# Patient Record
Sex: FEMALE | Race: BLACK OR AFRICAN AMERICAN | Employment: UNEMPLOYED | URBAN - METROPOLITAN AREA
[De-identification: names, ages, dates, MRNs, and addresses within clinical notes are randomized per-mention and may not be internally consistent; named-entity substitution may affect disease eponyms.]

---

## 2017-01-13 DIAGNOSIS — J45.40 MODERATE PERSISTENT ASTHMA WITHOUT COMPLICATION: ICD-10-CM

## 2017-01-13 RX ORDER — BUDESONIDE AND FORMOTEROL FUMARATE DIHYDRATE 160; 4.5 UG/1; UG/1
2 AEROSOL RESPIRATORY (INHALATION) 2 TIMES DAILY
Qty: 1 INHALER | Refills: 3 | Status: SHIPPED | OUTPATIENT
Start: 2017-01-13 | End: 2022-10-10

## 2017-07-06 ENCOUNTER — APPOINTMENT (OUTPATIENT)
Dept: CT IMAGING | Age: 27
End: 2017-07-06
Payer: MEDICARE

## 2017-07-06 ENCOUNTER — HOSPITAL ENCOUNTER (EMERGENCY)
Age: 27
Discharge: HOME | End: 2017-07-07
Attending: EMERGENCY MEDICINE
Payer: MEDICARE

## 2017-07-06 DIAGNOSIS — N83.209 HEMORRHAGIC CYST OF OVARY: ICD-10-CM

## 2017-07-06 DIAGNOSIS — N39.0 URINARY TRACT INFECTION, SITE UNSPECIFIED: ICD-10-CM

## 2017-07-06 DIAGNOSIS — K80.20 CHOLELITHIASIS WITHOUT CHOLECYSTITIS: ICD-10-CM

## 2017-07-06 DIAGNOSIS — R10.10 UPPER ABDOMINAL PAIN: Primary | ICD-10-CM

## 2017-07-06 DIAGNOSIS — R73.9 HYPERGLYCEMIA: ICD-10-CM

## 2017-07-06 LAB
-: ABNORMAL
ABSOLUTE EOS #: 0.1 K/UL (ref 0–0.4)
ABSOLUTE LYMPH #: 2.9 K/UL (ref 1–4.8)
ABSOLUTE MONO #: 0.4 K/UL (ref 0.1–1.3)
AMORPHOUS: ABNORMAL
ANION GAP SERPL CALCULATED.3IONS-SCNC: 15 MMOL/L (ref 9–17)
BACTERIA: ABNORMAL
BASOPHILS # BLD: 0 %
BASOPHILS ABSOLUTE: 0 K/UL (ref 0–0.2)
BILIRUBIN URINE: NEGATIVE
BUN BLDV-MCNC: 12 MG/DL (ref 6–20)
BUN/CREAT BLD: ABNORMAL (ref 9–20)
CALCIUM SERPL-MCNC: 9.2 MG/DL (ref 8.6–10.4)
CASTS UA: ABNORMAL /LPF
CHLORIDE BLD-SCNC: 101 MMOL/L (ref 98–107)
CO2: 24 MMOL/L (ref 20–31)
COLOR: YELLOW
COMMENT UA: ABNORMAL
CREAT SERPL-MCNC: 0.71 MG/DL (ref 0.5–0.9)
CRYSTALS, UA: ABNORMAL /HPF
DIFFERENTIAL TYPE: NORMAL
EOSINOPHILS RELATIVE PERCENT: 1 %
EPITHELIAL CELLS UA: ABNORMAL /HPF
GFR AFRICAN AMERICAN: >60 ML/MIN
GFR NON-AFRICAN AMERICAN: >60 ML/MIN
GFR SERPL CREATININE-BSD FRML MDRD: ABNORMAL ML/MIN/{1.73_M2}
GFR SERPL CREATININE-BSD FRML MDRD: ABNORMAL ML/MIN/{1.73_M2}
GLUCOSE BLD-MCNC: 351 MG/DL (ref 70–99)
GLUCOSE URINE: ABNORMAL
HCG QUALITATIVE: NEGATIVE
HCT VFR BLD CALC: 43 % (ref 36–46)
HEMOGLOBIN: 14.7 G/DL (ref 12–16)
KETONES, URINE: ABNORMAL
LEUKOCYTE ESTERASE, URINE: NEGATIVE
LIPASE: 46 U/L (ref 13–60)
LYMPHOCYTES # BLD: 32 %
MCH RBC QN AUTO: 29.4 PG (ref 26–34)
MCHC RBC AUTO-ENTMCNC: 34.2 G/DL (ref 31–37)
MCV RBC AUTO: 85.9 FL (ref 80–100)
MONOCYTES # BLD: 5 %
MUCUS: ABNORMAL
NITRITE, URINE: NEGATIVE
OTHER OBSERVATIONS UA: ABNORMAL
PDW BLD-RTO: 12.6 % (ref 11.5–14.9)
PH UA: 6 (ref 5–8)
PLATELET # BLD: 305 K/UL (ref 150–450)
PLATELET ESTIMATE: NORMAL
PMV BLD AUTO: 8.5 FL (ref 6–12)
POTASSIUM SERPL-SCNC: 4.1 MMOL/L (ref 3.7–5.3)
PROTEIN UA: ABNORMAL
RBC # BLD: 5.01 M/UL (ref 4–5.2)
RBC # BLD: NORMAL 10*6/UL
RBC UA: ABNORMAL /HPF
RENAL EPITHELIAL, UA: ABNORMAL /HPF
SEG NEUTROPHILS: 62 %
SEGMENTED NEUTROPHILS ABSOLUTE COUNT: 5.6 K/UL (ref 1.3–9.1)
SODIUM BLD-SCNC: 140 MMOL/L (ref 135–144)
SPECIFIC GRAVITY UA: 1.03 (ref 1–1.03)
TRICHOMONAS: ABNORMAL
TURBIDITY: CLEAR
URINE HGB: ABNORMAL
UROBILINOGEN, URINE: NORMAL
WBC # BLD: 9.1 K/UL (ref 3.5–11)
WBC # BLD: NORMAL 10*3/UL
WBC UA: ABNORMAL /HPF
YEAST: ABNORMAL

## 2017-07-06 PROCEDURE — 81001 URINALYSIS AUTO W/SCOPE: CPT

## 2017-07-06 PROCEDURE — S0028 INJECTION, FAMOTIDINE, 20 MG: HCPCS | Performed by: EMERGENCY MEDICINE

## 2017-07-06 PROCEDURE — 74177 CT ABD & PELVIS W/CONTRAST: CPT

## 2017-07-06 PROCEDURE — 83690 ASSAY OF LIPASE: CPT

## 2017-07-06 PROCEDURE — 2580000003 HC RX 258: Performed by: EMERGENCY MEDICINE

## 2017-07-06 PROCEDURE — 6360000002 HC RX W HCPCS: Performed by: EMERGENCY MEDICINE

## 2017-07-06 PROCEDURE — 80076 HEPATIC FUNCTION PANEL: CPT

## 2017-07-06 PROCEDURE — 80048 BASIC METABOLIC PNL TOTAL CA: CPT

## 2017-07-06 PROCEDURE — 96375 TX/PRO/DX INJ NEW DRUG ADDON: CPT

## 2017-07-06 PROCEDURE — 87086 URINE CULTURE/COLONY COUNT: CPT

## 2017-07-06 PROCEDURE — 86403 PARTICLE AGGLUT ANTBDY SCRN: CPT

## 2017-07-06 PROCEDURE — 85025 COMPLETE CBC W/AUTO DIFF WBC: CPT

## 2017-07-06 PROCEDURE — 99284 EMERGENCY DEPT VISIT MOD MDM: CPT

## 2017-07-06 PROCEDURE — 6360000004 HC RX CONTRAST MEDICATION: Performed by: EMERGENCY MEDICINE

## 2017-07-06 PROCEDURE — 96374 THER/PROPH/DIAG INJ IV PUSH: CPT

## 2017-07-06 PROCEDURE — 2500000003 HC RX 250 WO HCPCS: Performed by: EMERGENCY MEDICINE

## 2017-07-06 PROCEDURE — 36415 COLL VENOUS BLD VENIPUNCTURE: CPT

## 2017-07-06 PROCEDURE — 82010 KETONE BODYS QUAN: CPT

## 2017-07-06 PROCEDURE — 84703 CHORIONIC GONADOTROPIN ASSAY: CPT

## 2017-07-06 RX ORDER — FENTANYL CITRATE 50 UG/ML
75 INJECTION, SOLUTION INTRAMUSCULAR; INTRAVENOUS ONCE
Status: COMPLETED | OUTPATIENT
Start: 2017-07-06 | End: 2017-07-06

## 2017-07-06 RX ORDER — ONDANSETRON 2 MG/ML
4 INJECTION INTRAMUSCULAR; INTRAVENOUS ONCE
Status: COMPLETED | OUTPATIENT
Start: 2017-07-06 | End: 2017-07-06

## 2017-07-06 RX ORDER — 0.9 % SODIUM CHLORIDE 0.9 %
1000 INTRAVENOUS SOLUTION INTRAVENOUS ONCE
Status: COMPLETED | OUTPATIENT
Start: 2017-07-06 | End: 2017-07-06

## 2017-07-06 RX ORDER — SODIUM CHLORIDE 0.9 % (FLUSH) 0.9 %
10 SYRINGE (ML) INJECTION PRN
Status: DISCONTINUED | OUTPATIENT
Start: 2017-07-06 | End: 2017-07-07 | Stop reason: HOSPADM

## 2017-07-06 RX ORDER — 0.9 % SODIUM CHLORIDE 0.9 %
100 INTRAVENOUS SOLUTION INTRAVENOUS ONCE
Status: COMPLETED | OUTPATIENT
Start: 2017-07-06 | End: 2017-07-06

## 2017-07-06 RX ADMIN — IOVERSOL 130 ML: 741 INJECTION INTRA-ARTERIAL; INTRAVENOUS at 23:28

## 2017-07-06 RX ADMIN — FENTANYL CITRATE 75 MCG: 50 INJECTION, SOLUTION INTRAMUSCULAR; INTRAVENOUS at 22:45

## 2017-07-06 RX ADMIN — FAMOTIDINE 20 MG: 10 INJECTION, SOLUTION INTRAVENOUS at 22:26

## 2017-07-06 RX ADMIN — Medication 10 ML: at 23:28

## 2017-07-06 RX ADMIN — SODIUM CHLORIDE 100 ML: 9 INJECTION, SOLUTION INTRAVENOUS at 23:28

## 2017-07-06 RX ADMIN — ONDANSETRON 4 MG: 2 INJECTION INTRAMUSCULAR; INTRAVENOUS at 22:45

## 2017-07-06 RX ADMIN — SODIUM CHLORIDE 1000 ML: 9 INJECTION, SOLUTION INTRAVENOUS at 22:26

## 2017-07-06 ASSESSMENT — ENCOUNTER SYMPTOMS
SORE THROAT: 0
DIARRHEA: 0
EYE PAIN: 0
NAUSEA: 1
BACK PAIN: 0
ABDOMINAL PAIN: 1
COUGH: 0
VOMITING: 0
SHORTNESS OF BREATH: 0

## 2017-07-06 ASSESSMENT — PAIN SCALES - GENERAL
PAINLEVEL_OUTOF10: 7
PAINLEVEL_OUTOF10: 10
PAINLEVEL_OUTOF10: 10

## 2017-07-07 VITALS
RESPIRATION RATE: 16 BRPM | OXYGEN SATURATION: 99 % | HEIGHT: 64 IN | BODY MASS INDEX: 32.44 KG/M2 | TEMPERATURE: 98.5 F | WEIGHT: 190 LBS | SYSTOLIC BLOOD PRESSURE: 162 MMHG | HEART RATE: 92 BPM | DIASTOLIC BLOOD PRESSURE: 96 MMHG

## 2017-07-07 LAB
ALBUMIN SERPL-MCNC: 4 G/DL (ref 3.5–5.2)
ALBUMIN/GLOBULIN RATIO: ABNORMAL (ref 1–2.5)
ALP BLD-CCNC: 53 U/L (ref 35–104)
ALT SERPL-CCNC: 13 U/L (ref 5–33)
AST SERPL-CCNC: 11 U/L
BETA-HYDROXYBUTYRATE: 0.42 MMOL/L (ref 0.02–0.27)
BILIRUB SERPL-MCNC: 0.24 MG/DL (ref 0.3–1.2)
BILIRUBIN DIRECT: <0.08 MG/DL
BILIRUBIN, INDIRECT: ABNORMAL MG/DL (ref 0–1)
GLOBULIN: ABNORMAL G/DL (ref 1.5–3.8)
TOTAL PROTEIN: 7.7 G/DL (ref 6.4–8.3)

## 2017-07-07 PROCEDURE — 6360000002 HC RX W HCPCS: Performed by: EMERGENCY MEDICINE

## 2017-07-07 PROCEDURE — 96375 TX/PRO/DX INJ NEW DRUG ADDON: CPT

## 2017-07-07 PROCEDURE — 6370000000 HC RX 637 (ALT 250 FOR IP): Performed by: EMERGENCY MEDICINE

## 2017-07-07 RX ORDER — NITROFURANTOIN 25; 75 MG/1; MG/1
100 CAPSULE ORAL 2 TIMES DAILY
Qty: 10 CAPSULE | Refills: 0 | Status: SHIPPED | OUTPATIENT
Start: 2017-07-07 | End: 2017-07-12

## 2017-07-07 RX ORDER — HYDROCODONE BITARTRATE AND ACETAMINOPHEN 5; 325 MG/1; MG/1
1 TABLET ORAL ONCE
Status: COMPLETED | OUTPATIENT
Start: 2017-07-07 | End: 2017-07-07

## 2017-07-07 RX ORDER — HYDROCODONE BITARTRATE AND ACETAMINOPHEN 5; 325 MG/1; MG/1
1 TABLET ORAL EVERY 6 HOURS PRN
Qty: 8 TABLET | Refills: 0 | Status: SHIPPED | OUTPATIENT
Start: 2017-07-07 | End: 2017-07-14

## 2017-07-07 RX ORDER — IBUPROFEN 800 MG/1
800 TABLET ORAL EVERY 8 HOURS PRN
Qty: 30 TABLET | Refills: 0 | Status: SHIPPED | OUTPATIENT
Start: 2017-07-07 | End: 2017-12-30 | Stop reason: SDUPTHER

## 2017-07-07 RX ORDER — KETOROLAC TROMETHAMINE 30 MG/ML
30 INJECTION, SOLUTION INTRAMUSCULAR; INTRAVENOUS ONCE
Status: COMPLETED | OUTPATIENT
Start: 2017-07-07 | End: 2017-07-07

## 2017-07-07 RX ADMIN — KETOROLAC TROMETHAMINE 30 MG: 30 INJECTION, SOLUTION INTRAMUSCULAR at 00:53

## 2017-07-07 RX ADMIN — HYDROCODONE BITARTRATE AND ACETAMINOPHEN 1 TABLET: 5; 325 TABLET ORAL at 00:52

## 2017-07-07 ASSESSMENT — PAIN SCALES - GENERAL
PAINLEVEL_OUTOF10: 5
PAINLEVEL_OUTOF10: 7

## 2017-07-08 LAB
CULTURE: ABNORMAL
CULTURE: ABNORMAL
Lab: ABNORMAL
SPECIMEN DESCRIPTION: ABNORMAL
SPECIMEN DESCRIPTION: ABNORMAL
STATUS: ABNORMAL

## 2017-12-29 ENCOUNTER — HOSPITAL ENCOUNTER (EMERGENCY)
Age: 27
Discharge: HOME | End: 2017-12-30
Attending: EMERGENCY MEDICINE
Payer: MEDICARE

## 2017-12-29 DIAGNOSIS — R10.2 SUPRAPUBIC ABDOMINAL PAIN: ICD-10-CM

## 2017-12-29 DIAGNOSIS — N73.0 PID (ACUTE PELVIC INFLAMMATORY DISEASE): Primary | ICD-10-CM

## 2017-12-29 DIAGNOSIS — R73.9 HYPERGLYCEMIA: ICD-10-CM

## 2017-12-29 LAB — HCG(URINE) PREGNANCY TEST: NEGATIVE

## 2017-12-29 PROCEDURE — 81001 URINALYSIS AUTO W/SCOPE: CPT

## 2017-12-29 PROCEDURE — 99284 EMERGENCY DEPT VISIT MOD MDM: CPT

## 2017-12-29 PROCEDURE — 84703 CHORIONIC GONADOTROPIN ASSAY: CPT

## 2017-12-29 PROCEDURE — 86403 PARTICLE AGGLUT ANTBDY SCRN: CPT

## 2017-12-29 PROCEDURE — 87086 URINE CULTURE/COLONY COUNT: CPT

## 2017-12-29 ASSESSMENT — PAIN DESCRIPTION - LOCATION: LOCATION: BACK;ABDOMEN

## 2017-12-29 ASSESSMENT — PAIN DESCRIPTION - PAIN TYPE: TYPE: ACUTE PAIN

## 2017-12-29 ASSESSMENT — PAIN SCALES - GENERAL: PAINLEVEL_OUTOF10: 8

## 2017-12-30 VITALS
DIASTOLIC BLOOD PRESSURE: 85 MMHG | OXYGEN SATURATION: 98 % | TEMPERATURE: 97.6 F | WEIGHT: 172 LBS | RESPIRATION RATE: 17 BRPM | BODY MASS INDEX: 29.37 KG/M2 | HEART RATE: 85 BPM | SYSTOLIC BLOOD PRESSURE: 170 MMHG | HEIGHT: 64 IN

## 2017-12-30 LAB
-: ABNORMAL
AMORPHOUS: ABNORMAL
BACTERIA: ABNORMAL
BILIRUBIN URINE: NEGATIVE
CASTS UA: ABNORMAL /LPF
CHP ED QC CHECK: NORMAL
COLOR: YELLOW
COMMENT UA: ABNORMAL
CRYSTALS, UA: ABNORMAL /HPF
EPITHELIAL CELLS UA: ABNORMAL /HPF
GLUCOSE BLD-MCNC: 389 MG/DL
GLUCOSE BLD-MCNC: 389 MG/DL (ref 65–105)
GLUCOSE BLD-MCNC: 470 MG/DL
GLUCOSE BLD-MCNC: 470 MG/DL (ref 65–105)
GLUCOSE URINE: ABNORMAL
KETONES, URINE: NEGATIVE
LEUKOCYTE ESTERASE, URINE: NEGATIVE
MUCUS: ABNORMAL
NITRITE, URINE: NEGATIVE
OTHER OBSERVATIONS UA: ABNORMAL
PH UA: 7 (ref 5–8)
PROTEIN UA: ABNORMAL
RBC UA: ABNORMAL /HPF
RENAL EPITHELIAL, UA: ABNORMAL /HPF
SPECIFIC GRAVITY UA: 1.03 (ref 1–1.03)
TRICHOMONAS: ABNORMAL
TURBIDITY: CLEAR
URINE HGB: ABNORMAL
UROBILINOGEN, URINE: NORMAL
WBC UA: ABNORMAL /HPF
YEAST: ABNORMAL

## 2017-12-30 PROCEDURE — 6370000000 HC RX 637 (ALT 250 FOR IP)

## 2017-12-30 PROCEDURE — 96372 THER/PROPH/DIAG INJ SC/IM: CPT

## 2017-12-30 PROCEDURE — 82947 ASSAY GLUCOSE BLOOD QUANT: CPT

## 2017-12-30 PROCEDURE — 6370000000 HC RX 637 (ALT 250 FOR IP): Performed by: EMERGENCY MEDICINE

## 2017-12-30 RX ORDER — DOXYCYCLINE HYCLATE 100 MG
100 TABLET ORAL 2 TIMES DAILY
Qty: 28 TABLET | Refills: 0 | Status: SHIPPED | OUTPATIENT
Start: 2017-12-30 | End: 2018-01-13

## 2017-12-30 RX ORDER — IBUPROFEN 600 MG/1
600 TABLET ORAL EVERY 6 HOURS PRN
Qty: 30 TABLET | Refills: 0 | Status: SHIPPED | OUTPATIENT
Start: 2017-12-30 | End: 2022-10-10

## 2017-12-30 RX ORDER — ACETAMINOPHEN 325 MG/1
650 TABLET ORAL ONCE
Status: COMPLETED | OUTPATIENT
Start: 2017-12-30 | End: 2017-12-30

## 2017-12-30 RX ORDER — DOXYCYCLINE 100 MG/1
100 CAPSULE ORAL ONCE
Status: COMPLETED | OUTPATIENT
Start: 2017-12-30 | End: 2017-12-30

## 2017-12-30 RX ADMIN — DOXYCYCLINE 100 MG: 100 CAPSULE ORAL at 00:59

## 2017-12-30 RX ADMIN — ACETAMINOPHEN 650 MG: 325 TABLET ORAL at 01:52

## 2017-12-30 RX ADMIN — INSULIN LISPRO 16 UNITS: 100 INJECTION, SOLUTION INTRAVENOUS; SUBCUTANEOUS at 01:06

## 2017-12-30 ASSESSMENT — PAIN SCALES - GENERAL: PAINLEVEL_OUTOF10: 8

## 2017-12-30 NOTE — ED PROVIDER NOTES
[address]  eMERGENCY dEPARTMENT eNCOUnter      Pt Name: Francisca Núñez  MRN: [id number]  [place] 1990  Date of evaluation: 12/29/17      CHIEF COMPLAINT:  Chief Complaint   Patient presents with    Abdominal Pain    Back Pain       HISTORY OF PRESENT ILLNESS    Kennedy Núñez is a 32 y.o. female who presents with Persistent abdominal pain that is lower in nature, rating to her back which is more chronic in nature. Patient Reports just seen and evaluated by her ObGyn diagnosed with STDs and treated with azithromycin and Rocephin. Patient denies any irregular vaginal discharge or bleeding, she is currently on her period. Denies fevers chills nausea vomiting diarrhea chest pain shortness breath. Persistent low abdominal pain ongoing for approximately 1-2 weeks context of recently diagnosed such  sexually transmitted diseases, Constant duration and no modifying factors moderate severity. REVIEW OF SYSTEMS       Review of Systems   Constitutional: Negative for chills and fever. HENT: Negative for ear pain and sore throat. Eyes: Negative for pain and visual disturbance. Respiratory: Negative for cough and shortness of breath. Cardiovascular: Negative for chest pain. Gastrointestinal: Negative for abdominal pain, diarrhea, nausea and vomiting. Endocrine: Negative for polydipsia and polyuria. Genitourinary: Positive for pelvic pain. Negative for dysuria, hematuria and vaginal discharge. Musculoskeletal: Negative for arthralgias and back pain. Skin: Negative for rash. Allergic/Immunologic: Negative for food allergies. Neurological: Negative for weakness, numbness and headaches. Hematological: Does not bruise/bleed easily. Psychiatric/Behavioral: Negative for self-injury and suicidal ideas. The patient is not nervous/anxious. PAST MEDICAL HISTORY   PMH:  has a past medical history of Asthma; Diabetes mellitus ([address] Headache, migraine;  Hypertension; and Type 2 diabetes mellitus without complication ([facility] [address] Surgical History:  has a past surgical history that includes Ovary removal and Ovary removal.  Social History:  reports that she has never smoked. She does not have any smokeless tobacco history on file. She reports that she does not drink alcohol or use drugs. Family History: Noncontributory at this time  Psychiatric History: Noncontributory at this time    Allergies:is allergic to morphine and januvia [sitagliptin]. PHYSICAL EXAM     INITIAL VITALS: BP (!) 170/85   Pulse 85   Temp 97.6 °F (36.4 °C) (Oral)   Resp 17   Ht 5' 4\" (1.626 m)   Wt 172 lb (78 kg)   LMP 12/27/2017   SpO2 98%   BMI 29.52 kg/m²     Physical Exam   Constitutional: She is oriented to person, place, and time. She appears well-developed and well-nourished. HENT:   Head: Normocephalic and atraumatic. Right Ear: External ear normal.   Left Ear: External ear normal.   Nose: Nose normal.   Mouth/Throat: Oropharynx is clear and moist.   Eyes: Conjunctivae and EOM are normal. Pupils are equal, round, and reactive to light. Right eye exhibits no discharge. Left eye exhibits no discharge. Neck: Normal range of motion. Neck supple. No tracheal deviation present. Cardiovascular: Normal rate, regular rhythm, normal heart sounds and intact distal pulses. Exam reveals no gallop and no friction rub. No murmur heard. Pulmonary/Chest: Effort normal and breath sounds normal. No respiratory distress. She has no wheezes. She has no rales. She exhibits no tenderness. Abdominal: Soft. Bowel sounds are normal. She exhibits no distension and no mass. There is no tenderness. There is no rebound and no guarding. Musculoskeletal: Normal range of motion. She exhibits no edema or tenderness. Neurological: She is alert and oriented to person, place, and time. She has normal reflexes. No cranial nerve deficit. Skin: No rash noted. She is not diaphoretic.    Psychiatric: She has a normal mood and context of Recently treated STI, therefore we will treat for PID with 14 day course of doxycycline. Follow-up ObGyn for reevaluation. Patient's glucose level is trending down, she states she has outpatient follow-up for diabetes and encouraged reeducation. CRITICAL CARE:   This note was late noted and review of results show positive Trichomonas which was not identified during patient's stay. We'll notify the charge nurse to have Flagyl prescription called in for patient, As it is unclear whether patient was treated for trichomoniasis in addition to gonorrhea and chlamydia. CONSULTS:  None      FINAL IMPRESSION      1. PID (acute pelvic inflammatory disease)    2. Hyperglycemia    3.  Suprapubic abdominal pain          DISPOSITION/PLAN:  DISPOSITION Decision To Discharge 12/30/2017 01:47:57 AM        PATIENT REFERRED TO:  [address] [place] [id number]  [phone]  Go to   As needed, If symptoms worsen    Your primary care physician at the health department    Call in 1 week  Re-Evaluation      DISCHARGE MEDICATIONS:  Discharge Medication List as of 12/30/2017  1:50 AM      START taking these medications    Details   doxycycline hyclate (VIBRA-TABS) 100 MG tablet Take 1 tablet by mouth 2 times daily for 14 days, Disp-28 tablet, R-0Print             (Please note that portions of this note were completed with a voice recognition program.  Efforts were made to edit the dictations but occasionally words are mis-transcribed.)    Isaias Angel MD  Attending Emergency Physician            Isaias Angel MD  01/03/18 3065 [facility] Aly Medley MD  01/03/18 [id number]

## 2018-01-03 ASSESSMENT — ENCOUNTER SYMPTOMS
ABDOMINAL PAIN: 0
BACK PAIN: 0
EYE PAIN: 0
SHORTNESS OF BREATH: 0
COUGH: 0
DIARRHEA: 0
NAUSEA: 0
SORE THROAT: 0
VOMITING: 0

## 2019-05-07 LAB
BUN BLDV-MCNC: 22 MG/DL (ref 7–25)
CALCIUM SERPL-MCNC: 9.4 MG/DL (ref 8.6–10.3)
CHLORIDE BLD-SCNC: 100 MEQ/L (ref 98–107)
CO2: 24 MEQ/L (ref 21–31)
CREAT SERPL-MCNC: 0.83 MG/DL (ref 0.6–1.2)
EGFR AFRICAN AMERICAN: >60 ML/MIN/1.73SQ M
EGFR IF NONAFRICAN AMERICAN: >60 ML/MIN/1.73SQ M
GLUCOSE: 492 MG/DL (ref 70–100)
HCT VFR BLD CALC: 39.3 % (ref 36–45)
HEMOGLOBIN: 13.2 G/DL (ref 12–15)
INR BLD: 1.03 (ref 0.91–1.16)
MCH RBC QN AUTO: 27.9 PG (ref 27–33)
MCHC RBC AUTO-ENTMCNC: 33.6 G/DL (ref 32–35)
MCV RBC AUTO: 83.1 FL (ref 82–98)
NUCLEATED RED BLOOD CELLS: 0 % (ref 0–0)
PDW BLD-RTO: 12.1 % (ref 11.5–15)
PLATELET # BLD: 365 10*3/UL (ref 150–400)
POTASSIUM SERPL-SCNC: 4.3 MEQ/L (ref 3.5–5.1)
PT: 13.5 SEC (ref 12.3–14.8)
RBC: 4.73 10*6/UL (ref 3.8–5)
SODIUM BLD-SCNC: 132 MEQ/L (ref 136–145)
WBC: 8.03 10*3/UL (ref 4–10.6)

## 2019-05-31 LAB
GLUCOSE BLD-MCNC: 149 MG/DL (ref 70–100)
GLUCOSE BLD-MCNC: 217 MG/DL (ref 70–100)
GLUCOSE BLD-MCNC: 217 MG/DL (ref 70–100)
GLUCOSE BLD-MCNC: 232 MG/DL (ref 70–100)
PREGNANCY TEST URINE, POC: NEGATIVE

## 2019-06-01 LAB
GLUCOSE BLD-MCNC: 255 MG/DL (ref 70–100)
GLUCOSE BLD-MCNC: 324 MG/DL (ref 70–100)

## 2022-10-10 ENCOUNTER — APPOINTMENT (OUTPATIENT)
Dept: CT IMAGING | Age: 32
End: 2022-10-10
Payer: MEDICARE

## 2022-10-10 ENCOUNTER — HOSPITAL ENCOUNTER (EMERGENCY)
Age: 32
Discharge: ANOTHER HOSPITAL | End: 2022-10-11
Attending: EMERGENCY MEDICINE
Payer: MEDICARE

## 2022-10-10 DIAGNOSIS — K92.2 LOWER GI BLEED: Primary | ICD-10-CM

## 2022-10-10 LAB
ABSOLUTE BANDS #: 1.71 K/UL (ref 0–1)
ABSOLUTE EOS #: 0 K/UL (ref 0–0.4)
ABSOLUTE LYMPH #: 1.46 K/UL (ref 1–4.8)
ABSOLUTE MONO #: 0.98 K/UL (ref 0.1–1.3)
ALBUMIN SERPL-MCNC: 3.6 G/DL (ref 3.5–5.2)
ALP BLD-CCNC: 136 U/L (ref 35–104)
ALT SERPL-CCNC: 105 U/L (ref 5–33)
ANION GAP SERPL CALCULATED.3IONS-SCNC: 13 MMOL/L (ref 9–17)
AST SERPL-CCNC: 72 U/L
BANDS: 7 % (ref 0–10)
BASOPHILS # BLD: 0 % (ref 0–2)
BASOPHILS ABSOLUTE: 0 K/UL (ref 0–0.2)
BETA-HYDROXYBUTYRATE: 0.6 MMOL/L (ref 0.02–0.27)
BILIRUB SERPL-MCNC: 0.2 MG/DL (ref 0.3–1.2)
BILIRUBIN DIRECT: <0.1 MG/DL
BILIRUBIN, INDIRECT: ABNORMAL MG/DL (ref 0–1)
BUN BLDV-MCNC: 33 MG/DL (ref 6–20)
CALCIUM SERPL-MCNC: 9.6 MG/DL (ref 8.6–10.4)
CHLORIDE BLD-SCNC: 92 MMOL/L (ref 98–107)
CO2: 25 MMOL/L (ref 20–31)
CREAT SERPL-MCNC: 4.2 MG/DL (ref 0.5–0.9)
EOSINOPHILS RELATIVE PERCENT: 0 % (ref 0–4)
GFR SERPL CREATININE-BSD FRML MDRD: 14 ML/MIN/1.73M2
GLUCOSE BLD-MCNC: 161 MG/DL (ref 70–99)
HCT VFR BLD CALC: 21.9 % (ref 36–46)
HEMOGLOBIN: 6.9 G/DL (ref 12–16)
INR BLD: 2.1
LACTIC ACID, SEPSIS: 0.9 MMOL/L (ref 0.5–1.9)
LYMPHOCYTES # BLD: 6 % (ref 24–44)
MCH RBC QN AUTO: 28.2 PG (ref 26–34)
MCHC RBC AUTO-ENTMCNC: 31.6 G/DL (ref 31–37)
MCV RBC AUTO: 89.1 FL (ref 80–100)
MONOCYTES # BLD: 4 % (ref 1–7)
MORPHOLOGY: ABNORMAL
PARTIAL THROMBOPLASTIN TIME: 40.8 SEC (ref 24–36)
PDW BLD-RTO: 15.7 % (ref 11.5–14.9)
PLATELET # BLD: 433 K/UL (ref 150–450)
PMV BLD AUTO: 6.9 FL (ref 6–12)
POTASSIUM SERPL-SCNC: 4.1 MMOL/L (ref 3.7–5.3)
PROTHROMBIN TIME: 23.3 SEC (ref 11.8–14.6)
RBC # BLD: 2.46 M/UL (ref 4–5.2)
SEG NEUTROPHILS: 83 % (ref 36–66)
SEGMENTED NEUTROPHILS ABSOLUTE COUNT: 20.25 K/UL (ref 1.3–9.1)
SODIUM BLD-SCNC: 130 MMOL/L (ref 135–144)
TOTAL PROTEIN: 7.1 G/DL (ref 6.4–8.3)
TROPONIN, HIGH SENSITIVITY: 113 NG/L (ref 0–14)
TROPONIN, HIGH SENSITIVITY: 96 NG/L (ref 0–14)
WBC # BLD: 24.4 K/UL (ref 3.5–11)

## 2022-10-10 PROCEDURE — 6360000002 HC RX W HCPCS: Performed by: STUDENT IN AN ORGANIZED HEALTH CARE EDUCATION/TRAINING PROGRAM

## 2022-10-10 PROCEDURE — 82010 KETONE BODYS QUAN: CPT

## 2022-10-10 PROCEDURE — 86920 COMPATIBILITY TEST SPIN: CPT

## 2022-10-10 PROCEDURE — 85610 PROTHROMBIN TIME: CPT

## 2022-10-10 PROCEDURE — 93005 ELECTROCARDIOGRAM TRACING: CPT | Performed by: STUDENT IN AN ORGANIZED HEALTH CARE EDUCATION/TRAINING PROGRAM

## 2022-10-10 PROCEDURE — 82805 BLOOD GASES W/O2 SATURATION: CPT

## 2022-10-10 PROCEDURE — 80048 BASIC METABOLIC PNL TOTAL CA: CPT

## 2022-10-10 PROCEDURE — P9016 RBC LEUKOCYTES REDUCED: HCPCS

## 2022-10-10 PROCEDURE — 36415 COLL VENOUS BLD VENIPUNCTURE: CPT

## 2022-10-10 PROCEDURE — 70450 CT HEAD/BRAIN W/O DYE: CPT

## 2022-10-10 PROCEDURE — 83605 ASSAY OF LACTIC ACID: CPT

## 2022-10-10 PROCEDURE — 86850 RBC ANTIBODY SCREEN: CPT

## 2022-10-10 PROCEDURE — 96365 THER/PROPH/DIAG IV INF INIT: CPT

## 2022-10-10 PROCEDURE — 74174 CTA ABD&PLVS W/CONTRAST: CPT

## 2022-10-10 PROCEDURE — 85730 THROMBOPLASTIN TIME PARTIAL: CPT

## 2022-10-10 PROCEDURE — 80076 HEPATIC FUNCTION PANEL: CPT

## 2022-10-10 PROCEDURE — 86900 BLOOD TYPING SEROLOGIC ABO: CPT

## 2022-10-10 PROCEDURE — 99285 EMERGENCY DEPT VISIT HI MDM: CPT

## 2022-10-10 PROCEDURE — 36430 TRANSFUSION BLD/BLD COMPNT: CPT

## 2022-10-10 PROCEDURE — 86901 BLOOD TYPING SEROLOGIC RH(D): CPT

## 2022-10-10 PROCEDURE — 2580000003 HC RX 258: Performed by: STUDENT IN AN ORGANIZED HEALTH CARE EDUCATION/TRAINING PROGRAM

## 2022-10-10 PROCEDURE — 84484 ASSAY OF TROPONIN QUANT: CPT

## 2022-10-10 PROCEDURE — 85025 COMPLETE CBC W/AUTO DIFF WBC: CPT

## 2022-10-10 PROCEDURE — 6360000004 HC RX CONTRAST MEDICATION: Performed by: STUDENT IN AN ORGANIZED HEALTH CARE EDUCATION/TRAINING PROGRAM

## 2022-10-10 RX ORDER — ACETAMINOPHEN 325 MG/1
650 TABLET ORAL EVERY 6 HOURS PRN
COMMUNITY

## 2022-10-10 RX ORDER — SODIUM CHLORIDE 9 MG/ML
50 INJECTION, SOLUTION INTRAVENOUS ONCE
Status: COMPLETED | OUTPATIENT
Start: 2022-10-10 | End: 2022-10-10

## 2022-10-10 RX ORDER — POLYETHYLENE GLYCOL 3350 17 G/17G
17 POWDER, FOR SOLUTION ORAL DAILY PRN
COMMUNITY

## 2022-10-10 RX ORDER — INSULIN LISPRO 100 [IU]/ML
15 INJECTION, SOLUTION INTRAVENOUS; SUBCUTANEOUS
COMMUNITY
End: 2022-10-19

## 2022-10-10 RX ORDER — HYDRALAZINE HYDROCHLORIDE 100 MG/1
100 TABLET, FILM COATED ORAL 3 TIMES DAILY
COMMUNITY

## 2022-10-10 RX ORDER — RIVAROXABAN 2.5 MG/1
2.5 TABLET, FILM COATED ORAL 2 TIMES DAILY WITH MEALS
COMMUNITY
End: 2022-10-19

## 2022-10-10 RX ORDER — PATIROMER 8.4 G/1
8.4 POWDER, FOR SUSPENSION ORAL
COMMUNITY

## 2022-10-10 RX ORDER — SODIUM CHLORIDE 0.9 % (FLUSH) 0.9 %
10 SYRINGE (ML) INJECTION PRN
Status: DISCONTINUED | OUTPATIENT
Start: 2022-10-10 | End: 2022-10-11 | Stop reason: HOSPADM

## 2022-10-10 RX ORDER — LOSARTAN POTASSIUM 50 MG/1
50 TABLET ORAL DAILY
COMMUNITY
End: 2022-10-19

## 2022-10-10 RX ORDER — CLOPIDOGREL BISULFATE 75 MG/1
75 TABLET ORAL DAILY
COMMUNITY

## 2022-10-10 RX ORDER — B COMPLEX, C NO.20/FOLIC ACID 1 MG
1 CAPSULE ORAL DAILY
COMMUNITY

## 2022-10-10 RX ORDER — ATORVASTATIN CALCIUM 10 MG/1
10 TABLET, FILM COATED ORAL NIGHTLY
COMMUNITY

## 2022-10-10 RX ORDER — 0.9 % SODIUM CHLORIDE 0.9 %
1000 INTRAVENOUS SOLUTION INTRAVENOUS ONCE
Status: COMPLETED | OUTPATIENT
Start: 2022-10-10 | End: 2022-10-10

## 2022-10-10 RX ORDER — INSULIN DETEMIR 100 [IU]/ML
24 INJECTION, SOLUTION SUBCUTANEOUS NIGHTLY
Status: ON HOLD | COMMUNITY
End: 2022-10-31 | Stop reason: HOSPADM

## 2022-10-10 RX ORDER — SEVELAMER CARBONATE 800 MG/1
2 TABLET, FILM COATED ORAL
COMMUNITY
End: 2022-10-19

## 2022-10-10 RX ORDER — SODIUM CHLORIDE 9 MG/ML
INJECTION, SOLUTION INTRAVENOUS PRN
Status: DISCONTINUED | OUTPATIENT
Start: 2022-10-10 | End: 2022-10-11 | Stop reason: HOSPADM

## 2022-10-10 RX ORDER — ASPIRIN 81 MG/1
81 TABLET ORAL DAILY
COMMUNITY

## 2022-10-10 RX ORDER — CALCIUM ACETATE 667 MG/1
3 CAPSULE ORAL
COMMUNITY

## 2022-10-10 RX ORDER — 0.9 % SODIUM CHLORIDE 0.9 %
80 INTRAVENOUS SOLUTION INTRAVENOUS ONCE
Status: COMPLETED | OUTPATIENT
Start: 2022-10-10 | End: 2022-10-10

## 2022-10-10 RX ORDER — CARVEDILOL 25 MG/1
25 TABLET ORAL 2 TIMES DAILY
COMMUNITY

## 2022-10-10 RX ORDER — ALBUTEROL SULFATE 90 UG/1
2 AEROSOL, METERED RESPIRATORY (INHALATION) EVERY 6 HOURS PRN
COMMUNITY

## 2022-10-10 RX ORDER — NIFEDIPINE 90 MG/1
90 TABLET, FILM COATED, EXTENDED RELEASE ORAL DAILY
COMMUNITY

## 2022-10-10 RX ORDER — MIDODRINE HYDROCHLORIDE 10 MG/1
30 TABLET ORAL DAILY
COMMUNITY

## 2022-10-10 RX ORDER — ESCITALOPRAM OXALATE 10 MG/1
10 TABLET ORAL DAILY
Status: ON HOLD | COMMUNITY
End: 2022-10-31 | Stop reason: HOSPADM

## 2022-10-10 RX ORDER — SENNA AND DOCUSATE SODIUM 50; 8.6 MG/1; MG/1
2 TABLET, FILM COATED ORAL NIGHTLY
COMMUNITY

## 2022-10-10 RX ADMIN — PROTHROMBIN, COAGULATION FACTOR VII HUMAN, COAGULATION FACTOR IX HUMAN, COAGULATION FACTOR X HUMAN, PROTEIN C, PROTEIN S HUMAN, AND WATER 2000 UNITS: KIT at 22:20

## 2022-10-10 RX ADMIN — SODIUM CHLORIDE 1000 ML: 9 INJECTION, SOLUTION INTRAVENOUS at 19:45

## 2022-10-10 RX ADMIN — SODIUM CHLORIDE 50 ML: 9 INJECTION, SOLUTION INTRAVENOUS at 22:40

## 2022-10-10 RX ADMIN — IOPAMIDOL 100 ML: 755 INJECTION, SOLUTION INTRAVENOUS at 20:42

## 2022-10-10 RX ADMIN — SODIUM CHLORIDE, PRESERVATIVE FREE 10 ML: 5 INJECTION INTRAVENOUS at 20:42

## 2022-10-10 RX ADMIN — SODIUM CHLORIDE 80 ML: 9 INJECTION, SOLUTION INTRAVENOUS at 20:42

## 2022-10-10 ASSESSMENT — PAIN - FUNCTIONAL ASSESSMENT: PAIN_FUNCTIONAL_ASSESSMENT: NONE - DENIES PAIN

## 2022-10-10 NOTE — ED PROVIDER NOTES
[address]  Emergency Department Encounter  Emergency Medicine Resident     Pt Name:Kennedy Brito  MRN: [id number]  [place] 1990  Date of evaluation: 10/10/22  PCP:  No primary care provider on file. CHIEF COMPLAINT       Chief Complaint   Patient presents with    Altered Mental Status    Rectal Bleeding       HISTORY OF PRESENT ILLNESS  (Location/Symptom, Timing/Onset, Context/Setting, Quality, Duration, Modifying Factors, Severity.)      Kennedy Brito is a 28 y.o. female who presents with altered mental status from nursing home. Past medical history sniffing for diabetes, hypertension as well as acute CVA. Patient is a dialysis patient received her dialysis today. According to EMS patient has been altered for at least 24 hours. Nursing staff at [facility] states that since coming back from dialysis has been altered. States that he think it happened yesterday was her last known well. According to them patient is typically able to hold conversations but does have hemiparesis from acute CVA. They also state that patient had possible vaginal bleeding and but they were unsure if this was her menses or not. PAST MEDICAL / SURGICAL / SOCIAL / FAMILY HISTORY      has a past medical history of Asthma, Atherosclerosis of native arteries of extremities with rest pain, unspecified extremity ([address] Diabetes mellitus ([address] Headache, migraine, Hypertension, Lichen simplex chronicus, Noninflammatory disorder of vagina, unspecified, Patient's noncompliance with other medical treatment and regimen for other reason, and Type 2 diabetes mellitus without complication ([address]        has a past surgical history that includes Ovary removal and Ovary removal.      Social History     Socioeconomic History    Marital status: Single     Spouse name: Not on file    Number of children: Not on file    Years of education: Not on file    Highest education level: Not on file   Occupational History    Not on file Tobacco Use    Smoking status: Never    Smokeless tobacco: Never   Substance and Sexual Activity    Alcohol use: No    Drug use: No    Sexual activity: Not on file   Other Topics Concern    Not on file   Social History Narrative    Not on file     Social Determinants of Health     Financial Resource Strain: Not on file   Food Insecurity: Not on file   Transportation Needs: Not on file   Physical Activity: Not on file   Stress: Not on file   Social Connections: Not on file   Intimate Partner Violence: Not on file   Housing Stability: Not on file       Family History   Problem Relation Age of Onset    High Blood Pressure Mother     Diabetes Mother     Other Mother 27        Lung cancer    High Blood Pressure Father     Diabetes Father     Other Father 27        Prostate cancer       Allergies:  Morphine and Januvia [sitagliptin]    Home Medications:  Prior to Admission medications    Medication Sig Start Date End Date Taking?  Authorizing Provider   acetaminophen (TYLENOL) 325 MG tablet Take 650 mg by mouth every 6 hours as needed for Pain or Fever (do not exceed 3 gm in 24 hours)   Yes Historical Provider, MD   albuterol sulfate HFA (VENTOLIN HFA) 108 (90 Base) MCG/ACT inhaler Inhale 2 puffs into the lungs every 6 hours as needed for Wheezing or Shortness of Breath   Yes Historical Provider, MD   aspirin 81 MG EC tablet Take 81 mg by mouth daily   Yes Historical Provider, MD   atorvastatin (LIPITOR) 10 MG tablet Take 10 mg by mouth at bedtime   Yes Historical Provider, MD   calcium acetate (PHOSLO) 667 MG CAPS capsule Take 3 capsules by mouth 3 times daily (with meals)   Yes Historical Provider, MD   carvedilol (COREG) 25 MG tablet Take 25 mg by mouth 2 times daily   Yes Historical Provider, MD   vitamin D (CHOLECALCIFEROL) 125 MCG (5000 UT) CAPS capsule Take 5,000 Units by mouth daily   Yes Historical Provider, MD   clopidogrel (PLAVIX) 75 MG tablet Take 75 mg by mouth daily   Yes Historical Provider, MD escitalopram (LEXAPRO) 10 MG tablet Take 10 mg by mouth daily   Yes Historical Provider, MD   polyethylene glycol (GLYCOLAX) 17 GM/SCOOP powder Take 17 g by mouth daily as needed (constipation)   Yes Historical Provider, MD   hydrALAZINE (APRESOLINE) 100 MG tablet Take 100 mg by mouth 2 times daily   Yes Historical Provider, MD   insulin lispro, 1 Unit Dial, (HUMALOG/ADMELOG) 100 UNIT/ML SOPN Inject 15 Units into the skin 3 times daily (before meals)   Yes Historical Provider, MD   insulin detemir (LEVEMIR FLEXTOUCH) 100 UNIT/ML injection pen Inject 24 Units into the skin nightly   Yes Historical Provider, MD   losartan (COZAAR) 50 MG tablet Take 50 mg by mouth daily   Yes Historical Provider, MD   midodrine (PROAMATINE) 10 MG tablet Take 20 mg by mouth three times a week Indications: Mon, Wed, Fri at dialysis   Yes Historical Provider, MD   NIFEdipine (ADALAT CC) 90 MG extended release tablet Take 90 mg by mouth daily   Yes Historical Provider, MD   sennosides-docusate sodium (SENOKOT-S) 8.6-50 MG tablet Take 2 tablets by mouth at bedtime   Yes Historical Provider, MD   sevelamer (RENVELA) 800 MG tablet Take 2 tablets by mouth 3 times daily (with meals)   Yes Historical Provider, MD   B Complex-C-Folic Acid (TRIPHROCAPS) 1 MG CAPS Take 1 capsule by mouth daily   Yes Historical Provider, MD   patiromer sorbitex calcium (VELTASSA) 8.4 g PACK packet Take 8.4 g by mouth Twice a Week Indications: twice per day on Thursdays and Sundays   Yes Historical Provider, MD   rivaroxaban (XARELTO) 2.5 MG TABS tablet Take 2.5 mg by mouth 2 times daily (with meals)   Yes Historical Provider, MD       REVIEW OF SYSTEMS    (2-9 systems for level 4, 10 or more for level 5)      Review of Systems   Unable to perform ROS: Mental status change     PHYSICAL EXAM   (up to 7 for level 4, 8 or more for level 5)      INITIAL VITALS:   BP (!) 100/55   Pulse 75   Temp 98.1 °F (36.7 °C) (Oral)   Resp 15   Ht 5' 4\" (1.626 m)   Wt 185 lb (83.9 kg)   SpO2 98%   BMI 31.76 kg/m²     Physical Exam  Constitutional:       General: She is in acute distress. Appearance: She is ill-appearing. She is not toxic-appearing or diaphoretic. HENT:      Head: Normocephalic and atraumatic. Eyes:      Extraocular Movements: Extraocular movements intact. Pupils: Pupils are equal, round, and reactive to light. Cardiovascular:      Rate and Rhythm: Normal rate and regular rhythm. Heart sounds: No murmur heard. No friction rub. No gallop. Pulmonary:      Effort: No respiratory distress. Breath sounds: No stridor. No wheezing, rhonchi or rales. Chest:      Chest wall: No tenderness. Abdominal:      General: There is no distension. Palpations: There is no mass. Tenderness: There is no abdominal tenderness. There is no guarding. Musculoskeletal:         General: No swelling or tenderness. Skin:     General: Skin is warm. Capillary Refill: Capillary refill takes less than 2 seconds. Coloration: Skin is not cyanotic or pale. Findings: No erythema or rash. Neurological:      Mental Status: She is alert. She is disoriented. GCS: GCS eye subscore is 4. GCS verbal subscore is 3. GCS motor subscore is 3. Comments: Right-sided hemiparesis noted, weakness in all extremities, no facial droop or pupil abnormalities, patient looking around room. Does respond to some verbal stimuli. Is following commands.        DIFFERENTIAL  DIAGNOSIS     PLAN (LABS / IMAGING / EKG):  Orders Placed This Encounter   Procedures    CT HEAD WO CONTRAST    CTA ABDOMEN PELVIS W CONTRAST    CBC with Auto Differential    Basic Metabolic Panel w/ Reflex to MG    Hepatic Function Panel    Troponin    Lactate, Sepsis    Hemoglobin and Hematocrit    Blood Gas, Venous    Beta-Hydroxybutyrate    Protime-INR    APTT    Verify hospital blood product consent form has been signed and witnessed    Vital Signs For Blood Product Transfusion Transfusion Reaction Management    Verify hospital blood product consent form has been signed and witnessed    Vital Signs For Blood Product Transfusion    Transfusion Reaction Management    Inpatient consult to Vascular Surgery    Inpatient consult to General Surgery    Inpatient consult to GI    Inpatient consult to GI    EKG 12 Lead    TYPE AND SCREEN    PREPARE RBC (CROSSMATCH), 1 Units    PREPARE RBC (CROSSMATCH), 1 Units       MEDICATIONS ORDERED:  Orders Placed This Encounter   Medications    0.9 % sodium chloride bolus    0.9 % sodium chloride bolus    sodium chloride flush 0.9 % injection 10 mL    iopamidol (ISOVUE-370) 76 % injection 100 mL    FOLLOWED BY Linked Order Group     prothrombin complex concentrate (human) (KCENTRA) infusion 2,000 Units     0.9 % sodium chloride infusion    0.9 % sodium chloride infusion       DDX: Lower GI bleed, acute CVA, hemorrhagic shock, electrolyte abnormality, clotting disorder, vascular enteric fistula    DIAGNOSTIC RESULTS / EMERGENCY DEPARTMENT COURSE / MDM   LAB RESULTS:  Results for orders placed or performed during the hospital encounter of 10/10/22   CBC with Auto Differential   Result Value Ref Range    WBC 24.4 (H) 3.5 - 11.0 k/uL    RBC 2.46 (L) 4.0 - 5.2 m/uL    Hemoglobin 6.9 (LL) 12.0 - 16.0 g/dL    Hematocrit 21.9 (L) 36 - 46 %    MCV 89.1 80 - 100 fL    MCH 28.2 26 - 34 pg    MCHC 31.6 31 - 37 g/dL    RDW 15.7 (H) 11.5 - 14.9 %    Platelets 319 038 - 960 k/uL    MPV 6.9 6.0 - 12.0 fL    Seg Neutrophils 83 (H) 36 - 66 %    Lymphocytes 6 (L) 24 - 44 %    Monocytes 4 1 - 7 %    Eosinophils % 0 0 - 4 %    Basophils 0 0 - 2 %    Bands 7 0 - 10 %    Segs Absolute 20.25 (H) 1.3 - 9.1 k/uL    Absolute Lymph # 1.46 1.0 - 4.8 k/uL    Absolute Mono # 0.98 0.1 - 1.3 k/uL    Absolute Eos # 0.00 0.0 - 0.4 k/uL    Basophils Absolute 0.00 0.0 - 0.2 k/uL    Absolute Bands # 1.71 (H) 0.0 - 1.0 k/uL    Morphology ANISOCYTOSIS PRESENT     Morphology HYPOCHROMIA PRESENT Morphology 1+ POLYCHROMASIA    Basic Metabolic Panel w/ Reflex to MG   Result Value Ref Range    Glucose 161 (H) 70 - 99 mg/dL    BUN 33 (H) 6 - 20 mg/dL    Creatinine 4.20 (H) 0.50 - 0.90 mg/dL    Est, Glom Filt Rate 14 (L) >60 mL/min/1.73m2    Calcium 9.6 8.6 - 10.4 mg/dL    Sodium 130 (L) 135 - 144 mmol/L    Potassium 4.1 3.7 - 5.3 mmol/L    Chloride 92 (L) 98 - 107 mmol/L    CO2 25 20 - 31 mmol/L    Anion Gap 13 9 - 17 mmol/L   Hepatic Function Panel   Result Value Ref Range    Albumin 3.6 3.5 - 5.2 g/dL    Alkaline Phosphatase 136 (H) 35 - 104 U/L     (H) 5 - 33 U/L    AST 72 (H) <32 U/L    Total Bilirubin 0.2 (L) 0.3 - 1.2 mg/dL    Bilirubin, Direct <0.1 <0.31 mg/dL    Bilirubin, Indirect Can not be calculated 0.00 - 1.00 mg/dL    Total Protein 7.1 6.4 - 8.3 g/dL   Troponin   Result Value Ref Range    Troponin, High Sensitivity 113 (HH) 0 - 14 ng/L   Troponin   Result Value Ref Range    Troponin, High Sensitivity 96 (HH) 0 - 14 ng/L   Lactate, Sepsis   Result Value Ref Range    Lactic Acid, Sepsis 0.9 0.5 - 1.9 mmol/L   Beta-Hydroxybutyrate   Result Value Ref Range    Beta-Hydroxybutyrate 0.60 (H) 0.02 - 0.27 mmol/L   Protime-INR   Result Value Ref Range    Protime 23.3 (H) 11.8 - 14.6 sec    INR 2.1    APTT   Result Value Ref Range    PTT 40.8 (H) 24.0 - 36.0 sec   EKG 12 Lead   Result Value Ref Range    Ventricular Rate 78 BPM    Atrial Rate 78 BPM    P-R Interval 152 ms    QRS Duration 90 ms    Q-T Interval 410 ms    QTc Calculation (Bazett) 467 ms    P Axis 27 degrees    R Axis 51 degrees    T Axis 17 degrees   TYPE AND SCREEN   Result Value Ref Range    Expiration Date 10/13/2022,2359     Arm Band Number [id number]     ABO/Rh B POSITIVE     Antibody Screen NEGATIVE     Unit Number [id number]     Product Code Leukocyte Reduced Red Cell     Unit Divison 00     Dispense Status ISSUED     Unit Issue Date/Time [id number]     Product Code Blood Bank [id number]     Blood Bank Unit Type and Rh B POS Blood Bank ISBT Product Blood Type 7300     Blood Bank Blood Product Expiration Date 110148318961     Transfusion Status OK TO TRANSFUSE     Crossmatch Result COMPATIBLE        IMPRESSION: Labs concerning for hemorrhagic-like picture. Creatinine of 4 is not bad for a dialysis patient. RADIOLOGY:  CTA ABDOMEN PELVIS W CONTRAST   Final Result   Addendum (preliminary) 1 of 1   ADDENDUM:   Findings were discussed with Dr. Charlie Thomas. Diffuse presacral edema is present and there is also a moderate stenosis    of   the right renal artery, findings which were not mentioned in the original   report. Final   Moderate amount of colonic and rectal stool. Diffuse rectal mucosal   enhancement with a small focus of active bleeding within the distal rectum   and pooling of blood in the dependent rectal lumen on delayed phase imaging. No significant mesenteric artery occlusive disease. Mild stenosis of the right common iliac artery. CT HEAD WO CONTRAST   Final Result   No evidence of acute intracranial process. EKG  Normal sinus rhythm, rate 78, normal axis, intervals within normal limits, no acute ST elevations noted, there is some T wave inversions in V2 and V3 with flattening in V4 and V5, abnormal EKG, poor R wave progression as well, abnormal EKG    All EKG's are interpreted by the Emergency Department Physician who either signs or Co-signs this chart in the absence of a cardiologist.    EMERGENCY DEPARTMENT COURSE:  80-year-old female presenting with altered mental status was found to have with a thought was vaginal bleeding. On examination this was more concerning for GI bleeding. Patient been altered for over 24 hours. Concern for acute CVA given history of CVA. However patient was also slightly hypotensive but not tachycardic. CT head unremarkable. No new focal neurologic deficits. Patient was initially not responding to verbal stimuli.   Could not head to yes or no questions. Patient is on Xarelto. Has been compliant with her dialysis and comes from SNF. CT of the abdomen pelvis concerning for active bleeding the distal rectum. There is nothing on my exam that would be palpation of a hemorrhoid. Patient given 2 units of PRBCs due to low blood pressure. With maps in the low 60s. Patient's blood pressure improved after IV fluids as well as 1 unit PRBCs and reversal medications. Given that patient is on Xarelto given Kcentra here in emergency department. Discussed with multiple services for transfer. Difficulty with transfer due to multiple places being on bypass. Patient accepted at [facility][1] surgical ICU. Patient sent for transfer via mobile ICU with improved vital signs and stability. ED Course as of 10/11/22 0032   Mon Oct 10, 2022   1274 We will give IV fluids emergently. We will also transfuse emergently. Patient's blood pressure still sitting in maps the high 50s low 60s. This is in Trendelenburg. Patient is a dialysis patient but given low blood pressure and active hemorrhage concern for hypovolemia. [MS]   2000 Therefore risks versus benefits outweigh for fluid and blood ministration [MS]   2019 Troponin, High Sensitivity(!!): 113 [MS]   2138 Patient still having slightly low blood pressures. Getting blood now. Patient still has hypoperfusion we will continue blood as well as start on low-dose Levophed [MS]   2143 Called to bedside patient had large bloody bowel movement. Is getting 1 unit of blood now will increase speed. Difficult to ascertain the source. There is no obvious hemorrhoids. [MS]   2144 Will repair second PRBCs [MS]   5954 Discussed with vascular surgery over [facility][1] and they state that there is nothing from their standpoint they recommend GI and general surgery consultation. They state that transfer at their hospital is a long waiting list up to 24 hours.   Discussed with our general surgeon here who states that given her comorbidities he would recommend transfer. Discussed with GI over at [facility] they are on full transfer bypass and he states he would not be able to help. Patient needs emergent GI evaluation. We will discussed with [facility] if not available discussed with Betty Gooden. [MS]   65 Discussed with vascular surgeon again at [facility] he states there is nothing he can do from his standpoint but was told that the wait list was long for transfer. However we will discussed with crit care there and see if they have a bed available. If not we will attempt to do ER to ER at [facility]. [MS]   0224 BP(!): 100/55 [MS]   2301 Discussed with ED attending at [facility] he states he has no beds at this time. Discussed with access and they state there are no crit care beds at this time. Will reconsult critical care at [facility]. If cannot get bed there will discussed for transfer to [facility] [MS]   2345 BP(!): 100/55 [MS]   2356 Patient's most recent blood pressure shows a MAP of 90. We will still get the second unit of PRBCs. [MS]   Tue Oct 11, 2022   0031 Patient with persistent bleeding and low MAP readings. The decision was made to reverse her Xarelto. Patient was transferred after an accepting physician excepted her for direct admission at [facility] and after the vascular surgeon was spoken with. Patient was transferred ALS. EMS is being waited on for transfer. Patient's blood pressure did improve after transfusion was started. [SH]      ED Course User Index  [MS] Samy Maravilla DO  [SH] Sourav Farrell DO       No notes of EC Admission Criteria type on file. PROCEDURES:  N/A    CONSULTS:  IP CONSULT TO VASCULAR SURGERY  IP CONSULT TO GENERAL SURGERY  IP CONSULT TO GI  IP CONSULT TO GI    CRITICAL CARE:  60 min    FINAL IMPRESSION      1. Lower GI bleed          DISPOSITION / PLAN     DISPOSITION Decision To Transfer 10/11/2022 12:06:22 AM      PATIENT REFERRED TO:  No follow-up provider specified.     DISCHARGE MEDICATIONS:  New Prescriptions    No medications on file       Radha Iverson DO  Emergency Medicine Resident    (Please note that portions of thisnote were completed with a voice recognition program.  Efforts were made to edit the dictations but occasionally words are mis-transcribed.)        Anna Cervantes DO  Resident  10/11/22 520 [address]DO  Resident  10/11/22 [id number]

## 2022-10-10 NOTE — ED PROVIDER NOTES
[address]  eMERGENCY dEPARTMENT eNCOUnter   Attending Attestation     Pt Name: Jada Pradhan  MRN: [id number]  [place] 1990  Date of evaluation: 10/10/22       Kennedy Pradhan is a 28 y.o. female who presents with Altered Mental Status and Rectal Bleeding      History:   Patient was sent in by squad for altered mental status. In reviewing the chart it looks like the patient is about 2 weeks out from a femoropopliteal bypass for critical limb ischemia. Patient is dialysis patient evidently went to dialysis today and since then has not been acting normal.  Normally she is able to converse but evidently has not really been talking but her eyes are open. Blood pressure has been low and they have noticed blood coming out. Patient is not able to answer any my questions. Exam: Vitals:   Vitals:    10/10/22 1922   BP: (!) 90/49   Pulse: 79   Resp: 18   Temp: 98.1 °F (36.7 °C)   TempSrc: Axillary   SpO2: 100%   Weight: 185 lb (83.9 kg)   Height: 5' 4\" (1.626 m)     Patient's heart is regular rate rhythm no murmurs. Lungs clear to auscultation bilaterally. Patient is staring with her eyes open but is not responding otherwise. Patient was rolled and it was noted that there is clear blood coming from the rectum. I performed a history and physical examination of the patient and discussed management with the resident. I reviewed the residents note and agree with the documented findings and plan of care. Any areas of disagreement are noted on the chart. I was personally present for the key portions of any procedures. I have documented in the chart those procedures where I was not present during the key portions. I have personally reviewed all images and agree with the resident's interpretation. I have reviewed the emergency nurses triage note.  I agree with the chief complaint, past medical history, past surgical history, allergies, medications, social and family history as documented unless otherwise noted below. Documentation of the HPI, Physical Exam and Medical Decision Making performed by medical students or scribes is based on my personal performance of the HPI, PE and MDM. I personally evaluated and examined the patient in conjunction with the APC and agree with the assessment, treatment plan, and disposition of the patient as recorded by the APC. Additional findings are as noted.     Robert Cortes MD  Attending Emergency  Physician             Niya Curtis MD  10/10/22 2003

## 2022-10-11 VITALS
SYSTOLIC BLOOD PRESSURE: 70 MMHG | TEMPERATURE: 98.1 F | HEIGHT: 64 IN | HEART RATE: 81 BPM | WEIGHT: 185 LBS | BODY MASS INDEX: 31.58 KG/M2 | DIASTOLIC BLOOD PRESSURE: 36 MMHG | RESPIRATION RATE: 19 BRPM | OXYGEN SATURATION: 99 %

## 2022-10-11 LAB
CARBOXYHEMOGLOBIN: 4 % (ref 0–5)
EKG ATRIAL RATE: 78 BPM
EKG P AXIS: 27 DEGREES
EKG P-R INTERVAL: 152 MS
EKG Q-T INTERVAL: 410 MS
EKG QRS DURATION: 90 MS
EKG QTC CALCULATION (BAZETT): 467 MS
EKG R AXIS: 51 DEGREES
EKG T AXIS: 17 DEGREES
EKG VENTRICULAR RATE: 78 BPM
HCO3 VENOUS: 27.8 MMOL/L (ref 24–30)
METHEMOGLOBIN: <0 % (ref 0–1.9)
O2 SAT, VEN: 70.7 % (ref 60–85)
PATIENT TEMP: 37
PCO2, VEN: 37.4 MM HG (ref 39–55)
PH VENOUS: 7.48 (ref 7.32–7.42)
PO2, VEN: 37.5 MM HG (ref 30–50)
POSITIVE BASE EXCESS, VEN: 4.3 MMOL/L (ref 0–2)

## 2022-10-11 PROCEDURE — 93010 ELECTROCARDIOGRAM REPORT: CPT | Performed by: INTERNAL MEDICINE

## 2022-10-11 PROCEDURE — 82800 BLOOD PH: CPT

## 2022-10-11 PROCEDURE — 86900 BLOOD TYPING SEROLOGIC ABO: CPT

## 2022-10-11 PROCEDURE — P9016 RBC LEUKOCYTES REDUCED: HCPCS

## 2022-10-11 ASSESSMENT — PAIN - FUNCTIONAL ASSESSMENT: PAIN_FUNCTIONAL_ASSESSMENT: NONE - DENIES PAIN

## 2022-10-11 NOTE — ED NOTES
Transfusion infusion rate increased to @ 200mL/hr per Dr Berna Kowalski verbal order.      Richard Gomez RN  10/10/22 7739

## 2022-10-11 NOTE — ED NOTES
Pt incontinent of urine and bloody stools. XL clots observed. MD notified and enters room. Pericare performed. Brief changed.       Emmanuel Schuster RN  10/10/22 0904

## 2022-10-11 NOTE — PROGRESS NOTES
Medication History completed:    New medications: cholecalciferol, sevelamer, senna-docusate, Xarelto, polyethylene glycol, Veltassa, nifedipine ER, midodrine, losartan, Levemir, hydralazine, escitalopram, clopidogrel, carvedilol, calcium acetate, B complex vitamin, atorvastatin, aspirin, albuterol, acetaminophen    Medications discontinued: ergocalciferol, rosuvastatin, Novolog, metformin, meclizine, lisinopril, Lantus, ibuprofen, cyclobenzaprine, Symbicort    Changes to dosing:   Humalog changed to 15 units three times daily before meals    Stated allergies: As listed    Other pertinent information: Medications confirmed with facility list ([facility]).      Thank you,  Capo Morrell, PharmD, BCPS  [phone]

## 2022-10-11 NOTE — ED NOTES
Blood started. [address] transport willing to do 15 minute monitoring and observe pt for reactions and monitor vitals.  Pt on monitor in transport     Poornima Zuluaga, [facility]  10/11/22 7174

## 2022-10-11 NOTE — ED NOTES
[facility] ([facility]) Transportation arrival. Report given to personnel.      Miguel Angel Dowd RN  10/11/22 0043

## 2022-10-11 NOTE — ED NOTES
Pt incontinent of bloody stools. XL clots noted. Pericare completed. Brief changed. Family at the bedside.      Lizzeth Keith RN  10/11/22 4479

## 2022-10-11 NOTE — ED NOTES
Attempts at contacting pt's Emergency Contact/Mother unsuccessful. Per the individual whom answered the phone, the pt's EC/Mother is now . Mother recently passed away, 10/5/22.       Lilly Cain RN  10/10/22 2023

## 2022-10-17 LAB
ABO/RH: NORMAL
ANTIBODY SCREEN: NEGATIVE
ARM BAND NUMBER: NORMAL
BLD PROD TYP BPU: NORMAL
BLD PROD TYP BPU: NORMAL
BLOOD BANK BLOOD PRODUCT EXPIRATION DATE: NORMAL
BLOOD BANK BLOOD PRODUCT EXPIRATION DATE: NORMAL
BLOOD BANK ISBT PRODUCT BLOOD TYPE: 5100
BLOOD BANK ISBT PRODUCT BLOOD TYPE: 7300
BLOOD BANK PRODUCT CODE: NORMAL
BLOOD BANK PRODUCT CODE: NORMAL
BLOOD BANK UNIT TYPE AND RH: NORMAL
BLOOD BANK UNIT TYPE AND RH: NORMAL
BPU ID: NORMAL
BPU ID: NORMAL
CROSSMATCH RESULT: NORMAL
CROSSMATCH RESULT: NORMAL
DISPENSE STATUS BLOOD BANK: NORMAL
DISPENSE STATUS BLOOD BANK: NORMAL
EXPIRATION DATE: NORMAL
TRANSFUSION STATUS: NORMAL
TRANSFUSION STATUS: NORMAL
UNIT DIVISION: 0
UNIT DIVISION: 0
UNIT ISSUE DATE/TIME: NORMAL
UNIT ISSUE DATE/TIME: NORMAL

## 2022-10-19 ENCOUNTER — APPOINTMENT (OUTPATIENT)
Dept: CT IMAGING | Age: 32
DRG: 368 | End: 2022-10-19
Payer: MEDICARE

## 2022-10-19 ENCOUNTER — HOSPITAL ENCOUNTER (INPATIENT)
Age: 32
LOS: 12 days | Discharge: NURSING, REHAB OR LONG-TERM CARE | DRG: 368 | End: 2022-10-31
Attending: EMERGENCY MEDICINE | Admitting: INTERNAL MEDICINE
Payer: MEDICARE

## 2022-10-19 DIAGNOSIS — R10.9 ABDOMINAL PAIN, UNSPECIFIED ABDOMINAL LOCATION: ICD-10-CM

## 2022-10-19 DIAGNOSIS — R11.2 NAUSEA AND VOMITING, UNSPECIFIED VOMITING TYPE: ICD-10-CM

## 2022-10-19 DIAGNOSIS — R33.8 ACUTE URINARY RETENTION: Primary | ICD-10-CM

## 2022-10-19 DIAGNOSIS — R10.84 DIFFUSE ABDOMINAL PAIN: ICD-10-CM

## 2022-10-19 DIAGNOSIS — R10.30 LOWER ABDOMINAL PAIN: ICD-10-CM

## 2022-10-19 DIAGNOSIS — R74.8 ELEVATED LIPASE: ICD-10-CM

## 2022-10-19 PROBLEM — K85.90 PANCREATITIS, UNSPECIFIED PANCREATITIS TYPE: Status: ACTIVE | Noted: 2022-10-19

## 2022-10-19 LAB
ABSOLUTE EOS #: 0.1 K/UL (ref 0–0.4)
ABSOLUTE LYMPH #: 1.5 K/UL (ref 1–4.8)
ABSOLUTE MONO #: 0.4 K/UL (ref 0.1–1.3)
ALBUMIN SERPL-MCNC: 4.4 G/DL (ref 3.5–5.2)
ALP BLD-CCNC: 110 U/L (ref 35–104)
ALT SERPL-CCNC: 82 U/L (ref 5–33)
ANION GAP SERPL CALCULATED.3IONS-SCNC: 19 MMOL/L (ref 9–17)
AST SERPL-CCNC: 59 U/L
BACTERIA: NORMAL
BASOPHILS # BLD: 0 % (ref 0–2)
BASOPHILS ABSOLUTE: 0 K/UL (ref 0–0.2)
BILIRUB SERPL-MCNC: 0.3 MG/DL (ref 0.3–1.2)
BILIRUBIN URINE: ABNORMAL
BUN BLDV-MCNC: 19 MG/DL (ref 6–20)
CALCIUM SERPL-MCNC: 9.7 MG/DL (ref 8.6–10.4)
CASTS UA: NORMAL /LPF
CHLORIDE BLD-SCNC: 94 MMOL/L (ref 98–107)
CO2: 21 MMOL/L (ref 20–31)
COLOR: ABNORMAL
CREAT SERPL-MCNC: 2.87 MG/DL (ref 0.5–0.9)
DATE, STOOL #1: NORMAL
EOSINOPHILS RELATIVE PERCENT: 1 % (ref 0–4)
EPITHELIAL CELLS UA: NORMAL /HPF
GFR SERPL CREATININE-BSD FRML MDRD: 22 ML/MIN/1.73M2
GLUCOSE BLD-MCNC: 151 MG/DL (ref 65–105)
GLUCOSE BLD-MCNC: 202 MG/DL (ref 70–99)
GLUCOSE URINE: NEGATIVE
HCG QUALITATIVE: NEGATIVE
HCT VFR BLD CALC: 26.4 % (ref 36–46)
HEMOCCULT SP1 STL QL: POSITIVE
HEMOGLOBIN: 8.9 G/DL (ref 12–16)
KETONES, URINE: ABNORMAL
LACTIC ACID: 0.9 MMOL/L (ref 0.5–2.2)
LEUKOCYTE ESTERASE, URINE: ABNORMAL
LIPASE: 117 U/L (ref 13–60)
LYMPHOCYTES # BLD: 14 % (ref 24–44)
MAGNESIUM: 2 MG/DL (ref 1.6–2.6)
MCH RBC QN AUTO: 30.1 PG (ref 26–34)
MCHC RBC AUTO-ENTMCNC: 33.6 G/DL (ref 31–37)
MCV RBC AUTO: 89.6 FL (ref 80–100)
MONOCYTES # BLD: 4 % (ref 1–7)
NITRITE, URINE: NEGATIVE
PDW BLD-RTO: 17.5 % (ref 11.5–14.9)
PH UA: 5 (ref 5–8)
PLATELET # BLD: 344 K/UL (ref 150–450)
PMV BLD AUTO: 6.9 FL (ref 6–12)
POTASSIUM SERPL-SCNC: 3.6 MMOL/L (ref 3.7–5.3)
PROTEIN UA: ABNORMAL
RBC # BLD: 2.95 M/UL (ref 4–5.2)
RBC UA: NORMAL /HPF
SEG NEUTROPHILS: 81 % (ref 36–66)
SEGMENTED NEUTROPHILS ABSOLUTE COUNT: 8.8 K/UL (ref 1.3–9.1)
SODIUM BLD-SCNC: 134 MMOL/L (ref 135–144)
SPECIFIC GRAVITY UA: 1.02 (ref 1–1.03)
SPECIMEN DESCRIPTION: NORMAL
TIME, STOOL #1: 1706
TOTAL PROTEIN: 7.8 G/DL (ref 6.4–8.3)
TROPONIN, HIGH SENSITIVITY: 90 NG/L (ref 0–14)
TROPONIN, HIGH SENSITIVITY: 95 NG/L (ref 0–14)
TROPONIN, HIGH SENSITIVITY: 98 NG/L (ref 0–14)
TURBIDITY: CLEAR
URINE HGB: NEGATIVE
UROBILINOGEN, URINE: NORMAL
WBC # BLD: 10.9 K/UL (ref 3.5–11)
WBC UA: NORMAL /HPF

## 2022-10-19 PROCEDURE — 81001 URINALYSIS AUTO W/SCOPE: CPT

## 2022-10-19 PROCEDURE — 83605 ASSAY OF LACTIC ACID: CPT

## 2022-10-19 PROCEDURE — 80053 COMPREHEN METABOLIC PANEL: CPT

## 2022-10-19 PROCEDURE — 96375 TX/PRO/DX INJ NEW DRUG ADDON: CPT

## 2022-10-19 PROCEDURE — 84484 ASSAY OF TROPONIN QUANT: CPT

## 2022-10-19 PROCEDURE — 2060000000 HC ICU INTERMEDIATE R&B

## 2022-10-19 PROCEDURE — 70450 CT HEAD/BRAIN W/O DYE: CPT

## 2022-10-19 PROCEDURE — 6360000002 HC RX W HCPCS: Performed by: STUDENT IN AN ORGANIZED HEALTH CARE EDUCATION/TRAINING PROGRAM

## 2022-10-19 PROCEDURE — 82272 OCCULT BLD FECES 1-3 TESTS: CPT

## 2022-10-19 PROCEDURE — 82947 ASSAY GLUCOSE BLOOD QUANT: CPT

## 2022-10-19 PROCEDURE — 6360000002 HC RX W HCPCS: Performed by: NURSE PRACTITIONER

## 2022-10-19 PROCEDURE — 2580000003 HC RX 258: Performed by: NURSE PRACTITIONER

## 2022-10-19 PROCEDURE — 83735 ASSAY OF MAGNESIUM: CPT

## 2022-10-19 PROCEDURE — 84703 CHORIONIC GONADOTROPIN ASSAY: CPT

## 2022-10-19 PROCEDURE — 99285 EMERGENCY DEPT VISIT HI MDM: CPT

## 2022-10-19 PROCEDURE — 51702 INSERT TEMP BLADDER CATH: CPT

## 2022-10-19 PROCEDURE — 93005 ELECTROCARDIOGRAM TRACING: CPT | Performed by: STUDENT IN AN ORGANIZED HEALTH CARE EDUCATION/TRAINING PROGRAM

## 2022-10-19 PROCEDURE — 74176 CT ABD & PELVIS W/O CONTRAST: CPT

## 2022-10-19 PROCEDURE — 85025 COMPLETE CBC W/AUTO DIFF WBC: CPT

## 2022-10-19 PROCEDURE — 83690 ASSAY OF LIPASE: CPT

## 2022-10-19 PROCEDURE — 36415 COLL VENOUS BLD VENIPUNCTURE: CPT

## 2022-10-19 PROCEDURE — 96374 THER/PROPH/DIAG INJ IV PUSH: CPT

## 2022-10-19 RX ORDER — FENTANYL CITRATE 50 UG/ML
50 INJECTION, SOLUTION INTRAMUSCULAR; INTRAVENOUS ONCE
Status: COMPLETED | OUTPATIENT
Start: 2022-10-19 | End: 2022-10-19

## 2022-10-19 RX ORDER — SODIUM CHLORIDE 0.9 % (FLUSH) 0.9 %
5-40 SYRINGE (ML) INJECTION PRN
Status: DISCONTINUED | OUTPATIENT
Start: 2022-10-19 | End: 2022-10-31 | Stop reason: HOSPADM

## 2022-10-19 RX ORDER — CARVEDILOL 25 MG/1
25 TABLET ORAL 2 TIMES DAILY
Status: DISCONTINUED | OUTPATIENT
Start: 2022-10-19 | End: 2022-10-31 | Stop reason: HOSPADM

## 2022-10-19 RX ORDER — SODIUM CHLORIDE, SODIUM LACTATE, POTASSIUM CHLORIDE, CALCIUM CHLORIDE 600; 310; 30; 20 MG/100ML; MG/100ML; MG/100ML; MG/100ML
INJECTION, SOLUTION INTRAVENOUS CONTINUOUS
Status: DISCONTINUED | OUTPATIENT
Start: 2022-10-20 | End: 2022-10-20

## 2022-10-19 RX ORDER — SODIUM CHLORIDE 9 MG/ML
INJECTION, SOLUTION INTRAVENOUS PRN
Status: DISCONTINUED | OUTPATIENT
Start: 2022-10-19 | End: 2022-10-31 | Stop reason: HOSPADM

## 2022-10-19 RX ORDER — MIDODRINE HYDROCHLORIDE 10 MG/1
30 TABLET ORAL
Status: DISCONTINUED | OUTPATIENT
Start: 2022-10-21 | End: 2022-10-20

## 2022-10-19 RX ORDER — ESCITALOPRAM OXALATE 10 MG/1
10 TABLET ORAL DAILY
Status: DISCONTINUED | OUTPATIENT
Start: 2022-10-20 | End: 2022-10-27

## 2022-10-19 RX ORDER — CALCIUM ACETATE 667 MG/1
3 CAPSULE ORAL
Status: DISCONTINUED | OUTPATIENT
Start: 2022-10-20 | End: 2022-10-31 | Stop reason: HOSPADM

## 2022-10-19 RX ORDER — ONDANSETRON 2 MG/ML
4 INJECTION INTRAMUSCULAR; INTRAVENOUS ONCE
Status: COMPLETED | OUTPATIENT
Start: 2022-10-19 | End: 2022-10-19

## 2022-10-19 RX ORDER — CLOPIDOGREL BISULFATE 75 MG/1
75 TABLET ORAL DAILY
Status: DISCONTINUED | OUTPATIENT
Start: 2022-10-20 | End: 2022-10-31 | Stop reason: HOSPADM

## 2022-10-19 RX ORDER — NIFEDIPINE 90 MG/1
90 TABLET, FILM COATED, EXTENDED RELEASE ORAL DAILY
Status: DISCONTINUED | OUTPATIENT
Start: 2022-10-20 | End: 2022-10-31 | Stop reason: HOSPADM

## 2022-10-19 RX ORDER — ONDANSETRON 2 MG/ML
4 INJECTION INTRAMUSCULAR; INTRAVENOUS EVERY 6 HOURS PRN
Status: DISCONTINUED | OUTPATIENT
Start: 2022-10-19 | End: 2022-10-26

## 2022-10-19 RX ORDER — ATORVASTATIN CALCIUM 10 MG/1
10 TABLET, FILM COATED ORAL NIGHTLY
Status: DISCONTINUED | OUTPATIENT
Start: 2022-10-19 | End: 2022-10-31 | Stop reason: HOSPADM

## 2022-10-19 RX ORDER — DEXTROSE MONOHYDRATE 100 MG/ML
INJECTION, SOLUTION INTRAVENOUS CONTINUOUS PRN
Status: DISCONTINUED | OUTPATIENT
Start: 2022-10-19 | End: 2022-10-31 | Stop reason: HOSPADM

## 2022-10-19 RX ORDER — INSULIN GLARGINE 100 [IU]/ML
24 INJECTION, SOLUTION SUBCUTANEOUS NIGHTLY
Status: DISCONTINUED | OUTPATIENT
Start: 2022-10-19 | End: 2022-10-31 | Stop reason: HOSPADM

## 2022-10-19 RX ORDER — ALBUTEROL SULFATE 90 UG/1
2 AEROSOL, METERED RESPIRATORY (INHALATION) EVERY 6 HOURS PRN
Status: DISCONTINUED | OUTPATIENT
Start: 2022-10-19 | End: 2022-10-31 | Stop reason: HOSPADM

## 2022-10-19 RX ORDER — ONDANSETRON 4 MG/1
4 TABLET, ORALLY DISINTEGRATING ORAL EVERY 8 HOURS PRN
Status: DISCONTINUED | OUTPATIENT
Start: 2022-10-19 | End: 2022-10-26

## 2022-10-19 RX ORDER — SODIUM CHLORIDE, SODIUM LACTATE, POTASSIUM CHLORIDE, CALCIUM CHLORIDE 600; 310; 30; 20 MG/100ML; MG/100ML; MG/100ML; MG/100ML
INJECTION, SOLUTION INTRAVENOUS CONTINUOUS
Status: DISCONTINUED | OUTPATIENT
Start: 2022-10-19 | End: 2022-10-20

## 2022-10-19 RX ORDER — ASPIRIN 81 MG/1
81 TABLET ORAL DAILY
Status: DISCONTINUED | OUTPATIENT
Start: 2022-10-20 | End: 2022-10-31 | Stop reason: HOSPADM

## 2022-10-19 RX ORDER — SODIUM CHLORIDE 0.9 % (FLUSH) 0.9 %
5-40 SYRINGE (ML) INJECTION EVERY 12 HOURS SCHEDULED
Status: DISCONTINUED | OUTPATIENT
Start: 2022-10-19 | End: 2022-10-30

## 2022-10-19 RX ORDER — HYDRALAZINE HYDROCHLORIDE 50 MG/1
100 TABLET, FILM COATED ORAL 3 TIMES DAILY
Status: DISCONTINUED | OUTPATIENT
Start: 2022-10-19 | End: 2022-10-31 | Stop reason: HOSPADM

## 2022-10-19 RX ADMIN — SODIUM CHLORIDE, PRESERVATIVE FREE 10 ML: 5 INJECTION INTRAVENOUS at 22:46

## 2022-10-19 RX ADMIN — FENTANYL CITRATE 50 MCG: 50 INJECTION, SOLUTION INTRAMUSCULAR; INTRAVENOUS at 16:59

## 2022-10-19 RX ADMIN — ONDANSETRON 4 MG: 2 INJECTION INTRAMUSCULAR; INTRAVENOUS at 16:56

## 2022-10-19 RX ADMIN — ONDANSETRON 4 MG: 2 INJECTION INTRAMUSCULAR; INTRAVENOUS at 22:51

## 2022-10-19 RX ADMIN — SODIUM CHLORIDE, POTASSIUM CHLORIDE, SODIUM LACTATE AND CALCIUM CHLORIDE: 600; 310; 30; 20 INJECTION, SOLUTION INTRAVENOUS at 22:49

## 2022-10-19 ASSESSMENT — PAIN DESCRIPTION - PAIN TYPE: TYPE: ACUTE PAIN

## 2022-10-19 ASSESSMENT — PAIN DESCRIPTION - LOCATION: LOCATION: ABDOMEN

## 2022-10-19 ASSESSMENT — PAIN - FUNCTIONAL ASSESSMENT
PAIN_FUNCTIONAL_ASSESSMENT: 0-10
PAIN_FUNCTIONAL_ASSESSMENT: PREVENTS OR INTERFERES WITH MANY ACTIVE NOT PASSIVE ACTIVITIES

## 2022-10-19 ASSESSMENT — PAIN DESCRIPTION - FREQUENCY: FREQUENCY: CONTINUOUS

## 2022-10-19 ASSESSMENT — PAIN SCALES - GENERAL: PAINLEVEL_OUTOF10: 10

## 2022-10-19 ASSESSMENT — PAIN DESCRIPTION - DESCRIPTORS: DESCRIPTORS: SHARP

## 2022-10-19 NOTE — ED PROVIDER NOTES
[facility] ED  Emergency Department Encounter  Emergency Medicine Resident     Pt Name: Yusuf Bryant  MRN: [id number]  [place] 1990  Date of evaluation: 10/19/22  PCP:  No primary care provider on file. CHIEF COMPLAINT       Chief Complaint   Patient presents with    Abdominal Pain     Abdominal pain, emesis post 2-4 hours dialysis treatment. Abdomen is softly distended. HISTORY OFPRESENT ILLNESS  (Location/Symptom, Timing/Onset, Context/Setting, Quality, Duration, Modifying Factors,Severity.)      Yusuf Bryant is a 28 y.o. female who presents with lower abdominal pain which started today after dialysis. She did have vomiting with dialysis earlier today. Patient is uncomfortable appearing and intermittently answering questions, but mostly stating she is in pain. Of note she was admitted for GI bleed 10/11-10/14/2022 in which she required transfusion due to blood loss. Did undergo flex sigmoidoscopy and heater probing cauterization of multiple rectal ulcers and was also taken off xarelto, however was later cleared by GI for ASA and plavix. Patient unable to state if she is having rectal bleeding at this time. PMH: threathened , c section, T2DM, HTN, DKA    PAST MEDICAL / SURGICAL / SOCIAL / FAMILY HISTORY      has a past medical history of Asthma, Atherosclerosis of native arteries of extremities with rest pain, unspecified extremity ([address] Diabetes mellitus ([address] Headache, migraine, Hypertension, Lichen simplex chronicus, Noninflammatory disorder of vagina, unspecified, Patient's noncompliance with other medical treatment and regimen for other reason, and Type 2 diabetes mellitus without complication ([address]      has a past surgical history that includes Ovary removal and Ovary removal.    Social History     Socioeconomic History    Marital status: Single     Spouse name: Not on file    Number of children: Not on file    Years of education: Not on file    Highest education level: Not on file   Occupational History    Not on file   Tobacco Use    Smoking status: Never    Smokeless tobacco: Never   Substance and Sexual Activity    Alcohol use: No    Drug use: No    Sexual activity: Not on file   Other Topics Concern    Not on file   Social History Narrative    Not on file     Social Determinants of Health     Financial Resource Strain: Not on file   Food Insecurity: Not on file   Transportation Needs: Not on file   Physical Activity: Not on file   Stress: Not on file   Social Connections: Not on file   Intimate Partner Violence: Not on file   Housing Stability: Not on file       Family History   Problem Relation Age of Onset    High Blood Pressure Mother     Diabetes Mother     Other Mother 27        Lung cancer    High Blood Pressure Father     Diabetes Father     Other Father 27        Prostate cancer       Allergies:  Morphine and Januvia [sitagliptin]    Home Medications:  Prior to Admission medications    Medication Sig Start Date End Date Taking?  Authorizing Provider   aspirin 81 MG EC tablet Take 81 mg by mouth daily   Yes Historical Provider, MD   atorvastatin (LIPITOR) 10 MG tablet Take 10 mg by mouth at bedtime   Yes Historical Provider, MD   calcium acetate (PHOSLO) 667 MG CAPS capsule Take 3 capsules by mouth 3 times daily (with meals)   Yes Historical Provider, MD   carvedilol (COREG) 25 MG tablet Take 25 mg by mouth 2 times daily   Yes Historical Provider, MD   vitamin D (CHOLECALCIFEROL) 125 MCG (5000 UT) CAPS capsule Take 5,000 Units by mouth daily   Yes Historical Provider, MD   clopidogrel (PLAVIX) 75 MG tablet Take 75 mg by mouth daily   Yes Historical Provider, MD   escitalopram (LEXAPRO) 10 MG tablet Take 10 mg by mouth daily   Yes Historical Provider, MD   hydrALAZINE (APRESOLINE) 100 MG tablet Take 100 mg by mouth 3 times daily   Yes Historical Provider, MD   insulin detemir (LEVEMIR FLEXTOUCH) 100 UNIT/ML injection pen Inject 24 Units into the skin nightly   Yes Historical Provider, MD   midodrine (PROAMATINE) 10 MG tablet Take 30 mg by mouth daily Indications: Mon, Wed, Fri at dialysis   Yes Historical Provider, MD   NIFEdipine (ADALAT CC) 90 MG extended release tablet Take 90 mg by mouth daily   Yes Historical Provider, MD   sennosides-docusate sodium (SENOKOT-S) 8.6-50 MG tablet Take 2 tablets by mouth at bedtime   Yes Historical Provider, MD ADAME Complex-C-Folic Acid (TRIPHROCAPS) 1 MG CAPS Take 1 capsule by mouth daily   Yes Historical Provider, MD   patiromer sorbitex calcium (VELTASSA) 8.4 g PACK packet Take 8.4 g by mouth Twice a Week Indications: twice per day on Thursdays and Sundays   Yes Historical Provider, MD   acetaminophen (TYLENOL) 325 MG tablet Take 650 mg by mouth every 6 hours as needed for Pain or Fever (do not exceed 3 gm in 24 hours)    Historical Provider, MD   albuterol sulfate HFA (PROVENTIL;VENTOLIN;PROAIR) 108 (90 Base) MCG/ACT inhaler Inhale 2 puffs into the lungs every 6 hours as needed for Wheezing or Shortness of Breath    Historical Provider, MD   polyethylene glycol (GLYCOLAX) 17 GM/SCOOP powder Take 17 g by mouth daily as needed (constipation)    Historical Provider, MD       REVIEW OF SYSTEMS    (2-9 systems for level 4, 10 or more for level 5)      Review of Systems   Reason unable to perform ROS: patient intermittently cooperative with examination/ROS questioning. Constitutional:  Negative for fever. Respiratory:  Negative for shortness of breath. Cardiovascular:  Negative for chest pain. Gastrointestinal:  Positive for abdominal distention, abdominal pain and vomiting. Neurological:  Negative for headaches. PHYSICAL EXAM   (up to 7 for level 4, 8 or more for level 5)     INITIAL VITALS:    height is 5' 4.02\" (1.626 m) and weight is 185 lb (83.9 kg). Her oral temperature is 97.3 °F (36.3 °C). Her blood pressure is 137/64 and her pulse is 84.  Her respiration is 18 and oxygen saturation is 99%. Physical Exam  Constitutional:       Appearance: She is ill-appearing. She is not toxic-appearing. HENT:      Head: Normocephalic and atraumatic. Mouth/Throat:      Mouth: Mucous membranes are moist.   Eyes:      Extraocular Movements: Extraocular movements intact. Pupils: Pupils are equal, round, and reactive to light. Cardiovascular:      Rate and Rhythm: Normal rate and regular rhythm. Heart sounds: Normal heart sounds. No murmur heard. Pulmonary:      Effort: Pulmonary effort is normal. No respiratory distress. Breath sounds: Normal breath sounds. No wheezing. Abdominal:      General: Bowel sounds are normal. There is distension. Tenderness: There is abdominal tenderness in the right lower quadrant, suprapubic area and left lower quadrant. Musculoskeletal:         General: Normal range of motion. Cervical back: Normal range of motion. Right lower leg: No edema. Left lower leg: No edema. Skin:     Capillary Refill: Capillary refill takes less than 2 seconds. Comments: Surgical incision at left groin, clean dry and intact with staples. No induration, external signs of hematoma or bleeding. Neurological:      Mental Status: She is oriented to person, place, and time.        DIFFERENTIAL  DIAGNOSIS     PLAN (LABS / IMAGING / EKG):  Orders Placed This Encounter   Procedures    CT ABDOMEN PELVIS WO CONTRAST Additional Contrast? None    CT HEAD WO CONTRAST    CBC with Auto Differential    CMP    Lactic Acid    Lipase    Magnesium    Urinalysis with Reflex to Culture    HCG Qualitative, Serum    BLOOD OCCULT STOOL #1    Troponin    Troponin    Basic Metabolic Panel w/ Reflex to MG    Lipase    CBC with Auto Differential    Microscopic Urinalysis    Diet NPO Exceptions are: Ice Chips, Sips of Water with Meds    Insert indwelling urinary catheter    Discontinue indwelling urinary catheter when documented indications no longer apply per hospital policy    Telemetry monitoring - 72 hour duration    Vital signs per unit routine    Admission/Observation order previously placed    Up with assistance    Daily weights    Intake and output    Neurovascular checks    HYPOGLYCEMIA TREATMENT: blood glucose LESS THAN 70 mg/dL and patient ALERT and TOLERATING PO    HYPOGLYCEMIA TREATMENT: blood glucose LESS THAN 70 mg/dL and patient NOT ALERT or NPO    Full Code    Inpatient consult to Internal Medicine    Consult to Gastroenterology    Inpatient consult to Nephrology    Initiate Oxygen Therapy Protocol    POCT glucose    POCT Glucose    POC Glucose Fingerstick    EKG 12 Lead    ADMIT TO INPATIENT       MEDICATIONS ORDERED:  Orders Placed This Encounter   Medications    ondansetron (ZOFRAN) injection 4 mg    fentaNYL (SUBLIMAZE) injection 50 mcg    albuterol sulfate HFA (PROVENTIL;VENTOLIN;PROAIR) 108 (90 Base) MCG/ACT inhaler 2 puff     Order Specific Question:   Initiate RT Bronchodilator Protocol     Answer:   Yes - Inpatient Protocol    aspirin EC tablet 81 mg    atorvastatin (LIPITOR) tablet 10 mg    calcium acetate (PHOSLO) capsule 2,001 mg    carvedilol (COREG) tablet 25 mg    clopidogrel (PLAVIX) tablet 75 mg    escitalopram (LEXAPRO) tablet 10 mg    hydrALAZINE (APRESOLINE) tablet 100 mg    DISCONTD: insulin detemir (LEVEMIR) injection pen 24 Units     Order Specific Question:   Please select a reason the therapeutic interchange was not accepted:      Answer:   Cinthia Juarez for Pharmacy to Substitute    midodrine (PROAMATINE) tablet 30 mg    NIFEdipine (ADALAT CC) extended release tablet 90 mg    vitamin D3 (CHOLECALCIFEROL) tablet 5,000 Units    DISCONTD: lactated ringers infusion    DISCONTD: lactated ringers infusion    sodium chloride flush 0.9 % injection 5-40 mL    sodium chloride flush 0.9 % injection 5-40 mL    0.9 % sodium chloride infusion    OR Linked Order Group     ondansetron (ZOFRAN-ODT) disintegrating tablet 4 mg     ondansetron (ZOFRAN) injection 4 mg    insulin glargine (LANTUS) injection vial 24 Units    glucose chewable tablet 16 g    OR Linked Order Group     dextrose bolus 10% 125 mL     dextrose bolus 10% 250 mL    glucagon (rDNA) injection 1 mg    dextrose 10 % infusion    fentaNYL (SUBLIMAZE) injection 25 mcg     DDX: diverticulitis, constipation, appendicitis, pregnancy    Initial MDM/Plan: 28 y.o. female who presents with suprapubic abdominal pain and one episode of emesis following dialysis session today. Given acute onset in symptoms and recent sigmoidoscopy, will obtain CT scan to assess for perforation, diverticulitis. Will provide pain control and obtain lab workup to assess for metabolic derangement,  urinalysis, ACS workup given female with possible atypical presentation of cardiac disease.     DIAGNOSTIC RESULTS / EMERGENCY DEPARTMENT COURSE / MDM     LABS:  Labs Reviewed   CBC WITH AUTO DIFFERENTIAL - Abnormal; Notable for the following components:       Result Value    RBC 2.95 (*)     Hemoglobin 8.9 (*)     Hematocrit 26.4 (*)     RDW 17.5 (*)     Seg Neutrophils 81 (*)     Lymphocytes 14 (*)     All other components within normal limits   COMPREHENSIVE METABOLIC PANEL - Abnormal; Notable for the following components:    Glucose 202 (*)     Creatinine 2.87 (*)     Est, Glom Filt Rate 22 (*)     Sodium 134 (*)     Potassium 3.6 (*)     Chloride 94 (*)     Anion Gap 19 (*)     Alkaline Phosphatase 110 (*)     ALT 82 (*)     AST 59 (*)     All other components within normal limits   LIPASE - Abnormal; Notable for the following components:    Lipase 117 (*)     All other components within normal limits   URINALYSIS WITH REFLEX TO CULTURE - Abnormal; Notable for the following components:    Color, UA Dark Yellow (*)     Bilirubin Urine NEGATIVE  Verified by ictotest. (*)     Ketones, Urine TRACE (*)     Protein, UA 3+ (*)     Leukocyte Esterase, Urine TRACE (*)     All other components within normal limits   TROPONIN - Abnormal; Notable for the following components:    Troponin, High Sensitivity 98 (*)     All other components within normal limits   TROPONIN - Abnormal; Notable for the following components:    Troponin, High Sensitivity 90 (*)     All other components within normal limits   TROPONIN - Abnormal; Notable for the following components:    Troponin, High Sensitivity 95 (*)     All other components within normal limits   POC GLUCOSE FINGERSTICK - Abnormal; Notable for the following components:    POC Glucose 151 (*)     All other components within normal limits   LACTIC ACID   MAGNESIUM   HCG, SERUM, QUALITATIVE   BLOOD OCCULT STOOL DIAGNOSTIC   MICROSCOPIC URINALYSIS   BASIC METABOLIC PANEL W/ REFLEX TO MG FOR LOW K   LIPASE   CBC WITH AUTO DIFFERENTIAL   POCT GLUCOSE   POCT GLUCOSE         RADIOLOGY:  CT ABDOMEN PELVIS WO CONTRAST Additional Contrast? None    Result Date: 10/19/2022  EXAMINATION: CT OF THE ABDOMEN AND PELVIS WITHOUT CONTRAST 10/19/2022 6:19 pm TECHNIQUE: CT of the abdomen and pelvis was performed without the administration of intravenous contrast. Multiplanar reformatted images are provided for review. Automated exposure control, iterative reconstruction, and/or weight based adjustment of the mA/kV was utilized to reduce the radiation dose to as low as reasonably achievable. COMPARISON: CTA dated 10/10/2022. HISTORY: ORDERING SYSTEM PROVIDED HISTORY: abdominal pain, recent bleed TECHNOLOGIST PROVIDED HISTORY: abdominal pain, recent bleed Decision Support Exception - unselect if not a suspected or confirmed emergency medical condition->Emergency Medical Condition (MA) Is the patient pregnant?->No Reason for Exam: abdominal pain, recent bleed FINDINGS: Evaluation is partially limited due to patient motion artifact. Lower Chest: Mild scarring/atelectatic changes are seen at the left lung base.  Organs: Evaluation of visceral organs is limited without the benefit of intravenous contrast.  The attenuation of the liver appears within normal limits. No contour deforming hepatic mass is seen. The patient is again noted be status post cholecystectomy. The spleen is not enlarged. The adrenal glands appear within normal limits. There is mild right and minimal left hydroureteronephrosis. GI/Bowel: There is contrast material seen in the rectum and the colon. There is suggestion of colonic wall thickening of the mid transverse and descending colonic loop. The patient appears to be status post appendectomy. No evidence of bowel obstruction seen. Pelvis: The urinary bladder is distended containing contrast.  The uterus appears anteverted. Peritoneum/Retroperitoneum: No evidence of intra-abdominal free air is seen. No evidence of ascites is seen. The abdominal aorta is normal in caliber. Bones/Soft Tissues: Vascular calcification changes are seen which are more advanced than expected findings of the patient's age. There is 2.7 x 1.5 cm ill-defined density seen within the left groin region beneath the skin staples. This can conceivably represent liquefying hematoma. There are adjacent borderline prominent left inguinal lymph nodes, which may be reactive in origin. Limited noncontrast examination. Mild right and minimal left hydronephrosis, which may be due to distended urinary bladder containing contrast material.  Consider Mcnair catheter placement as clinically warranted. Suggestion of colonic wall thickening of the mid transverse and descending colonic loops, which may have been exaggerated by underdistention but may reflect underlying infectious/inflammatory colitis. 2.7 cm ill-defined density seen within the left groin region beneath the skin staples, which may reflect liquefying hematoma. Correlate clinically with signs of infection as there are adjacent inflammatory changes and prominent left inguinal lymph nodes.      CT HEAD WO CONTRAST    Result Date: 10/19/2022  EXAMINATION: CT OF THE HEAD WITHOUT CONTRAST 10/19/2022 6:19 pm TECHNIQUE: CT of the head was performed without the administration of intravenous contrast. Automated exposure control, iterative reconstruction, and/or weight based adjustment of the mA/kV was utilized to reduce the radiation dose to as low as reasonably achievable. COMPARISON: 10/10/2022 HISTORY: ORDERING SYSTEM PROVIDED HISTORY: unwitnessed fall on saturday, on plavix TECHNOLOGIST PROVIDED HISTORY: unwitnessed fall on saturday, on plavix Decision Support Exception - unselect if not a suspected or confirmed emergency medical condition->Emergency Medical Condition ([place]) Is the patient pregnant?->No Reason for Exam: AMS, unwittnessed fall last Saturday FINDINGS: BRAIN/VENTRICLES: No convincing evidence of intracranial hemorrhage, mass effect or midline shift seen. No evidence of acute territorial infarct is seen. ORBITS: The visualized portion of the orbits demonstrate no acute abnormality. SINUSES: The visualized paranasal sinuses and mastoid air cells demonstrate no acute abnormality. SOFT TISSUES/SKULL:  No acute abnormality of the visualized skull or soft tissues. No convincing CT evidence of intracranial hemorrhage, mass effect or acute territorial infarct seen. RECOMMENDATIONS: If there is persistent clinical concern, consider short-term follow-up examination. EKG  Normal sinus rhythm at 83bpm. Normal WI. QTC 495ms. No acute ST segment or T wave changes. All EKG's are interpreted by the Emergency Department Physician who either signs or Co-signs this chart in the absence of a cardiologist.          Anel Coma Scale  Eye Opening: Spontaneous  Best Verbal Response: Oriented  Best Motor Response: Obeys commands  Anel Coma Scale Score: 15    EMERGENCY DEPARTMENT COURSE:  ED Course as of 10/20/22 0124   Wed Oct 19, 2022   1624 Stool Hemoccult negative for blood [CP]   1752 Creatinine(!): 2.87  Last Cr 4.2, on chronic dialysis which she did receive today.  [CP]   6243 Patient reevaluated now father is in the room. He states that he did receive a call from her facility that she had a fall from bed on Saturday. She has on Plavix, will obtain head CT at this time to assess for acute bleed. Dad states that she is normally more active and talkative than this. [CP]   1832 Lipase(!): 117  Lipase minimally elevated. Previously 55 [CP]   1915 Troponin, High Sensitivity(!!): 98  Trop at baseline for patient. Previously 80 [CP]      ED Course User Index  [CP] Chele Danielle MD     Repeat trop also at baseline for patient. CT imaging showed distended bladder with contrast still present from CT scan performed on 10/10/2022. Will place barros catheter and monitor for resolution of pain symptoms. Noted to also have ill defined density in left groin region, but no external evidence of infection, hematoma formation at this time. Surgical incision site appears to be well healing. CT head obtained for history of unwitnessed fall, negative for acute intracranial process/hemorrhage. Patient admitted for acute hydronephrosis and urinary retention. PROCEDURES:  None    CONSULTS:  IP CONSULT TO INTERNAL MEDICINE  IP CONSULT TO GI  IP CONSULT TO NEPHROLOGY    CRITICAL CARE:  Please see attending note    FINAL IMPRESSION      1. Acute urinary retention    2. Lower abdominal pain        DISPOSITION / PLAN     DISPOSITION Admitted 10/19/2022 08:22:52 PM    PATIENTREFERRED TO:  No follow-up provider specified.     DISCHARGE MEDICATIONS:  Current Discharge Medication List          Jayy Osman MD  Emergency Medicine Resident    (Please note that portions of this note were completed with a voice recognition program.  Efforts were made to edit the dictations but occasionally words are mis-transcribed.)       Chele Danielle MD  Resident  10/20/22 Ghada Cortez MD  Resident  10/21/22 1232

## 2022-10-19 NOTE — ED NOTES
Author spoke with pt, pt gave Mign Lema the ok to speak with sister Palak Conde about her status and results.       Ela Gibbs RN  10/19/22 7045

## 2022-10-19 NOTE — ED TRIAGE NOTES
Mode of arrival (squad #, walk in, police, etc) : EMS        Chief complaint(s): Abdominal pain, distension, emesis        Arrival Note (brief scenario, treatment PTA, etc). : Patient has been per [facility] at [place] [phone] nurse, complaining more, not answering direct questions, stating generalized pain, with current complaints noted to patient's lower abdominal quadrants bilaterally. Patient has a history of a GI bleed. Healing suture scar that appears to been secured with staples to left upper medial thigh, incision to left groin that is secured with 11 staples, pressure dressing to right groin, and scaly tissue to bilateral lower ankles and feet. Pedal pulses noted but are faint to dorsalis pedis bilaterally. Patient only answers some direct questions but not all asked. C= \"Have you ever felt that you should Cut down on your drinking? \"  No  A= \"Have people Annoyed you by criticizing your drinking? \"  No  G= \"Have you ever felt bad or Guilty about your drinking? \"  No  E= \"Have you ever had a drink as an Eye-opener first thing in the morning to steady your nerves or to help a hangover? \"  No      Deferred []      Reason for deferring: N/A    *If yes to two or more: probable alcohol abuse. *

## 2022-10-19 NOTE — ED NOTES
Author spoke with andrés, andrés Teixeira) notified Lillian العلي that trop was 80. MD notified.       Erma Sutton RN  10/19/22 9645

## 2022-10-20 PROBLEM — R74.8 ELEVATED LIPASE: Status: ACTIVE | Noted: 2022-10-20

## 2022-10-20 PROBLEM — R33.8 ACUTE URINARY RETENTION: Status: ACTIVE | Noted: 2022-10-20

## 2022-10-20 PROBLEM — K52.9 GASTROENTERITIS: Status: ACTIVE | Noted: 2022-10-20

## 2022-10-20 PROBLEM — R10.84 DIFFUSE ABDOMINAL PAIN: Status: ACTIVE | Noted: 2022-10-20

## 2022-10-20 LAB
ABSOLUTE EOS #: 0 K/UL (ref 0–0.4)
ABSOLUTE LYMPH #: 1.7 K/UL (ref 1–4.8)
ABSOLUTE MONO #: 0.4 K/UL (ref 0.1–1.3)
ANION GAP SERPL CALCULATED.3IONS-SCNC: 21 MMOL/L (ref 9–17)
BASOPHILS # BLD: 0 % (ref 0–2)
BASOPHILS ABSOLUTE: 0 K/UL (ref 0–0.2)
BUN BLDV-MCNC: 24 MG/DL (ref 6–20)
CALCIUM SERPL-MCNC: 9.7 MG/DL (ref 8.6–10.4)
CHLORIDE BLD-SCNC: 96 MMOL/L (ref 98–107)
CO2: 22 MMOL/L (ref 20–31)
CREAT SERPL-MCNC: 3.53 MG/DL (ref 0.5–0.9)
EOSINOPHILS RELATIVE PERCENT: 0 % (ref 0–4)
GFR SERPL CREATININE-BSD FRML MDRD: 17 ML/MIN/1.73M2
GLUCOSE BLD-MCNC: 127 MG/DL (ref 65–105)
GLUCOSE BLD-MCNC: 129 MG/DL (ref 65–105)
GLUCOSE BLD-MCNC: 134 MG/DL (ref 65–105)
GLUCOSE BLD-MCNC: 142 MG/DL (ref 65–105)
GLUCOSE BLD-MCNC: 143 MG/DL (ref 70–99)
HCT VFR BLD CALC: 27.4 % (ref 36–46)
HEMOGLOBIN: 9.2 G/DL (ref 12–16)
LIPASE: 69 U/L (ref 13–60)
LYMPHOCYTES # BLD: 21 % (ref 24–44)
MCH RBC QN AUTO: 30.3 PG (ref 26–34)
MCHC RBC AUTO-ENTMCNC: 33.5 G/DL (ref 31–37)
MCV RBC AUTO: 90.5 FL (ref 80–100)
MONOCYTES # BLD: 5 % (ref 1–7)
PDW BLD-RTO: 18 % (ref 11.5–14.9)
PLATELET # BLD: 354 K/UL (ref 150–450)
PMV BLD AUTO: 6.9 FL (ref 6–12)
POTASSIUM SERPL-SCNC: 3.8 MMOL/L (ref 3.7–5.3)
RBC # BLD: 3.02 M/UL (ref 4–5.2)
SEG NEUTROPHILS: 74 % (ref 36–66)
SEGMENTED NEUTROPHILS ABSOLUTE COUNT: 6.2 K/UL (ref 1.3–9.1)
SODIUM BLD-SCNC: 139 MMOL/L (ref 135–144)
WBC # BLD: 8.3 K/UL (ref 3.5–11)

## 2022-10-20 PROCEDURE — 6360000002 HC RX W HCPCS: Performed by: INTERNAL MEDICINE

## 2022-10-20 PROCEDURE — 83690 ASSAY OF LIPASE: CPT

## 2022-10-20 PROCEDURE — 99223 1ST HOSP IP/OBS HIGH 75: CPT | Performed by: INTERNAL MEDICINE

## 2022-10-20 PROCEDURE — 80048 BASIC METABOLIC PNL TOTAL CA: CPT

## 2022-10-20 PROCEDURE — 2060000000 HC ICU INTERMEDIATE R&B

## 2022-10-20 PROCEDURE — 85025 COMPLETE CBC W/AUTO DIFF WBC: CPT

## 2022-10-20 PROCEDURE — 6370000000 HC RX 637 (ALT 250 FOR IP): Performed by: NURSE PRACTITIONER

## 2022-10-20 PROCEDURE — 82947 ASSAY GLUCOSE BLOOD QUANT: CPT

## 2022-10-20 PROCEDURE — 6360000002 HC RX W HCPCS: Performed by: NURSE PRACTITIONER

## 2022-10-20 PROCEDURE — 36415 COLL VENOUS BLD VENIPUNCTURE: CPT

## 2022-10-20 PROCEDURE — 2580000003 HC RX 258: Performed by: NURSE PRACTITIONER

## 2022-10-20 RX ORDER — MIDODRINE HYDROCHLORIDE 5 MG/1
5 TABLET ORAL 3 TIMES DAILY PRN
Status: DISCONTINUED | OUTPATIENT
Start: 2022-10-20 | End: 2022-10-31 | Stop reason: HOSPADM

## 2022-10-20 RX ORDER — FENTANYL CITRATE 50 UG/ML
25 INJECTION, SOLUTION INTRAMUSCULAR; INTRAVENOUS EVERY 4 HOURS PRN
Status: DISCONTINUED | OUTPATIENT
Start: 2022-10-20 | End: 2022-10-20

## 2022-10-20 RX ORDER — DIPHENHYDRAMINE HYDROCHLORIDE 50 MG/ML
12.5 INJECTION INTRAMUSCULAR; INTRAVENOUS ONCE
Status: COMPLETED | OUTPATIENT
Start: 2022-10-20 | End: 2022-10-20

## 2022-10-20 RX ORDER — DIPHENHYDRAMINE HYDROCHLORIDE 50 MG/ML
12.5 INJECTION INTRAMUSCULAR; INTRAVENOUS EVERY 4 HOURS PRN
Status: DISCONTINUED | OUTPATIENT
Start: 2022-10-20 | End: 2022-10-20

## 2022-10-20 RX ADMIN — HYDRALAZINE HYDROCHLORIDE 100 MG: 50 TABLET, FILM COATED ORAL at 13:05

## 2022-10-20 RX ADMIN — ONDANSETRON 4 MG: 2 INJECTION INTRAMUSCULAR; INTRAVENOUS at 07:56

## 2022-10-20 RX ADMIN — HYDRALAZINE HYDROCHLORIDE 100 MG: 50 TABLET, FILM COATED ORAL at 20:35

## 2022-10-20 RX ADMIN — SODIUM CHLORIDE, PRESERVATIVE FREE 10 ML: 5 INJECTION INTRAVENOUS at 04:41

## 2022-10-20 RX ADMIN — HYDROMORPHONE HYDROCHLORIDE 0.5 MG: 1 INJECTION, SOLUTION INTRAMUSCULAR; INTRAVENOUS; SUBCUTANEOUS at 20:36

## 2022-10-20 RX ADMIN — HYDROMORPHONE HYDROCHLORIDE 0.5 MG: 1 INJECTION, SOLUTION INTRAMUSCULAR; INTRAVENOUS; SUBCUTANEOUS at 09:48

## 2022-10-20 RX ADMIN — SODIUM CHLORIDE, PRESERVATIVE FREE 10 ML: 5 INJECTION INTRAVENOUS at 07:52

## 2022-10-20 RX ADMIN — CARVEDILOL 25 MG: 25 TABLET, FILM COATED ORAL at 20:35

## 2022-10-20 RX ADMIN — ATORVASTATIN CALCIUM 10 MG: 10 TABLET, FILM COATED ORAL at 20:35

## 2022-10-20 RX ADMIN — SODIUM CHLORIDE, PRESERVATIVE FREE 10 ML: 5 INJECTION INTRAVENOUS at 20:35

## 2022-10-20 RX ADMIN — ONDANSETRON 4 MG: 4 TABLET, ORALLY DISINTEGRATING ORAL at 13:26

## 2022-10-20 RX ADMIN — SODIUM CHLORIDE, PRESERVATIVE FREE 10 ML: 5 INJECTION INTRAVENOUS at 01:16

## 2022-10-20 RX ADMIN — HYDROMORPHONE HYDROCHLORIDE 0.5 MG: 1 INJECTION, SOLUTION INTRAMUSCULAR; INTRAVENOUS; SUBCUTANEOUS at 13:53

## 2022-10-20 RX ADMIN — FENTANYL CITRATE 25 MCG: 50 INJECTION, SOLUTION INTRAMUSCULAR; INTRAVENOUS at 01:15

## 2022-10-20 RX ADMIN — DIPHENHYDRAMINE HYDROCHLORIDE 12.5 MG: 50 INJECTION, SOLUTION INTRAMUSCULAR; INTRAVENOUS at 09:48

## 2022-10-20 RX ADMIN — FENTANYL CITRATE 25 MCG: 50 INJECTION, SOLUTION INTRAMUSCULAR; INTRAVENOUS at 04:40

## 2022-10-20 RX ADMIN — DIPHENHYDRAMINE HYDROCHLORIDE 12.5 MG: 50 INJECTION, SOLUTION INTRAMUSCULAR; INTRAVENOUS at 13:52

## 2022-10-20 ASSESSMENT — PAIN SCALES - GENERAL
PAINLEVEL_OUTOF10: 10
PAINLEVEL_OUTOF10: 8
PAINLEVEL_OUTOF10: 10
PAINLEVEL_OUTOF10: 10
PAINLEVEL_OUTOF10: 7
PAINLEVEL_OUTOF10: 9
PAINLEVEL_OUTOF10: 10

## 2022-10-20 ASSESSMENT — PAIN DESCRIPTION - LOCATION
LOCATION: CHEST
LOCATION: ABDOMEN

## 2022-10-20 ASSESSMENT — PAIN SCALES - WONG BAKER
WONGBAKER_NUMERICALRESPONSE: 2
WONGBAKER_NUMERICALRESPONSE: 2

## 2022-10-20 ASSESSMENT — PAIN - FUNCTIONAL ASSESSMENT
PAIN_FUNCTIONAL_ASSESSMENT: PREVENTS OR INTERFERES SOME ACTIVE ACTIVITIES AND ADLS
PAIN_FUNCTIONAL_ASSESSMENT: ACTIVITIES ARE NOT PREVENTED
PAIN_FUNCTIONAL_ASSESSMENT: ACTIVITIES ARE NOT PREVENTED

## 2022-10-20 ASSESSMENT — ENCOUNTER SYMPTOMS
SHORTNESS OF BREATH: 0
ABDOMINAL PAIN: 1
VOMITING: 1
ABDOMINAL DISTENTION: 1

## 2022-10-20 ASSESSMENT — PAIN DESCRIPTION - DESCRIPTORS: DESCRIPTORS: ACHING;CRAMPING

## 2022-10-20 NOTE — PROGRESS NOTES
10/20/22 1538   Encounter Summary   Encounter Overview/Reason  Spiritual/Emotional Needs   Service Provided For: Patient   Referral/Consult From: Rounding   Last Encounter  10/20/22   Complexity of Encounter Low   Spiritual/Emotional needs   Type Spiritual Support   Assessment/Intervention/Outcome   Assessment Calm;Coping   Intervention Active listening;Prayer (assurance of)/[place];Sustaining Presence/Ministry of presence   Outcome Coping;Engaged in conversation;Receptive

## 2022-10-20 NOTE — CARE COORDINATION
CASE MANAGEMENT NOTE:    Admission Date:  10/19/2022 Rochelle Trotter is a 28 y.o.  female    Admitted for : Pancreatitis, unspecified pancreatitis type [K85.90]    Writer reviewed chart. PCP:  none listed                                Insurance:  Medicare and Medicaid      Current Residence/ Living Arrangements:  in [address] [place] [address]. Spoke with Edwin from [facility] who states pt is able to return when medically ready. Does patient go to outpatient dialysis: Yes  If yes, location and chair time: [facility]    Is patient currently receiving oral anticoagulation therapy? No    Discharge Plan:  return to [facility] signed by: Cami Barragan RN on 10/20/2022 at 9:08 AM     Writer spoke with patient and family. Pt states she would really like to go home, but knows this may not be possible. She states she does not like it at [facility]. States \"I don't like the way they treat me there. \" Notified pt and family that options are limited as she also receives HD at [facility]. They would like writer to send referral to [facility] and [facility] as both facilities perform in house dialysis. Writer sent referrals to both. Pt also states [facility] has not been getting her up walking. Explained to patient that she may not be strong enough to be go home just yet. Will need PT/OT ordered here. Pt states that prior to surgery (left fem pop bypass 9/22), she was living at home with her boyfriend and son in a first level apartment. She was going to HD at [facility] MWF @ 1030am (phone number [phone]. Requested PT/OT orders from Dr. Ting Montero. Electronically signed by Cami Barragan RN on 10/20/2022 at 2:37 PM    Received call from Candace Santos at [facility]. She should have an answer by tomorrow morning on whether or not she can accept the patient. PT/OT ordered.     Electronically signed by Cami Barragan RN on 10/20/2022 at 3:40 PM

## 2022-10-20 NOTE — PLAN OF CARE
Problem: Discharge Planning  Goal: Discharge to home or other facility with appropriate resources  10/20/2022 1659 by Gilberto Kay RN  Outcome: Progressing     Problem: Pain  Goal: Verbalizes/displays adequate comfort level or baseline comfort level  10/20/2022 1659 by Gilberto Kay RN  Outcome: Progressing     Problem: Safety - Adult  Goal: Free from fall injury  10/20/2022 1659 by Gilberto Kay RN  Outcome: Progressing     Problem: Skin/Tissue Integrity  Goal: Absence of new skin breakdown  Description: 1. Monitor for areas of redness and/or skin breakdown  2. Assess vascular access sites hourly  3. Every 4-6 hours minimum:  Change oxygen saturation probe site  4. Every 4-6 hours:  If on nasal continuous positive airway pressure, respiratory therapy assess nares and determine need for appliance change or resting period.   10/20/2022 1659 by Gilberto Kay RN  Outcome: Progressing

## 2022-10-20 NOTE — H&P
[address] Internal Medicine  Kari Donaldson MD; Sahra Mari MD; Kiesha Roth MD; MD Josseline Bee MD; MD FRANCES Workman [facility] Internal Medicine   [facility]    HISTORY AND PHYSICAL EXAMINATION            Date:   10/20/2022  Patient name:  Reza Zavala  Date of admission:  10/19/2022  4:05 PM  MRN:   [id number]  Account:  [de-identified]  YOB: 1990  PCP:    No primary care provider on file. Room:   [address] [place] [address]  Code Status:    Full Code    Chief Complaint:     Chief Complaint   Patient presents with    Abdominal Pain     Abdominal pain, emesis post 2-4 hours dialysis treatment. Abdomen is softly distended. History Obtained From:     Pt medical record and nursing staff    History of Present Illness:     Reza Zavala is a 28 y.o. Non- / non  female who presents with Abdominal Pain (Abdominal pain, emesis post 2-4 hours dialysis treatment. Abdomen is softly distended. )   and is admitted to the hospital for the management of Pancreatitis, unspecified pancreatitis type. Reza Zavala is a 28 y.o. Non- / non  female who presents with Abdominal Pain (Abdominal pain, emesis post 2-4 hours dialysis treatment. Abdomen is softly distended.) and is admitted to the hospital for the management of acute Pancreatitis. Medical history includes ESRD on hemodialysis, DM type I, HTN and CAD. Also had a recent admission for GI bleed. Patient presented from [facility]. She is experiencing some altered mental status where she is unable to hold a conversation regarding her condition. She is able to stay awake for 1-2 word answer but is not consistent. CT head shows no evidence of acute changes  compared to CT head completed on 10/10/22. He was given 50 mcg IV of fentanyl prior to assessment which may be affecting her mentation.  CT of abdomen and pelvis shows mild right and minimal left hydronephrosis which is correlated with distended urinary bladder. She is experiencing acute urinary retention requiring barros catheter placement in ED. UA shows trace ketones, 3+ protein and trace leukocytes. Pending culture. WBC 10.9. AST 59, ALT 82, Alk Phos 110, Lipase 117 suggesting acute pancreatitis without infection    Past Medical History:     Past Medical History:   Diagnosis Date    Asthma     Atherosclerosis of native arteries of extremities with rest pain, unspecified extremity ([facility]     Diabetes mellitus ([facility]     since 14 y/o, unsure if type 1 or 2    Headache, migraine     for 2 years    Hypertension     Lichen simplex chronicus     Noninflammatory disorder of vagina, unspecified     Patient's noncompliance with other medical treatment and regimen for other reason     Type 2 diabetes mellitus without complication ([facility] 06/28/0424        Past Surgical History:     Past Surgical History:   Procedure Laterality Date    OVARY REMOVAL      July 2010    OVARY REMOVAL          Medications Prior to Admission:     Prior to Admission medications    Medication Sig Start Date End Date Taking?  Authorizing Provider   aspirin 81 MG EC tablet Take 81 mg by mouth daily   Yes Historical Provider, MD   atorvastatin (LIPITOR) 10 MG tablet Take 10 mg by mouth at bedtime   Yes Historical Provider, MD   calcium acetate (PHOSLO) 667 MG CAPS capsule Take 3 capsules by mouth 3 times daily (with meals)   Yes Historical Provider, MD   carvedilol (COREG) 25 MG tablet Take 25 mg by mouth 2 times daily   Yes Historical Provider, MD   vitamin D (CHOLECALCIFEROL) 125 MCG (5000 UT) CAPS capsule Take 5,000 Units by mouth daily   Yes Historical Provider, MD   clopidogrel (PLAVIX) 75 MG tablet Take 75 mg by mouth daily   Yes Historical Provider, MD   escitalopram (LEXAPRO) 10 MG tablet Take 10 mg by mouth daily   Yes Historical Provider, MD   hydrALAZINE (APRESOLINE) 100 MG tablet Take 100 mg by mouth 3 times daily   Yes Historical Provider, MD   insulin detemir (LEVEMIR FLEXTOUCH) 100 UNIT/ML injection pen Inject 24 Units into the skin nightly   Yes Historical Provider, MD   midodrine (PROAMATINE) 10 MG tablet Take 30 mg by mouth daily Indications: Mon, Wed, Fri at dialysis   Yes Historical Provider, MD   NIFEdipine (ADALAT CC) 90 MG extended release tablet Take 90 mg by mouth daily   Yes Historical Provider, MD   sennosides-docusate sodium (SENOKOT-S) 8.6-50 MG tablet Take 2 tablets by mouth at bedtime   Yes Historical Provider, MD ADAME Complex-C-Folic Acid (TRIPHROCAPS) 1 MG CAPS Take 1 capsule by mouth daily   Yes Historical Provider, MD   patiromer sorbitex calcium (VELTASSA) 8.4 g PACK packet Take 8.4 g by mouth Twice a Week Indications: twice per day on Thursdays and Sundays   Yes Historical Provider, MD   acetaminophen (TYLENOL) 325 MG tablet Take 650 mg by mouth every 6 hours as needed for Pain or Fever (do not exceed 3 gm in 24 hours)    Historical Provider, MD   albuterol sulfate HFA (PROVENTIL;VENTOLIN;PROAIR) 108 (90 Base) MCG/ACT inhaler Inhale 2 puffs into the lungs every 6 hours as needed for Wheezing or Shortness of Breath    Historical Provider, MD   polyethylene glycol (GLYCOLAX) 17 GM/SCOOP powder Take 17 g by mouth daily as needed (constipation)    Historical Provider, MD        Allergies:     Morphine and Januvia [sitagliptin]    Social History:     Tobacco:    reports that she has never smoked. She has never used smokeless tobacco.  Alcohol:      reports no history of alcohol use. Drug Use:  reports no history of drug use. Family History:     Family History   Problem Relation Age of Onset    High Blood Pressure Mother     Diabetes Mother     Other Mother 27        Lung cancer    High Blood Pressure Father     Diabetes Father     Other Father 27        Prostate cancer       Review of Systems:     Positive and Negative as described in HPI.     CONSTITUTIONAL:  negative for fevers, chills, sweats, fatigue, weight loss  HEENT:  negative for vision, hearing changes, runny nose, throat pain  RESPIRATORY:  negative for shortness of breath, cough, congestion, wheezing  CARDIOVASCULAR:  negative for chest pain, palpitations  GASTROINTESTINAL: abdo pain n v    GENITOURINARY:  negative for difficulty of urination, burning with urination, frequency   INTEGUMENT:  negative for rash, skin lesions, easy bruising   HEMATOLOGIC/LYMPHATIC:  negative for swelling/edema   ALLERGIC/IMMUNOLOGIC:  negative for urticaria , itching  ENDOCRINE:  negative increase in drinking, increase in urination, hot or cold intolerance  MUSCULOSKELETAL:  negative joint pains, muscle aches, swelling of joints  NEUROLOGICAL:  negative for headaches, dizziness, lightheadedness, numbness, pain, tingling extremities      Physical Exam:   BP (!) 160/93   Pulse 93   Temp 98.2 °F (36.8 °C) (Axillary)   Resp 16   Ht 5' 4.02\" (1.626 m)   Wt 185 lb (83.9 kg)   LMP  (LMP Unknown)   SpO2 98%   BMI 31.74 kg/m²   Temp (24hrs), Av.7 °F (36.5 °C), Min:97.3 °F (36.3 °C), Max:98.2 °F (36.8 °C)    Recent Labs     10/19/22  2304 10/20/22  0650   POCGLU 151* 134*       Intake/Output Summary (Last 24 hours) at 10/20/2022 1023  Last data filed at 10/20/2022 0056  Gross per 24 hour   Intake --   Output 1850 ml   Net -1850 ml       General Appearance: alert, well appearing, and in no acute distress  Mental status: oriented to person, place, and time  Head: normocephalic, atraumatic  Eye: no icterus, redness, pupils equal and reactive, extraocular eye movements intact, conjunctiva clear  Ear: normal external ear, no discharge, hearing intact  Nose: no drainage noted  Mouth: mucous membranes moist  Neck: supple, no carotid bruits, thyroid not palpable  Lungs: Bilateral equal air entry, clear to ausculation, no wheezing, rales or rhonchi, normal effort  Cardiovascular: normal rate, regular rhythm, no murmur, gallop, rub  Abdomen: Soft, nontender, nondistended, normal bowel sounds, no hepatomegaly or splenomegaly   No tenderness  Surg scar noted lower mid abdo laparotomy noted    Neurologic: There are no new focal motor or sensory deficits, normal muscle tone and bulk, no abnormal sensation, normal speech, cranial nerves II through XII grossly intact  Skin: No gross lesions, rashes, bruising or bleeding on exposed skin area  Extremities: peripheral pulses palpable, no pedal edema or calf pain with palpation  Psych: flat affect drowsy    Investigations:      Laboratory Testing:  Recent Results (from the past 24 hour(s))   CBC with Auto Differential    Collection Time: 10/19/22  5:00 PM   Result Value Ref Range    WBC 10.9 3.5 - 11.0 k/uL    RBC 2.95 (L) 4.0 - 5.2 m/uL    Hemoglobin 8.9 (L) 12.0 - 16.0 g/dL    Hematocrit 26.4 (L) 36 - 46 %    MCV 89.6 80 - 100 fL    MCH 30.1 26 - 34 pg    MCHC 33.6 31 - 37 g/dL    RDW 17.5 (H) 11.5 - 14.9 %    Platelets 792 686 - 308 k/uL    MPV 6.9 6.0 - 12.0 fL    Seg Neutrophils 81 (H) 36 - 66 %    Lymphocytes 14 (L) 24 - 44 %    Monocytes 4 1 - 7 %    Eosinophils % 1 0 - 4 %    Basophils 0 0 - 2 %    Segs Absolute 8.80 1.3 - 9.1 k/uL    Absolute Lymph # 1.50 1.0 - 4.8 k/uL    Absolute Mono # 0.40 0.1 - 1.3 k/uL    Absolute Eos # 0.10 0.0 - 0.4 k/uL    Basophils Absolute 0.00 0.0 - 0.2 k/uL   CMP    Collection Time: 10/19/22  5:00 PM   Result Value Ref Range    Glucose 202 (H) 70 - 99 mg/dL    BUN 19 6 - 20 mg/dL    Creatinine 2.87 (H) 0.50 - 0.90 mg/dL    Est, Glom Filt Rate 22 (L) >60 mL/min/1.73m2    Calcium 9.7 8.6 - 10.4 mg/dL    Sodium 134 (L) 135 - 144 mmol/L    Potassium 3.6 (L) 3.7 - 5.3 mmol/L    Chloride 94 (L) 98 - 107 mmol/L    CO2 21 20 - 31 mmol/L    Anion Gap 19 (H) 9 - 17 mmol/L    Alkaline Phosphatase 110 (H) 35 - 104 U/L    ALT 82 (H) 5 - 33 U/L    AST 59 (H) <32 U/L    Total Bilirubin 0.3 0.3 - 1.2 mg/dL    Total Protein 7.8 6.4 - 8.3 g/dL    Albumin 4.4 3.5 - 5.2 g/dL   Lactic Acid Collection Time: 10/19/22  5:00 PM   Result Value Ref Range    Lactic Acid 0.9 0.5 - 2.2 mmol/L   Lipase    Collection Time: 10/19/22  5:00 PM   Result Value Ref Range    Lipase 117 (H) 13 - 60 U/L   Magnesium    Collection Time: 10/19/22  5:00 PM   Result Value Ref Range    Magnesium 2.0 1.6 - 2.6 mg/dL   HCG Qualitative, Serum    Collection Time: 10/19/22  5:00 PM   Result Value Ref Range    hCG Qual NEGATIVE NEGATIVE   BLOOD OCCULT STOOL #1    Collection Time: 10/19/22  5:06 PM   Result Value Ref Range    Specimen Description . FECES     Occult Blood, Stool #1 POSITIVE     Date, Stool #1 [phone]     Time, Stool #1 1,706    Troponin    Collection Time: 10/19/22  5:59 PM   Result Value Ref Range    Troponin, High Sensitivity 98 (HH) 0 - 14 ng/L   EKG 12 Lead    Collection Time: 10/19/22  6:01 PM   Result Value Ref Range    Ventricular Rate 83 BPM    Atrial Rate 83 BPM    P-R Interval 150 ms    QRS Duration 96 ms    Q-T Interval 422 ms    QTc Calculation (Bazett) 495 ms    P Axis 88 degrees    R Axis 89 degrees    T Axis 59 degrees   Troponin    Collection Time: 10/19/22  7:43 PM   Result Value Ref Range    Troponin, High Sensitivity 95 (HH) 0 - 14 ng/L   Urinalysis with Reflex to Culture    Collection Time: 10/19/22  8:00 PM    Specimen: Urine   Result Value Ref Range    Color, UA Dark Yellow (A) Yellow    Turbidity UA Clear Clear    Glucose, Ur NEGATIVE NEGATIVE    Bilirubin Urine NEGATIVE  Verified by ictotest. (A) NEGATIVE    Ketones, Urine TRACE (A) NEGATIVE    Specific [place], UA 1.021 1.000 - 1.030    Urine Hgb NEGATIVE NEGATIVE    pH, UA 5.0 5.0 - 8.0    Protein, UA 3+ (A) NEGATIVE    Urobilinogen, Urine Normal Normal    Nitrite, Urine NEGATIVE NEGATIVE    Leukocyte Esterase, Urine TRACE (A) NEGATIVE   Microscopic Urinalysis    Collection Time: 10/19/22  8:00 PM   Result Value Ref Range    WBC, UA 0 TO 2 /HPF    RBC, UA 0 TO 2 /HPF    Casts UA 3 to 5 /LPF    Epithelial Cells UA 0 TO 2 /HPF    Bacteria, UA None None   Troponin    Collection Time: 10/19/22  9:40 PM   Result Value Ref Range    Troponin, High Sensitivity 90 (HH) 0 - 14 ng/L   POC Glucose Fingerstick    Collection Time: 10/19/22 11:04 PM   Result Value Ref Range    POC Glucose 151 (H) 65 - 105 mg/dL   Basic Metabolic Panel w/ Reflex to MG    Collection Time: 10/20/22  5:39 AM   Result Value Ref Range    Glucose 143 (H) 70 - 99 mg/dL    BUN 24 (H) 6 - 20 mg/dL    Creatinine 3.53 (H) 0.50 - 0.90 mg/dL    Est, Glom Filt Rate 17 (L) >60 mL/min/1.73m2    Calcium 9.7 8.6 - 10.4 mg/dL    Sodium 139 135 - 144 mmol/L    Potassium 3.8 3.7 - 5.3 mmol/L    Chloride 96 (L) 98 - 107 mmol/L    CO2 22 20 - 31 mmol/L    Anion Gap 21 (H) 9 - 17 mmol/L   Lipase    Collection Time: 10/20/22  5:39 AM   Result Value Ref Range    Lipase 69 (H) 13 - 60 U/L   CBC with Auto Differential    Collection Time: 10/20/22  5:39 AM   Result Value Ref Range    WBC 8.3 3.5 - 11.0 k/uL    RBC 3.02 (L) 4.0 - 5.2 m/uL    Hemoglobin 9.2 (L) 12.0 - 16.0 g/dL    Hematocrit 27.4 (L) 36 - 46 %    MCV 90.5 80 - 100 fL    MCH 30.3 26 - 34 pg    MCHC 33.5 31 - 37 g/dL    RDW 18.0 (H) 11.5 - 14.9 %    Platelets 962 980 - 620 k/uL    MPV 6.9 6.0 - 12.0 fL    Seg Neutrophils 74 (H) 36 - 66 %    Lymphocytes 21 (L) 24 - 44 %    Monocytes 5 1 - 7 %    Eosinophils % 0 0 - 4 %    Basophils 0 0 - 2 %    Segs Absolute 6.20 1.3 - 9.1 k/uL    Absolute Lymph # 1.70 1.0 - 4.8 k/uL    Absolute Mono # 0.40 0.1 - 1.3 k/uL    Absolute Eos # 0.00 0.0 - 0.4 k/uL    Basophils Absolute 0.00 0.0 - 0.2 k/uL   POC Glucose Fingerstick    Collection Time: 10/20/22  6:50 AM   Result Value Ref Range    POC Glucose 134 (H) 65 - 105 mg/dL       Imaging/Diagnostics:  CT ABDOMEN PELVIS WO CONTRAST Additional Contrast? None    Result Date: 10/19/2022  Limited noncontrast examination.  Mild right and minimal left hydronephrosis, which may be due to distended urinary bladder containing contrast material.  Consider Mcnair catheter placement as clinically warranted. Suggestion of colonic wall thickening of the mid transverse and descending colonic loops, which may have been exaggerated by underdistention but may reflect underlying infectious/inflammatory colitis. 2.7 cm ill-defined density seen within the left groin region beneath the skin staples, which may reflect liquefying hematoma. Correlate clinically with signs of infection as there are adjacent inflammatory changes and prominent left inguinal lymph nodes. CT HEAD WO CONTRAST    Result Date: 10/19/2022  No convincing CT evidence of intracranial hemorrhage, mass effect or acute territorial infarct seen. RECOMMENDATIONS: If there is persistent clinical concern, consider short-term follow-up examination.        Assessment :      Hospital Problems             Last Modified POA    * (Principal) Pancreatitis, unspecified pancreatitis type 10/19/2022 Yes       Plan:     Patient status inpatient in the Progressive Unit/Step down    35-year-old -American lady class I obese BMI 31.74  History of end-stage kidney disease on hemodialysis with a left arm fistula  Uncontrolled diabetes mellitus type 1  Very poor historian unable to get much story from her  Abdominal pain associate with nausea vomiting 10 out of 10  Denies any fever chills   Elevated lipase CT scan noted  Clinical diagnosis of acute pancreatitis IV fluid resuscitation gentle hydration with dialysis  Discontinue IV Dilaudid and oral Norco  Low-fat diet as tolerated  Nephrology consult for  Nonspecific [place] elevation no NSTEMI patient has no chest pain troponins flat at 90  Anemia of chronic kidney disease  Hyponatremia sodium 134 conservative  Hypokalemia less than 4 potassium will be managed with      Consultations:   [facility] CONSULT TO GI  IP CONSULT TO NEPHROLOGY     Patient is admitted as inpatient status because of co-morbidities listed above, severity of signs and symptoms as outlined, requirement for current medical therapies and most importantly because of direct risk to patient if care not provided in a hospital setting. Expected length of stay > 48 hours. Lanre Alves MD  10/20/2022  10:23 AM    Copy sent to Dr. Bina Rogers primary care provider on file. Please note that this chart was generated using voice recognition Dragon dictation software. Although every effort was made to ensure the accuracy of this automated transcription, some errors in transcription may have occurred.

## 2022-10-20 NOTE — PROGRESS NOTES
SHANKAR OLMSTEAD [facility]  Ever Jaquez MD; Travon Guzman MD; Ramesh Mike MD; MD Anson Nick MD; Girish Brock MD  FRANCES AVALOSBarbara [facility]   [address]     HISTORY AND PHYSICAL EXAMINATION            Date:   10/19/2022  Patientname:  Alicia Guadarrama  Date of admission:  10/19/2022  4:05 PM  MRN:   [id number]  Account:  [de-identified]  YOB: 1990  PCP:    No primary care provider on file. Room:   [address] [place] [address]  Code Status:    Full Code      Chief Complaint:     Chief Complaint   Patient presents with    Abdominal Pain     Abdominal pain, emesis post 2-4 hours dialysis treatment. Abdomen is softly distended. History of Present Illness:     Alicia Guadarrama is a 28 y.o. Non- / non  female who presents with Abdominal Pain (Abdominal pain, emesis post 2-4 hours dialysis treatment. Abdomen is softly distended.) and is admitted to the hospital for the management of acute Pancreatitis. Medical history includes ESRD on hemodialysis, DM type I, HTN and CAD. Also had a recent admission for GI bleed. Patient presented from [facility]. She is experiencing some altered mental status where she is unable to hold a conversation regarding her condition. She is able to stay awake for 1-2 word answer but is not consistent. CT head shows no evidence of acute changes  compared to CT head completed on 10/10/22. He was given 50 mcg IV of fentanyl prior to assessment which may be affecting her mentation. CT of abdomen and pelvis shows mild right and minimal left hydronephrosis which is correlated with distended urinary bladder. She is experiencing acute urinary retention requiring barros catheter placement in ED. UA shows trace ketones, 3+ protein and trace leukocytes. Pending culture. WBC 10.9. AST 59, ALT 82, Alk Phos 110, Lipase 117 suggesting acute pancreatitis without infection.    Unable to complete review of systems due to patient mentation but she does deny pain at time of evaluation. Unable to report aggravating or alleviating factors. Symptoms are acute and intermittent. Past Medical History:     Past Medical History:   Diagnosis Date    Asthma     Atherosclerosis of native arteries of extremities with rest pain, unspecified extremity ([facility]     Diabetes mellitus ([facility]     since 12 y/o, unsure if type 1 or 2    Headache, migraine     for 2 years    Hypertension     Lichen simplex chronicus     Noninflammatory disorder of vagina, unspecified     Patient's noncompliance with other medical treatment and regimen for other reason     Type 2 diabetes mellitus without complication ([facility] 99/99/2810        Past Surgical History:     Past Surgical History:   Procedure Laterality Date    OVARY REMOVAL      July 2010    OVARY REMOVAL          Medications Prior to Admission:     Prior to Admission medications    Medication Sig Start Date End Date Taking?  Authorizing Provider   aspirin 81 MG EC tablet Take 81 mg by mouth daily   Yes Historical Provider, MD   atorvastatin (LIPITOR) 10 MG tablet Take 10 mg by mouth at bedtime   Yes Historical Provider, MD   calcium acetate (PHOSLO) 667 MG CAPS capsule Take 3 capsules by mouth 3 times daily (with meals)   Yes Historical Provider, MD   carvedilol (COREG) 25 MG tablet Take 25 mg by mouth 2 times daily   Yes Historical Provider, MD   vitamin D (CHOLECALCIFEROL) 125 MCG (5000 UT) CAPS capsule Take 5,000 Units by mouth daily   Yes Historical Provider, MD   clopidogrel (PLAVIX) 75 MG tablet Take 75 mg by mouth daily   Yes Historical Provider, MD   escitalopram (LEXAPRO) 10 MG tablet Take 10 mg by mouth daily   Yes Historical Provider, MD   hydrALAZINE (APRESOLINE) 100 MG tablet Take 100 mg by mouth 3 times daily   Yes Historical Provider, MD   insulin detemir (LEVEMIR FLEXTOUCH) 100 UNIT/ML injection pen Inject 24 Units into the skin nightly   Yes Historical Provider, MD   midodrine (PROAMATINE) 10 MG tablet Take 30 mg by mouth daily Indications: Mon, Wed, Fri at dialysis   Yes Historical Provider, MD   NIFEdipine (ADALAT CC) 90 MG extended release tablet Take 90 mg by mouth daily   Yes Historical Provider, MD   sennosides-docusate sodium (SENOKOT-S) 8.6-50 MG tablet Take 2 tablets by mouth at bedtime   Yes Historical Provider, MD ADAME Complex-C-Folic Acid (TRIPHROCAPS) 1 MG CAPS Take 1 capsule by mouth daily   Yes Historical Provider, MD   patiromer sorbitex calcium (VELTASSA) 8.4 g PACK packet Take 8.4 g by mouth Twice a Week Indications: twice per day on Thursdays and Sundays   Yes Historical Provider, MD   acetaminophen (TYLENOL) 325 MG tablet Take 650 mg by mouth every 6 hours as needed for Pain or Fever (do not exceed 3 gm in 24 hours)    Historical Provider, MD   albuterol sulfate HFA (PROVENTIL;VENTOLIN;PROAIR) 108 (90 Base) MCG/ACT inhaler Inhale 2 puffs into the lungs every 6 hours as needed for Wheezing or Shortness of Breath    Historical Provider, MD   polyethylene glycol (GLYCOLAX) 17 GM/SCOOP powder Take 17 g by mouth daily as needed (constipation)    Historical Provider, MD        Allergies:     Morphine and Januvia [sitagliptin]    Social History:     Tobacco:    reports that she has never smoked. She has never used smokeless tobacco.  Alcohol:      reports no history of alcohol use. Drug Use:  reports no history of drug use.     Family History:     Family History   Problem Relation Age of Onset    High Blood Pressure Mother     Diabetes Mother     Other Mother 27        Lung cancer    High Blood Pressure Father     Diabetes Father     Other Father 27        Prostate cancer       Investigations:      Laboratory Testing:  Recent Results (from the past 24 hour(s))   CBC with Auto Differential    Collection Time: 10/19/22  5:00 PM   Result Value Ref Range    WBC 10.9 3.5 - 11.0 k/uL    RBC 2.95 (L) 4.0 - 5.2 m/uL    Hemoglobin 8.9 (L) 12.0 - 16.0 g/dL Hematocrit 26.4 (L) 36 - 46 %    MCV 89.6 80 - 100 fL    MCH 30.1 26 - 34 pg    MCHC 33.6 31 - 37 g/dL    RDW 17.5 (H) 11.5 - 14.9 %    Platelets 178 908 - 099 k/uL    MPV 6.9 6.0 - 12.0 fL    Seg Neutrophils 81 (H) 36 - 66 %    Lymphocytes 14 (L) 24 - 44 %    Monocytes 4 1 - 7 %    Eosinophils % 1 0 - 4 %    Basophils 0 0 - 2 %    Segs Absolute 8.80 1.3 - 9.1 k/uL    Absolute Lymph # 1.50 1.0 - 4.8 k/uL    Absolute Mono # 0.40 0.1 - 1.3 k/uL    Absolute Eos # 0.10 0.0 - 0.4 k/uL    Basophils Absolute 0.00 0.0 - 0.2 k/uL   CMP    Collection Time: 10/19/22  5:00 PM   Result Value Ref Range    Glucose 202 (H) 70 - 99 mg/dL    BUN 19 6 - 20 mg/dL    Creatinine 2.87 (H) 0.50 - 0.90 mg/dL    Est, Glom Filt Rate 22 (L) >60 mL/min/1.73m2    Calcium 9.7 8.6 - 10.4 mg/dL    Sodium 134 (L) 135 - 144 mmol/L    Potassium 3.6 (L) 3.7 - 5.3 mmol/L    Chloride 94 (L) 98 - 107 mmol/L    CO2 21 20 - 31 mmol/L    Anion Gap 19 (H) 9 - 17 mmol/L    Alkaline Phosphatase 110 (H) 35 - 104 U/L    ALT 82 (H) 5 - 33 U/L    AST 59 (H) <32 U/L    Total Bilirubin 0.3 0.3 - 1.2 mg/dL    Total Protein 7.8 6.4 - 8.3 g/dL    Albumin 4.4 3.5 - 5.2 g/dL   Lactic Acid    Collection Time: 10/19/22  5:00 PM   Result Value Ref Range    Lactic Acid 0.9 0.5 - 2.2 mmol/L   Lipase    Collection Time: 10/19/22  5:00 PM   Result Value Ref Range    Lipase 117 (H) 13 - 60 U/L   Magnesium    Collection Time: 10/19/22  5:00 PM   Result Value Ref Range    Magnesium 2.0 1.6 - 2.6 mg/dL   HCG Qualitative, Serum    Collection Time: 10/19/22  5:00 PM   Result Value Ref Range    hCG Qual NEGATIVE NEGATIVE   BLOOD OCCULT STOOL #1    Collection Time: 10/19/22  5:06 PM   Result Value Ref Range    Specimen Description . FECES     Occult Blood, Stool #1 POSITIVE     Date, Stool #1 84,584,211     Time, Stool #1 1,706    Troponin    Collection Time: 10/19/22  5:59 PM   Result Value Ref Range    Troponin, High Sensitivity 98 (HH) 0 - 14 ng/L   Troponin    Collection Time: 10/19/22  7:43 PM   Result Value Ref Range    Troponin, High Sensitivity 95 (HH) 0 - 14 ng/L   Urinalysis with Reflex to Culture    Collection Time: 10/19/22  8:00 PM    Specimen: Urine   Result Value Ref Range    Color, UA Dark Yellow (A) Yellow    Turbidity UA Clear Clear    Glucose, Ur NEGATIVE NEGATIVE    Bilirubin Urine NEGATIVE  Verified by ictotest. (A) NEGATIVE    Ketones, Urine TRACE (A) NEGATIVE    Specific Lilly, UA 1.021 1.000 - 1.030    Urine Hgb NEGATIVE NEGATIVE    pH, UA 5.0 5.0 - 8.0    Protein, UA 3+ (A) NEGATIVE    Urobilinogen, Urine Normal Normal    Nitrite, Urine NEGATIVE NEGATIVE    Leukocyte Esterase, Urine TRACE (A) NEGATIVE   Microscopic Urinalysis    Collection Time: 10/19/22  8:00 PM   Result Value Ref Range    WBC, UA 0 TO 2 /HPF    RBC, UA 0 TO 2 /HPF    Casts UA 3 to 5 /LPF    Epithelial Cells UA 0 TO 2 /HPF    Bacteria, UA None None   Troponin    Collection Time: 10/19/22  9:40 PM   Result Value Ref Range    Troponin, High Sensitivity 90 (HH) 0 - 14 ng/L   POC Glucose Fingerstick    Collection Time: 10/19/22 11:04 PM   Result Value Ref Range    POC Glucose 151 (H) 65 - 105 mg/dL       Imaging/Diagnostics:  CT ABDOMEN PELVIS WO CONTRAST Additional Contrast? None    Result Date: 10/19/2022  Limited noncontrast examination. Mild right and minimal left hydronephrosis, which may be due to distended urinary bladder containing contrast material.  Consider Mcnair catheter placement as clinically warranted. Suggestion of colonic wall thickening of the mid transverse and descending colonic loops, which may have been exaggerated by underdistention but may reflect underlying infectious/inflammatory colitis. 2.7 cm ill-defined density seen within the left groin region beneath the skin staples, which may reflect liquefying hematoma. Correlate clinically with signs of infection as there are adjacent inflammatory changes and prominent left inguinal lymph nodes.      CT HEAD WO CONTRAST    Result Date: 10/19/2022  No convincing CT evidence of intracranial hemorrhage, mass effect or acute territorial infarct seen. RECOMMENDATIONS: If there is persistent clinical concern, consider short-term follow-up examination. Plan:     Patient status inpatient in the Progressive Unit/Step down    Pancreatitis  -Lipase- 117  -Check Triglycerides-   -NPO  -IV fluids discontinued due to ESRD  -Pain and nausea control  -Monitor lipase  -GI consult    ESRD  -on dialysis 3x/week  -had partial dialysis treatment 10/19  -hydronephrosis seen on CT abdomen/Pelvis  -nephrology consulted     Acute urine retention  -Mcnair catheter placed in ED  -UA completed  -pending C&S. Diabetes Mellitus  -Continue home dose insulin  -hold oral hypoglycemics/metformin for now  -POCT ac and hs with sliding scale coverage    SW consult for return to facility    Full code    Lovenox held due to recent GI bleed    CUAUHTEMOC Garcia - NP  10/19/2022  11:30 PM      Please note that this chart was generated using voice recognition Dragon dictation software. Although every effort was made to ensure the accuracy of this automated transcription, some errors in transcription may have occurred. [address]  Bang Wilkinson [address] [place] [address].    Phone [phone]

## 2022-10-20 NOTE — PROGRESS NOTES
Dr. Bebeto Padilla notified patient had reported 10/10 chest pain after nurse had given hydralazine and family showed up. Order received and placed for 0.5 mg Dilaudid IV and 12.5 mg Benadryl IV. Continue to monitor.

## 2022-10-20 NOTE — ED NOTES
Report to [employer]. All questions answered. Pt ready for transport to inpatient unit.       Annette Ding RN  10/19/22 2038

## 2022-10-20 NOTE — CONSULTS
GI Consult Note:    Name: Denia Ley  MRN: [id number]     Acct: [de-identified]  Room: 2103/2103-01    Admit Date: 10/19/2022  PCP: No primary care provider on file. Physician Requesting Consult: Marleny Delgadillo MD     Reason for Consult:    Abdominal pain  Nausea vomiting   Mild elevation of the pancreatic enzymes  Bloating   Anxiety issues  Irritable bowel syndrome    Chief Complaint:     Chief Complaint   Patient presents with    Abdominal Pain     Abdominal pain, emesis post 2-4 hours dialysis treatment. Abdomen is softly distended. History Obtained From:     Patient and EMR    History of Present Illness:      Denia Ley is a  28 y.o.  female who presents with Abdominal Pain (Abdominal pain, emesis post 2-4 hours dialysis treatment. Abdomen is softly distended. )    This patient has history significant for renal failure  She is a dialysis patient  Has been admitted some gastroenteritis-like symptoms abdominal pain nausea vomiting without any hematemesis coffee-ground emesis  Mild elevation of the pancreatic enzymes also noted  She is history for ovarian surgery done in the past  Complains of bloating gas  She is not able to communicate very well at this time very drowsy  No rectal bleeding melena is reported  She has some anemia probably related to renal failure?   Her charts records were all reviewed      Symptoms:  Onset:  Location:  abdomen  Duration:  day(s)  Severity:  moderate  Quality:  intermittent      Past Medical History:     Past Medical History:   Diagnosis Date    Asthma     Atherosclerosis of native arteries of extremities with rest pain, unspecified extremity ([address]     Diabetes mellitus ([address]     since 14 y/o, unsure if type 1 or 2    Headache, migraine     for 2 years    Hypertension     Lichen simplex chronicus     Noninflammatory disorder of vagina, unspecified     Patient's noncompliance with other medical treatment and regimen for other reason     Type 2 diabetes mellitus without complication ([facility] 63/36/7775        Past Surgical History:     Past Surgical History:   Procedure Laterality Date    OVARY REMOVAL      July 2010    OVARY REMOVAL          Medications Prior to Admission:       Prior to Admission medications    Medication Sig Start Date End Date Taking?  Authorizing Provider   aspirin 81 MG EC tablet Take 81 mg by mouth daily   Yes Historical Provider, MD   atorvastatin (LIPITOR) 10 MG tablet Take 10 mg by mouth at bedtime   Yes Historical Provider, MD   calcium acetate (PHOSLO) 667 MG CAPS capsule Take 3 capsules by mouth 3 times daily (with meals)   Yes Historical Provider, MD   carvedilol (COREG) 25 MG tablet Take 25 mg by mouth 2 times daily   Yes Historical Provider, MD   vitamin D (CHOLECALCIFEROL) 125 MCG (5000 UT) CAPS capsule Take 5,000 Units by mouth daily   Yes Historical Provider, MD   clopidogrel (PLAVIX) 75 MG tablet Take 75 mg by mouth daily   Yes Historical Provider, MD   escitalopram (LEXAPRO) 10 MG tablet Take 10 mg by mouth daily   Yes Historical Provider, MD   hydrALAZINE (APRESOLINE) 100 MG tablet Take 100 mg by mouth 3 times daily   Yes Historical Provider, MD   insulin detemir (LEVEMIR FLEXTOUCH) 100 UNIT/ML injection pen Inject 24 Units into the skin nightly   Yes Historical Provider, MD   midodrine (PROAMATINE) 10 MG tablet Take 30 mg by mouth daily Indications: Mon, Wed, Fri at dialysis   Yes Historical Provider, MD   NIFEdipine (ADALAT CC) 90 MG extended release tablet Take 90 mg by mouth daily   Yes Historical Provider, MD   sennosides-docusate sodium (SENOKOT-S) 8.6-50 MG tablet Take 2 tablets by mouth at bedtime   Yes Historical Provider, MD   B Complex-C-Folic Acid (TRIPHROCAPS) 1 MG CAPS Take 1 capsule by mouth daily   Yes Historical Provider, MD   patiromer sorbitex calcium (VELTASSA) 8.4 g PACK packet Take 8.4 g by mouth Twice a Week Indications: twice per day on Thursdays and Sundays   Yes Historical Provider, MD   acetaminophen (TYLENOL) 325 MG tablet Take 650 mg by mouth every 6 hours as needed for Pain or Fever (do not exceed 3 gm in 24 hours)    Historical Provider, MD   albuterol sulfate HFA (PROVENTIL;VENTOLIN;PROAIR) 108 (90 Base) MCG/ACT inhaler Inhale 2 puffs into the lungs every 6 hours as needed for Wheezing or Shortness of Breath    Historical Provider, MD   polyethylene glycol (GLYCOLAX) 17 GM/SCOOP powder Take 17 g by mouth daily as needed (constipation)    Historical Provider, MD        Allergies:       Morphine and Januvia [sitagliptin]    Social History:     Tobacco:    reports that she has never smoked. She has never used smokeless tobacco.  Alcohol:      reports no history of alcohol use. Drug Use:  reports no history of drug use.     Family History:     Family History   Problem Relation Age of Onset    High Blood Pressure Mother     Diabetes Mother     Other Mother 27        Lung cancer    High Blood Pressure Father     Diabetes Father     Other Father 27        Prostate cancer       Review of Systems:     Positive and Negative as described in HPI    Constitutional:  negative for  fevers, chills, sweats, positive fatigue, and weight loss  HEENT:  negative for vision or hearing changes,   Respiratory:  negative for shortness of breath, cough, or congestion  Cardiovascular:  negative for  chest pain, palpitations  Gastrointestinal as it is nausea, vomiting, diarrhea, constipation, positive abdominal pain  Genitourinary: On dialysis  Integument:  negative for rash, skin lesions  Musculoskeletal: Positive muscle aches or joint pain  Neurological:  negative for headaches, dizziness, lightheadedness, numbness, pain and tingling extrimities  Behavior/Psych:  negative for depression and positive anxiety    Code Status:  Full Code    Physical Exam:     Vitals:  BP (!) 154/83   Pulse 87   Temp 98 °F (36.7 °C) (Axillary)   Resp 15   Ht 5' 4.02\" (1.626 m)   Wt 185 lb (83.9 kg)   LMP  (LMP Unknown) SpO2 99%   BMI 31.74 kg/m²   Temp (24hrs), Av.8 °F (36.6 °C), Min:97.3 °F (36.3 °C), Max:98.2 °F (36.8 °C)      General appearance -very drowsy and sick appearing, and in no acute distress  Mental status -slow but oriented to person, place, and time with anxious affect  Head - normocephalic and atraumatic  Eyes - pupils equal and reactive, extraocular eye movements intact, conjunctiva clear  Ears - hearing appears to be intact  Nose - no drainage noted  Mouth - mucous membranes moist  Neck - supple, no carotid bruits, thyroid not palpable  Chest - clear to auscultation, normal effort  Heart - normal rate, regular rhythm, no murmurs  Abdomen - soft, scars in the lower abdominal areas mild diffuse tenderness, nondistended, bowel sounds present all four quadrants, no masses, hepatomegaly or splenomegaly.  No hernias  Neurological - normal speech, no focal findings or movement disorder noted, cranial nerves not done at this time  Extremities mild pedal edema or calf pain with palpation  Skin - no gross lesions, rashes, or induration noted  Cranial Nerves not done at this time  Lymph nodes: not done at this time    Data:   CBC:   Lab Results   Component Value Date/Time    WBC 8.3 10/20/2022 05:39 AM    RBC 3.02 10/20/2022 05:39 AM    RBC 4.73 2019 11:18 AM    HGB 9.2 10/20/2022 05:39 AM    HCT 27.4 10/20/2022 05:39 AM    MCV 90.5 10/20/2022 05:39 AM    MCH 30.3 10/20/2022 05:39 AM    MCHC 33.5 10/20/2022 05:39 AM    RDW 18.0 10/20/2022 05:39 AM     10/20/2022 05:39 AM     2012 03:56 AM    MPV 6.9 10/20/2022 05:39 AM     CBC with Differential:    Lab Results   Component Value Date/Time    WBC 8.3 10/20/2022 05:39 AM    RBC 3.02 10/20/2022 05:39 AM    RBC 4.73 2019 11:18 AM    HGB 9.2 10/20/2022 05:39 AM    HCT 27.4 10/20/2022 05:39 AM     10/20/2022 05:39 AM     2012 03:56 AM    MCV 90.5 10/20/2022 05:39 AM    MCH 30.3 10/20/2022 05:39 AM    MCHC 33.5 10/20/2022 05:39 AM    RDW 18.0 10/20/2022 05:39 AM    NRBC 0 05/07/2019 11:18 AM    LYMPHOPCT 21 10/20/2022 05:39 AM    MONOPCT 5 10/20/2022 05:39 AM    BASOPCT 0 10/20/2022 05:39 AM    MONOSABS 0.40 10/20/2022 05:39 AM    LYMPHSABS 1.70 10/20/2022 05:39 AM    EOSABS 0.00 10/20/2022 05:39 AM    BASOSABS 0.00 10/20/2022 05:39 AM    DIFFTYPE NOT REPORTED 07/06/2017 10:17 PM     Hemoglobin/Hematocrit:    Lab Results   Component Value Date/Time    HGB 9.2 10/20/2022 05:39 AM    HCT 27.4 10/20/2022 05:39 AM     CMP:    Lab Results   Component Value Date/Time     10/20/2022 05:39 AM    K 3.8 10/20/2022 05:39 AM    CL 96 10/20/2022 05:39 AM    CO2 22 10/20/2022 05:39 AM    BUN 24 10/20/2022 05:39 AM    CREATININE 3.53 10/20/2022 05:39 AM    GFRAA >60 07/06/2017 10:17 PM    LABGLOM 17 10/20/2022 05:39 AM    GLUCOSE 143 10/20/2022 05:39 AM    GLUCOSE 492 05/07/2019 11:18 AM    PROT 7.8 10/19/2022 05:00 PM    PROT 7.0 02/20/2019 10:46 AM    LABALBU 4.4 10/19/2022 05:00 PM    LABALBU 3.6 02/20/2019 10:46 AM    CALCIUM 9.7 10/20/2022 05:39 AM    BILITOT 0.3 10/19/2022 05:00 PM    ALKPHOS 110 10/19/2022 05:00 PM    AST 59 10/19/2022 05:00 PM    ALT 82 10/19/2022 05:00 PM     BMP:    Lab Results   Component Value Date/Time     10/20/2022 05:39 AM    K 3.8 10/20/2022 05:39 AM    CL 96 10/20/2022 05:39 AM    CO2 22 10/20/2022 05:39 AM    BUN 24 10/20/2022 05:39 AM    LABALBU 4.4 10/19/2022 05:00 PM    LABALBU 3.6 02/20/2019 10:46 AM    CREATININE 3.53 10/20/2022 05:39 AM    CALCIUM 9.7 10/20/2022 05:39 AM    GFRAA >60 07/06/2017 10:17 PM    LABGLOM 17 10/20/2022 05:39 AM    GLUCOSE 143 10/20/2022 05:39 AM    GLUCOSE 492 05/07/2019 11:18 AM     PT/INR:    Lab Results   Component Value Date/Time    PROTIME 23.3 10/10/2022 07:37 PM    PROTIME 13.5 05/07/2019 11:18 AM    INR 2.1 10/10/2022 07:37 PM     PTT:    Lab Results   Component Value Date/Time    APTT 40.8 10/10/2022 07:37 PM   [APTT}    Assesment:     Primary Problem  Pancreatitis, unspecified pancreatitis type    Active Hospital Problems    Diagnosis Date Noted    Pancreatitis, unspecified pancreatitis type [K85.90] 10/19/2022     Priority: Medium     Abdominal pain  Mild elevation of the pancreatic enzymes  Nausea vomiting   Bloating   Anxiety issues  Irritable bowel syndrome  Plan:     Mild elevation of the pancreatic enzymes related to renal failure does not appear to be related to pancreatitis  Currently improving  Continue hydration  Soft diet as tolerated  As needed antiemetics  Watch for any bleeding  Relatively stable from GI point of view for outpatient follow-up  Discussed with nursing staff on the floor        Thank you for allowing me to participate in the care of your patient. Please feel free to contact me with any questions or concerns. Electronically signed by Rona Scales MD on 10/20/2022 at 5:02 PM     Copy sent to Dr. Cho primary care provider on file.

## 2022-10-20 NOTE — CONSULTS
Department of Internal Medicine  Nephrology Quincy Glaser MD   Consult Note    Reason for consultation: Management of hemodialysis dependent end-stage renal disease. Consulting physician: Pam Chan MD.    History of present: This is a 28 y.o. 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 Mena Servin at of nephrology as consultants of [facility], Presented to the emergency department yesterday with complaints of abdominal pain that started after hemodialysis. This was associated with 1 episode of vomiting at dialysis. She has a recent history of GI bleed requiring transfusion and was admitted from October 11 to October 14, 2022. Flexible sigmoidoscopy performed on that admission showed multiple rectal ulcers which were cauterized and patient was taken off Xarelto.     Morphine and Januvia [sitagliptin]    Past Medical History:   Diagnosis Date    Asthma     Atherosclerosis of native arteries of extremities with rest pain, unspecified extremity ([address]     Diabetes mellitus ([address]     since 12 y/o, unsure if type 1 or 2    Headache, migraine     for 2 years    Hypertension     Lichen simplex chronicus     Noninflammatory disorder of vagina, unspecified     Patient's noncompliance with other medical treatment and regimen for other reason     Type 2 diabetes mellitus without complication ([address] 77/40/1708     Scheduled Meds:   aspirin  81 mg Oral Daily    atorvastatin  10 mg Oral Nightly    calcium acetate  3 capsule Oral TID WC    carvedilol  25 mg Oral BID    clopidogrel  75 mg Oral Daily    escitalopram  10 mg Oral Daily    hydrALAZINE  100 mg Oral TID    NIFEdipine  90 mg Oral Daily    vitamin D3  5,000 Units Net -1850 ml     Constitutional: alert, appears stated age, and cooperative    Skin: Skin color, texture, turgor normal. No rashes or lesions    Head: Normocephalic, without obvious abnormality, atraumatic     Cardiovascular/Edema: regular rate and rhythm, S1, S2 normal, no murmur, click, rub or gallop    Respiratory: Lungs: clear to auscultation bilaterally    Abdomen:  Full with periumbilical tenderness; normal bowel sounds. Back: symmetric, no curvature. ROM normal. No CVA tenderness. Extremities: Trace bilateral pitting pedal edema with hyperpigmented dermatosis. Neuro:  Grossly normal      CBC:   Recent Labs     10/19/22  1700 10/20/22  0539   WBC 10.9 8.3   HGB 8.9* 9.2*    354     BMP:    Recent Labs     10/19/22  1700 10/20/22  0539   * 139   K 3.6* 3.8   CL 94* 96*   CO2 21 22   BUN 19 24*   CREATININE 2.87* 3.53*   GLUCOSE 202* 143*       Lab Results   Component Value Date/Time    NITRU NEGATIVE 10/19/2022 08:00 PM    COLORU Dark Yellow 10/19/2022 08:00 PM    PHUR 5.0 10/19/2022 08:00 PM    WBCUA 0 TO 2 10/19/2022 08:00 PM    RBCUA 0 TO 2 10/19/2022 08:00 PM    MUCUS NOT REPORTED 12/29/2017 11:33 PM    TRICHOMONAS MODERATE 12/29/2017 11:33 PM    YEAST NOT REPORTED 12/29/2017 11:33 PM    BACTERIA None 10/19/2022 08:00 PM    SPECGRAV 1.021 10/19/2022 08:00 PM    LEUKOCYTESUR TRACE 10/19/2022 08:00 PM    UROBILINOGEN Normal 10/19/2022 08:00 PM    BILIRUBINUR NEGATIVE  Verified by ictotest. 10/19/2022 08:00 PM    [address] [place] [address] Street NEGATIVE 01/07/2012 03:53 AM    GLUCOSEU NEGATIVE 10/19/2022 08:00 PM    GLUCOSEU 3+ 01/07/2012 03:53 AM    KETUA TRACE 10/19/2022 08:00 PM    AMORPHOUS NOT REPORTED 12/29/2017 11:33 PM     Urine Creatinine:     Lab Results   Component Value Date/Time    LABCREA 254.2 12/06/2015 02:31 PM     IMPRESSION/RECOMMENDATIONS:    1.  End-stage renal disease - we will place patient on a MWF hemodialysis schedule. Next more dialysis will be tomorrow.     2.  Systemic hypertension -continue current medications. 3.  Mineral bone disease profile - check serum phosphorus level. 4.  Abdominal pain - CT scan of the abdomen and pelvis showed mild right and minimal left hydronephrosis; colonic wall thickening of the mid transverse and descending colonic loops as well as a 2.7 cm ill-defined density within the left groin region beneath the skin staples consistent with liquefying hematoma. GI evaluation is ongoing. Prognosis is guarded. Thank you very much for the courtesy and confidence of this consultation.     Pablo Cole MD FACP  Attending Nephrologist  10/20/2022 2:46 PM

## 2022-10-20 NOTE — DISCHARGE INSTR - COC
Continuity of Care Form    Patient Name: Marsha Woo   :  1990  MRN:  [id number]    Admit date:  10/19/2022  Discharge date:  10/31/22    Code Status Order: Full Code   Advance Directives:     Admitting Physician:  Ángel Flaherty MD  PCP: No primary care provider on file. Discharging Nurse: Negro Black RN  [address] Unit/Room#: 2103/2103-01  Discharging Unit Phone Number: [phone]    Emergency Contact:   Extended Emergency Contact Information  Primary Emergency Contact: Kyle Bolton   Home Phone: [phone]  Relation: Parent  Secondary Emergency Contact: \[facility] [place] [facility]\"" Phone: [phone]  Relation: Brother/Sister    Past Surgical History:  Past Surgical History:   Procedure Laterality Date    OVARY REMOVAL      2010    OVARY REMOVAL         Immunization History: There is no immunization history on file for this patient.     Active Problems:  Patient Active Problem List   Diagnosis Code    Threatened  O20.0    Ovarian cyst N83.209    Hx of  section Z98.891    Asthma J45.909    Diabetes ([address] E11.9    Uncontrolled diabetes mellitus [id number]    Vitamin D deficiency E55.9    Chronic fatigue R53.82    Medically noncompliant Z91.199    Type 2 diabetes mellitus without complication ([address] O85.8    Dizziness R42    Type 1 diabetes mellitus without complication (HCC) D45.0    HTN, goal below 140/90 I10    Nausea and vomiting R11.2    Microalbuminuria R80.9    Pain of right lower extremity M79.604    Pain in both lower extremities M79.604, M79.605    Fatigue R53.83    Diabetic ketoacidosis associated with type 1 diabetes mellitus (HCC) E10.10    Chest pain R07.9    Diabetic ketoacidosis without coma associated with type 1 diabetes mellitus (HCC) E10.10    Pancreatitis, unspecified pancreatitis type K85.90       Isolation/Infection:   Isolation            No Isolation          Patient Infection Status       None to display            Nurse Assessment:  Last Vital Signs: BP (!) 160/93   Pulse 93   Temp 98.2 °F (36.8 °C) (Axillary)   Resp 16   Ht 5' 4.02\" (1.626 m)   Wt 185 lb (83.9 kg)   LMP  (LMP Unknown)   SpO2 98%   BMI 31.74 kg/m²     Last documented pain score (0-10 scale): Pain Level: 10  Last Weight:   Wt Readings from Last 1 Encounters:   10/19/22 185 lb (83.9 kg)     Mental Status:  oriented    IV Access:  - None    Nursing Mobility/ADLs:  Walking   Assisted  Transfer  Assisted  Bathing  Assisted  Dressing  Assisted  Toileting  Assisted  Feeding  Independent  Med Admin  Assisted  Med Delivery   whole    Wound Care Documentation and Therapy:   Wound Care Documentation:  Wound 10/31/22 Right great toe (Active)   Wound Image   10/31/22 1231   Wound Etiology Traumatic 10/31/22 1231   Dressing Status Old drainage noted;New dressing applied 10/31/22 1231   Wound Cleansed Cleansed with saline 10/31/22 1231   Dressing/Treatment Betadine swabs/povidone iodine; Foam 10/31/22 1231   Wound Assessment Dry 10/31/22 1231   Drainage Amount Small 10/31/22 1231   Drainage Description Sanguinous 10/31/22 1231   Odor None 10/31/22 1231   Indy-wound Assessment Dry/flaky; Hyperkeratosis (callous) 10/31/22 1231   Margins Attached edges 10/31/22 1231   Number of days: 0     Right and left great toenails: Keep dry, paint with betadine swab daily. Cover with foam dressing or bandaid to protect and keep nail from catching on clothing or linens. Pt needs to follow up with podiatry as outpatient for nail care    Elimination:  Continence:    Bowel: No  Bladder: No  Urinary Catheter: Removal Date 10/31/22    Colostomy/Ileostomy/Ileal Conduit: No       Date of Last BM: 10/28/22    Intake/Output Summary (Last 24 hours) at 10/20/2022 0912  Last data filed at 10/20/2022 0056  Gross per 24 hour   Intake --   Output 1850 ml   Net -1850 ml     I/O last 3 completed shifts:  In: -   Out: [address] [Urine:1850]    Safety Concerns:     None    Impairments/Disabilities: None    Nutrition Therapy:  Current Nutrition Therapy:   - Oral Diet:  General    Routes of Feeding: Oral  Liquids: Thin Liquids  Daily Fluid Restriction: no  Last Modified Barium Swallow with Video (Video Swallowing Test): not done    Treatments at the Time of Hospital Discharge:   Respiratory Treatments: PRN  Oxygen Therapy:  is not on home oxygen therapy. Ventilator:    - No ventilator support    Rehab Therapies: Physical Therapy and Occupational Therapy  Weight Bearing Status/Restrictions: No weight bearing restrictions  Other Medical Equipment (for information only, NOT a DME order): Other Treatments: skilled nursing assessment, medication education and monitoring, resume previous orders    Patient's personal belongings (please select all that are sent with patient):  None    RN SIGNATURE:  Electronically signed by Heather Caraballo RN on 10/31/22 at 11:25 AM EDT    CASE MANAGEMENT/SOCIAL WORK SECTION    Inpatient Status Date: 10/19/22    Readmission Risk Assessment Score:  Readmission Risk              Risk of Unplanned Readmission:  16           Discharging to Facility/ Agency   [facility] [address]  Phone [phone]  Fax [phone]  Evening fax [phone]      Receives Hemodialysis  MWF    / signature: Electronically signed by Gemini Rawls RN on 10/20/22 at 9:12 AM EDT    PHYSICIAN SECTION    Prognosis: Fair    Condition at Discharge: Stable    Rehab Potential (if transferring to Rehab): Fair    Recommended Labs or Other Treatments After Discharge:     Physician Certification: I certify the above information and transfer of Tom Nab  is necessary for the continuing treatment of the diagnosis listed and that she requires [facility] for less 30 days.      Update Admission H&P: No change in H&P    LFTs in 1 week after discharge    **Corrective Low Dose Algorithm** Insulin sliding scale  Glucose: Dose:    No Insulin  200-249 1 Unit  250-299 2 Units  300-349 3 Units  Over 349 4 Units and notify physician      PHYSICIAN SIGNATURE:  Electronically signed by Kyle Catherine MD on 10/21/22 at 10:36 AM EDT

## 2022-10-21 LAB
ABSOLUTE EOS #: 0.1 K/UL (ref 0–0.4)
ABSOLUTE LYMPH #: 1.7 K/UL (ref 1–4.8)
ABSOLUTE MONO #: 0.5 K/UL (ref 0.1–1.3)
ANION GAP SERPL CALCULATED.3IONS-SCNC: 20 MMOL/L (ref 9–17)
BASOPHILS # BLD: 0 % (ref 0–2)
BASOPHILS ABSOLUTE: 0 K/UL (ref 0–0.2)
BUN BLDV-MCNC: 33 MG/DL (ref 6–20)
CALCIUM SERPL-MCNC: 10 MG/DL (ref 8.6–10.4)
CHLORIDE BLD-SCNC: 98 MMOL/L (ref 98–107)
CO2: 23 MMOL/L (ref 20–31)
CREAT SERPL-MCNC: 4.32 MG/DL (ref 0.5–0.9)
EKG ATRIAL RATE: 83 BPM
EKG ATRIAL RATE: 89 BPM
EKG P AXIS: 33 DEGREES
EKG P AXIS: 88 DEGREES
EKG P-R INTERVAL: 146 MS
EKG P-R INTERVAL: 150 MS
EKG Q-T INTERVAL: 400 MS
EKG Q-T INTERVAL: 422 MS
EKG QRS DURATION: 90 MS
EKG QRS DURATION: 96 MS
EKG QTC CALCULATION (BAZETT): 486 MS
EKG QTC CALCULATION (BAZETT): 495 MS
EKG R AXIS: 79 DEGREES
EKG R AXIS: 89 DEGREES
EKG T AXIS: 46 DEGREES
EKG T AXIS: 59 DEGREES
EKG VENTRICULAR RATE: 83 BPM
EKG VENTRICULAR RATE: 89 BPM
EOSINOPHILS RELATIVE PERCENT: 1 % (ref 0–4)
GFR SERPL CREATININE-BSD FRML MDRD: 13 ML/MIN/1.73M2
GLUCOSE BLD-MCNC: 119 MG/DL (ref 65–105)
GLUCOSE BLD-MCNC: 131 MG/DL (ref 65–105)
GLUCOSE BLD-MCNC: 141 MG/DL (ref 65–105)
GLUCOSE BLD-MCNC: 144 MG/DL (ref 65–105)
GLUCOSE BLD-MCNC: 159 MG/DL (ref 70–99)
HCT VFR BLD CALC: 28.7 % (ref 36–46)
HEMOGLOBIN: 9.6 G/DL (ref 12–16)
LYMPHOCYTES # BLD: 21 % (ref 24–44)
MCH RBC QN AUTO: 30.8 PG (ref 26–34)
MCHC RBC AUTO-ENTMCNC: 33.5 G/DL (ref 31–37)
MCV RBC AUTO: 91.8 FL (ref 80–100)
MONOCYTES # BLD: 6 % (ref 1–7)
PDW BLD-RTO: 19.2 % (ref 11.5–14.9)
PHOSPHORUS: 4.5 MG/DL (ref 2.6–4.5)
PLATELET # BLD: 433 K/UL (ref 150–450)
PMV BLD AUTO: 6.8 FL (ref 6–12)
POTASSIUM SERPL-SCNC: 3.9 MMOL/L (ref 3.7–5.3)
RBC # BLD: 3.13 M/UL (ref 4–5.2)
SEG NEUTROPHILS: 72 % (ref 36–66)
SEGMENTED NEUTROPHILS ABSOLUTE COUNT: 5.6 K/UL (ref 1.3–9.1)
SODIUM BLD-SCNC: 141 MMOL/L (ref 135–144)
WBC # BLD: 7.9 K/UL (ref 3.5–11)

## 2022-10-21 PROCEDURE — 6360000002 HC RX W HCPCS: Performed by: INTERNAL MEDICINE

## 2022-10-21 PROCEDURE — 36415 COLL VENOUS BLD VENIPUNCTURE: CPT

## 2022-10-21 PROCEDURE — 2580000003 HC RX 258: Performed by: NURSE PRACTITIONER

## 2022-10-21 PROCEDURE — 84100 ASSAY OF PHOSPHORUS: CPT

## 2022-10-21 PROCEDURE — 2060000000 HC ICU INTERMEDIATE R&B

## 2022-10-21 PROCEDURE — 93010 ELECTROCARDIOGRAM REPORT: CPT | Performed by: INTERNAL MEDICINE

## 2022-10-21 PROCEDURE — 5A1D70Z PERFORMANCE OF URINARY FILTRATION, INTERMITTENT, LESS THAN 6 HOURS PER DAY: ICD-10-PCS | Performed by: INTERNAL MEDICINE

## 2022-10-21 PROCEDURE — 99239 HOSP IP/OBS DSCHRG MGMT >30: CPT | Performed by: INTERNAL MEDICINE

## 2022-10-21 PROCEDURE — 93005 ELECTROCARDIOGRAM TRACING: CPT | Performed by: INTERNAL MEDICINE

## 2022-10-21 PROCEDURE — 82947 ASSAY GLUCOSE BLOOD QUANT: CPT

## 2022-10-21 PROCEDURE — 2500000003 HC RX 250 WO HCPCS: Performed by: NURSE PRACTITIONER

## 2022-10-21 PROCEDURE — 85025 COMPLETE CBC W/AUTO DIFF WBC: CPT

## 2022-10-21 PROCEDURE — 6370000000 HC RX 637 (ALT 250 FOR IP): Performed by: INTERNAL MEDICINE

## 2022-10-21 PROCEDURE — 6370000000 HC RX 637 (ALT 250 FOR IP): Performed by: NURSE PRACTITIONER

## 2022-10-21 PROCEDURE — 90935 HEMODIALYSIS ONE EVALUATION: CPT

## 2022-10-21 PROCEDURE — 80048 BASIC METABOLIC PNL TOTAL CA: CPT

## 2022-10-21 PROCEDURE — 6360000002 HC RX W HCPCS: Performed by: NURSE PRACTITIONER

## 2022-10-21 RX ORDER — ONDANSETRON 4 MG/1
4 TABLET, ORALLY DISINTEGRATING ORAL EVERY 8 HOURS PRN
Qty: 30 TABLET | Refills: 0 | Status: SHIPPED | OUTPATIENT
Start: 2022-10-21

## 2022-10-21 RX ORDER — LANOLIN ALCOHOL/MO/W.PET/CERES
3 CREAM (GRAM) TOPICAL NIGHTLY PRN
Status: DISCONTINUED | OUTPATIENT
Start: 2022-10-21 | End: 2022-10-31 | Stop reason: HOSPADM

## 2022-10-21 RX ORDER — HYDROCODONE BITARTRATE AND ACETAMINOPHEN 5; 325 MG/1; MG/1
1 TABLET ORAL EVERY 4 HOURS PRN
Qty: 18 TABLET | Refills: 0 | Status: SHIPPED | OUTPATIENT
Start: 2022-10-21 | End: 2022-10-31 | Stop reason: SDUPTHER

## 2022-10-21 RX ORDER — METOPROLOL TARTRATE 5 MG/5ML
2.5 INJECTION INTRAVENOUS EVERY 6 HOURS PRN
Status: DISCONTINUED | OUTPATIENT
Start: 2022-10-21 | End: 2022-10-31 | Stop reason: HOSPADM

## 2022-10-21 RX ADMIN — ESCITALOPRAM OXALATE 10 MG: 10 TABLET ORAL at 08:42

## 2022-10-21 RX ADMIN — EPOETIN ALFA-EPBX 2000 UNITS: 2000 INJECTION, SOLUTION INTRAVENOUS; SUBCUTANEOUS at 17:55

## 2022-10-21 RX ADMIN — HYDRALAZINE HYDROCHLORIDE 100 MG: 50 TABLET, FILM COATED ORAL at 08:43

## 2022-10-21 RX ADMIN — CARVEDILOL 25 MG: 25 TABLET, FILM COATED ORAL at 20:41

## 2022-10-21 RX ADMIN — CLOPIDOGREL BISULFATE 75 MG: 75 TABLET ORAL at 08:42

## 2022-10-21 RX ADMIN — SODIUM CHLORIDE, PRESERVATIVE FREE 10 ML: 5 INJECTION INTRAVENOUS at 06:12

## 2022-10-21 RX ADMIN — CALCIUM ACETATE 2001 MG: 667 CAPSULE ORAL at 08:42

## 2022-10-21 RX ADMIN — ONDANSETRON 4 MG: 4 TABLET, ORALLY DISINTEGRATING ORAL at 20:50

## 2022-10-21 RX ADMIN — SODIUM CHLORIDE, PRESERVATIVE FREE 10 ML: 5 INJECTION INTRAVENOUS at 00:16

## 2022-10-21 RX ADMIN — SODIUM CHLORIDE, PRESERVATIVE FREE 10 ML: 5 INJECTION INTRAVENOUS at 12:51

## 2022-10-21 RX ADMIN — ONDANSETRON 4 MG: 2 INJECTION INTRAMUSCULAR; INTRAVENOUS at 12:51

## 2022-10-21 RX ADMIN — MIDODRINE HYDROCHLORIDE 5 MG: 5 TABLET ORAL at 15:35

## 2022-10-21 RX ADMIN — ONDANSETRON 4 MG: 2 INJECTION INTRAMUSCULAR; INTRAVENOUS at 00:16

## 2022-10-21 RX ADMIN — HYDRALAZINE HYDROCHLORIDE 100 MG: 50 TABLET, FILM COATED ORAL at 20:41

## 2022-10-21 RX ADMIN — NIFEDIPINE 90 MG: 90 TABLET, EXTENDED RELEASE ORAL at 08:42

## 2022-10-21 RX ADMIN — SODIUM CHLORIDE, PRESERVATIVE FREE 10 ML: 5 INJECTION INTRAVENOUS at 20:46

## 2022-10-21 RX ADMIN — CARVEDILOL 25 MG: 25 TABLET, FILM COATED ORAL at 08:43

## 2022-10-21 RX ADMIN — INSULIN GLARGINE 24 UNITS: 100 INJECTION, SOLUTION SUBCUTANEOUS at 20:45

## 2022-10-21 RX ADMIN — ASPIRIN 81 MG: 81 TABLET, COATED ORAL at 08:41

## 2022-10-21 RX ADMIN — ATORVASTATIN CALCIUM 10 MG: 10 TABLET, FILM COATED ORAL at 20:41

## 2022-10-21 RX ADMIN — ONDANSETRON 4 MG: 2 INJECTION INTRAMUSCULAR; INTRAVENOUS at 06:12

## 2022-10-21 RX ADMIN — Medication 5000 UNITS: at 08:41

## 2022-10-21 RX ADMIN — SODIUM CHLORIDE, PRESERVATIVE FREE 10 ML: 5 INJECTION INTRAVENOUS at 08:46

## 2022-10-21 NOTE — PROGRESS NOTES
HEMODIALYSIS PRE-TREATMENT NOTE    Patient Identifiers prior to treatment: Name, , MRN    Isolation Required: No                      Isolation Type: N/A       (please document if patient is being managed as a PUI/COVID-19 patient)        Hepatitis status:                           Date Drawn                             Result  Hepatitis B Surface Antigen 10/10/22     Negative                     Hepatitis B Surface Antibody 10/10/22 20        Hepatitis B Core Antibody 10/10/22 Negative          How was Hepatitis Status verified: Immune per [facility] outpatient faxed records     Was a copy of the labs you documented provided to facility for the patient's chart: Yes    Hemodialysis orders verified: Yes, Dr. Nicole Davison Within normal limits ( I.e. s/s of infection,...): AVF RONAL     Pre-Assessment completed: Yes    Pre-dialysis report received from: Carlie Langley RN                      Time: [phone]

## 2022-10-21 NOTE — PROGRESS NOTES
Department of Internal Medicine  Nephrology Benita Cornell MD Progress Note    Reason for consultation: Management of hemodialysis dependent end-stage renal disease. Consulting physician: Vickie Leon MD.    Interval history: Patient was seen and examined today and she continues to have nausea. She also reports mild abdominal pain. She is afebrile. History of present: This is a 28 y.o. 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 Colin Snellen at of nephrology as consultants of [facility], Presented to the emergency department yesterday with complaints of abdominal pain that started after hemodialysis. This was associated with 1 episode of vomiting at dialysis. She has a recent history of GI bleed requiring transfusion and was admitted from October 11 to October 14, 2022. Flexible sigmoidoscopy performed on that admission showed multiple rectal ulcers which were cauterized and patient was taken off Xarelto.     Scheduled Meds:   aspirin  81 mg Oral Daily    atorvastatin  10 mg Oral Nightly    calcium acetate  3 capsule Oral TID WC    carvedilol  25 mg Oral BID    clopidogrel  75 mg Oral Daily    escitalopram  10 mg Oral Daily    hydrALAZINE  100 mg Oral TID    NIFEdipine  90 mg Oral Daily    vitamin D3  5,000 Units Oral Daily    sodium chloride flush  5-40 mL IntraVENous 2 times per day    insulin glargine  24 Units SubCUTAneous Nightly     Continuous Infusions:   sodium chloride      dextrose       PRN Meds:.midodrine, albuterol sulfate HFA, sodium chloride flush, sodium chloride, ondansetron **OR** ondansetron, glucose, dextrose bolus **OR** dextrose bolus, glucagon (rDNA), dextrose    Physical Exam: VITALS:  BP (!) 182/87   Pulse 88   Temp 98.1 °F (36.7 °C) (Oral)   Resp 16   Ht 5' 4.02\" (1.626 m)   Wt 195 lb 12.3 oz (88.8 kg)   LMP  (LMP Unknown)   SpO2 98%   BMI 33.59 kg/m²   TEMPERATURE:  Current - Temp: 98.1 °F (36.7 °C); Max - Temp  Av.2 °F (36.8 °C)  Min: 98 °F (36.7 °C)  Max: 98.6 °F (37 °C)  RESPIRATIONS RANGE: Resp  Av.8  Min: 14  Max: 22  PULSE RANGE: Pulse  Av  Min: 84  Max: 88  BLOOD PRESSURE RANGE:  Systolic (47WLC), DII:477 , Min:149 , YCB:930 ; Diastolic (11MEO), XYK:67, Min:69, Max:87  PULSE OXIMETRY RANGE: SpO2  Av.5 %  Min: 98 %  Max: 100 %  24HR INTAKE/OUTPUT:    Intake/Output Summary (Last 24 hours) at 10/21/2022 0931  Last data filed at 10/21/2022 0429  Gross per 24 hour   Intake --   Output 250 ml   Net -250 ml       Constitutional: alert, appears stated age, and cooperative    Skin: Skin color, texture, turgor normal. No rashes or lesions    Head: Normocephalic, without obvious abnormality, atraumatic     Cardiovascular/Edema: regular rate and rhythm, S1, S2 normal, no murmur, click, rub or gallop    Respiratory: Lungs: clear to auscultation bilaterally    Abdomen:  Full with periumbilical tenderness; normal bowel sounds. Back: symmetric, no curvature. ROM normal. No CVA tenderness. Extremities: Trace bilateral pitting pedal edema with hyperpigmented dermatosis.     Neuro:  Grossly normal      CBC:   Recent Labs     10/19/22  1700 10/20/22  0539 10/21/22  0540   WBC 10.9 8.3 7.9   HGB 8.9* 9.2* 9.6*    354 433       BMP:    Recent Labs     10/19/22  1700 10/20/22  0539 10/21/22  0540   * 139 141   K 3.6* 3.8 3.9   CL 94* 96* 98   CO2 21 22 23   BUN 19 24* 33*   CREATININE 2.87* 3.53* 4.32*   GLUCOSE 202* 143* 159*         Lab Results   Component Value Date/Time    NITRU NEGATIVE 10/19/2022 08:00 PM    COLORU Dark Yellow 10/19/2022 08:00 PM    PHUR 5.0 10/19/2022 08:00 PM    WBCUA 0 TO 2 10/19/2022 08:00 PM    RBCUA 0 TO 2 10/19/2022 08:00 PM MUCUS NOT REPORTED 12/29/2017 11:33 PM    TRICHOMONAS MODERATE 12/29/2017 11:33 PM    YEAST NOT REPORTED 12/29/2017 11:33 PM    BACTERIA None 10/19/2022 08:00 PM    SPECGRAV 1.021 10/19/2022 08:00 PM    LEUKOCYTESUR TRACE 10/19/2022 08:00 PM    UROBILINOGEN Normal 10/19/2022 08:00 PM    BILIRUBINUR NEGATIVE  Verified by ictotest. 10/19/2022 08:00 PM    [address] NEGATIVE 01/07/2012 03:53 AM    GLUCOSEU NEGATIVE 10/19/2022 08:00 PM    GLUCOSEU 3+ 01/07/2012 03:53 AM    KETUA TRACE 10/19/2022 08:00 PM    AMORPHOUS NOT REPORTED 12/29/2017 11:33 PM     Urine Creatinine:     Lab Results   Component Value Date/Time    LABCREA 254.2 12/06/2015 02:31 PM     IMPRESSION/RECOMMENDATIONS:    1.  End-stage renal disease - We will maintain Monday, Wednesday and Friday hemodialysis schedule and patient will receive acute hemodialysis today using left arm AV fistula. 2.  Systemic hypertension - Blood pressure control is suboptimal partly secondary to inability to keep down medications. She has diabetic autonomic neuropathy and often has intradialytic episodes of hypotension for which she gets midodrine. We will add IV metoprolol 5 mg every 6 hours as needed. 3.  Mineral bone disease profile - serum phosphorus level 4.5 mg/dL is within target range. Sylvia Ruffing 4.  Abdominal pain - CT scan of the abdomen and pelvis showed mild right and minimal left hydronephrosis; colonic wall thickening of the mid transverse and descending colonic loops as well as a 2.7 cm ill-defined density within the left groin region beneath the skin staples consistent with liquefying hematoma. GI evaluation is ongoing. 5.  Anemia of chronic kidney disease - hemoglobin 9.6 g/dL today. We will place on Retacrit 2000 units subcutaneously 3 times a week. Prognosis is guarded.     Ethan Martinez MD FACP  Attending Nephrologist  10/21/2022 9:31 AM

## 2022-10-21 NOTE — PLAN OF CARE
Problem: Discharge Planning  Goal: Discharge to home or other facility with appropriate resources  10/21/2022 0010 by Oanh Nunez RN  Outcome: Progressing     Problem: Pain  Goal: Verbalizes/displays adequate comfort level or baseline comfort level  10/21/2022 0010 by Oanh Nunez RN  Outcome: Progressing     Problem: Safety - Adult  Goal: Free from fall injury  10/21/2022 0010 by Oanh Nunez RN  Outcome: Progressing     Problem: Skin/Tissue Integrity  Goal: Absence of new skin breakdown  Description: 1. Monitor for areas of redness and/or skin breakdown  2. Assess vascular access sites hourly  3. Every 4-6 hours minimum:  Change oxygen saturation probe site  4. Every 4-6 hours:  If on nasal continuous positive airway pressure, respiratory therapy assess nares and determine need for appliance change or resting period.   10/21/2022 0010 by Oanh Nunez RN  Outcome: Progressing     Problem: Chronic Conditions and Co-morbidities  Goal: Patient's chronic conditions and co-morbidity symptoms are monitored and maintained or improved  10/21/2022 0010 by Oanh Nunez RN  Outcome: Progressing

## 2022-10-21 NOTE — PROGRESS NOTES
HEMODIALYSIS POST TREATMENT NOTE    Treatment time ordered: 3hrs    Actual treatment time: 3hrs    UltraFiltration Goal: 1.0/2.0kgs    UltraFiltration Removed: 1.17kgs       Pre Treatment weight: 88.8  Post Treatment weight: 87.5  Estimated Dry Weight: 75kg    Access used:     Central Venous Catheter:          Tunneled or Non-tunneled: N/A           Site: N/A          Access Flow: N/A      Internal Access:       AV Fistula or AV Graft: AVF         Site: Upper Left       Access Flow: Fair       Sign and symptoms of infection: No       If YES: N/A    Medications or blood products given: See MAR     Chronic outpatient schedule: [facility]    Chronic outpatient unit: [facility]    Summary of response to treatment: Patient tolerated treatment fair patient was hypotensive 200 ml of normal saline given temperature reduced to 35.5 goal also reduced then UF turned off returned blood and clamped sites for 5 min bruit and thrill are still present    Explain if orders NOT met, was physician notified:N/A      ACES flowsheet faxed to patient unit/ placed in patient chart: Yes    Post assessment completed: Jose Hernández    Report given to: Chioma Sanz documented Safety Checks include the followin) Access and face visible at all times. 2) All connections and blood lines are secure with no kinks. 3) NVL alarm engaged. 4) Hemosafe device applied (if applicable). 5) No collapse of Arterial or Venous blood chambers. 6) All blood lines / pump segments in the air detectors.

## 2022-10-21 NOTE — H&P
[address] [facility] Internal Medicine  Jhonatan Moreland MD; Paola Aguilar MD; Carmela Rosario MD; Erling Mcardle, MD Larance Lah, MD; MD FRANCES GillespieSainte Genev[facility] Internal [facility]   [facility]    HISTORY AND PHYSICAL EXAMINATION            Date:   10/21/2022  Patient name:  Yusuf Bryant  Date of admission:  10/19/2022  4:05 PM  MRN:   [id number]  Account:  [de-identified]  YOB: 1990  PCP:    No primary care provider on file. Room:   [address] [place] [address]  Code Status:    Full Code    Chief Complaint:     Chief Complaint   Patient presents with    Abdominal Pain     Abdominal pain, emesis post 2-4 hours dialysis treatment. Abdomen is softly distended. History Obtained From:     Pt medical record and nursing staff    History of Present Illness:     Yusuf Bryant is a 28 y.o. Non- / non  female who presents with Abdominal Pain (Abdominal pain, emesis post 2-4 hours dialysis treatment. Abdomen is softly distended. )   and is admitted to the hospital for the management of Pancreatitis, unspecified pancreatitis type. Yusuf Bryant is a 28 y.o. Non- / non  female who presents with Abdominal Pain (Abdominal pain, emesis post 2-4 hours dialysis treatment. Abdomen is softly distended.) and is admitted to the hospital for the management of acute Pancreatitis. Medical history includes ESRD on hemodialysis, DM type I, HTN and CAD. Also had a recent admission for GI bleed. Patient presented from [facility]. She is experiencing some altered mental status where she is unable to hold a conversation regarding her condition. She is able to stay awake for 1-2 word answer but is not consistent. CT head shows no evidence of acute changes  compared to CT head completed on 10/10/22. He was given 50 mcg IV of fentanyl prior to assessment which may be affecting her mentation.  CT of abdomen and pelvis shows mild right and minimal left hydronephrosis which is correlated with distended urinary bladder. She is experiencing acute urinary retention requiring barros catheter placement in ED. UA shows trace ketones, 3+ protein and trace leukocytes. Pending culture. WBC 10.9. AST 59, ALT 82, Alk Phos 110, Lipase 117 suggesting acute pancreatitis without infection    Past Medical History:     Past Medical History:   Diagnosis Date    Asthma     Atherosclerosis of native arteries of extremities with rest pain, unspecified extremity ([address]     Diabetes mellitus ([facility]     since 12 y/o, unsure if type 1 or 2    Headache, migraine     for 2 years    Hypertension     Lichen simplex chronicus     Noninflammatory disorder of vagina, unspecified     Patient's noncompliance with other medical treatment and regimen for other reason     Type 2 diabetes mellitus without complication ([facility] 19/66/6505        Past Surgical History:     Past Surgical History:   Procedure Laterality Date    OVARY REMOVAL      July 2010    OVARY REMOVAL          Medications Prior to Admission:     Prior to Admission medications    Medication Sig Start Date End Date Taking? Authorizing Provider   ondansetron (ZOFRAN-ODT) 4 MG disintegrating tablet Take 1 tablet by mouth every 8 hours as needed for Nausea or Vomiting 10/21/22  Yes Shanda Alanis MD   HYDROcodone-acetaminophen (NORCO) 5-325 MG per tablet Take 1 tablet by mouth every 4 hours as needed for Pain for up to 3 days. Intended supply: 3 days.  Take lowest dose possible to manage pain 10/21/22 10/24/22 Yes Shanda Alanis MD   aspirin 81 MG EC tablet Take 81 mg by mouth daily   Yes Historical Provider, MD   atorvastatin (LIPITOR) 10 MG tablet Take 10 mg by mouth at bedtime   Yes Historical Provider, MD   calcium acetate (PHOSLO) 667 MG CAPS capsule Take 3 capsules by mouth 3 times daily (with meals)   Yes Historical Provider, MD   carvedilol (COREG) 25 MG tablet Take 25 mg by mouth 2 times daily   Yes Historical Provider, MD   vitamin D (CHOLECALCIFEROL) 125 MCG (5000 UT) CAPS capsule Take 5,000 Units by mouth daily   Yes Historical Provider, MD   clopidogrel (PLAVIX) 75 MG tablet Take 75 mg by mouth daily   Yes Historical Provider, MD   escitalopram (LEXAPRO) 10 MG tablet Take 10 mg by mouth daily   Yes Historical Provider, MD   hydrALAZINE (APRESOLINE) 100 MG tablet Take 100 mg by mouth 3 times daily   Yes Historical Provider, MD   insulin detemir (LEVEMIR FLEXTOUCH) 100 UNIT/ML injection pen Inject 24 Units into the skin nightly   Yes Historical Provider, MD   midodrine (PROAMATINE) 10 MG tablet Take 30 mg by mouth daily Indications: Mon, Wed, Fri at dialysis   Yes Historical Provider, MD   NIFEdipine (ADALAT CC) 90 MG extended release tablet Take 90 mg by mouth daily   Yes Historical Provider, MD   sennosides-docusate sodium (SENOKOT-S) 8.6-50 MG tablet Take 2 tablets by mouth at bedtime   Yes Historical Provider, MD   B Complex-C-Folic Acid (TRIPHROCAPS) 1 MG CAPS Take 1 capsule by mouth daily   Yes Historical Provider, MD   patiromer sorbitex calcium (VELTASSA) 8.4 g PACK packet Take 8.4 g by mouth Twice a Week Indications: twice per day on Thursdays and Sundays   Yes Historical Provider, MD   acetaminophen (TYLENOL) 325 MG tablet Take 650 mg by mouth every 6 hours as needed for Pain or Fever (do not exceed 3 gm in 24 hours)    Historical Provider, MD   albuterol sulfate HFA (PROVENTIL;VENTOLIN;PROAIR) 108 (90 Base) MCG/ACT inhaler Inhale 2 puffs into the lungs every 6 hours as needed for Wheezing or Shortness of Breath    Historical Provider, MD   polyethylene glycol (GLYCOLAX) 17 GM/SCOOP powder Take 17 g by mouth daily as needed (constipation)    Historical Provider, MD        Allergies:     Morphine and Januvia [sitagliptin]    Social History:     Tobacco:    reports that she has never smoked.  She has never used smokeless tobacco.  Alcohol:      reports no history of alcohol use.  Drug Use:  reports no history of drug use. Family History:     Family History   Problem Relation Age of Onset    High Blood Pressure Mother     Diabetes Mother     Other Mother 27        Lung cancer    High Blood Pressure Father     Diabetes Father     Other Father 27        Prostate cancer       Review of Systems:     Positive and Negative as described in HPI.     CONSTITUTIONAL:  negative for fevers, chills, sweats, fatigue, weight loss  HEENT:  negative for vision, hearing changes, runny nose, throat pain  RESPIRATORY:  negative for shortness of breath, cough, congestion, wheezing  CARDIOVASCULAR:  negative for chest pain, palpitations  GASTROINTESTINAL: abdo pain n v    GENITOURINARY:  negative for difficulty of urination, burning with urination, frequency   INTEGUMENT:  negative for rash, skin lesions, easy bruising   HEMATOLOGIC/LYMPHATIC:  negative for swelling/edema   ALLERGIC/IMMUNOLOGIC:  negative for urticaria , itching  ENDOCRINE:  negative increase in drinking, increase in urination, hot or cold intolerance  MUSCULOSKELETAL:  negative joint pains, muscle aches, swelling of joints  NEUROLOGICAL:  negative for headaches, dizziness, lightheadedness, numbness, pain, tingling extremities      Physical Exam:   BP (!) 182/87   Pulse 88   Temp 98.1 °F (36.7 °C) (Oral)   Resp 16   Ht 5' 4.02\" (1.626 m)   Wt 195 lb 12.3 oz (88.8 kg)   LMP  (LMP Unknown)   SpO2 98%   BMI 33.59 kg/m²   Temp (24hrs), Av.2 °F (36.8 °C), Min:98 °F (36.7 °C), Max:98.6 °F (37 °C)    Recent Labs     10/20/22  1612 10/20/22  2122 10/21/22  0539 10/21/22  0853   POCGLU 129* 127* 144* 141*         Intake/Output Summary (Last 24 hours) at 10/21/2022 1039  Last data filed at 10/21/2022 0900  Gross per 24 hour   Intake --   Output 450 ml   Net -450 ml         General Appearance: alert, well appearing, and in no acute distress  Mental status: oriented to person, place, and time  Head: normocephalic, atraumatic  Eye: no icterus, redness, pupils equal and reactive, extraocular eye movements intact, conjunctiva clear  Ear: normal external ear, no discharge, hearing intact  Nose: no drainage noted  Mouth: mucous membranes moist  Neck: supple, no carotid bruits, thyroid not palpable  Lungs: Bilateral equal air entry, clear to ausculation, no wheezing, rales or rhonchi, normal effort  Cardiovascular: normal rate, regular rhythm, no murmur, gallop, rub  Abdomen: Soft, nontender, nondistended, normal bowel sounds, no hepatomegaly or splenomegaly   No tenderness  Surg scar noted lower mid abdo laparotomy noted    Neurologic: There are no new focal motor or sensory deficits, normal muscle tone and bulk, no abnormal sensation, normal speech, cranial nerves II through XII grossly intact  Skin: No gross lesions, rashes, bruising or bleeding on exposed skin area  Extremities: peripheral pulses palpable, no pedal edema or calf pain with palpation  Psych: flat affect drowsy    Investigations:      Laboratory Testing:  Recent Results (from the past 24 hour(s))   POC Glucose Fingerstick    Collection Time: 10/20/22 11:30 AM   Result Value Ref Range    POC Glucose 142 (H) 65 - 105 mg/dL   POC Glucose Fingerstick    Collection Time: 10/20/22  4:12 PM   Result Value Ref Range    POC Glucose 129 (H) 65 - 105 mg/dL   POC Glucose Fingerstick    Collection Time: 10/20/22  9:22 PM   Result Value Ref Range    POC Glucose 127 (H) 65 - 105 mg/dL   POC Glucose Fingerstick    Collection Time: 10/21/22  5:39 AM   Result Value Ref Range    POC Glucose 144 (H) 65 - 105 mg/dL   Basic Metabolic Panel w/ Reflex to MG    Collection Time: 10/21/22  5:40 AM   Result Value Ref Range    Glucose 159 (H) 70 - 99 mg/dL    BUN 33 (H) 6 - 20 mg/dL    Creatinine 4.32 (H) 0.50 - 0.90 mg/dL    Est, Glom Filt Rate 13 (L) >60 mL/min/1.73m2    Calcium 10.0 8.6 - 10.4 mg/dL    Sodium 141 135 - 144 mmol/L    Potassium 3.9 3.7 - 5.3 mmol/L    Chloride 98 98 - 107 mmol/L    CO2 23 20 - 31 mmol/L    Anion Gap 20 (H) 9 - 17 mmol/L   CBC with Auto Differential    Collection Time: 10/21/22  5:40 AM   Result Value Ref Range    WBC 7.9 3.5 - 11.0 k/uL    RBC 3.13 (L) 4.0 - 5.2 m/uL    Hemoglobin 9.6 (L) 12.0 - 16.0 g/dL    Hematocrit 28.7 (L) 36 - 46 %    MCV 91.8 80 - 100 fL    MCH 30.8 26 - 34 pg    MCHC 33.5 31 - 37 g/dL    RDW 19.2 (H) 11.5 - 14.9 %    Platelets 796 935 - 239 k/uL    MPV 6.8 6.0 - 12.0 fL    Seg Neutrophils 72 (H) 36 - 66 %    Lymphocytes 21 (L) 24 - 44 %    Monocytes 6 1 - 7 %    Eosinophils % 1 0 - 4 %    Basophils 0 0 - 2 %    Segs Absolute 5.60 1.3 - 9.1 k/uL    Absolute Lymph # 1.70 1.0 - 4.8 k/uL    Absolute Mono # 0.50 0.1 - 1.3 k/uL    Absolute Eos # 0.10 0.0 - 0.4 k/uL    Basophils Absolute 0.00 0.0 - 0.2 k/uL   Phosphorus    Collection Time: 10/21/22  5:40 AM   Result Value Ref Range    Phosphorus 4.5 2.6 - 4.5 mg/dL   POC Glucose Fingerstick    Collection Time: 10/21/22  8:53 AM   Result Value Ref Range    POC Glucose 141 (H) 65 - 105 mg/dL   EKG 12 Lead    Collection Time: 10/21/22  9:20 AM   Result Value Ref Range    Ventricular Rate 89 BPM    Atrial Rate 89 BPM    P-R Interval 146 ms    QRS Duration 90 ms    Q-T Interval 400 ms    QTc Calculation (Bazett) 486 ms    P Axis 33 degrees    R Axis 79 degrees    T Axis 46 degrees       Imaging/Diagnostics:  CT ABDOMEN PELVIS WO CONTRAST Additional Contrast? None    Result Date: 10/19/2022  Limited noncontrast examination. Mild right and minimal left hydronephrosis, which may be due to distended urinary bladder containing contrast material.  Consider Mcnair catheter placement as clinically warranted. Suggestion of colonic wall thickening of the mid transverse and descending colonic loops, which may have been exaggerated by underdistention but may reflect underlying infectious/inflammatory colitis. 2.7 cm ill-defined density seen within the left groin region beneath the skin staples, which may reflect liquefying hematoma. Correlate clinically with signs of infection as there are adjacent inflammatory changes and prominent left inguinal lymph nodes. CT HEAD WO CONTRAST    Result Date: 10/19/2022  No convincing CT evidence of intracranial hemorrhage, mass effect or acute territorial infarct seen. RECOMMENDATIONS: If there is persistent clinical concern, consider short-term follow-up examination.        Assessment :      Hospital Problems             Last Modified POA    * (Principal) Pancreatitis, unspecified pancreatitis type 10/19/2022 Yes    Acute urinary retention 10/20/2022 Yes    Gastroenteritis 10/20/2022 Yes    Elevated lipase 10/20/2022 Yes    Diffuse abdominal pain 10/20/2022 Yes     Plan:     Patient status inpatient in the Progressive Unit/Step down    63-year-old -American lady class I obese BMI 31.74  History of end-stage kidney disease on hemodialysis with a left arm fistula  Uncontrolled diabetes mellitus type 1  Very poor historian unable to get much story from her  Abdominal pain associate with nausea vomiting 10 out of 10  Denies any fever chills   Elevated lipase CT scan noted  Clinical diagnosis of acute pancreatitis IV fluid resuscitation gentle hydration with dialysis  Discontinue IV Dilaudid and oral Norco  Low-fat diet as tolerated  Nephrology consult for  Nonspecific Trope elevation no NSTEMI patient has no chest pain troponins flat at 90  Anemia of chronic kidney disease  Hyponatremia sodium 134 conservative  Hypokalemia less than 4 potassium will be managed with  Oct 21  Troponin elevation is flat EKG noted chest pain and noncardiac in origin  Reproducible  Patient complains abdominal pain asking for IV Dilaudid  GI input noted  Patient has dependence on opioid and abuse  Advance diet discharge home with family patient is refusing ECF  High risk of readmission with the dependence and opioid seeking behavior      Consultations:   IP CONSULT TO INTERNAL MEDICINE  IP CONSULT TO GI  IP CONSULT TO NEPHROLOGY     Patient is admitted as inpatient status because of co-morbidities listed above, severity of signs and symptoms as outlined, requirement for current medical therapies and most importantly because of direct risk to patient if care not provided in a hospital setting. Expected length of stay > 48 hours. Jake Mcnally MD  10/21/2022  10:39 AM    Copy sent to Dr. Makenzie Parnell primary care provider on file. Please note that this chart was generated using voice recognition Dragon dictation software. Although every effort was made to ensure the accuracy of this automated transcription, some errors in transcription may have occurred.

## 2022-10-21 NOTE — PROGRESS NOTES
Patient complaining of bad abdominal pain and has no pain medication on. Patient also now complaining of chest pain. Dr. Marie Lorenz called. Awaiting call back.

## 2022-10-21 NOTE — PROGRESS NOTES
Physical Therapy        Physical Therapy Cancel Note      DATE: 10/21/2022    NAME: Renita Gonsalez  MRN: [id number]   : 1990      Patient not seen this date for Physical Therapy due to:    10- at 36- HOLD PT evaluation this a.m. per nurse Petty Cervantes due to patient's C/O chest pain.   Will check status this p.m. per nurse request.       Electronically signed by Phoebe Hobbs PT on 10/21/2022 at 9:25 AM

## 2022-10-21 NOTE — CARE COORDINATION
DISCHARGE PLANNING NOTE:     [facility] called ans stated that the patient is accepted. They do not have an inpt dialysis slot but they would pay for the transportation to the patient's out pt HD slot  M/W/F at [address] renal [address] @10:30 am .     CM will speak to the patient .   Electronically signed by Laura Wilhelm RN on 10/21/2022 at 11:47 AM

## 2022-10-21 NOTE — PROGRESS NOTES
Physical Therapy        Physical Therapy Cancel Note      DATE: 10/21/2022    NAME: Piero Louis  MRN: [id number]   : 1990      Patient not seen this date for Physical Therapy due to:    10- at 1341- Deferred per nurse Kvng Jensen as pt being taken to dialysis momentarily. Will hold PT evaluation until tomorrow.        Electronically signed by Jaida Bustamante PT on 10/21/2022 at 2:48 PM

## 2022-10-22 LAB
ABSOLUTE EOS #: 0.2 K/UL (ref 0–0.4)
ABSOLUTE LYMPH #: 1.8 K/UL (ref 1–4.8)
ABSOLUTE MONO #: 0.8 K/UL (ref 0.1–1.3)
ALBUMIN SERPL-MCNC: 4.7 G/DL (ref 3.5–5.2)
ALP BLD-CCNC: 80 U/L (ref 35–104)
ALT SERPL-CCNC: 26 U/L (ref 5–33)
ANION GAP SERPL CALCULATED.3IONS-SCNC: 16 MMOL/L (ref 9–17)
ANION GAP SERPL CALCULATED.3IONS-SCNC: 18 MMOL/L (ref 9–17)
AST SERPL-CCNC: 15 U/L
BASOPHILS # BLD: 1 % (ref 0–2)
BASOPHILS ABSOLUTE: 0 K/UL (ref 0–0.2)
BILIRUB SERPL-MCNC: 0.3 MG/DL (ref 0.3–1.2)
BUN BLDV-MCNC: 20 MG/DL (ref 6–20)
BUN BLDV-MCNC: 26 MG/DL (ref 6–20)
CALCIUM SERPL-MCNC: 10.1 MG/DL (ref 8.6–10.4)
CALCIUM SERPL-MCNC: 10.1 MG/DL (ref 8.6–10.4)
CHLORIDE BLD-SCNC: 95 MMOL/L (ref 98–107)
CHLORIDE BLD-SCNC: 96 MMOL/L (ref 98–107)
CO2: 26 MMOL/L (ref 20–31)
CO2: 27 MMOL/L (ref 20–31)
CREAT SERPL-MCNC: 3.44 MG/DL (ref 0.5–0.9)
CREAT SERPL-MCNC: 4.21 MG/DL (ref 0.5–0.9)
EOSINOPHILS RELATIVE PERCENT: 2 % (ref 0–4)
GFR SERPL CREATININE-BSD FRML MDRD: 14 ML/MIN/1.73M2
GFR SERPL CREATININE-BSD FRML MDRD: 17 ML/MIN/1.73M2
GLUCOSE BLD-MCNC: 100 MG/DL (ref 65–105)
GLUCOSE BLD-MCNC: 106 MG/DL (ref 65–105)
GLUCOSE BLD-MCNC: 108 MG/DL (ref 65–105)
GLUCOSE BLD-MCNC: 108 MG/DL (ref 70–99)
GLUCOSE BLD-MCNC: 116 MG/DL (ref 65–105)
GLUCOSE BLD-MCNC: 98 MG/DL (ref 70–99)
HCT VFR BLD CALC: 29.9 % (ref 36–46)
HCT VFR BLD CALC: 32 % (ref 36–46)
HEMOGLOBIN: 10.2 G/DL (ref 12–16)
HEMOGLOBIN: 10.5 G/DL (ref 12–16)
LACTIC ACID: 0.8 MMOL/L (ref 0.5–2.2)
LIPASE: 75 U/L (ref 13–60)
LYMPHOCYTES # BLD: 23 % (ref 24–44)
MCH RBC QN AUTO: 30.1 PG (ref 26–34)
MCH RBC QN AUTO: 30.8 PG (ref 26–34)
MCHC RBC AUTO-ENTMCNC: 32.9 G/DL (ref 31–37)
MCHC RBC AUTO-ENTMCNC: 34.1 G/DL (ref 31–37)
MCV RBC AUTO: 90.4 FL (ref 80–100)
MCV RBC AUTO: 91.5 FL (ref 80–100)
MONOCYTES # BLD: 10 % (ref 1–7)
PDW BLD-RTO: 18.9 % (ref 11.5–14.9)
PDW BLD-RTO: 19.2 % (ref 11.5–14.9)
PLATELET # BLD: 426 K/UL (ref 150–450)
PLATELET # BLD: 442 K/UL (ref 150–450)
PMV BLD AUTO: 6.7 FL (ref 6–12)
PMV BLD AUTO: 6.8 FL (ref 6–12)
POTASSIUM SERPL-SCNC: 3.8 MMOL/L (ref 3.7–5.3)
POTASSIUM SERPL-SCNC: 3.9 MMOL/L (ref 3.7–5.3)
RBC # BLD: 3.31 M/UL (ref 4–5.2)
RBC # BLD: 3.5 M/UL (ref 4–5.2)
SEG NEUTROPHILS: 64 % (ref 36–66)
SEGMENTED NEUTROPHILS ABSOLUTE COUNT: 5 K/UL (ref 1.3–9.1)
SODIUM BLD-SCNC: 138 MMOL/L (ref 135–144)
SODIUM BLD-SCNC: 140 MMOL/L (ref 135–144)
TOTAL PROTEIN: 7.8 G/DL (ref 6.4–8.3)
WBC # BLD: 6.8 K/UL (ref 3.5–11)
WBC # BLD: 7.8 K/UL (ref 3.5–11)

## 2022-10-22 PROCEDURE — 80048 BASIC METABOLIC PNL TOTAL CA: CPT

## 2022-10-22 PROCEDURE — 97530 THERAPEUTIC ACTIVITIES: CPT

## 2022-10-22 PROCEDURE — 2060000000 HC ICU INTERMEDIATE R&B

## 2022-10-22 PROCEDURE — 6360000002 HC RX W HCPCS: Performed by: NURSE PRACTITIONER

## 2022-10-22 PROCEDURE — 85025 COMPLETE CBC W/AUTO DIFF WBC: CPT

## 2022-10-22 PROCEDURE — 83605 ASSAY OF LACTIC ACID: CPT

## 2022-10-22 PROCEDURE — 80053 COMPREHEN METABOLIC PANEL: CPT

## 2022-10-22 PROCEDURE — 82947 ASSAY GLUCOSE BLOOD QUANT: CPT

## 2022-10-22 PROCEDURE — 6370000000 HC RX 637 (ALT 250 FOR IP): Performed by: NURSE PRACTITIONER

## 2022-10-22 PROCEDURE — 99232 SBSQ HOSP IP/OBS MODERATE 35: CPT | Performed by: INTERNAL MEDICINE

## 2022-10-22 PROCEDURE — 2580000003 HC RX 258: Performed by: NURSE PRACTITIONER

## 2022-10-22 PROCEDURE — 83690 ASSAY OF LIPASE: CPT

## 2022-10-22 PROCEDURE — 97162 PT EVAL MOD COMPLEX 30 MIN: CPT

## 2022-10-22 PROCEDURE — 36415 COLL VENOUS BLD VENIPUNCTURE: CPT

## 2022-10-22 PROCEDURE — 85027 COMPLETE CBC AUTOMATED: CPT

## 2022-10-22 PROCEDURE — 51798 US URINE CAPACITY MEASURE: CPT

## 2022-10-22 RX ORDER — ACETAMINOPHEN 325 MG/1
650 TABLET ORAL EVERY 4 HOURS PRN
Status: DISCONTINUED | OUTPATIENT
Start: 2022-10-22 | End: 2022-10-31 | Stop reason: HOSPADM

## 2022-10-22 RX ADMIN — ONDANSETRON 4 MG: 2 INJECTION INTRAMUSCULAR; INTRAVENOUS at 16:56

## 2022-10-22 RX ADMIN — ESCITALOPRAM OXALATE 10 MG: 10 TABLET ORAL at 11:13

## 2022-10-22 RX ADMIN — CARVEDILOL 25 MG: 25 TABLET, FILM COATED ORAL at 11:13

## 2022-10-22 RX ADMIN — ONDANSETRON 4 MG: 2 INJECTION INTRAMUSCULAR; INTRAVENOUS at 23:48

## 2022-10-22 RX ADMIN — INSULIN GLARGINE 24 UNITS: 100 INJECTION, SOLUTION SUBCUTANEOUS at 23:48

## 2022-10-22 RX ADMIN — ASPIRIN 81 MG: 81 TABLET, COATED ORAL at 11:13

## 2022-10-22 RX ADMIN — ATORVASTATIN CALCIUM 10 MG: 10 TABLET, FILM COATED ORAL at 22:52

## 2022-10-22 RX ADMIN — ACETAMINOPHEN 650 MG: 325 TABLET, FILM COATED ORAL at 04:03

## 2022-10-22 RX ADMIN — Medication 3 MG: at 22:52

## 2022-10-22 RX ADMIN — Medication 5000 UNITS: at 11:13

## 2022-10-22 RX ADMIN — HYDRALAZINE HYDROCHLORIDE 100 MG: 50 TABLET, FILM COATED ORAL at 22:52

## 2022-10-22 RX ADMIN — ONDANSETRON 4 MG: 4 TABLET, ORALLY DISINTEGRATING ORAL at 04:05

## 2022-10-22 RX ADMIN — SODIUM CHLORIDE, PRESERVATIVE FREE 10 ML: 5 INJECTION INTRAVENOUS at 10:54

## 2022-10-22 RX ADMIN — CARVEDILOL 25 MG: 25 TABLET, FILM COATED ORAL at 22:51

## 2022-10-22 RX ADMIN — SODIUM CHLORIDE, PRESERVATIVE FREE 10 ML: 5 INJECTION INTRAVENOUS at 22:58

## 2022-10-22 RX ADMIN — CLOPIDOGREL BISULFATE 75 MG: 75 TABLET ORAL at 11:13

## 2022-10-22 RX ADMIN — SODIUM CHLORIDE, PRESERVATIVE FREE 10 ML: 5 INJECTION INTRAVENOUS at 10:55

## 2022-10-22 RX ADMIN — NIFEDIPINE 90 MG: 90 TABLET, EXTENDED RELEASE ORAL at 11:13

## 2022-10-22 RX ADMIN — ONDANSETRON 4 MG: 2 INJECTION INTRAMUSCULAR; INTRAVENOUS at 10:53

## 2022-10-22 RX ADMIN — HYDRALAZINE HYDROCHLORIDE 100 MG: 50 TABLET, FILM COATED ORAL at 11:13

## 2022-10-22 ASSESSMENT — PAIN SCALES - GENERAL
PAINLEVEL_OUTOF10: 8
PAINLEVEL_OUTOF10: 6

## 2022-10-22 ASSESSMENT — PAIN DESCRIPTION - LOCATION: LOCATION: THROAT

## 2022-10-22 NOTE — PROGRESS NOTES
Per Dr. Josias Jiang remove patient's barros and bladder scan once per shift. Call if retaining > 250 mL.

## 2022-10-22 NOTE — PROGRESS NOTES
Physical Therapy  Facility/Department: [address] [place] [address] CARE  Physical Therapy Initial Assessment    Name: Rica Kawasaki  : 1990  MRN: [id number]  Date of Service: 10/22/2022    Discharge Recommendations:  Patient would benefit from continued therapy after discharge, Therapy recommended at discharge   PT Equipment Recommendations  Equipment Needed:  (TBD)      Patient Diagnosis(es): The primary encounter diagnosis was Acute urinary retention. Diagnoses of Lower abdominal pain, Diffuse abdominal pain, and Elevated lipase were also pertinent to this visit. Past Medical History:  has a past medical history of Asthma, Atherosclerosis of native arteries of extremities with rest pain, unspecified extremity ([address] Diabetes mellitus ([facility] [address] Headache, migraine, Hypertension, Lichen simplex chronicus, Noninflammatory disorder of vagina, unspecified, Patient's noncompliance with other medical treatment and regimen for other reason, and Type 2 diabetes mellitus without complication ([facility] [address] Past Surgical History:  has a past surgical history that includes Ovary removal and Ovary removal.    Assessment   Body Structures, Functions, Activity Limitations Requiring Skilled Therapeutic Intervention: Decreased functional mobility ; Decreased endurance;Decreased ROM; Decreased balance; Increased pain;Decreased sensation;Decreased strength;Decreased safe awareness  Assessment: continue per POC to maxmize potential for safe D/C  Treatment Diagnosis: impaiured mobility due to weakness  Therapy Prognosis: Fair  Decision Making: Medium Complexity  History: pt admitted due to C/O abdominal pain and emesis  Exam: ROM, MMT, balance and mobility assessments  Clinical Presentation: mod x 1 for rolling either direction in bed, max x 1 supine > sit but max x 2 sit > supine, dangled at the EOB x 20 minutes w/ max x 1, HIGH FALL RISK- very weak  Barriers to Learning: fatigue  Requires PT Follow-Up: Yes  Activity Tolerance  Activity Tolerance: Patient limited by fatigue;Patient limited by endurance; Patient limited by pain     Plan   Physcial Therapy Plan  General Plan: 5-7 times per week  Current Treatment Recommendations: Strengthening, ROM, Equipment evaluation, education, & procurement, Balance training, Functional mobility training, Home exercise program, Transfer training, Safety education & training, Patient/Caregiver education & training  Safety Devices  Type of Devices: Patient at risk for falls, All fall risk precautions in place, All bull prominences offloaded, Left in bed, Bed alarm in place, Call light within reach, Nurse notified     Restrictions  Restrictions/Precautions  Restrictions/Precautions: Fall Risk (AV fistula  left arm, periperal IV right antecubital, urinary catheter, troponins 95 on 10-)  Required Braces or Orthoses?: No  Position Activity Restriction  Other position/activity restrictions: NO BP, IV sticks or venipunctures left UE, up w/ assist     Subjective   Pain: 6/10 legsdescribes as dull and intermittant in nature  General  Patient assessed for rehabilitation services?: Yes  Response To Previous Treatment: Not applicable  Family / Caregiver Present: No  Referring Practitioner: Dr. Loreda Moritz  Referral Date : 10/20/22  Diagnosis: pancreatitis  Follows Commands: Within Functional Limits  Other (Comment): OK per nurse Ursula to proceed w/ PT evaluation  General Comment  Comments: hx recent left LE fem-pop surgery 9-. CT  scan of the head shows no acute intracranial abnormalities.   Subjective  Subjective: Pt reports that she was admitted w/ C/O abdominal pain and emesis starting 2-4  hours after dialysis         Social/Functional History  Social/Functional History  Lives With: Significant other  Type of Home: Apartment  Home Layout: One level, Two level, Performs ADL's on one level, Able to Live on Main level with bedroom/bathroom  Home Access: Stairs to enter without rails  Entrance Stairs - Number of Steps: 2-3 steps w/o rail  Entrance Stairs - Rails: None  Bathroom Shower/Tub: Tub/Shower unit, Doors, Curtain  Bathroom Toilet: Standard (has coutertop next to it)  Home Equipment: Rollator  Has the patient had two or more falls in the past year or any fall with injury in the past year?: No  ADL Assistance: Independent  Homemaking Assistance: Independent (pt is primary prior to fem pop surgery)  Homemaking Responsibilities: Yes (pt is primary prior to fem pop surgery)  Ambulation Assistance: Independent (no device usually, states she used rollator when she needed it)  Transfer Assistance: Independent  Active : No  Patient's  Info: medical cab to dialysis  Mode of Transportation: Cab  IADL Comments: sleeps on the couch at home  Additional Comments: HD 3 X week (M, W and F); pt has been  staying at [facility] [address] for rehab since her hospitalization for left LE fem-pop surgery- states she has been transfering and tanding w/ 2 assist, pt reports that she has been unable to walk due to pain in bilateral LEs and weakness.   Vision/Hearing  Vision  Vision: Impaired  Vision Exceptions: Wears glasses at all times  Hearing  Hearing: Within functional limits    Cognition   Orientation  Overall Orientation Status: Within Functional Limits  Orientation Level: Oriented to place;Oriented to person;Oriented to time;Oriented to situation;Oriented X4     Objective   O2 Device: None (Room air)     Observation/Palpation  Observation: AV fistula left arm, peripheral IV right antecubital, urinary catheter  Gross Assessment  Sensation: Impaired (C/O numbness bilateral feet)     PROM RLE (degrees)  RLE PROM: WFL  AROM RLE (degrees)  R Hip Flexion 0-125: 0-20  R Hip ABduction 0-45: 0-25  R Hip ADduction 0-10: neutral  R Knee Flexion 0-145: 0-30  R Knee Extension 0: 0  R Ankle Dorsiflexion 0-20: 0-5  R Ankle Plantar Flexion 0-45: 0-30  PROM LLE (degrees)  LLE General PROM: gentle PROM only tolerated due to pain level: hx recent left fem-pop surgery 9-  AROM LLE (degrees)  LLE General AROM: hx recent left fem-pop surgery 9-  L Hip Flexion 0-125: unable to tolerate AROM due to pain, 0-10 AAROM  L Hip ABduction 0-45: unable to tolerate AROM due to pain, 0-20 AAROM  L Hip ADduction 0-10: unable to tolerate AROM due to pain, return to neutral from abducted position  L Knee Flexion 0-145: unable to tolerate AROM due to pain, 0-20 AAROM  L Knee Extension 0: 0  L Ankle Dorsiflexion 0-20: 0-5  L Ankle Plantar Flexion 0-45: 0-30  AROM RUE (degrees)  RUE General AROM: see OT for UE assessment  AROM LUE (degrees)  LUE General AROM: see OT for UE assessment  Strength RLE  Strength RLE: Exception  R Hip Flexion: 2+/5  R Hip ABduction: 3+/5  R Hip ADduction: 3+/5  R Knee Flexion: 3-/5  R Knee Extension: 2+/5  R Ankle Dorsiflexion: 3/5  R Ankle Plantar flexion: 3+/5  Strength LLE  Comment: hx recent left fem-pop surgery 9-  L Hip Flexion: 2-/5  L Hip ABduction: 2-/5  L Hip ADduction: 2-/5  L Knee Flexion: 2-/5  L Knee Extension: 2-/5  L Ankle Dorsiflexion: 3/5  L Ankle Plantar Flexion: 3+/5  Strength RUE  Comment: see OT for UE assessment  Strength LUE  Comment: see OT for UE assessment           Bed mobility  Rolling to Left: Moderate assistance  Rolling to Right: Moderate assistance  Supine to Sit: Maximum assistance  Sit to Supine: Maximum assistance;2 Person assistance  Scooting: Maximal assistance;2 Person assistance  Bed Mobility Comments: dangled at the EOB 15 minutes w/ max x 1 to hold position. Pt extremely weak and leans head forward on therapist's shoulder- pt having bouts of dizziness and spitting up saliva.   PCT changed out special sheet for the [place] bed while therapist guarded the patient at the EOB  Transfers  Comment: deferred standing due to debilitation  Ambulation  Comments: deferred standing due to debilitation  Stairs/Curb  Stairs?: No     Balance  Sitting - Static: Poor  Sitting - Dynamic: Poor  Standing - Static:  (deferred standing due to debilitation)  Standing - Dynamic:  (deferred standing due to debilitation)           OutComes Score                                                  AM-PAC Score  AM-PAC Inpatient Mobility Raw Score : 8 (10/22/22 0835)  AM-PAC Inpatient T-Scale Score : 28.52 (10/22/22 0835)  Mobility Inpatient CMS 0-100% Score: 86.62 (10/22/22 0835)  Mobility Inpatient CMS G-Code Modifier : CM (10/22/22 0835)          Tinneti Score       Goals  Short Term Goals  Time Frame for Short Term Goals: 5-7 treatments/ week  Short Term Goal 1: pt to tolerate 1/2 hour of therapuetic exercise and activity  Short Term Goal 2: pt to demonstrate good technique for LE strengthening and ROM exercises  Short Term Goal 3: pt to demonstrate increased LE  strength by 1/2 MMG to assist movement in bed and transfers  Short Term Goal 4: pt to demonstrate rolling in bed and supine > sit w/ min x 1 for repositioning and sit > supine w/ min x 2  Short Term Goal 5: pt to demonstrate fair sitting balance at the EOB x 20-30 minutes w/ min x 1  Short Term Goal 6: pt to advance to and  demonstrate sit <> stand transfers using Earlean Mitts w/ max x 2  Short Term Goal 7: pt to advance to and  demonstrate standing tolerance to 3 minutes on the Earlean Mitts w/ max x 2  Patient Goals   Patient Goals : feel better       Education  Patient Education  Education Given To: Patient  Education Provided: Role of Therapy;Plan of Care  Education Method: Demonstration;Verbal  Education Outcome: Continued education needed      Therapy Time   Individual Concurrent Group Co-treatment   Time In 0835         Time Out 0922         Minutes 47         Timed Code Treatment Minutes: [address], PT

## 2022-10-22 NOTE — PROGRESS NOTES
[facility] Internal Medicine  Katharine Sol MD; Willy Pope MD; Adela Casillas MD; MD Magda Fulton MD; MD FRANCES [facility] Internal Medicine   [facility]    Progress note               Date:   10/22/2022  Patient name:  Kade Iraheta  Date of admission:  10/19/2022  4:05 PM  MRN:   [id number]  Account:  [de-identified]  YOB: 1990  PCP:    No primary care provider on file. Room:   [address] [place] [address]  Code Status:    Full Code    Chief Complaint:     Chief Complaint   Patient presents with    Abdominal Pain     Abdominal pain, emesis post 2-4 hours dialysis treatment. Abdomen is softly distended. History Obtained From:     Pt medical record and nursing staff    History of Present Illness:     Kade Iraheta is a 28 y.o. Non- / non  female who presents with Abdominal Pain (Abdominal pain, emesis post 2-4 hours dialysis treatment. Abdomen is softly distended. )   and is admitted to the hospital for the management of Pancreatitis, unspecified pancreatitis type. Kade Iraheta is a 28 y.o. Non- / non  female who presents with Abdominal Pain (Abdominal pain, emesis post 2-4 hours dialysis treatment. Abdomen is softly distended.) and is admitted to the hospital for the management of acute Pancreatitis. Medical history includes ESRD on hemodialysis, DM type I, HTN and CAD. Also had a recent admission for GI bleed. Patient presented from [facility]. She is experiencing some altered mental status where she is unable to hold a conversation regarding her condition. She is able to stay awake for 1-2 word answer but is not consistent. CT head shows no evidence of acute changes  compared to CT head completed on 10/10/22. He was given 50 mcg IV of fentanyl prior to assessment which may be affecting her mentation.  CT of abdomen and pelvis shows mild right and minimal left hydronephrosis which is correlated with distended urinary bladder. She is experiencing acute urinary retention requiring barros catheter placement in ED. UA shows trace ketones, 3+ protein and trace leukocytes. Pending culture. WBC 10.9. AST 59, ALT 82, Alk Phos 110, Lipase 117 suggesting acute pancreatitis without infection    Past Medical History:     Past Medical History:   Diagnosis Date    Asthma     Atherosclerosis of native arteries of extremities with rest pain, unspecified extremity ([facility] [address]     Diabetes mellitus ([facility] [address]     since 12 y/o, unsure if type 1 or 2    Headache, migraine     for 2 years    Hypertension     Lichen simplex chronicus     Noninflammatory disorder of vagina, unspecified     Patient's noncompliance with other medical treatment and regimen for other reason     Type 2 diabetes mellitus without complication ([facility] [address] 73/68/8233        Past Surgical History:     Past Surgical History:   Procedure Laterality Date    OVARY REMOVAL      July 2010    OVARY REMOVAL          Medications Prior to Admission:     Prior to Admission medications    Medication Sig Start Date End Date Taking? Authorizing Provider   ondansetron (ZOFRAN-ODT) 4 MG disintegrating tablet Take 1 tablet by mouth every 8 hours as needed for Nausea or Vomiting 10/21/22  Yes Tello Rivas MD   HYDROcodone-acetaminophen (NORCO) 5-325 MG per tablet Take 1 tablet by mouth every 4 hours as needed for Pain for up to 3 days. Intended supply: 3 days.  Take lowest dose possible to manage pain 10/21/22 10/24/22 Yes Tello Rivas MD   aspirin 81 MG EC tablet Take 81 mg by mouth daily   Yes Historical Provider, MD   atorvastatin (LIPITOR) 10 MG tablet Take 10 mg by mouth at bedtime   Yes Historical Provider, MD   calcium acetate (PHOSLO) 667 MG CAPS capsule Take 3 capsules by mouth 3 times daily (with meals)   Yes Historical Provider, MD   carvedilol (COREG) 25 MG tablet Take 25 mg by mouth 2 times daily Yes Historical Provider, MD   vitamin D (CHOLECALCIFEROL) 125 MCG (5000 UT) CAPS capsule Take 5,000 Units by mouth daily   Yes Historical Provider, MD   clopidogrel (PLAVIX) 75 MG tablet Take 75 mg by mouth daily   Yes Historical Provider, MD   escitalopram (LEXAPRO) 10 MG tablet Take 10 mg by mouth daily   Yes Historical Provider, MD   hydrALAZINE (APRESOLINE) 100 MG tablet Take 100 mg by mouth 3 times daily   Yes Historical Provider, MD   insulin detemir (LEVEMIR FLEXTOUCH) 100 UNIT/ML injection pen Inject 24 Units into the skin nightly   Yes Historical Provider, MD   midodrine (PROAMATINE) 10 MG tablet Take 30 mg by mouth daily Indications: Mon, Wed, Fri at dialysis   Yes Historical Provider, MD   NIFEdipine (ADALAT CC) 90 MG extended release tablet Take 90 mg by mouth daily   Yes Historical Provider, MD   sennosides-docusate sodium (SENOKOT-S) 8.6-50 MG tablet Take 2 tablets by mouth at bedtime   Yes Historical Provider, MD   B Complex-C-Folic Acid (TRIPHROCAPS) 1 MG CAPS Take 1 capsule by mouth daily   Yes Historical Provider, MD   patiromer sorbitex calcium (VELTASSA) 8.4 g PACK packet Take 8.4 g by mouth Twice a Week Indications: twice per day on Thursdays and Sundays   Yes Historical Provider, MD   acetaminophen (TYLENOL) 325 MG tablet Take 650 mg by mouth every 6 hours as needed for Pain or Fever (do not exceed 3 gm in 24 hours)    Historical Provider, MD   albuterol sulfate HFA (PROVENTIL;VENTOLIN;PROAIR) 108 (90 Base) MCG/ACT inhaler Inhale 2 puffs into the lungs every 6 hours as needed for Wheezing or Shortness of Breath    Historical Provider, MD   polyethylene glycol (GLYCOLAX) 17 GM/SCOOP powder Take 17 g by mouth daily as needed (constipation)    Historical Provider, MD        Allergies:     Morphine and Januvia [sitagliptin]    Social History:     Tobacco:    reports that she has never smoked. She has never used smokeless tobacco.  Alcohol:      reports no history of alcohol use.   Drug Use:  reports no history of drug use. Family History:     Family History   Problem Relation Age of Onset    High Blood Pressure Mother     Diabetes Mother     Other Mother 27        Lung cancer    High Blood Pressure Father     Diabetes Father     Other Father 27        Prostate cancer       Review of Systems:     Positive and Negative as described in HPI.     CONSTITUTIONAL:  negative for fevers, chills, sweats, fatigue, weight loss  HEENT:  negative for vision, hearing changes, runny nose, throat pain  RESPIRATORY:  negative for shortness of breath, cough, congestion, wheezing  CARDIOVASCULAR:  negative for chest pain, palpitations  GASTROINTESTINAL: abdo pain n v    GENITOURINARY:  negative for difficulty of urination, burning with urination, frequency   INTEGUMENT:  negative for rash, skin lesions, easy bruising   HEMATOLOGIC/LYMPHATIC:  negative for swelling/edema   ALLERGIC/IMMUNOLOGIC:  negative for urticaria , itching  ENDOCRINE:  negative increase in drinking, increase in urination, hot or cold intolerance  MUSCULOSKELETAL:  negative joint pains, muscle aches, swelling of joints  NEUROLOGICAL:  negative for headaches, dizziness, lightheadedness, numbness, pain, tingling extremities      Physical Exam:   BP (!) 161/68   Pulse 87   Temp 98.2 °F (36.8 °C) (Oral)   Resp 18   Ht 5' 4.02\" (1.626 m)   Wt 187 lb 6.3 oz (85 kg)   LMP  (LMP Unknown)   SpO2 99%   BMI 32.15 kg/m²   Temp (24hrs), Av.2 °F (36.8 °C), Min:97.9 °F (36.6 °C), Max:98.4 °F (36.9 °C)    Recent Labs     10/21/22  2008 10/22/22  0620 10/22/22  1144 10/22/22  1620   POCGLU 119* 108* 106* 100         Intake/Output Summary (Last 24 hours) at 10/22/2022 1948  Last data filed at 10/22/2022 0292  Gross per 24 hour   Intake 160 ml   Output 150 ml   Net 10 ml         General Appearance: alert, well appearing, and in no acute distress  Mental status: oriented to person, place, and time  Head: normocephalic, atraumatic  Eye: no icterus, redness, pupils equal and reactive, extraocular eye movements intact, conjunctiva clear  Ear: normal external ear, no discharge, hearing intact  Nose: no drainage noted  Mouth: mucous membranes moist  Neck: supple, no carotid bruits, thyroid not palpable  Lungs: Bilateral equal air entry, clear to ausculation, no wheezing, rales or rhonchi, normal effort  Cardiovascular: normal rate, regular rhythm, no murmur, gallop, rub  Abdomen: Soft, nontender, nondistended, normal bowel sounds, no hepatomegaly or splenomegaly   No tenderness  Surg scar noted lower mid abdo laparotomy noted    Neurologic: There are no new focal motor or sensory deficits, normal muscle tone and bulk, no abnormal sensation, normal speech, cranial nerves II through XII grossly intact  Skin: No gross lesions, rashes, bruising or bleeding on exposed skin area  Extremities: peripheral pulses palpable, no pedal edema or calf pain with palpation  Psych: flat affect drowsy    Investigations:      Laboratory Testing:  Recent Results (from the past 24 hour(s))   POC Glucose Fingerstick    Collection Time: 10/21/22  8:08 PM   Result Value Ref Range    POC Glucose 119 (H) 65 - 105 mg/dL   Basic Metabolic Panel w/ Reflex to MG    Collection Time: 10/22/22  5:13 AM   Result Value Ref Range    Glucose 108 (H) 70 - 99 mg/dL    BUN 20 6 - 20 mg/dL    Creatinine 3.44 (H) 0.50 - 0.90 mg/dL    Est, Glom Filt Rate 17 (L) >60 mL/min/1.73m2    Calcium 10.1 8.6 - 10.4 mg/dL    Sodium 138 135 - 144 mmol/L    Potassium 3.8 3.7 - 5.3 mmol/L    Chloride 96 (L) 98 - 107 mmol/L    CO2 26 20 - 31 mmol/L    Anion Gap 16 9 - 17 mmol/L   CBC with Auto Differential    Collection Time: 10/22/22  5:13 AM   Result Value Ref Range    WBC 7.8 3.5 - 11.0 k/uL    RBC 3.31 (L) 4.0 - 5.2 m/uL    Hemoglobin 10.2 (L) 12.0 - 16.0 g/dL    Hematocrit 29.9 (L) 36 - 46 %    MCV 90.4 80 - 100 fL    MCH 30.8 26 - 34 pg    MCHC 34.1 31 - 37 g/dL    RDW 19.2 (H) 11.5 - 14.9 %    Platelets 996 180 - 157 k/uL    MPV 6.8 6.0 - 12.0 fL    Seg Neutrophils 64 36 - 66 %    Lymphocytes 23 (L) 24 - 44 %    Monocytes 10 (H) 1 - 7 %    Eosinophils % 2 0 - 4 %    Basophils 1 0 - 2 %    Segs Absolute 5.00 1.3 - 9.1 k/uL    Absolute Lymph # 1.80 1.0 - 4.8 k/uL    Absolute Mono # 0.80 0.1 - 1.3 k/uL    Absolute Eos # 0.20 0.0 - 0.4 k/uL    Basophils Absolute 0.00 0.0 - 0.2 k/uL   POC Glucose Fingerstick    Collection Time: 10/22/22  6:20 AM   Result Value Ref Range    POC Glucose 108 (H) 65 - 105 mg/dL   POC Glucose Fingerstick    Collection Time: 10/22/22 11:44 AM   Result Value Ref Range    POC Glucose 106 (H) 65 - 105 mg/dL   POC Glucose Fingerstick    Collection Time: 10/22/22  4:20 PM   Result Value Ref Range    POC Glucose 100 65 - 105 mg/dL   CBC    Collection Time: 10/22/22  7:15 PM   Result Value Ref Range    WBC 6.8 3.5 - 11.0 k/uL    RBC 3.50 (L) 4.0 - 5.2 m/uL    Hemoglobin 10.5 (L) 12.0 - 16.0 g/dL    Hematocrit 32.0 (L) 36 - 46 %    MCV 91.5 80 - 100 fL    MCH 30.1 26 - 34 pg    MCHC 32.9 31 - 37 g/dL    RDW 18.9 (H) 11.5 - 14.9 %    Platelets 515 737 - 292 k/uL    MPV 6.7 6.0 - 12.0 fL       Imaging/Diagnostics:  CT ABDOMEN PELVIS WO CONTRAST Additional Contrast? None    Result Date: 10/19/2022  Limited noncontrast examination. Mild right and minimal left hydronephrosis, which may be due to distended urinary bladder containing contrast material.  Consider Mcnair catheter placement as clinically warranted. Suggestion of colonic wall thickening of the mid transverse and descending colonic loops, which may have been exaggerated by underdistention but may reflect underlying infectious/inflammatory colitis. 2.7 cm ill-defined density seen within the left groin region beneath the skin staples, which may reflect liquefying hematoma. Correlate clinically with signs of infection as there are adjacent inflammatory changes and prominent left inguinal lymph nodes.      CT HEAD WO CONTRAST    Result Date: 10/19/2022  No convincing CT evidence of intracranial hemorrhage, mass effect or acute territorial infarct seen. RECOMMENDATIONS: If there is persistent clinical concern, consider short-term follow-up examination.        Assessment :      Hospital Problems             Last Modified POA    * (Principal) Pancreatitis, unspecified pancreatitis type 10/19/2022 Yes    Acute urinary retention 10/20/2022 Yes    Gastroenteritis 10/20/2022 Yes    Elevated lipase 10/20/2022 Yes    Diffuse abdominal pain 10/20/2022 Yes     Plan:     Patient status inpatient in the Progressive Unit/Step down    80-year-old -American lady class I obese BMI 31.74  History of end-stage kidney disease on hemodialysis with a left arm fistula  Uncontrolled diabetes mellitus type 1  Very poor historian unable to get much story from her  Abdominal pain associate with nausea vomiting 10 out of 10  Denies any fever chills   Elevated lipase CT scan noted  Clinical diagnosis of acute pancreatitis IV fluid resuscitation gentle hydration with dialysis  Discontinue IV Dilaudid and oral Norco  Low-fat diet as tolerated  Nephrology consult for  Nonspecific Trope elevation no NSTEMI patient has no chest pain troponins flat at 90  Anemia of chronic kidney disease  Hyponatremia sodium 134 conservative  Hypokalemia less than 4 potassium will be managed with  Oct 22  Troponin elevation is flat EKG noted chest pain and noncardiac in origin  Reproducible  Patient complains abdominal pain asking for IV Dilaudid  GI input noted  Patient has dependence on opioid and abuse  Advance diet discharge home with family patient is refusing ECF  High risk of readmission with the dependence and opioid seeking behavior  PT/OT   More nausea today   GI input needed ,   Labs ordered       Consultations:   IP CONSULT TO INTERNAL MEDICINE  IP CONSULT TO GI  IP CONSULT TO NEPHROLOGY     Patient is admitted as inpatient status because of co-morbidities listed above, severity of signs and symptoms as outlined, requirement for current medical therapies and most importantly because of direct risk to patient if care not provided in a hospital setting. Expected length of stay > 48 hours. Rajat Ferguson MD  10/22/2022  7:48 PM    Copy sent to Dr. Cho primary care provider on file. Please note that this chart was generated using voice recognition Dragon dictation software. Although every effort was made to ensure the accuracy of this automated transcription, some errors in transcription may have occurred.

## 2022-10-22 NOTE — PROGRESS NOTES
Patient had large amount of liquid, brown emesis. Patient states she is having nausea and stomach pain. IV zofran administered.

## 2022-10-22 NOTE — PROGRESS NOTES
Department of Internal Medicine  Nephrology Willow Israel MD Progress Note    Reason for consultation: Management of hemodialysis dependent end-stage renal disease. Consulting physician: Lakia Oswald MD.    Interval history: Merline Spoon was seen and examined today and she is more withdrawn but awake. She is not in acute respiratory distress. 1.2 kg of fluid was removed with dialysis yesterday for a postdialysis weight of 87.5 kg. Her outpatient target weight is 75 kg. History of present: This is a 28 y.o. 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 Janes Barillas at of nephrology as consultants of [facility], Presented to the emergency department yesterday with complaints of abdominal pain that started after hemodialysis. This was associated with 1 episode of vomiting at dialysis. She has a recent history of GI bleed requiring transfusion and was admitted from October 11 to October 14, 2022. Flexible sigmoidoscopy performed on that admission showed multiple rectal ulcers which were cauterized and patient was taken off Xarelto.     Scheduled Meds:   epoetin juventino-epbx  2,000 Units SubCUTAneous Once per day on Mon Wed Fri    aspirin  81 mg Oral Daily    atorvastatin  10 mg Oral Nightly    calcium acetate  3 capsule Oral TID WC    carvedilol  25 mg Oral BID    clopidogrel  75 mg Oral Daily    escitalopram  10 mg Oral Daily    hydrALAZINE  100 mg Oral TID    NIFEdipine  90 mg Oral Daily    vitamin D3  5,000 Units Oral Daily    sodium chloride flush  5-40 mL IntraVENous 2 times per day    insulin glargine  24 Units SubCUTAneous Nightly     Continuous Infusions:   sodium chloride      dextrose       PRN Meds:.acetaminophen, metoprolol, melatonin, midodrine, albuterol sulfate HFA, sodium chloride flush, sodium chloride, ondansetron **OR** ondansetron, glucose, dextrose bolus **OR** dextrose bolus, glucagon (rDNA), dextrose    Physical Exam:    VITALS:  BP (!) 151/60   Pulse 86   Temp 98.3 °F (36.8 °C) (Oral)   Resp 18   Ht 5' 4.02\" (1.626 m)   Wt 187 lb 6.3 oz (85 kg)   LMP  (LMP Unknown)   SpO2 98%   BMI 32.15 kg/m²   TEMPERATURE:  Current - Temp: 98.3 °F (36.8 °C); Max - Temp  Av.3 °F (36.8 °C)  Min: 97.9 °F (36.6 °C)  Max: 98.5 °F (36.9 °C)  RESPIRATIONS RANGE: Resp  Av  Min: 16  Max: 18  PULSE RANGE: Pulse  Av.4  Min: 80  Max: 88  BLOOD PRESSURE RANGE:  Systolic (46ZNS), PBF:298 , Min:87 , JJW:741 ; Diastolic (42VUB), QCR:42, Min:43, Max:75  PULSE OXIMETRY RANGE: SpO2  Av.6 %  Min: 97 %  Max: 100 %  24HR INTAKE/OUTPUT:    Intake/Output Summary (Last 24 hours) at 10/22/2022 1344  Last data filed at 10/22/2022 2399  Gross per 24 hour   Intake 660 ml   Output 1827 ml   Net -1167 ml       Constitutional: alert, appears stated age, and cooperative    Skin: Skin color, texture, turgor normal. No rashes or lesions    Head: Normocephalic, without obvious abnormality, atraumatic     Cardiovascular/Edema: regular rate and rhythm, S1, S2 normal, no murmur, click, rub or gallop    Respiratory: Lungs: clear to auscultation bilaterally    Abdomen:  Full with periumbilical tenderness; normal bowel sounds. Back: symmetric, no curvature. ROM normal. No CVA tenderness. Extremities: Trace bilateral pitting pedal edema with hyperpigmented dermatosis.     Neuro:  Grossly normal      CBC:   Recent Labs     10/20/22  0539 10/21/22  0540 10/22/22  0513   WBC 8.3 7.9 7.8   HGB 9.2* 9.6* 10.2*    433 426       BMP:    Recent Labs     10/20/22  0539 10/21/22  0540 10/22/22  0513    141 138   K 3.8 3.9 3.8   CL 96* 98 96*   CO2 22 23 26   BUN 24* 33* 20   CREATININE 3.53* 4.32* 3.44*   GLUCOSE 143* 159* 108*         Lab Results   Component Value Date/Time    NITRU NEGATIVE 10/19/2022 08:00 PM    COLORU Dark Yellow 10/19/2022 08:00 PM    PHUR 5.0 10/19/2022 08:00 PM    WBCUA 0 TO 2 10/19/2022 08:00 PM    RBCUA 0 TO 2 10/19/2022 08:00 PM    MUCUS NOT REPORTED 12/29/2017 11:33 PM    TRICHOMONAS MODERATE 12/29/2017 11:33 PM    YEAST NOT REPORTED 12/29/2017 11:33 PM    BACTERIA None 10/19/2022 08:00 PM    SPECGRAV 1.021 10/19/2022 08:00 PM    LEUKOCYTESUR TRACE 10/19/2022 08:00 PM    UROBILINOGEN Normal 10/19/2022 08:00 PM    BILIRUBINUR NEGATIVE  Verified by ictotest. 10/19/2022 08:00 PM    [address] NEGATIVE 01/07/2012 03:53 AM    GLUCOSEU NEGATIVE 10/19/2022 08:00 PM    GLUCOSEU 3+ 01/07/2012 03:53 AM    KETUA TRACE 10/19/2022 08:00 PM    AMORPHOUS NOT REPORTED 12/29/2017 11:33 PM     Urine Creatinine:     Lab Results   Component Value Date/Time    LABCREA 254.2 12/06/2015 02:31 PM     IMPRESSION/RECOMMENDATIONS:    1.  End-stage renal disease - We will maintain Monday, Wednesday and Friday hemodialysis schedule. We will change target weight to 85 kg. AV fistula functioning well    2. Systemic hypertension - Blood pressure control is acceptable at this time. 3.  Mineral bone disease profile - serum phosphorus level 4.5 mg/dL is within target range. [place] 4.  Abdominal pain - CT scan of the abdomen and pelvis showed mild right and minimal left hydronephrosis; colonic wall thickening of the mid transverse and descending colonic loops as well as a 2.7 cm ill-defined density within the left groin region beneath the skin staples consistent with liquefying hematoma. GI evaluation is ongoing. 5.  Anemia of chronic kidney disease - hemoglobin 10.2 g/dL today is within target range. We will place on Retacrit 2000 units subcutaneously 3 times a week. Prognosis is guarded.     Cherelle Maria MD FACP  Attending Nephrologist  10/22/2022 1:44 PM

## 2022-10-22 NOTE — CARE COORDINATION
ONGOING DISCHARGE PLAN:    Patient is alert and oriented x4. Spoke with patient regarding discharge plan and patient confirms that plan is still to go to SNF. Pt does not want [facility]. [facility] can accept and do HD at the facility Patient was agreeable to [facility]. Spoke to Maeve at [facility] and they do have a bed for the patient. Monday they will have to submit paperwork for the HD spot but Mamadou Williamson will contact us. Pt gets HD MWF currently . Will continue to follow for additional discharge needs.     Electronically signed by Alexandru Cisneros RN on 10/22/2022 at 1:47 PM

## 2022-10-22 NOTE — PROGRESS NOTES
Dr. Carmelo Alves notified patient is still having nausea/vomiting. Orders received for lab work, see orders.

## 2022-10-22 NOTE — PROGRESS NOTES
Dr. Jose Eduardo Betancourt notified of patient vomiting x 2 today, instructed to reach out to GI. NP Ramila Hayes notified, will see patient tomorrow.

## 2022-10-23 ENCOUNTER — APPOINTMENT (OUTPATIENT)
Dept: CT IMAGING | Age: 32
DRG: 368 | End: 2022-10-23
Payer: MEDICARE

## 2022-10-23 LAB
AMMONIA: 24 UMOL/L (ref 11–51)
CARBOXYHEMOGLOBIN: 4.1 % (ref 0–5)
D-DIMER QUANTITATIVE: 3.04 MG/L FEU (ref 0–0.59)
GLUCOSE BLD-MCNC: 102 MG/DL (ref 65–105)
GLUCOSE BLD-MCNC: 103 MG/DL (ref 65–105)
GLUCOSE BLD-MCNC: 118 MG/DL (ref 65–105)
GLUCOSE BLD-MCNC: 205 MG/DL (ref 65–105)
GLUCOSE BLD-MCNC: 70 MG/DL (ref 65–105)
GLUCOSE BLD-MCNC: 73 MG/DL (ref 65–105)
GLUCOSE BLD-MCNC: 75 MG/DL (ref 65–105)
GLUCOSE BLD-MCNC: 87 MG/DL (ref 65–105)
HCO3 VENOUS: 25.5 MMOL/L (ref 24–30)
METHEMOGLOBIN: 0.7 % (ref 0–1.9)
O2 SAT, VEN: 95.9 % (ref 60–85)
PCO2, VEN: 23.1 MM HG (ref 39–55)
PH VENOUS: 7.65 (ref 7.32–7.42)
PO2, VEN: 203 MM HG (ref 30–50)
POSITIVE BASE EXCESS, VEN: 4.8 MMOL/L (ref 0–2)
REASON FOR REJECTION: NORMAL
TEXT FOR RESPIRATORY: ABNORMAL
TROPONIN, HIGH SENSITIVITY: 94 NG/L (ref 0–14)
TROPONIN, HIGH SENSITIVITY: 96 NG/L (ref 0–14)
TSH SERPL DL<=0.05 MIU/L-ACNC: 1.04 UIU/ML (ref 0.3–5)
ZZ NTE CLEAN UP: ORDERED TEST: NORMAL
ZZ NTE WITH NAME CLEAN UP: SPECIMEN SOURCE: NORMAL

## 2022-10-23 PROCEDURE — 99233 SBSQ HOSP IP/OBS HIGH 50: CPT | Performed by: INTERNAL MEDICINE

## 2022-10-23 PROCEDURE — 6370000000 HC RX 637 (ALT 250 FOR IP): Performed by: INTERNAL MEDICINE

## 2022-10-23 PROCEDURE — 2580000003 HC RX 258: Performed by: INTERNAL MEDICINE

## 2022-10-23 PROCEDURE — 71260 CT THORAX DX C+: CPT | Performed by: INTERNAL MEDICINE

## 2022-10-23 PROCEDURE — 84443 ASSAY THYROID STIM HORMONE: CPT

## 2022-10-23 PROCEDURE — 6370000000 HC RX 637 (ALT 250 FOR IP): Performed by: NURSE PRACTITIONER

## 2022-10-23 PROCEDURE — 82947 ASSAY GLUCOSE BLOOD QUANT: CPT

## 2022-10-23 PROCEDURE — 82805 BLOOD GASES W/O2 SATURATION: CPT

## 2022-10-23 PROCEDURE — 82800 BLOOD PH: CPT

## 2022-10-23 PROCEDURE — 6360000002 HC RX W HCPCS: Performed by: NURSE PRACTITIONER

## 2022-10-23 PROCEDURE — 2580000003 HC RX 258: Performed by: NURSE PRACTITIONER

## 2022-10-23 PROCEDURE — 84484 ASSAY OF TROPONIN QUANT: CPT

## 2022-10-23 PROCEDURE — 82140 ASSAY OF AMMONIA: CPT

## 2022-10-23 PROCEDURE — 85379 FIBRIN DEGRADATION QUANT: CPT

## 2022-10-23 PROCEDURE — 93005 ELECTROCARDIOGRAM TRACING: CPT

## 2022-10-23 PROCEDURE — 6360000004 HC RX CONTRAST MEDICATION: Performed by: INTERNAL MEDICINE

## 2022-10-23 PROCEDURE — 36415 COLL VENOUS BLD VENIPUNCTURE: CPT

## 2022-10-23 PROCEDURE — 2060000000 HC ICU INTERMEDIATE R&B

## 2022-10-23 RX ORDER — SODIUM CHLORIDE 9 MG/ML
INJECTION, SOLUTION INTRAVENOUS CONTINUOUS
Status: DISCONTINUED | OUTPATIENT
Start: 2022-10-23 | End: 2022-10-25

## 2022-10-23 RX ORDER — SODIUM CHLORIDE 0.9 % (FLUSH) 0.9 %
10 SYRINGE (ML) INJECTION PRN
Status: DISCONTINUED | OUTPATIENT
Start: 2022-10-23 | End: 2022-10-31 | Stop reason: HOSPADM

## 2022-10-23 RX ORDER — 0.9 % SODIUM CHLORIDE 0.9 %
100 INTRAVENOUS SOLUTION INTRAVENOUS ONCE
Status: COMPLETED | OUTPATIENT
Start: 2022-10-23 | End: 2022-10-23

## 2022-10-23 RX ORDER — LIDOCAINE 4 G/G
1 PATCH TOPICAL DAILY
Status: DISCONTINUED | OUTPATIENT
Start: 2022-10-23 | End: 2022-10-31 | Stop reason: HOSPADM

## 2022-10-23 RX ADMIN — HYDRALAZINE HYDROCHLORIDE 100 MG: 50 TABLET, FILM COATED ORAL at 20:55

## 2022-10-23 RX ADMIN — ONDANSETRON 4 MG: 2 INJECTION INTRAMUSCULAR; INTRAVENOUS at 13:42

## 2022-10-23 RX ADMIN — SODIUM CHLORIDE: 9 INJECTION, SOLUTION INTRAVENOUS at 14:39

## 2022-10-23 RX ADMIN — ONDANSETRON 4 MG: 2 INJECTION INTRAMUSCULAR; INTRAVENOUS at 06:11

## 2022-10-23 RX ADMIN — SODIUM CHLORIDE 100 ML: 9 INJECTION, SOLUTION INTRAVENOUS at 13:32

## 2022-10-23 RX ADMIN — CARVEDILOL 25 MG: 25 TABLET, FILM COATED ORAL at 20:55

## 2022-10-23 RX ADMIN — ACETAMINOPHEN 650 MG: 325 TABLET, FILM COATED ORAL at 22:27

## 2022-10-23 RX ADMIN — Medication 3 MG: at 22:29

## 2022-10-23 RX ADMIN — SODIUM CHLORIDE, PRESERVATIVE FREE 10 ML: 5 INJECTION INTRAVENOUS at 09:48

## 2022-10-23 RX ADMIN — ASPIRIN 81 MG: 81 TABLET, COATED ORAL at 11:40

## 2022-10-23 RX ADMIN — CARVEDILOL 25 MG: 25 TABLET, FILM COATED ORAL at 09:51

## 2022-10-23 RX ADMIN — SODIUM CHLORIDE, PRESERVATIVE FREE 10 ML: 5 INJECTION INTRAVENOUS at 13:32

## 2022-10-23 RX ADMIN — DEXTROSE MONOHYDRATE: 100 INJECTION, SOLUTION INTRAVENOUS at 19:32

## 2022-10-23 RX ADMIN — IOPAMIDOL 75 ML: 755 INJECTION, SOLUTION INTRAVENOUS at 13:32

## 2022-10-23 RX ADMIN — ACETAMINOPHEN 650 MG: 325 TABLET, FILM COATED ORAL at 04:18

## 2022-10-23 RX ADMIN — CLOPIDOGREL BISULFATE 75 MG: 75 TABLET ORAL at 09:51

## 2022-10-23 RX ADMIN — ATORVASTATIN CALCIUM 10 MG: 10 TABLET, FILM COATED ORAL at 20:55

## 2022-10-23 ASSESSMENT — PAIN DESCRIPTION - FREQUENCY: FREQUENCY: CONTINUOUS

## 2022-10-23 ASSESSMENT — PAIN DESCRIPTION - ORIENTATION: ORIENTATION: RIGHT;LEFT

## 2022-10-23 ASSESSMENT — PAIN - FUNCTIONAL ASSESSMENT: PAIN_FUNCTIONAL_ASSESSMENT: PREVENTS OR INTERFERES SOME ACTIVE ACTIVITIES AND ADLS

## 2022-10-23 ASSESSMENT — PAIN SCALES - GENERAL
PAINLEVEL_OUTOF10: 10
PAINLEVEL_OUTOF10: 9

## 2022-10-23 ASSESSMENT — PAIN DESCRIPTION - ONSET: ONSET: ON-GOING

## 2022-10-23 ASSESSMENT — PAIN DESCRIPTION - DESCRIPTORS: DESCRIPTORS: ACHING;NAGGING

## 2022-10-23 ASSESSMENT — PAIN DESCRIPTION - PAIN TYPE: TYPE: CHRONIC PAIN

## 2022-10-23 ASSESSMENT — PAIN DESCRIPTION - LOCATION
LOCATION: ABDOMEN
LOCATION: FOOT;LEG

## 2022-10-23 NOTE — PROGRESS NOTES
[facility] Internal Medicine  Rebecca Davison MD; Lola Garcia MD; David Irving MD; MD Blayne Covarrubias MD; MD FRANCES [facility] Internal Medicine   [facility]    Progress note               Date:   10/23/2022  Patient name:  Yonis Singh  Date of admission:  10/19/2022  4:05 PM  MRN:   [id number]  Account:  [de-identified]  YOB: 1990  PCP:    No primary care provider on file. Room:   [address] [place] [address]  Code Status:    Full Code    Chief Complaint:     Chief Complaint   Patient presents with    Abdominal Pain     Abdominal pain, emesis post 2-4 hours dialysis treatment. Abdomen is softly distended. History Obtained From:     Pt medical record and nursing staff    History of Present Illness:     Yonis Singh is a 28 y.o. Non- / non  female who presents with Abdominal Pain (Abdominal pain, emesis post 2-4 hours dialysis treatment. Abdomen is softly distended. )   and is admitted to the hospital for the management of Pancreatitis, unspecified pancreatitis type. Yonis Singh is a 28 y.o. Non- / non  female who presents with Abdominal Pain (Abdominal pain, emesis post 2-4 hours dialysis treatment. Abdomen is softly distended.) and is admitted to the hospital for the management of acute Pancreatitis. Medical history includes ESRD on hemodialysis, DM type I, HTN and CAD. Also had a recent admission for GI bleed. Patient presented from [facility]. She is experiencing some altered mental status where she is unable to hold a conversation regarding her condition. She is able to stay awake for 1-2 word answer but is not consistent. CT head shows no evidence of acute changes  compared to CT head completed on 10/10/22. He was given 50 mcg IV of fentanyl prior to assessment which may be affecting her mentation.  CT of abdomen and pelvis shows mild right and minimal left hydronephrosis which is correlated with distended urinary bladder. She is experiencing acute urinary retention requiring barros catheter placement in ED. UA shows trace ketones, 3+ protein and trace leukocytes. Pending culture. WBC 10.9. AST 59, ALT 82, Alk Phos 110, Lipase 117 suggesting acute pancreatitis without infection  10/23  Patient, very sleepy as per nursing report  Has poor oral intake  Has nausea, 1 episode of vomiting  Complaining of chest pain  Very poor historian  Past Medical History:     Past Medical History:   Diagnosis Date    Asthma     Atherosclerosis of native arteries of extremities with rest pain, unspecified extremity ([address]     Diabetes mellitus ([facility] [address]     since 12 y/o, unsure if type 1 or 2    Headache, migraine     for 2 years    Hypertension     Lichen simplex chronicus     Noninflammatory disorder of vagina, unspecified     Patient's noncompliance with other medical treatment and regimen for other reason     Type 2 diabetes mellitus without complication ([facility] [address] 24/72/8261        Past Surgical History:     Past Surgical History:   Procedure Laterality Date    OVARY REMOVAL      July 2010    OVARY REMOVAL          Medications Prior to Admission:     Prior to Admission medications    Medication Sig Start Date End Date Taking? Authorizing Provider   ondansetron (ZOFRAN-ODT) 4 MG disintegrating tablet Take 1 tablet by mouth every 8 hours as needed for Nausea or Vomiting 10/21/22  Yes Marina Ramirez MD   HYDROcodone-acetaminophen (NORCO) 5-325 MG per tablet Take 1 tablet by mouth every 4 hours as needed for Pain for up to 3 days. Intended supply: 3 days.  Take lowest dose possible to manage pain 10/21/22 10/24/22 Yes Marina Ramirez MD   aspirin 81 MG EC tablet Take 81 mg by mouth daily   Yes Historical Provider, MD   atorvastatin (LIPITOR) 10 MG tablet Take 10 mg by mouth at bedtime   Yes Historical Provider, MD   calcium acetate (PHOSLO) 667 MG CAPS capsule Take 3 capsules by mouth 3 times daily (with meals)   Yes Historical Provider, MD   carvedilol (COREG) 25 MG tablet Take 25 mg by mouth 2 times daily   Yes Historical Provider, MD   vitamin D (CHOLECALCIFEROL) 125 MCG (5000 UT) CAPS capsule Take 5,000 Units by mouth daily   Yes Historical Provider, MD   clopidogrel (PLAVIX) 75 MG tablet Take 75 mg by mouth daily   Yes Historical Provider, MD   escitalopram (LEXAPRO) 10 MG tablet Take 10 mg by mouth daily   Yes Historical Provider, MD   hydrALAZINE (APRESOLINE) 100 MG tablet Take 100 mg by mouth 3 times daily   Yes Historical Provider, MD   insulin detemir (LEVEMIR FLEXTOUCH) 100 UNIT/ML injection pen Inject 24 Units into the skin nightly   Yes Historical Provider, MD   midodrine (PROAMATINE) 10 MG tablet Take 30 mg by mouth daily Indications: Mon, Wed, Fri at dialysis   Yes Historical Provider, MD   NIFEdipine (ADALAT CC) 90 MG extended release tablet Take 90 mg by mouth daily   Yes Historical Provider, MD   sennosides-docusate sodium (SENOKOT-S) 8.6-50 MG tablet Take 2 tablets by mouth at bedtime   Yes Historical Provider, MD   B Complex-C-Folic Acid (TRIPHROCAPS) 1 MG CAPS Take 1 capsule by mouth daily   Yes Historical Provider, MD   patiromer sorbitex calcium (VELTASSA) 8.4 g PACK packet Take 8.4 g by mouth Twice a Week Indications: twice per day on Thursdays and Sundays   Yes Historical Provider, MD   acetaminophen (TYLENOL) 325 MG tablet Take 650 mg by mouth every 6 hours as needed for Pain or Fever (do not exceed 3 gm in 24 hours)    Historical Provider, MD   albuterol sulfate HFA (PROVENTIL;VENTOLIN;PROAIR) 108 (90 Base) MCG/ACT inhaler Inhale 2 puffs into the lungs every 6 hours as needed for Wheezing or Shortness of Breath    Historical Provider, MD   polyethylene glycol (GLYCOLAX) 17 GM/SCOOP powder Take 17 g by mouth daily as needed (constipation)    Historical Provider, MD        Allergies:     Morphine and Januvia [sitagliptin]    Social History: Tobacco:    reports that she has never smoked. She has never used smokeless tobacco.  Alcohol:      reports no history of alcohol use. Drug Use:  reports no history of drug use. Family History:     Family History   Problem Relation Age of Onset    High Blood Pressure Mother     Diabetes Mother     Other Mother 27        Lung cancer    High Blood Pressure Father     Diabetes Father     Other Father 27        Prostate cancer       Review of Systems:     Positive and Negative as described in HPI.     CONSTITUTIONAL:  negative for fevers, chills, sweats, fatigue, weight loss  HEENT:  negative for vision, hearing changes, runny nose, throat pain  RESPIRATORY:  negative for shortness of breath, cough, congestion, wheezing  CARDIOVASCULAR:  negative for chest pain, palpitations  GASTROINTESTINAL: abdo pain n v    GENITOURINARY:  negative for difficulty of urination, burning with urination, frequency   INTEGUMENT:  negative for rash, skin lesions, easy bruising   HEMATOLOGIC/LYMPHATIC:  negative for swelling/edema   ALLERGIC/IMMUNOLOGIC:  negative for urticaria , itching  ENDOCRINE:  negative increase in drinking, increase in urination, hot or cold intolerance  MUSCULOSKELETAL:  negative joint pains, muscle aches, swelling of joints  NEUROLOGICAL:  negative for headaches, dizziness, lightheadedness, numbness, pain, tingling extremities      Physical Exam:   /66   Pulse 77   Temp 97.7 °F (36.5 °C) (Oral)   Resp 17   Ht 5' 4.02\" (1.626 m)   Wt 187 lb 6.3 oz (85 kg)   LMP  (LMP Unknown)   SpO2 99%   BMI 32.15 kg/m²   Temp (24hrs), Av.1 °F (36.7 °C), Min:97.5 °F (36.4 °C), Max:98.6 °F (37 °C)    Recent Labs     10/22/22  2253 10/23/22  0753 10/23/22  1020 10/23/22  1116   POCGLU 116* 87 103 118*     No intake or output data in the 24 hours ending 10/23/22 1143    General Appearance: alert, well appearing, and in no acute distress  Mental status: oriented to person, place, and time  Head: normocephalic, atraumatic  Eye: no icterus, redness, pupils equal and reactive, extraocular eye movements intact, conjunctiva clear  Ear: normal external ear, no discharge, hearing intact  Nose: no drainage noted  Mouth: mucous membranes moist  Neck: supple, no carotid bruits, thyroid not palpable  Lungs: Bilateral equal air entry, clear to ausculation, no wheezing, rales or rhonchi, normal effort  Cardiovascular: normal rate, regular rhythm, no murmur, gallop, rub  Abdomen: Soft, nontender, nondistended, normal bowel sounds, no hepatomegaly or splenomegaly   No tenderness  Surg scar noted lower mid abdo laparotomy noted    Neurologic: There are no new focal motor or sensory deficits, normal muscle tone and bulk, no abnormal sensation, normal speech, cranial nerves II through XII grossly intact  Skin: No gross lesions, rashes, bruising or bleeding on exposed skin area  Extremities: peripheral pulses palpable, no pedal edema or calf pain with palpation  Psych: flat affect drowsy    Investigations:      Laboratory Testing:  Recent Results (from the past 24 hour(s))   POC Glucose Fingerstick    Collection Time: 10/22/22 11:44 AM   Result Value Ref Range    POC Glucose 106 (H) 65 - 105 mg/dL   POC Glucose Fingerstick    Collection Time: 10/22/22  4:20 PM   Result Value Ref Range    POC Glucose 100 65 - 105 mg/dL   CBC    Collection Time: 10/22/22  7:15 PM   Result Value Ref Range    WBC 6.8 3.5 - 11.0 k/uL    RBC 3.50 (L) 4.0 - 5.2 m/uL    Hemoglobin 10.5 (L) 12.0 - 16.0 g/dL    Hematocrit 32.0 (L) 36 - 46 %    MCV 91.5 80 - 100 fL    MCH 30.1 26 - 34 pg    MCHC 32.9 31 - 37 g/dL    RDW 18.9 (H) 11.5 - 14.9 %    Platelets 283 376 - 847 k/uL    MPV 6.7 6.0 - 12.0 fL   Comprehensive Metabolic Panel    Collection Time: 10/22/22  7:15 PM   Result Value Ref Range    Glucose 98 70 - 99 mg/dL    BUN 26 (H) 6 - 20 mg/dL    Creatinine 4.21 (H) 0.50 - 0.90 mg/dL    Est, Glom Filt Rate 14 (L) >60 mL/min/1.73m2    Calcium 10.1 8.6 - 10.4 mg/dL Sodium 140 135 - 144 mmol/L    Potassium 3.9 3.7 - 5.3 mmol/L    Chloride 95 (L) 98 - 107 mmol/L    CO2 27 20 - 31 mmol/L    Anion Gap 18 (H) 9 - 17 mmol/L    Alkaline Phosphatase 80 35 - 104 U/L    ALT 26 5 - 33 U/L    AST 15 <32 U/L    Total Bilirubin 0.3 0.3 - 1.2 mg/dL    Total Protein 7.8 6.4 - 8.3 g/dL    Albumin 4.7 3.5 - 5.2 g/dL   Lipase    Collection Time: 10/22/22  7:15 PM   Result Value Ref Range    Lipase 75 (H) 13 - 60 U/L   Lactic Acid    Collection Time: 10/22/22  7:15 PM   Result Value Ref Range    Lactic Acid 0.8 0.5 - 2.2 mmol/L   POC Glucose Fingerstick    Collection Time: 10/22/22 10:53 PM   Result Value Ref Range    POC Glucose 116 (H) 65 - 105 mg/dL   POC Glucose Fingerstick    Collection Time: 10/23/22  7:53 AM   Result Value Ref Range    POC Glucose 87 65 - 105 mg/dL   POC Glucose Fingerstick    Collection Time: 10/23/22 10:20 AM   Result Value Ref Range    POC Glucose 103 65 - 105 mg/dL   POC Glucose Fingerstick    Collection Time: 10/23/22 11:16 AM   Result Value Ref Range    POC Glucose 118 (H) 65 - 105 mg/dL       Imaging/Diagnostics:  CT ABDOMEN PELVIS WO CONTRAST Additional Contrast? None    Result Date: 10/19/2022  Limited noncontrast examination. Mild right and minimal left hydronephrosis, which may be due to distended urinary bladder containing contrast material.  Consider Mcnair catheter placement as clinically warranted. Suggestion of colonic wall thickening of the mid transverse and descending colonic loops, which may have been exaggerated by underdistention but may reflect underlying infectious/inflammatory colitis. 2.7 cm ill-defined density seen within the left groin region beneath the skin staples, which may reflect liquefying hematoma. Correlate clinically with signs of infection as there are adjacent inflammatory changes and prominent left inguinal lymph nodes.      CT HEAD WO CONTRAST    Result Date: 10/19/2022  No convincing CT evidence of intracranial hemorrhage, mass effect or acute territorial infarct seen. RECOMMENDATIONS: If there is persistent clinical concern, consider short-term follow-up examination.        Assessment :      Hospital Problems             Last Modified POA    * (Principal) Pancreatitis, unspecified pancreatitis type 10/19/2022 Yes    Acute urinary retention 10/20/2022 Yes    Gastroenteritis 10/20/2022 Yes    Elevated lipase 10/20/2022 Yes    Diffuse abdominal pain 10/20/2022 Yes     Plan:     Patient status inpatient in the Progressive Unit/Step down    80-year-old -American lady class I obese BMI 31.74  History of end-stage kidney disease on hemodialysis with a left arm fistula  Uncontrolled diabetes mellitus type 1  Very poor historian unable to get much story from her  Abdominal pain associate with nausea vomiting 10 out of 10  Denies any fever chills   Elevated lipase CT scan noted  Clinical diagnosis of acute pancreatitis IV fluid resuscitation gentle hydration with dialysis  Discontinue IV Dilaudid and oral Norco  Low-fat diet as tolerated  Nephrology consult for  Nonspecific Trope elevation no NSTEMI patient has no chest pain troponins flat at 90  Anemia of chronic kidney disease  Hyponatremia sodium 134 conservative  Hypokalemia less than 4 potassium will be managed with  Oct 22  Troponin elevation is flat EKG noted chest pain and noncardiac in origin  Reproducible  Patient complains abdominal pain asking for IV Dilaudid  GI input noted  Patient has dependence on opioid and abuse  Advance diet discharge home with family patient is refusing ECF  High risk of readmission with the dependence and opioid seeking behavior  PT/OT   More nausea today   GI input needed ,   Labs ordered   10/23  Complaining of chest pain, ordering EKG, 2 sets of troponin  Patient sleepy, did not receive any pain medication, sleeping medication  Ordering serum ammonia level  TSH, VBG  Patient has CT head done on 10/20 which was negative  Holding Lexapro  Will start patient on gentle hydration  ESRD, recent femoropopliteal bypass surgery, on left side, hypertension, diabetes, coronary artery disease, ESRD on hemodialysis  Consultations:   IP CONSULT TO INTERNAL MEDICINE  IP CONSULT TO GI  IP CONSULT TO NEPHROLOGY     Patient is admitted as inpatient status because of co-morbidities listed above, severity of signs and symptoms as outlined, requirement for current medical therapies and most importantly because of direct risk to patient if care not provided in a hospital setting. Expected length of stay > 48 hours. Nano Albarran MD  10/23/2022  11:43 AM    Copy sent to Dr. Cho primary care provider on file. Please note that this chart was generated using voice recognition Dragon dictation software. Although every effort was made to ensure the accuracy of this automated transcription, some errors in transcription may have occurred.

## 2022-10-23 NOTE — PROGRESS NOTES
RN rounding with Dr. Nohemy Alston, patient initially not answering questions minimally responsive. Blood sugar checked per Dr. Sole Shah instructions, glucose 84. Vital signs WNL. After stimulating again patient is now following commands and answering questions appropriately but still very lethargic and stating she does not feel well and is very tired.

## 2022-10-23 NOTE — CARE COORDINATION
ONGOING DISCHARGE PLAN:     Patient is alert and oriented x4. Spoke with patient regarding discharge plan and patient confirms that plan is still to go to SNF. Pt does not want [facility]. [facility] and [facility] HD at the facility Patient was agreeable to [facility]. Spoke to [facility] and they do have a bed for the patient. Monday they will have to submit paperwork for the HD spot but Mag [facility] will contact us. Pt gets HD MWF currently . Will continue to follow for additional discharge needs.   Electronically signed by Biju Chavarria RN on 10/23/2022 at 10:14 AM

## 2022-10-24 LAB
GLUCOSE BLD-MCNC: 172 MG/DL (ref 65–105)
GLUCOSE BLD-MCNC: 82 MG/DL (ref 65–105)
GLUCOSE BLD-MCNC: 92 MG/DL (ref 65–105)
GLUCOSE BLD-MCNC: 96 MG/DL (ref 65–105)
HCT VFR BLD CALC: 32.5 % (ref 36–46)
HEMOGLOBIN: 10.7 G/DL (ref 12–16)

## 2022-10-24 PROCEDURE — 2500000003 HC RX 250 WO HCPCS: Performed by: NURSE PRACTITIONER

## 2022-10-24 PROCEDURE — 36415 COLL VENOUS BLD VENIPUNCTURE: CPT

## 2022-10-24 PROCEDURE — 99232 SBSQ HOSP IP/OBS MODERATE 35: CPT | Performed by: INTERNAL MEDICINE

## 2022-10-24 PROCEDURE — 82947 ASSAY GLUCOSE BLOOD QUANT: CPT

## 2022-10-24 PROCEDURE — 6370000000 HC RX 637 (ALT 250 FOR IP): Performed by: NURSE PRACTITIONER

## 2022-10-24 PROCEDURE — 2060000000 HC ICU INTERMEDIATE R&B

## 2022-10-24 PROCEDURE — 99221 1ST HOSP IP/OBS SF/LOW 40: CPT | Performed by: PSYCHIATRY & NEUROLOGY

## 2022-10-24 PROCEDURE — 2580000003 HC RX 258: Performed by: INTERNAL MEDICINE

## 2022-10-24 PROCEDURE — 6360000002 HC RX W HCPCS: Performed by: NURSE PRACTITIONER

## 2022-10-24 PROCEDURE — 90935 HEMODIALYSIS ONE EVALUATION: CPT

## 2022-10-24 PROCEDURE — 85014 HEMATOCRIT: CPT

## 2022-10-24 PROCEDURE — 2580000003 HC RX 258: Performed by: NURSE PRACTITIONER

## 2022-10-24 PROCEDURE — 6370000000 HC RX 637 (ALT 250 FOR IP): Performed by: INTERNAL MEDICINE

## 2022-10-24 PROCEDURE — 85018 HEMOGLOBIN: CPT

## 2022-10-24 RX ORDER — MIRTAZAPINE 15 MG/1
15 TABLET, ORALLY DISINTEGRATING ORAL NIGHTLY
Status: DISCONTINUED | OUTPATIENT
Start: 2022-10-24 | End: 2022-10-31 | Stop reason: HOSPADM

## 2022-10-24 RX ADMIN — CALCIUM ACETATE 2001 MG: 667 CAPSULE ORAL at 14:45

## 2022-10-24 RX ADMIN — CLOPIDOGREL BISULFATE 75 MG: 75 TABLET ORAL at 14:47

## 2022-10-24 RX ADMIN — ASPIRIN 81 MG: 81 TABLET, COATED ORAL at 14:47

## 2022-10-24 RX ADMIN — NIFEDIPINE 90 MG: 90 TABLET, EXTENDED RELEASE ORAL at 14:44

## 2022-10-24 RX ADMIN — SODIUM CHLORIDE, PRESERVATIVE FREE 10 ML: 5 INJECTION INTRAVENOUS at 21:14

## 2022-10-24 RX ADMIN — SODIUM CHLORIDE, PRESERVATIVE FREE 10 ML: 5 INJECTION INTRAVENOUS at 14:48

## 2022-10-24 RX ADMIN — MIRTAZAPINE 15 MG: 15 TABLET, ORALLY DISINTEGRATING ORAL at 21:14

## 2022-10-24 RX ADMIN — ONDANSETRON 4 MG: 2 INJECTION INTRAMUSCULAR; INTRAVENOUS at 21:09

## 2022-10-24 RX ADMIN — CALCIUM ACETATE 1334 MG: 667 CAPSULE ORAL at 18:23

## 2022-10-24 RX ADMIN — Medication 5000 UNITS: at 14:44

## 2022-10-24 RX ADMIN — SODIUM CHLORIDE: 9 INJECTION, SOLUTION INTRAVENOUS at 06:12

## 2022-10-24 RX ADMIN — SODIUM CHLORIDE: 9 INJECTION, SOLUTION INTRAVENOUS at 21:22

## 2022-10-24 RX ADMIN — ACETAMINOPHEN 650 MG: 325 TABLET, FILM COATED ORAL at 21:09

## 2022-10-24 RX ADMIN — HYDRALAZINE HYDROCHLORIDE 100 MG: 50 TABLET, FILM COATED ORAL at 14:44

## 2022-10-24 RX ADMIN — ATORVASTATIN CALCIUM 10 MG: 10 TABLET, FILM COATED ORAL at 21:09

## 2022-10-24 RX ADMIN — Medication 3 MG: at 21:09

## 2022-10-24 RX ADMIN — CARVEDILOL 25 MG: 25 TABLET, FILM COATED ORAL at 14:47

## 2022-10-24 RX ADMIN — ACETAMINOPHEN 650 MG: 325 TABLET, FILM COATED ORAL at 04:07

## 2022-10-24 ASSESSMENT — PAIN SCALES - GENERAL
PAINLEVEL_OUTOF10: 8
PAINLEVEL_OUTOF10: 8

## 2022-10-24 ASSESSMENT — PAIN DESCRIPTION - LOCATION
LOCATION: ABDOMEN;LEG
LOCATION: FOOT

## 2022-10-24 ASSESSMENT — PAIN - FUNCTIONAL ASSESSMENT
PAIN_FUNCTIONAL_ASSESSMENT: PREVENTS OR INTERFERES SOME ACTIVE ACTIVITIES AND ADLS
PAIN_FUNCTIONAL_ASSESSMENT: PREVENTS OR INTERFERES SOME ACTIVE ACTIVITIES AND ADLS

## 2022-10-24 ASSESSMENT — PAIN DESCRIPTION - ONSET: ONSET: ON-GOING

## 2022-10-24 ASSESSMENT — PAIN DESCRIPTION - FREQUENCY: FREQUENCY: CONTINUOUS

## 2022-10-24 ASSESSMENT — PAIN DESCRIPTION - ORIENTATION
ORIENTATION: LEFT;RIGHT
ORIENTATION: LEFT;RIGHT;LOWER

## 2022-10-24 ASSESSMENT — PAIN DESCRIPTION - DESCRIPTORS
DESCRIPTORS: ACHING;NAGGING
DESCRIPTORS: ACHING;CRAMPING

## 2022-10-24 ASSESSMENT — PAIN DESCRIPTION - PAIN TYPE: TYPE: CHRONIC PAIN

## 2022-10-24 NOTE — PROGRESS NOTES
[facility] Internal Medicine  Twanna Phalen, MD; Jd Matamoros MD; Charlette Raymond MD; MD Rachelle Mesa MD; MD FRANCES Holder [facility] Internal Medicine   [facility]    Progress note               Date:   10/24/2022  Patient name:  Shraddha Pederson  Date of admission:  10/19/2022  4:05 PM  MRN:   [id number]  Account:  [de-identified]  YOB: 1990  PCP:    No primary care provider on file. Room:   [address] [place] [address]  Code Status:    Full Code    Chief Complaint:     Chief Complaint   Patient presents with    Abdominal Pain     Abdominal pain, emesis post 2-4 hours dialysis treatment. Abdomen is softly distended. History Obtained From:     Pt medical record and nursing staff    History of Present Illness:     Shraddha Pederson is a 28 y.o. Non- / non  female who presents with Abdominal Pain (Abdominal pain, emesis post 2-4 hours dialysis treatment. Abdomen is softly distended. )   and is admitted to the hospital for the management of Pancreatitis, unspecified pancreatitis type. Shraddha Pederson is a 28 y.o. Non- / non  female who presents with Abdominal Pain (Abdominal pain, emesis post 2-4 hours dialysis treatment. Abdomen is softly distended.) and is admitted to the hospital for the management of acute Pancreatitis. Medical history includes ESRD on hemodialysis, DM type I, HTN and CAD. Also had a recent admission for GI bleed. Patient presented from [facility]. She is experiencing some altered mental status where she is unable to hold a conversation regarding her condition. She is able to stay awake for 1-2 word answer but is not consistent. CT head shows no evidence of acute changes  compared to CT head completed on 10/10/22. He was given 50 mcg IV of fentanyl prior to assessment which may be affecting her mentation.  CT of abdomen and pelvis shows mild right and minimal left hydronephrosis which is correlated with distended urinary bladder. She is experiencing acute urinary retention requiring barros catheter placement in ED. UA shows trace ketones, 3+ protein and trace leukocytes. Pending culture. WBC 10.9. AST 59, ALT 82, Alk Phos 110, Lipase 117 suggesting acute pancreatitis without infection  10/23  Patient, very sleepy as per nursing report  Has poor oral intake  Has nausea, 1 episode of vomiting  Complaining of chest pain  Very poor historian    10/24  Patient is much alert, awake, she is oriented to time place person to my assessment today  Still have poor oral intake  Lantus was held last night  Blood sugar running low  Past Medical History:     Past Medical History:   Diagnosis Date    Asthma     Atherosclerosis of native arteries of extremities with rest pain, unspecified extremity ([address]     Diabetes mellitus ([address]     since 12 y/o, unsure if type 1 or 2    Headache, migraine     for 2 years    Hypertension     Lichen simplex chronicus     Noninflammatory disorder of vagina, unspecified     Patient's noncompliance with other medical treatment and regimen for other reason     Type 2 diabetes mellitus without complication ([address] 96/90/2120        Past Surgical History:     Past Surgical History:   Procedure Laterality Date    OVARY REMOVAL      July 2010    OVARY REMOVAL          Medications Prior to Admission:     Prior to Admission medications    Medication Sig Start Date End Date Taking? Authorizing Provider   ondansetron (ZOFRAN-ODT) 4 MG disintegrating tablet Take 1 tablet by mouth every 8 hours as needed for Nausea or Vomiting 10/21/22  Yes Nilam Navarro MD   HYDROcodone-acetaminophen (NORCO) 5-325 MG per tablet Take 1 tablet by mouth every 4 hours as needed for Pain for up to 3 days. Intended supply: 3 days.  Take lowest dose possible to manage pain 10/21/22 10/24/22 Yes Nilam Navarro MD   aspirin 81 MG EC tablet Take 81 mg by mouth daily   Yes Historical Provider, MD   atorvastatin (LIPITOR) 10 MG tablet Take 10 mg by mouth at bedtime   Yes Historical Provider, MD   calcium acetate (PHOSLO) 667 MG CAPS capsule Take 3 capsules by mouth 3 times daily (with meals)   Yes Historical Provider, MD   carvedilol (COREG) 25 MG tablet Take 25 mg by mouth 2 times daily   Yes Historical Provider, MD   vitamin D (CHOLECALCIFEROL) 125 MCG (5000 UT) CAPS capsule Take 5,000 Units by mouth daily   Yes Historical Provider, MD   clopidogrel (PLAVIX) 75 MG tablet Take 75 mg by mouth daily   Yes Historical Provider, MD   escitalopram (LEXAPRO) 10 MG tablet Take 10 mg by mouth daily   Yes Historical Provider, MD   hydrALAZINE (APRESOLINE) 100 MG tablet Take 100 mg by mouth 3 times daily   Yes Historical Provider, MD   insulin detemir (LEVEMIR FLEXTOUCH) 100 UNIT/ML injection pen Inject 24 Units into the skin nightly   Yes Historical Provider, MD   midodrine (PROAMATINE) 10 MG tablet Take 30 mg by mouth daily Indications: Mon, Wed, Fri at dialysis   Yes Historical Provider, MD   NIFEdipine (ADALAT CC) 90 MG extended release tablet Take 90 mg by mouth daily   Yes Historical Provider, MD   sennosides-docusate sodium (SENOKOT-S) 8.6-50 MG tablet Take 2 tablets by mouth at bedtime   Yes Historical Provider, MD   B Complex-C-Folic Acid (TRIPHROCAPS) 1 MG CAPS Take 1 capsule by mouth daily   Yes Historical Provider, MD   patiromer sorbitex calcium (VELTASSA) 8.4 g PACK packet Take 8.4 g by mouth Twice a Week Indications: twice per day on Thursdays and Sundays   Yes Historical Provider, MD   acetaminophen (TYLENOL) 325 MG tablet Take 650 mg by mouth every 6 hours as needed for Pain or Fever (do not exceed 3 gm in 24 hours)    Historical Provider, MD   albuterol sulfate HFA (PROVENTIL;VENTOLIN;PROAIR) 108 (90 Base) MCG/ACT inhaler Inhale 2 puffs into the lungs every 6 hours as needed for Wheezing or Shortness of Breath    Historical Provider, MD   polyethylene glycol (GLYCOLAX) 17 GM/SCOOP powder Take 17 g by mouth daily as needed (constipation)    Historical Provider, MD        Allergies:     Morphine and Januvia [sitagliptin]    Social History:     Tobacco:    reports that she has never smoked. She has never used smokeless tobacco.  Alcohol:      reports no history of alcohol use. Drug Use:  reports no history of drug use. Family History:     Family History   Problem Relation Age of Onset    High Blood Pressure Mother     Diabetes Mother     Other Mother 27        Lung cancer    High Blood Pressure Father     Diabetes Father     Other Father 27        Prostate cancer       Review of Systems:     Positive and Negative as described in HPI.     CONSTITUTIONAL:  negative for fevers, chills, sweats, fatigue, weight loss  HEENT:  negative for vision, hearing changes, runny nose, throat pain  RESPIRATORY:  negative for shortness of breath, cough, congestion, wheezing  CARDIOVASCULAR:  negative for chest pain, palpitations  GASTROINTESTINAL: abdo pain n v    GENITOURINARY:  negative for difficulty of urination, burning with urination, frequency   INTEGUMENT:  negative for rash, skin lesions, easy bruising   HEMATOLOGIC/LYMPHATIC:  negative for swelling/edema   ALLERGIC/IMMUNOLOGIC:  negative for urticaria , itching  ENDOCRINE:  negative increase in drinking, increase in urination, hot or cold intolerance  MUSCULOSKELETAL:  negative joint pains, muscle aches, swelling of joints  NEUROLOGICAL:  negative for headaches, dizziness, lightheadedness, numbness, pain, tingling extremities      Physical Exam:   /80   Pulse 80   Temp 97.9 °F (36.6 °C)   Resp 18   Ht 5' 4.02\" (1.626 m)   Wt 184 lb 1.4 oz (83.5 kg)   LMP  (LMP Unknown)   SpO2 100%   BMI 31.58 kg/m²   Temp (24hrs), Av.2 °F (36.8 °C), Min:97.8 °F (36.6 °C), Max:98.6 °F (37 °C)    Recent Labs     10/23/22  2037 10/23/22  2345 10/24/22  0612 10/24/22  1135   POCGLU 102 205* 92 82       Intake/Output Summary (Last 24 hours) at 10/24/2022 1242  Last data filed at 10/23/2022 1834  Gross per 24 hour   Intake 190.79 ml   Output --   Net 190.79 ml       General Appearance: alert, well appearing, and in no acute distress  Mental status: oriented to person, place, and time  Head: normocephalic, atraumatic  Eye: no icterus, redness, pupils equal and reactive, extraocular eye movements intact, conjunctiva clear  Ear: normal external ear, no discharge, hearing intact  Nose: no drainage noted  Mouth: mucous membranes moist  Neck: supple, no carotid bruits, thyroid not palpable  Lungs: Bilateral equal air entry, clear to ausculation, no wheezing, rales or rhonchi, normal effort  Cardiovascular: normal rate, regular rhythm, no murmur, gallop, rub  Abdomen: Soft, nontender, nondistended, normal bowel sounds, no hepatomegaly or splenomegaly   No tenderness  Surg scar noted lower mid abdo laparotomy noted    Neurologic: There are no new focal motor or sensory deficits, normal muscle tone and bulk, no abnormal sensation, normal speech, cranial nerves II through XII grossly intact  Skin: No gross lesions, rashes, bruising or bleeding on exposed skin area  Extremities: peripheral pulses palpable, no pedal edema or calf pain with palpation  Psych: flat affect drowsy    Investigations:      Laboratory Testing:  Recent Results (from the past 24 hour(s))   Troponin    Collection Time: 10/23/22  2:15 PM   Result Value Ref Range    Troponin, High Sensitivity 96 (HH) 0 - 14 ng/L   D-Dimer, Quantitative    Collection Time: 10/23/22  2:15 PM   Result Value Ref Range    D-Dimer, Quant 3.04 (H) 0.00 - 0.59 mg/L FEU   Ammonia    Collection Time: 10/23/22  2:15 PM   Result Value Ref Range    Ammonia 24 11 - 51 umol/L   Troponin    Collection Time: 10/23/22  4:04 PM   Result Value Ref Range    Troponin, High Sensitivity 94 (HH) 0 - 14 ng/L   POC Glucose Fingerstick    Collection Time: 10/23/22  4:21 PM   Result Value Ref Range    POC Glucose 75 65 - 105 mg/dL   POC Glucose Fingerstick    Collection Time: 10/23/22  5:14 PM   Result Value Ref Range    POC Glucose 73 65 - 105 mg/dL   POC Glucose Fingerstick    Collection Time: 10/23/22  6:51 PM   Result Value Ref Range    POC Glucose 70 65 - 105 mg/dL   POC Glucose Fingerstick    Collection Time: 10/23/22  8:37 PM   Result Value Ref Range    POC Glucose 102 65 - 105 mg/dL   POC Glucose Fingerstick    Collection Time: 10/23/22 11:45 PM   Result Value Ref Range    POC Glucose 205 (H) 65 - 105 mg/dL   POC Glucose Fingerstick    Collection Time: 10/24/22  6:12 AM   Result Value Ref Range    POC Glucose 92 65 - 105 mg/dL   POC Glucose Fingerstick    Collection Time: 10/24/22 11:35 AM   Result Value Ref Range    POC Glucose 82 65 - 105 mg/dL       Imaging/Diagnostics:  CT ABDOMEN PELVIS WO CONTRAST Additional Contrast? None    Result Date: 10/19/2022  Limited noncontrast examination. Mild right and minimal left hydronephrosis, which may be due to distended urinary bladder containing contrast material.  Consider Mcnair catheter placement as clinically warranted. Suggestion of colonic wall thickening of the mid transverse and descending colonic loops, which may have been exaggerated by underdistention but may reflect underlying infectious/inflammatory colitis. 2.7 cm ill-defined density seen within the left groin region beneath the skin staples, which may reflect liquefying hematoma. Correlate clinically with signs of infection as there are adjacent inflammatory changes and prominent left inguinal lymph nodes. CT HEAD WO CONTRAST    Result Date: 10/19/2022  No convincing CT evidence of intracranial hemorrhage, mass effect or acute territorial infarct seen. RECOMMENDATIONS: If there is persistent clinical concern, consider short-term follow-up examination.        Assessment :      Hospital Problems             Last Modified POA    * (Principal) Pancreatitis, unspecified pancreatitis type 10/19/2022 Yes    Acute urinary retention 10/20/2022 Yes    Gastroenteritis 10/20/2022 Yes    Elevated lipase 10/20/2022 Yes    Diffuse abdominal pain 10/20/2022 Yes     Plan:     Patient status inpatient in the Progressive Unit/Step down    20-year-old -American lady class I obese BMI 31.74  History of end-stage kidney disease on hemodialysis with a left arm fistula  Uncontrolled diabetes mellitus type 1  Very poor historian unable to get much story from her  Abdominal pain associate with nausea vomiting 10 out of 10  Denies any fever chills   Elevated lipase CT scan noted  Clinical diagnosis of acute pancreatitis IV fluid resuscitation gentle hydration with dialysis  Discontinue IV Dilaudid and oral Norco  Low-fat diet as tolerated  Nephrology consult for  Nonspecific Trope elevation no NSTEMI patient has no chest pain troponins flat at 90  Anemia of chronic kidney disease  Hyponatremia sodium 134 conservative  Hypokalemia less than 4 potassium will be managed with  Oct 22  Troponin elevation is flat EKG noted chest pain and noncardiac in origin  Reproducible  Patient complains abdominal pain asking for IV Dilaudid  GI input noted  Patient has dependence on opioid and abuse  Advance diet discharge home with family patient is refusing ECF  High risk of readmission with the dependence and opioid seeking behavior  PT/OT   More nausea today   GI input needed ,   Labs ordered   10/23  Complaining of chest pain, ordering EKG, 2 sets of troponin  Patient sleepy, did not receive any pain medication, sleeping medication  Ordering serum ammonia level  TSH, VBG  Patient has CT head done on 10/20 which was negative  Holding Lexapro  Will start patient on gentle hydration  ESRD, recent femoropopliteal bypass surgery, on left side, hypertension, diabetes, coronary artery disease, ESRD on hemodialysis    10/24  Patient much alert, awake today  Underwent CTA chest which was negative for PE  I encourage patient to eat, she told me that she will try to eat  Abdominal pain, chest pain has resolved  Patient will need sleep hygiene, as per nursing report she was up all night talking on phone  Seen in dialysis being  Will work on discharge planning  Patient need to eat  Will start patient on Remeron  Consultations:   Sebastian     Patient is admitted as inpatient status because of co-morbidities listed above, severity of signs and symptoms as outlined, requirement for current medical therapies and most importantly because of direct risk to patient if care not provided in a hospital setting. Expected length of stay > 48 hours. Minnie Santana MD  10/24/2022  12:42 PM    Copy sent to Dr. Cho primary care provider on file. Please note that this chart was generated using voice recognition Dragon dictation software. Although every effort was made to ensure the accuracy of this automated transcription, some errors in transcription may have occurred.

## 2022-10-24 NOTE — PLAN OF CARE
Problem: Discharge Planning  Goal: Discharge to home or other facility with appropriate resources  Outcome: Progressing     Problem: Pain  Goal: Verbalizes/displays adequate comfort level or baseline comfort level  Outcome: Progressing     Problem: Safety - Adult  Goal: Free from fall injury  Outcome: Progressing     Problem: Skin/Tissue Integrity  Goal: Absence of new skin breakdown  Description: 1. Monitor for areas of redness and/or skin breakdown  2. Assess vascular access sites hourly  3. Every 4-6 hours minimum:  Change oxygen saturation probe site  4. Every 4-6 hours:  If on nasal continuous positive airway pressure, respiratory therapy assess nares and determine need for appliance change or resting period. Outcome: Not Progressing  Note: Pt bedfast with limited movement; PO intake is fair to poor. Problem: Chronic Conditions and Co-morbidities  Goal: Patient's chronic conditions and co-morbidity symptoms are monitored and maintained or improved  Outcome: Progressing     Problem: ABCDS Injury Assessment  Goal: Absence of physical injury  Outcome: Progressing     Problem: Skin/Tissue Integrity  Goal: Absence of new skin breakdown  Description: 1. Monitor for areas of redness and/or skin breakdown  2. Assess vascular access sites hourly  3. Every 4-6 hours minimum:  Change oxygen saturation probe site  4. Every 4-6 hours:  If on nasal continuous positive airway pressure, respiratory therapy assess nares and determine need for appliance change or resting period. Outcome: Not Progressing  Note: Pt bedfast with limited movement; PO intake is fair to poor.

## 2022-10-24 NOTE — PROGRESS NOTES
Spoke with Dr. Keila Lovell about removing staples from fem/pop bypass completed on 9/22. Dr gave authorization to remove same. 1838 removed staples, no drainage at site, skin intact and suture edges approximated.

## 2022-10-24 NOTE — CARE COORDINATION
[facility] has [facility] bed available for patient Wednesday/Thursday this week. Patient will be on a Tuesday, Thursday, and Saturday schedule for dialysis. Those were the remaining days and times for patient at facility.  Electronically signed by SHANDA Jasso on 10/24/2022 at 4:08 PM

## 2022-10-24 NOTE — PROGRESS NOTES
HEMODIALYSIS POST TREATMENT NOTE    Treatment time ordered: 3 Hours    Actual treatment time: 3 Hours    UltraFiltration Goal: 2000 ml  UltraFiltration Removed: 2000 ml      Pre Treatment weight: 83.56 kg  Post Treatment weight: 81.5 kg  Estimated Dry Weight: 75 kg    Access used:     Central Venous Catheter:          Tunneled or Non-tunneled:            Site:           Access Flow:       Internal Access:       AV Fistula or AV Graft: AVF         Site: RONAL       Access Flow: Good       Sign and symptoms of infection: No       If YES:     Medications or blood products given: None    Chronic outpatient schedule: [facility]    Chronic outpatient unit: [facility]     Summary of response to treatment: Treatment ran fair. Patient tolerated well. Target goal met without interventions for BP support. Patient awake post blood return and denies any discomfort. Did have some complications with high arterial pressures ultimately requiring a decrease in BFR. Explain if orders NOT met, was physician notified:      Jose Putnam faxed to patient unit/ placed in patient chart: Yes    Post assessment completed: Yes    Report given to: Vane Hi RN      * Intra-treatment documented Safety Checks include the followin) Access and face visible at all times. 2) All connections and blood lines are secure with no kinks. 3) NVL alarm engaged. 4) Hemosafe device applied (if applicable). 5) No collapse of Arterial or Venous blood chambers. 6) All blood lines / pump segments in the air detectors.

## 2022-10-24 NOTE — PROGRESS NOTES
Pt had fem/pop bypass in September. Chart reviewed, surgery appears to have been 9/22/22 at [facility] 117 called physician noted in report, LVM on nurse's line to see about staple removal as same remain within pt's groin. Will await return call and proceed accordingly.

## 2022-10-24 NOTE — PROGRESS NOTES
Department of Internal Medicine  Nephrology Ethan Martinez MD Progress Note    Reason for consultation: Management of hemodialysis dependent end-stage renal disease. Consulting physician: Amada Michel MD.    Interval history: Patient was seen and examined today and she continues to complain of nausea. ABG yesterday showed respiratory alkalosis but CT angiogram of the chest did not show any evidence of pulmonary embolism. She had slowed mentation but is awake and eventually responds to verbal stimuli. History of present: This is a 28 y.o. 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 Susi hammonds of nephrology as consultants of [facility], Presented to the emergency department yesterday with complaints of abdominal pain that started after hemodialysis. This was associated with 1 episode of vomiting at dialysis. She has a recent history of GI bleed requiring transfusion and was admitted from October 11 to October 14, 2022. Flexible sigmoidoscopy performed on that admission showed multiple rectal ulcers which were cauterized and patient was taken off Xarelto.     Scheduled Meds:   lidocaine  1 patch TransDERmal Daily    epoetin juventino-epbx  2,000 Units SubCUTAneous Once per day on Mon Wed Fri    aspirin  81 mg Oral Daily    atorvastatin  10 mg Oral Nightly    calcium acetate  3 capsule Oral TID WC    carvedilol  25 mg Oral BID    clopidogrel  75 mg Oral Daily    [Held by provider] escitalopram  10 mg Oral Daily    hydrALAZINE  100 mg Oral TID    NIFEdipine  90 mg Oral Daily    vitamin D3  5,000 Units Oral Daily    sodium chloride flush  5-40 mL IntraVENous 2 times per day    insulin glargine  24 Units SubCUTAneous Nightly     Continuous Infusions:   sodium chloride 50 mL/hr at 10/24/22 0612    sodium chloride      dextrose Stopped (10/23/22 2044)     PRN Meds:.sodium chloride flush, acetaminophen, metoprolol, melatonin, midodrine, albuterol sulfate HFA, sodium chloride flush, sodium chloride, ondansetron **OR** ondansetron, glucose, dextrose bolus **OR** dextrose bolus, glucagon (rDNA), dextrose    Physical Exam:    VITALS:  BP (!) 173/93   Pulse 88   Temp 97.8 °F (36.6 °C) (Axillary)   Resp 16   Ht 5' 4.02\" (1.626 m)   Wt 187 lb 6.3 oz (85 kg)   LMP  (LMP Unknown)   SpO2 100%   BMI 32.15 kg/m²   TEMPERATURE:  Current - Temp: 97.8 °F (36.6 °C); Max - Temp  Av.1 °F (36.7 °C)  Min: 97.7 °F (36.5 °C)  Max: 98.6 °F (37 °C)  RESPIRATIONS RANGE: Resp  Av.2  Min: 16  Max: 18  PULSE RANGE: Pulse  Av.3  Min: 77  Max: 90  BLOOD PRESSURE RANGE:  Systolic (15RWB), QTJ:362 , Min:104 , NLK:802 ; Diastolic (13XKO), MKZ:01, Min:56, Max:93  PULSE OXIMETRY RANGE: SpO2  Av.7 %  Min: 97 %  Max: 100 %  24HR INTAKE/OUTPUT:    Intake/Output Summary (Last 24 hours) at 10/24/2022 1038  Last data filed at 10/23/2022 1834  Gross per 24 hour   Intake 190.79 ml   Output --   Net 190.79 ml       Constitutional: alert, appears stated age, and cooperative    Skin: Skin color, texture, turgor normal. No rashes or lesions    Head: Normocephalic, without obvious abnormality, atraumatic     Cardiovascular/Edema: regular rate and rhythm, S1, S2 normal, no murmur, click, rub or gallop    Respiratory: Lungs: clear to auscultation bilaterally    Abdomen:  Full with periumbilical tenderness; normal bowel sounds. Back: symmetric, no curvature. ROM normal. No CVA tenderness. Extremities: Trace bilateral pitting pedal edema with hyperpigmented dermatosis.     Neuro:  Grossly normal      CBC:   Recent Labs     10/22/22  0513 10/22/22  1915   WBC 7.8 6.8   HGB 10.2* 10.5*    442       BMP:    Recent Labs 10/22/22  0513 10/22/22  1915    140   K 3.8 3.9   CL 96* 95*   CO2 26 27   BUN 20 26*   CREATININE 3.44* 4.21*   GLUCOSE 108* 98         Lab Results   Component Value Date/Time    NITRU NEGATIVE 10/19/2022 08:00 PM    COLORU Dark Yellow 10/19/2022 08:00 PM    PHUR 5.0 10/19/2022 08:00 PM    WBCUA 0 TO 2 10/19/2022 08:00 PM    RBCUA 0 TO 2 10/19/2022 08:00 PM    MUCUS NOT REPORTED 12/29/2017 11:33 PM    TRICHOMONAS MODERATE 12/29/2017 11:33 PM    YEAST NOT REPORTED 12/29/2017 11:33 PM    BACTERIA None 10/19/2022 08:00 PM    SPECGRAV 1.021 10/19/2022 08:00 PM    LEUKOCYTESUR TRACE 10/19/2022 08:00 PM    UROBILINOGEN Normal 10/19/2022 08:00 PM    BILIRUBINUR NEGATIVE  Verified by ictotest. 10/19/2022 08:00 PM    [address] NEGATIVE 01/07/2012 03:53 AM    GLUCOSEU NEGATIVE 10/19/2022 08:00 PM    GLUCOSEU 3+ 01/07/2012 03:53 AM    KETUA TRACE 10/19/2022 08:00 PM    AMORPHOUS NOT REPORTED 12/29/2017 11:33 PM     Urine Creatinine:     Lab Results   Component Value Date/Time    LABCREA 254.2 12/06/2015 02:31 PM     IMPRESSION/RECOMMENDATIONS:    1.  End-stage renal disease - We will maintain Monday, Wednesday and Friday hemodialysis schedule. We will keep target weight at 85 kg. AV fistula functioning well. Renal diet,i.e 2-gram sodium,2-gram potassium,1500 ml fluid restriction,1-gram phosphorus, 1800 KCal and 1.2 gram/kg protein per day. 2.  Systemic hypertension - Blood pressure control is acceptable at this time. 3.  Mineral bone disease profile - serum phosphorus level 4.5 mg/dL is within target range. .    4.  Anemia of chronic kidney disease - hemoglobin 10.5 g/dL is within target range. Continue Retacrit 2000 units subcutaneously 3 times a week. Prognosis is guarded.     Valencia Hobbs MD FACP  Attending Nephrologist  10/24/2022 10:38 AM

## 2022-10-24 NOTE — PLAN OF CARE
Problem: Discharge Planning  Goal: Discharge to home or other facility with appropriate resources  Outcome: Progressing  Flowsheets (Taken 10/23/2022 1945)  Discharge to home or other facility with appropriate resources: Identify barriers to discharge with patient and caregiver     Problem: Pain  Goal: Verbalizes/displays adequate comfort level or baseline comfort level  Outcome: Progressing     Problem: Safety - Adult  Goal: Free from fall injury  Outcome: Progressing     Problem: Skin/Tissue Integrity  Goal: Absence of new skin breakdown  Description: 1. Monitor for areas of redness and/or skin breakdown  2. Assess vascular access sites hourly  3. Every 4-6 hours minimum:  Change oxygen saturation probe site  4. Every 4-6 hours:  If on nasal continuous positive airway pressure, respiratory therapy assess nares and determine need for appliance change or resting period.   Outcome: Progressing     Problem: Chronic Conditions and Co-morbidities  Goal: Patient's chronic conditions and co-morbidity symptoms are monitored and maintained or improved  Outcome: Progressing  Flowsheets (Taken 10/23/2022 1945)  Care Plan - Patient's Chronic Conditions and Co-Morbidity Symptoms are Monitored and Maintained or Improved: Monitor and assess patient's chronic conditions and comorbid symptoms for stability, deterioration, or improvement     Problem: ABCDS Injury Assessment  Goal: Absence of physical injury  Outcome: Progressing

## 2022-10-24 NOTE — CONSULTS
[id number] [facility] Neurology   IN-PATIENT SERVICE      NEUROLOGY CONSULT  NOTE            Date:   10/24/2022  Patient name:  Eduardo Shearer  Date of admission:  10/19/2022  YOB: 1990      Chief Complaint:     Chief Complaint   Patient presents with    Abdominal Pain     Abdominal pain, emesis post 2-4 hours dialysis treatment. Abdomen is softly distended. Reason for Consult:      Lethargy    History of Present Illness: The patient is a 28 y.o. female who presents with Abdominal Pain (Abdominal pain, emesis post 2-4 hours dialysis treatment. Abdomen is softly distended. )  . The patient was seen and examined and the chart was reviewed. Patient presented to the hospital with abdominal pain on 10/20. Was initially suspected to have possible acute pancreatitis. She was seen by GI which does not believe mild elevation in lipase is secondary to acute pancreatitis. Patient continued to have nausea during her stay. She has history of chronic opioid dependence. Reportedly on 10/23 she was more lethargic than usual.  It was reported she had been up all night prior to that night and then slept all day yesterday. Today she has been awake alert and oriented without any significant lethargy or confusion. She states at home she has been off with her sleep schedule staying up for much of the night and sleeping during the day. She states she has been struggling with this since her mom passed away in the last few weeks.     Past Medical History:     Past Medical History:   Diagnosis Date    Asthma     Atherosclerosis of native arteries of extremities with rest pain, unspecified extremity ([address]     Diabetes mellitus ([address]     since 14 y/o, unsure if type 1 or 2    Headache, migraine     for 2 years    Hypertension     Lichen simplex chronicus     Noninflammatory disorder of vagina, unspecified     Patient's noncompliance with other medical treatment and regimen for other reason     Type 2 diabetes mellitus without complication ([facility] 22/97/4512        Past Surgical History:     Past Surgical History:   Procedure Laterality Date    OVARY REMOVAL      July 2010    OVARY REMOVAL          Medications Prior to Admission:     Prior to Admission medications    Medication Sig Start Date End Date Taking? Authorizing Provider   ondansetron (ZOFRAN-ODT) 4 MG disintegrating tablet Take 1 tablet by mouth every 8 hours as needed for Nausea or Vomiting 10/21/22  Yes Leslie Chang MD   HYDROcodone-acetaminophen (NORCO) 5-325 MG per tablet Take 1 tablet by mouth every 4 hours as needed for Pain for up to 3 days. Intended supply: 3 days.  Take lowest dose possible to manage pain 10/21/22 10/24/22 Yes Leslie Chang MD   aspirin 81 MG EC tablet Take 81 mg by mouth daily   Yes Historical Provider, MD   atorvastatin (LIPITOR) 10 MG tablet Take 10 mg by mouth at bedtime   Yes Historical Provider, MD   calcium acetate (PHOSLO) 667 MG CAPS capsule Take 3 capsules by mouth 3 times daily (with meals)   Yes Historical Provider, MD   carvedilol (COREG) 25 MG tablet Take 25 mg by mouth 2 times daily   Yes Historical Provider, MD   vitamin D (CHOLECALCIFEROL) 125 MCG (5000 UT) CAPS capsule Take 5,000 Units by mouth daily   Yes Historical Provider, MD   clopidogrel (PLAVIX) 75 MG tablet Take 75 mg by mouth daily   Yes Historical Provider, MD   escitalopram (LEXAPRO) 10 MG tablet Take 10 mg by mouth daily   Yes Historical Provider, MD   hydrALAZINE (APRESOLINE) 100 MG tablet Take 100 mg by mouth 3 times daily   Yes Historical Provider, MD   insulin detemir (LEVEMIR FLEXTOUCH) 100 UNIT/ML injection pen Inject 24 Units into the skin nightly   Yes Historical Provider, MD   midodrine (PROAMATINE) 10 MG tablet Take 30 mg by mouth daily Indications: Mon, Wed, Fri at dialysis   Yes Historical Provider, MD   NIFEdipine (ADALAT CC) 90 MG extended release tablet Take 90 mg by mouth daily   Yes Historical Provider, MD sennosides-docusate sodium (SENOKOT-S) 8.6-50 MG tablet Take 2 tablets by mouth at bedtime   Yes Historical Provider, MD ADAME Complex-C-Folic Acid (TRIPHROCAPS) 1 MG CAPS Take 1 capsule by mouth daily   Yes Historical Provider, MD   patiromer sorbitex calcium (VELTASSA) 8.4 g PACK packet Take 8.4 g by mouth Twice a Week Indications: twice per day on Thursdays and Sundays   Yes Historical Provider, MD   acetaminophen (TYLENOL) 325 MG tablet Take 650 mg by mouth every 6 hours as needed for Pain or Fever (do not exceed 3 gm in 24 hours)    Historical Provider, MD   albuterol sulfate HFA (PROVENTIL;VENTOLIN;PROAIR) 108 (90 Base) MCG/ACT inhaler Inhale 2 puffs into the lungs every 6 hours as needed for Wheezing or Shortness of Breath    Historical Provider, MD   polyethylene glycol (GLYCOLAX) 17 GM/SCOOP powder Take 17 g by mouth daily as needed (constipation)    Historical Provider, MD        Allergies:     Morphine and Januvia [sitagliptin]    Social History:     Tobacco:    reports that she has never smoked. She has never used smokeless tobacco.  Alcohol:      reports no history of alcohol use. Drug Use:  reports no history of drug use. Family History:     Family History   Problem Relation Age of Onset    High Blood Pressure Mother     Diabetes Mother     Other Mother 27        Lung cancer    High Blood Pressure Father     Diabetes Father     Other Father 27        Prostate cancer       Review of Systems:       Constitutional Negative for fever and chills   HEENT Negative for ear discharge, ear pain, nosebleed. Negative for photophobia, headache. Musculoskeletal Negative for joint pain, negative for myalgia   Respiratory Negative for cough, dyspnea. Negative for hemoptysis and sputum. Cardiovascular Negative for palpitations, chest pain. Negative for orthopnea, claudication. Gastrointestinal Positive for nausea, vomiting.   Positive for abdominal pain, negative for diarrhea, blood in stool   Genitourinary Negative for dysuria, hematuria. Negative for suprapubic pain. Negative for bladder incontinence. Skin Negative for rash or itching   Hematology Negative for ecchymosis, anemia   Psychiatric Positive for anxiety, depression. Negative for suicidal ideation, hallucinations         Physical Exam:   /76   Pulse 83   Temp 98.2 °F (36.8 °C) (Oral)   Resp 16   Ht 5' 4.02\" (1.626 m)   Wt 179 lb 10.8 oz (81.5 kg)   LMP  (LMP Unknown)   SpO2 100%   BMI 30.83 kg/m²   Temp (24hrs), Av.1 °F (36.7 °C), Min:97.8 °F (36.6 °C), Max:98.6 °F (37 °C)        General examination:      General Appearance:  alert, well appearing, and in no acute distress  HEENT: Normocephalic, atraumatic, moist mucus membranes  Neck: supple, no carotid bruits, (-) nuchal rigidity  Lungs:  Respirations unlabored, chest wall no deformity, BS normal  Cardiovascular: normal rate, regular rhythm  Abdomen: Soft, nontender, nondistended  Skin: No gross lesions, rashes, bruising or bleeding on exposed skin area  Extremities:  peripheral pulses palpable, no cyanosis, clubbing or edema  Psych: normal affect      Neurological examination:    Mental status   Alert and oriented x 3; following all commands; speech is fluent, no dysarthria, aphasia.       Cranial nerves   II - visual fields intact to confrontation; pupils reactive  III, IV, VI - extraocular muscles intact; no BALJIT; no nystagmus; no ptosis   V - normal facial sensation                                                               VII - normal facial symmetry                                                             VIII - intact hearing                                                                             IX, X - symmetrical palate elevation                                               XI - symmetrical shoulder shrug                                                       XII - midline tongue without atrophy or fasciculation     Motor function  Strength: 5/5 RUE, 5/5 RLE, 5/5 LUE, 3/5  LLE secondary to pain limitation  Normal bulk and tone. No tremors                      Sensory function Intact to touch, pin, vibration, proprioception throughout     Cerebellar Intact finger-nose-finger testing. Intact heel-shin testing. No dysdiadochokinesia present. Reflex function 2/4 symmetric throughout . Downgoing plantar response bilaterally. (-)Lopez's sign bilaterally    Gait                  Not assessed due to pain in left lower extremity             Diagnostics:      Laboratory Testing:  CBC:   Recent Labs     10/22/22  0513 10/22/22  1915   WBC 7.8 6.8   HGB 10.2* 10.5*    442     BMP:    Recent Labs     10/22/22  0513 10/22/22  1915    140   K 3.8 3.9   CL 96* 95*   CO2 26 27   BUN 20 26*   CREATININE 3.44* 4.21*   GLUCOSE 108* 98         Lab Results   Component Value Date    CHOL 216 (H) 12/06/2015    LDLCHOLESTEROL 157 (H) 12/06/2015    HDL 37 (L) 12/06/2015    TRIG 112 12/06/2015    ALT 26 10/22/2022    AST 15 10/22/2022    TSH 1.04 10/23/2022    INR 2.1 10/10/2022    LABA1C 11.1 (H) 12/26/2018    LABMICR 225.0 12/26/2018     Imaging/Diagnostics:      CT HEAD WO CONTRAST    Narrative  EXAMINATION:  CT OF THE HEAD WITHOUT CONTRAST  10/19/2022 6:19 pm    TECHNIQUE:  CT of the head was performed without the administration of intravenous  contrast. Automated exposure control, iterative reconstruction, and/or weight  based adjustment of the mA/kV was utilized to reduce the radiation dose to as  low as reasonably achievable.     COMPARISON:  10/10/2022    HISTORY:  ORDERING SYSTEM PROVIDED HISTORY: unwitnessed fall on Saturday, on plavix  TECHNOLOGIST PROVIDED HISTORY:  unwitnessed fall on saturday, on plavix    Decision Support Exception - unselect if not a suspected or confirmed  emergency medical condition->Emergency Medical Condition (MA)  Is the patient pregnant?->No  Reason for Exam: AMS, unwittnessed fall last Saturday    FINDINGS:  BRAIN/VENTRICLES: No convincing evidence of intracranial hemorrhage, mass  effect or midline shift seen. No evidence of acute territorial infarct is  seen. ORBITS: The visualized portion of the orbits demonstrate no acute abnormality. SINUSES: The visualized paranasal sinuses and mastoid air cells demonstrate  no acute abnormality. SOFT TISSUES/SKULL:  No acute abnormality of the visualized skull or soft  tissues. Impression  No convincing CT evidence of intracranial hemorrhage, mass effect or acute  territorial infarct seen. RECOMMENDATIONS:  If there is persistent clinical concern, consider short-term follow-up  examination. I personally reviewed all of the above medications, clinical laboratory, imaging and other diagnostic tests. Impression:      Increased lethargy, excessive drowsiness secondary to sleep deprivation, possible medication component    Plan:     Regulate sleep-wake cycle. Patient is used to sleeping during the day and being awake most of the night even while at home. Limit pain and sedating medications  She is alert and oriented at this time appears to be back at her baseline  No further neuro work-up is indicated at this time. We will sign off, please do not hesitate to contact with any further questions       Thank you for this very interesting consultation.       Electronically signed by David Ponce DO on 10/24/2022 at 2:59 PM      David Ponce [address] [place] [address]  Neurology

## 2022-10-24 NOTE — PROGRESS NOTES
Physical Therapy        Physical Therapy Cancel Note      DATE: 10/24/2022    NAME: Drew Brito  MRN: [id number]   : 1990      Patient not seen this date for Physical Therapy due to:    10- at 1500- Pt unavailable for PT treatment due to nursing care.       Electronically signed by Giorgio Villafana PT on 10/24/2022 at 3:30 PM

## 2022-10-25 PROBLEM — F32.A DEPRESSION: Status: ACTIVE | Noted: 2022-10-25

## 2022-10-25 LAB
GLUCOSE BLD-MCNC: 124 MG/DL (ref 65–105)
GLUCOSE BLD-MCNC: 151 MG/DL (ref 65–105)
GLUCOSE BLD-MCNC: 94 MG/DL (ref 65–105)

## 2022-10-25 PROCEDURE — 82947 ASSAY GLUCOSE BLOOD QUANT: CPT

## 2022-10-25 PROCEDURE — 2500000003 HC RX 250 WO HCPCS: Performed by: NURSE PRACTITIONER

## 2022-10-25 PROCEDURE — 97535 SELF CARE MNGMENT TRAINING: CPT

## 2022-10-25 PROCEDURE — 97166 OT EVAL MOD COMPLEX 45 MIN: CPT

## 2022-10-25 PROCEDURE — 2060000000 HC ICU INTERMEDIATE R&B

## 2022-10-25 PROCEDURE — 6370000000 HC RX 637 (ALT 250 FOR IP): Performed by: INTERNAL MEDICINE

## 2022-10-25 PROCEDURE — 6370000000 HC RX 637 (ALT 250 FOR IP): Performed by: NURSE PRACTITIONER

## 2022-10-25 PROCEDURE — 99233 SBSQ HOSP IP/OBS HIGH 50: CPT | Performed by: INTERNAL MEDICINE

## 2022-10-25 PROCEDURE — 2580000003 HC RX 258: Performed by: NURSE PRACTITIONER

## 2022-10-25 RX ADMIN — HYDRALAZINE HYDROCHLORIDE 100 MG: 50 TABLET, FILM COATED ORAL at 13:16

## 2022-10-25 RX ADMIN — SODIUM CHLORIDE, PRESERVATIVE FREE 10 ML: 5 INJECTION INTRAVENOUS at 23:29

## 2022-10-25 RX ADMIN — Medication 5000 UNITS: at 10:39

## 2022-10-25 RX ADMIN — CARVEDILOL 25 MG: 25 TABLET, FILM COATED ORAL at 10:39

## 2022-10-25 RX ADMIN — CALCIUM ACETATE 2001 MG: 667 CAPSULE ORAL at 10:39

## 2022-10-25 RX ADMIN — ACETAMINOPHEN 650 MG: 325 TABLET, FILM COATED ORAL at 10:39

## 2022-10-25 RX ADMIN — NIFEDIPINE 90 MG: 90 TABLET, EXTENDED RELEASE ORAL at 10:39

## 2022-10-25 RX ADMIN — MIRTAZAPINE 15 MG: 15 TABLET, ORALLY DISINTEGRATING ORAL at 23:24

## 2022-10-25 RX ADMIN — ASPIRIN 81 MG: 81 TABLET, COATED ORAL at 10:39

## 2022-10-25 RX ADMIN — CARVEDILOL 25 MG: 25 TABLET, FILM COATED ORAL at 23:22

## 2022-10-25 RX ADMIN — ACETAMINOPHEN 650 MG: 325 TABLET, FILM COATED ORAL at 17:25

## 2022-10-25 RX ADMIN — MIDODRINE HYDROCHLORIDE 5 MG: 5 TABLET ORAL at 23:21

## 2022-10-25 RX ADMIN — HYDRALAZINE HYDROCHLORIDE 100 MG: 50 TABLET, FILM COATED ORAL at 10:39

## 2022-10-25 RX ADMIN — ATORVASTATIN CALCIUM 10 MG: 10 TABLET, FILM COATED ORAL at 23:21

## 2022-10-25 RX ADMIN — CALCIUM ACETATE 2001 MG: 667 CAPSULE ORAL at 13:16

## 2022-10-25 RX ADMIN — CLOPIDOGREL BISULFATE 75 MG: 75 TABLET ORAL at 10:39

## 2022-10-25 NOTE — PROGRESS NOTES
[facility] Internal Medicine  Juliana Brito MD; Denise Sosa MD; Mitesh Bell MD; MD Sanjiv Herrera MD; MD FRANCES Kaur [facility] Internal Medicine   [facility]    Progress note               Date:   10/25/2022  Patient name:  Rica Kawasaki  Date of admission:  10/19/2022  4:05 PM  MRN:   [id number]  Account:  [de-identified]  YOB: 1990  PCP:    No primary care provider on file. Room:   [address] [place] [address]  Code Status:    Full Code    Chief Complaint:     Chief Complaint   Patient presents with    Abdominal Pain     Abdominal pain, emesis post 2-4 hours dialysis treatment. Abdomen is softly distended. History Obtained From:     Pt medical record and nursing staff    History of Present Illness:     Rica Kawasaki is a 28 y.o. Non- / non  female who presents with Abdominal Pain (Abdominal pain, emesis post 2-4 hours dialysis treatment. Abdomen is softly distended. )   and is admitted to the hospital for the management of Pancreatitis, unspecified pancreatitis type. Rica Kawasaki is a 28 y.o. Non- / non  female who presents with Abdominal Pain (Abdominal pain, emesis post 2-4 hours dialysis treatment. Abdomen is softly distended.) and is admitted to the hospital for the management of acute Pancreatitis. Medical history includes ESRD on hemodialysis, DM type I, HTN and CAD. Also had a recent admission for GI bleed. Patient presented from [facility]. She is experiencing some altered mental status where she is unable to hold a conversation regarding her condition. She is able to stay awake for 1-2 word answer but is not consistent. CT head shows no evidence of acute changes  compared to CT head completed on 10/10/22. He was given 50 mcg IV of fentanyl prior to assessment which may be affecting her mentation.  CT of abdomen and pelvis shows mild right and minimal left hydronephrosis which is correlated with distended urinary bladder. She is experiencing acute urinary retention requiring barros catheter placement in ED. UA shows trace ketones, 3+ protein and trace leukocytes. Pending culture. WBC 10.9. AST 59, ALT 82, Alk Phos 110, Lipase 117 suggesting acute pancreatitis without infection  10/23  Patient, very sleepy as per nursing report  Has poor oral intake  Has nausea, 1 episode of vomiting  Complaining of chest pain  Very poor historian    10/24  Patient is much alert, awake, she is oriented to time place person to my assessment today  Still have poor oral intake  Lantus was held last night  Blood sugar running low  10/25     Past Medical History:     Past Medical History:   Diagnosis Date    Asthma     Atherosclerosis of native arteries of extremities with rest pain, unspecified extremity ([address]     Diabetes mellitus ([address]     since 14 y/o, unsure if type 1 or 2    Headache, migraine     for 2 years    Hypertension     Lichen simplex chronicus     Noninflammatory disorder of vagina, unspecified     Patient's noncompliance with other medical treatment and regimen for other reason     Type 2 diabetes mellitus without complication ([address] 57/77/4874        Past Surgical History:     Past Surgical History:   Procedure Laterality Date    OVARY REMOVAL      July 2010    OVARY REMOVAL          Medications Prior to Admission:     Prior to Admission medications    Medication Sig Start Date End Date Taking?  Authorizing Provider   ondansetron (ZOFRAN-ODT) 4 MG disintegrating tablet Take 1 tablet by mouth every 8 hours as needed for Nausea or Vomiting 10/21/22  Yes Ángel Flaherty MD   aspirin 81 MG EC tablet Take 81 mg by mouth daily   Yes Historical Provider, MD   atorvastatin (LIPITOR) 10 MG tablet Take 10 mg by mouth at bedtime   Yes Historical Provider, MD   calcium acetate (PHOSLO) 667 MG CAPS capsule Take 3 capsules by mouth 3 times daily (with meals)   Yes Historical Provider, MD   carvedilol (COREG) 25 MG tablet Take 25 mg by mouth 2 times daily   Yes Historical Provider, MD   vitamin D (CHOLECALCIFEROL) 125 MCG (5000 UT) CAPS capsule Take 5,000 Units by mouth daily   Yes Historical Provider, MD   clopidogrel (PLAVIX) 75 MG tablet Take 75 mg by mouth daily   Yes Historical Provider, MD   escitalopram (LEXAPRO) 10 MG tablet Take 10 mg by mouth daily   Yes Historical Provider, MD   hydrALAZINE (APRESOLINE) 100 MG tablet Take 100 mg by mouth 3 times daily   Yes Historical Provider, MD   insulin detemir (LEVEMIR FLEXTOUCH) 100 UNIT/ML injection pen Inject 24 Units into the skin nightly   Yes Historical Provider, MD   midodrine (PROAMATINE) 10 MG tablet Take 30 mg by mouth daily Indications: Mon, Wed, Fri at dialysis   Yes Historical Provider, MD   NIFEdipine (ADALAT CC) 90 MG extended release tablet Take 90 mg by mouth daily   Yes Historical Provider, MD   sennosides-docusate sodium (SENOKOT-S) 8.6-50 MG tablet Take 2 tablets by mouth at bedtime   Yes Historical Provider, MD   B Complex-C-Folic Acid (TRIPHROCAPS) 1 MG CAPS Take 1 capsule by mouth daily   Yes Historical Provider, MD   patiromer sorbitex calcium (VELTASSA) 8.4 g PACK packet Take 8.4 g by mouth Twice a Week Indications: twice per day on Thursdays and Sundays   Yes Historical Provider, MD   acetaminophen (TYLENOL) 325 MG tablet Take 650 mg by mouth every 6 hours as needed for Pain or Fever (do not exceed 3 gm in 24 hours)    Historical Provider, MD   albuterol sulfate HFA (PROVENTIL;VENTOLIN;PROAIR) 108 (90 Base) MCG/ACT inhaler Inhale 2 puffs into the lungs every 6 hours as needed for Wheezing or Shortness of Breath    Historical Provider, MD   polyethylene glycol (GLYCOLAX) 17 GM/SCOOP powder Take 17 g by mouth daily as needed (constipation)    Historical Provider, MD        Allergies:     Morphine and Januvia [sitagliptin]    Social History:     Tobacco:    reports that she has never smoked. She has never used smokeless tobacco.  Alcohol:      reports no history of alcohol use. Drug Use:  reports no history of drug use. Family History:     Family History   Problem Relation Age of Onset    High Blood Pressure Mother     Diabetes Mother     Other Mother 27        Lung cancer    High Blood Pressure Father     Diabetes Father     Other Father 27        Prostate cancer       Review of Systems:     Positive and Negative as described in HPI.     CONSTITUTIONAL:  negative for fevers, chills, sweats, fatigue, weight loss  HEENT:  negative for vision, hearing changes, runny nose, throat pain  RESPIRATORY:  negative for shortness of breath, cough, congestion, wheezing  CARDIOVASCULAR:  negative for chest pain, palpitations  GASTROINTESTINAL: abdo pain n v    GENITOURINARY:  negative for difficulty of urination, burning with urination, frequency   INTEGUMENT:  negative for rash, skin lesions, easy bruising   HEMATOLOGIC/LYMPHATIC:  negative for swelling/edema   ALLERGIC/IMMUNOLOGIC:  negative for urticaria , itching  ENDOCRINE:  negative increase in drinking, increase in urination, hot or cold intolerance  MUSCULOSKELETAL:  negative joint pains, muscle aches, swelling of joints  NEUROLOGICAL:  negative for headaches, dizziness, lightheadedness, numbness, pain, tingling extremities      Physical Exam:   BP (!) 100/59   Pulse 88   Temp 98 °F (36.7 °C) (Oral)   Resp 16   Ht 5' 4.02\" (1.626 m)   Wt 183 lb 3.2 oz (83.1 kg)   LMP  (LMP Unknown)   SpO2 96%   BMI 31.43 kg/m²   Temp (24hrs), Av.4 °F (36.9 °C), Min:98 °F (36.7 °C), Max:99 °F (37.2 °C)    Recent Labs     10/24/22  1732 10/24/22  2013 10/25/22  0657 10/25/22  1059   POCGLU 96 172* 94 124*       Intake/Output Summary (Last 24 hours) at 10/25/2022 1422  Last data filed at 10/25/2022 1303  Gross per 24 hour   Intake 250 ml   Output --   Net 250 ml       General Appearance: alert, well appearing, and in no acute distress  Mental status: oriented to person, place, and time  Head: normocephalic, atraumatic  Eye: no icterus, redness, pupils equal and reactive, extraocular eye movements intact, conjunctiva clear  Ear: normal external ear, no discharge, hearing intact  Nose: no drainage noted  Mouth: mucous membranes moist  Neck: supple, no carotid bruits, thyroid not palpable  Lungs: Bilateral equal air entry, clear to ausculation, no wheezing, rales or rhonchi, normal effort  Cardiovascular: normal rate, regular rhythm, no murmur, gallop, rub  Abdomen: Soft, nontender, nondistended, normal bowel sounds, no hepatomegaly or splenomegaly   No tenderness  Surg scar noted lower mid abdo laparotomy noted    Neurologic: There are no new focal motor or sensory deficits, normal muscle tone and bulk, no abnormal sensation, normal speech, cranial nerves II through XII grossly intact  Skin: No gross lesions, rashes, bruising or bleeding on exposed skin area  Extremities: peripheral pulses palpable, no pedal edema or calf pain with palpation  Psych: flat affect drowsy    Investigations:      Laboratory Testing:  Recent Results (from the past 24 hour(s))   POC Glucose Fingerstick    Collection Time: 10/24/22  5:32 PM   Result Value Ref Range    POC Glucose 96 65 - 105 mg/dL   Hemoglobin and Hematocrit    Collection Time: 10/24/22  6:26 PM   Result Value Ref Range    Hemoglobin 10.7 (L) 12.0 - 16.0 g/dL    Hematocrit 32.5 (L) 36 - 46 %   POC Glucose Fingerstick    Collection Time: 10/24/22  8:13 PM   Result Value Ref Range    POC Glucose 172 (H) 65 - 105 mg/dL   POC Glucose Fingerstick    Collection Time: 10/25/22  6:57 AM   Result Value Ref Range    POC Glucose 94 65 - 105 mg/dL   POC Glucose Fingerstick    Collection Time: 10/25/22 10:59 AM   Result Value Ref Range    POC Glucose 124 (H) 65 - 105 mg/dL       Imaging/Diagnostics:  CT ABDOMEN PELVIS WO CONTRAST Additional Contrast? None    Result Date: 10/19/2022  Limited noncontrast examination.  Mild right and minimal left hydronephrosis, which may be due to distended urinary bladder containing contrast material.  Consider Mcnair catheter placement as clinically warranted. Suggestion of colonic wall thickening of the mid transverse and descending colonic loops, which may have been exaggerated by underdistention but may reflect underlying infectious/inflammatory colitis. 2.7 cm ill-defined density seen within the left groin region beneath the skin staples, which may reflect liquefying hematoma. Correlate clinically with signs of infection as there are adjacent inflammatory changes and prominent left inguinal lymph nodes. CT HEAD WO CONTRAST    Result Date: 10/19/2022  No convincing CT evidence of intracranial hemorrhage, mass effect or acute territorial infarct seen. RECOMMENDATIONS: If there is persistent clinical concern, consider short-term follow-up examination.        Assessment :      Hospital Problems             Last Modified POA    * (Principal) Pancreatitis, unspecified pancreatitis type 10/19/2022 Yes    Acute urinary retention 10/20/2022 Yes    Gastroenteritis 10/20/2022 Yes    Elevated lipase 10/20/2022 Yes    Diffuse abdominal pain 10/20/2022 Yes    Lethargy 10/24/2022 Yes     Plan:     Patient status inpatient in the Progressive Unit/Step down    40-year-old -American lady class I obese BMI 31.74  History of end-stage kidney disease on hemodialysis with a left arm fistula  Uncontrolled diabetes mellitus type 1  Very poor historian unable to get much story from her  Abdominal pain associate with nausea vomiting 10 out of 10  Denies any fever chills   Elevated lipase CT scan noted  Clinical diagnosis of acute pancreatitis IV fluid resuscitation gentle hydration with dialysis  Discontinue IV Dilaudid and oral Norco  Low-fat diet as tolerated  Nephrology consult for  Nonspecific Trope elevation no NSTEMI patient has no chest pain troponins flat at 90  Anemia of chronic kidney disease  Hyponatremia sodium 134 conservative  Hypokalemia less than 4 potassium will be managed with  Oct 22  Troponin elevation is flat EKG noted chest pain and noncardiac in origin  Reproducible  Patient complains abdominal pain asking for IV Dilaudid  GI input noted  Patient has dependence on opioid and abuse  Advance diet discharge home with family patient is refusing ECF  High risk of readmission with the dependence and opioid seeking behavior  PT/OT   More nausea today   GI input needed ,   Labs ordered   10/23  Complaining of chest pain, ordering EKG, 2 sets of troponin  Patient sleepy, did not receive any pain medication, sleeping medication  Ordering serum ammonia level  TSH, VBG  Patient has CT head done on 10/20 which was negative  Holding Lexapro  Will start patient on gentle hydration  ESRD, recent femoropopliteal bypass surgery, on left side, hypertension, diabetes, coronary artery disease, ESRD on hemodialysis    10/24  Patient much alert, awake today  Underwent CTA chest which was negative for PE  I encourage patient to eat, she told me that she will try to eat  Abdominal pain, chest pain has resolved  Patient will need sleep hygiene, as per nursing report she was up all night talking on phone  Seen in dialysis being  Will work on discharge planning  Patient need to eat  Will start patient on Remeron    10/25  Patient, essentially same  Still have poor oral intake  Started Remeron yesterday  Blood sugars better controlled  Patient lost her mother recently, looks like she has some element of depression, requesting psych consult  Consultations:   IP CONSULT TO INTERNAL MEDICINE  IP CONSULT TO GI  IP CONSULT TO NEPHROLOGY  IP CONSULT TO NEUROLOGY     Patient is admitted as inpatient status because of co-morbidities listed above, severity of signs and symptoms as outlined, requirement for current medical therapies and most importantly because of direct risk to patient if care not provided in a hospital setting.   Expected length of stay > 48 hours. Desiree Dorman MD  10/25/2022  2:22 PM    Copy sent to Dr. Cho primary care provider on file. Please note that this chart was generated using voice recognition Dragon dictation software. Although every effort was made to ensure the accuracy of this automated transcription, some errors in transcription may have occurred.

## 2022-10-25 NOTE — PLAN OF CARE
Problem: Discharge Planning  Goal: Discharge to home or other facility with appropriate resources  10/25/2022 0106 by Milagros Carreon RN  Outcome: Progressing     Problem: Pain  Goal: Verbalizes/displays adequate comfort level or baseline comfort level  10/25/2022 0106 by Milagros Carreon RN  Outcome: Progressing     Problem: Safety - Adult  Goal: Free from fall injury  10/25/2022 0106 by Milagros Carreon RN  Outcome: Progressing     Problem: Skin/Tissue Integrity  Goal: Absence of new skin breakdown  Description: 1. Monitor for areas of redness and/or skin breakdown  2. Assess vascular access sites hourly  3. Every 4-6 hours minimum:  Change oxygen saturation probe site  4. Every 4-6 hours:  If on nasal continuous positive airway pressure, respiratory therapy assess nares and determine need for appliance change or resting period. 10/25/2022 0106 by Milagros Carreon RN  Outcome: Progressing     Problem: Chronic Conditions and Co-morbidities  Goal: Patient's chronic conditions and co-morbidity symptoms are monitored and maintained or improved  10/25/2022 0106 by Milagros Carreon RN  Outcome: Progressing     Problem: ABCDS Injury Assessment  Goal: Absence of physical injury  10/25/2022 0106 by Milagros Carreon RN  Outcome: Progressing     Problem: Skin/Tissue Integrity  Goal: Absence of new skin breakdown  Description: 1. Monitor for areas of redness and/or skin breakdown  2. Assess vascular access sites hourly  3. Every 4-6 hours minimum:  Change oxygen saturation probe site  4. Every 4-6 hours:  If on nasal continuous positive airway pressure, respiratory therapy assess nares and determine need for appliance change or resting period. 10/25/2022 0106 by Milagros Carreon RN  Outcome: Progressing  10/24/2022 1532 by Cyril Malcolm RN  Outcome: Not Progressing  Note: Pt bedfast with limited movement; PO intake is fair to poor.

## 2022-10-25 NOTE — CARE COORDINATION
GISELLA following for bed availability at [facility].  Electronically signed by SHANDA Ocasio on 10/25/2022 at 5:06 PM

## 2022-10-25 NOTE — PROGRESS NOTES
Pt requested regular diet. Writer contacted Dr. Candi Lowry who states if there are no speech recommendations, can advance diet accordingly. Speech is not on her care team at this time. Diet advanced.

## 2022-10-25 NOTE — PROGRESS NOTES
Department of Internal Medicine  Nephrology Dahiana Cantu MD Progress Note    Reason for consultation: Management of hemodialysis dependent end-stage renal disease. Consulting physician: Pravin Pollard MD.    Interval history: Patient remains lethargic but arousable. She continues to complain of nausea. She does not have chest pain or shortness of breath. She tolerated hemodialysis well yesterday with removal of 2 kg of fluid. Post hemodialysis weight was 81.5 kg    History of present: This is a 28 y.o. 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 Lisbeth Nunez at of nephrology as consultants of [facility], Presented to the emergency department yesterday with complaints of abdominal pain that started after hemodialysis. This was associated with 1 episode of vomiting at dialysis. She has a recent history of GI bleed requiring transfusion and was admitted from October 11 to October 14, 2022. Flexible sigmoidoscopy performed on that admission showed multiple rectal ulcers which were cauterized and patient was taken off Xarelto.     Scheduled Meds:   mirtazapine  15 mg Oral Nightly    lidocaine  1 patch TransDERmal Daily    epoetin juventino-epbx  2,000 Units SubCUTAneous Once per day on Mon Wed Fri    aspirin  81 mg Oral Daily    atorvastatin  10 mg Oral Nightly    calcium acetate  3 capsule Oral TID WC    carvedilol  25 mg Oral BID    clopidogrel  75 mg Oral Daily    [Held by provider] escitalopram  10 mg Oral Daily    hydrALAZINE  100 mg Oral TID    NIFEdipine  90 mg Oral Daily    vitamin D3  5,000 Units Oral Daily    sodium chloride flush  5-40 mL IntraVENous 2 times per day    [Held by provider] insulin glargine 24 Units SubCUTAneous Nightly     Continuous Infusions:   sodium chloride 50 mL/hr at 10/24/22 2122    sodium chloride      dextrose Stopped (10/23/22 2044)     PRN Meds:.sodium chloride flush, acetaminophen, metoprolol, melatonin, midodrine, albuterol sulfate HFA, sodium chloride flush, sodium chloride, ondansetron **OR** ondansetron, glucose, dextrose bolus **OR** dextrose bolus, glucagon (rDNA), dextrose    Physical Exam:    VITALS:  BP (!) 100/59   Pulse 88   Temp 98 °F (36.7 °C) (Oral)   Resp 16   Ht 5' 4.02\" (1.626 m)   Wt 183 lb 3.2 oz (83.1 kg)   LMP  (LMP Unknown)   SpO2 96%   BMI 31.43 kg/m²   TEMPERATURE:  Current - Temp: 98 °F (36.7 °C); Max - Temp  Av.4 °F (36.9 °C)  Min: 98 °F (36.7 °C)  Max: 99 °F (37.2 °C)  RESPIRATIONS RANGE: Resp  Av  Min: 16  Max: 16  PULSE RANGE: Pulse  Av.6  Min: 81  Max: 90  BLOOD PRESSURE RANGE:  Systolic (00VNH), DWV:685 , Min:87 , RME:670 ; Diastolic (69SBH), ZJF:35, Min:52, Max:76  PULSE OXIMETRY RANGE: SpO2  Av %  Min: 96 %  Max: 100 %  24HR INTAKE/OUTPUT:    Intake/Output Summary (Last 24 hours) at 10/25/2022 1354  Last data filed at 10/25/2022 1303  Gross per 24 hour   Intake 250 ml   Output --   Net 250 ml       Constitutional: alert, appears stated age, and cooperative    Skin: Skin color, texture, turgor normal. No rashes or lesions    Head: Normocephalic, without obvious abnormality, atraumatic     Cardiovascular/Edema: regular rate and rhythm, S1, S2 normal, no murmur, click, rub or gallop    Respiratory: Lungs: clear to auscultation bilaterally    Abdomen:  Full with periumbilical tenderness; normal bowel sounds. Back: symmetric, no curvature. ROM normal. No CVA tenderness. Extremities: Trace bilateral pitting pedal edema with hyperpigmented dermatosis.     Neuro:  Grossly normal      CBC:   Recent Labs     10/22/22  1915 10/24/22  1826   WBC 6.8  --    HGB 10.5* 10.7*     --        BMP:    Recent Labs     10/22/22  1915   NA 140   K 3.9   CL 95*   CO2 27   BUN 26*   CREATININE 4.21*   GLUCOSE 98       Lab Results   Component Value Date/Time    NITRU NEGATIVE 10/19/2022 08:00 PM    COLORU Dark Yellow 10/19/2022 08:00 PM    PHUR 5.0 10/19/2022 08:00 PM    WBCUA 0 TO 2 10/19/2022 08:00 PM    RBCUA 0 TO 2 10/19/2022 08:00 PM    MUCUS NOT REPORTED 12/29/2017 11:33 PM    TRICHOMONAS MODERATE 12/29/2017 11:33 PM    YEAST NOT REPORTED 12/29/2017 11:33 PM    BACTERIA None 10/19/2022 08:00 PM    SPECGRAV 1.021 10/19/2022 08:00 PM    LEUKOCYTESUR TRACE 10/19/2022 08:00 PM    UROBILINOGEN Normal 10/19/2022 08:00 PM    BILIRUBINUR NEGATIVE  Verified by ictotest. 10/19/2022 08:00 PM    [address] NEGATIVE 01/07/2012 03:53 AM    GLUCOSEU NEGATIVE 10/19/2022 08:00 PM    GLUCOSEU 3+ 01/07/2012 03:53 AM    KETUA TRACE 10/19/2022 08:00 PM    AMORPHOUS NOT REPORTED 12/29/2017 11:33 PM     Urine Creatinine:     Lab Results   Component Value Date/Time    LABCREA 254.2 12/06/2015 02:31 PM     IMPRESSION/RECOMMENDATIONS:    1.  End-stage renal disease - We will maintain Monday, Wednesday and Friday hemodialysis schedule. We will change target weight to 80 kg. . AV fistula functioning well. Renal diet,i.e 2-gram sodium,2-gram potassium,1500 ml fluid restriction,1-gram phosphorus, 1800 KCal and 1.2 gram/kg protein per day. 2.  Systemic hypertension - Blood pressure control is acceptable at this time. We will continue as needed midodrine for intradialytic episodes of hypotension. 3.  Mineral bone disease profile - serum phosphorus level 4.5 mg/dL is within target range. .    4.  Anemia of chronic kidney disease - hemoglobin 10.7 g/dL is within target range. Continue Retacrit 2000 units subcutaneously 3 times a week. Prognosis is guarded.     Kelsi Ramey MD FACP  Attending Nephrologist  10/25/2022 1:54 PM

## 2022-10-25 NOTE — PROGRESS NOTES
[address]   Occupational Therapy Evaluation  Date: 10/25/22  Patient Name: Aayush Esquivel       Room: 8861/8709-61  MRN: [id number]  Account: [de-identified]   : 1990  (28 y.o.) Gender: female     Discharge Recommendations:  Further Occupational Therapy is recommended upon facility discharge. OT Equipment Recommendations  Other: TBD    Referring Practitioner: Dr. Dylan Jeter  Diagnosis: Pancreatitis      Treatment Diagnosis: Impaired self-care status    Past Medical History:  has a past medical history of Asthma, Atherosclerosis of native arteries of extremities with rest pain, unspecified extremity ([address] Diabetes mellitus ([address] Headache, migraine, Hypertension, Lichen simplex chronicus, Noninflammatory disorder of vagina, unspecified, Patient's noncompliance with other medical treatment and regimen for other reason, and Type 2 diabetes mellitus without complication ([facility] [address] Past Surgical History:   has a past surgical history that includes Ovary removal and Ovary removal.    Restrictions  Restrictions/Precautions  Restrictions/Precautions: Fall Risk;General Precautions (AV grecia JACKSON)  Required Braces or Orthoses?: No  Position Activity Restriction  Other position/activity restrictions: NO BP, IV sticks or venipunctures left UE, up w/ assist      Vitals  Vitals  Heart Rate: 81  Heart Rate Source: Monitor  BP: (!) 88/52  BP Location: Right upper arm  BP Method: Automatic  MAP (Calculated): 64  Resp: 16  SpO2: 96 %  O2 Device: None (Room air)     Subjective  Subjective: \"I can't, I don't want to, I don't feel good\"  Comments: Pt refused to attempt to wash under her arms, but did allow OT to complete this task.   Subjective  Pain: 8/10 low back and BLE    Social/Functional History  Social/Functional History  Lives With: Significant other  Type of Home: Apartment  Home Layout: One level, Two level, Performs ADL's on one level, Able to Live on Main level with bedroom/bathroom  Home Access: Stairs to enter without rails  Entrance Stairs - Number of Steps: 2-3 steps w/o rail  Entrance Stairs - Rails: None  Bathroom Shower/Tub: Tub/Shower unit, Doors, Curtain  Bathroom Toilet: Standard (Has countertop next to it)  Bathroom Equipment:  (NO DME)  Bathroom Accessibility: Walker accessible  Home Equipment: Rollator  Has the patient had two or more falls in the past year or any fall with injury in the past year?: No  ADL Assistance: [address]: Independent (Pt was primary prior to fem pop 9/2022)  Homemaking Responsibilities: Yes  Ambulation Assistance: Independent (Usually no device, rollator as needed prior to fem pop 9/2022)  Transfer Assistance: Independent  Active : No  Patient's  Info: medical cab to dialysis  Mode of Transportation: Cab  IADL Comments: sleeps on the couch at home  Additional Comments: HD 3 X week (M, W and F); pt has been  staying at [address] [place] [address] [address] for rehab since her hospitalization for left LE fem-pop surgery- states she has been transfering and standing w/ 2 assist, pt reports that she has been unable to walk due to pain in bilateral LEs and weakness. Objective  Cognition  Orientation  Overall Orientation Status: Within Functional Limits  Orientation Level: Oriented to place, Oriented to person, Oriented to time, Oriented to situation, Oriented X4  Cognition  Overall Cognitive Status: Exceptions  Memory: Decreased recall of recent events  Cognition Comment: Pt unable to recall how long she had been at SNF or whether or whether or not she had been getting dressed. Pt also did not recall whether or not she had gotten any therapy in the hospital after her procedure, but did report only 2 sessions at SNF.    Sensation  Overall Sensation Status: Impaired  Additional Comments: Numbness B feet    Activities of Daily Living  ADL  Feeding: Setup  Grooming: Setup  UE Bathing: Maximum assistance  LE Bathing: Dependent/Total  UE Dressing: Maximum assistance  LE Dressing: Maximum assistance  Toileting: Dependent/Total  Toileting Skilled Clinical Factors: Pt reports incontinent of B/B since fem pop bypass in September 2022, wearing briefs and getting changed  Additional Comments: Above levels based on pt report, observation, and clinical reasoning. Pt physically capable of doing more, but limited by pain and poor effort. Pt refused to attempt washing under her arms stating \"I don't feel like it\" despite previously demonstrating ROM/strength WFL to complete the task. Gross Assessment  AROM: Within functional limits  Strength: Generally decreased, functional (4/5 throughout)  Coordination: Within functional limits  Tone: Normal  Sensation: Intact (BUE)    UE Function  Tone LUE  LUE Tone: Normotonic    Tone RUE  RUE Tone: Normotonic    Mobility  Bed Mobility  Bed mobility  Rolling to Left: Moderate assistance  Rolling to Right: Moderate assistance  Supine to Sit: Moderate assistance, 2 Person assistance  Sit to Supine:  Moderate assistance, 2 Person assistance  Scooting: Maximal assistance, 2 Person assistance  Bed Mobility Comments: Dangled at the EOB for <5 minutes 2* reports of dizziness    Balance  Balance  Sitting Balance: Contact guard assistance (Varied SBA>Min A, Pt reports of dizziness at EOB, occasional posterior LOB)  Standing Balance: Unable to assess(comment) (Unsafe to attempt standing 2* poor tolerance for sitting EOB wiht c/o dizziness)    Transfers  Transfers  Transfer Comments: Unsafe to attempt standing/transfers at this time 2* poor tolerance for sitting EOB, c/o dizziness    Assessment  Assessment  Performance deficits / Impairments: Decreased functional mobility , Decreased ADL status, Decreased cognition, Decreased endurance, Decreased balance  Treatment Diagnosis: Impaired self-care status  Prognosis: Fair  Decision Making: Medium Complexity  Discharge Recommendations: Patient would benefit from continued therapy after discharge    Activity Tolerance  Activity Tolerance: Patient limited by fatigue, Patient limited by pain, Treatment limited secondary to medical complications (free text)    Safety Devices  Type of Devices: Patient at risk for falls, Left in bed, Call light within reach, Bed alarm in place    Patient Education  Patient Education  Education Given To: Patient  Education Provided: Role of Therapy, Plan of Care  Education Provided Comments: Activity promotion  Education Method: Verbal  Barriers to Learning: Cognition  Education Outcome: Continued education needed    Functional Outcome Measures  AM-PAC Daily Activity Inpatient   How much help for putting on and taking off regular lower body clothing?: A Lot  How much help for Bathing?: A Lot  How much help for Toileting?: Total  How much help for putting on and taking off regular upper body clothing?: A Lot  How much help for taking care of personal grooming?: A Little  How much help for eating meals?: A Little  AM-[facility] Inpatient Daily Activity Raw Score: 13  AM-PAC Inpatient ADL T-Scale Score : 32.03  ADL Inpatient CMS 0-100% Score: 63.03  ADL Inpatient CMS G-Code Modifier : CL    Goals  Patient Goals   Patient goals : Pt reports that she wants to discharge to a different SNF. Short Term Goals  Time Frame for Short Term Goals: By Discharge  Short Term Goal 1: Pt will complete bed mobility with Mod A x1 and tolerate sitting EOB for 10+ minutes with SBA while completing a functional task. Short Term Goal 2: Pt will actively participate in self-care tasks at EOB or up to chair to promote overall increased strength and activity tolerance. Short Term Goal 3: Pt will complete UB ADL's with Min A and LB ADL's with Mod A and Good safety using AE if appropriate. Short Term Goal 4: Pt will complete sit<>stand with Max A x2 in preparation for functional transfers.   Short Term Goal 5: Pt will actively participate in 15-20 minutes of therapeutic exercise/activity to promote increased independence and safety with self-care and mobility. Plan  Occupational Therapy Plan  Times Per Week: 5  Times Per Day:  Once a day  Current Treatment Recommendations: Strengthening, Balance training, Functional mobility training, Endurance training, Pain management, Safety education & training, Patient/Caregiver education & training, Equipment evaluation, education, & procurement, Self-Care / ADL    OT Individual Minutes  OT Individual Minutes  Time In: 1000  Time Out: 0300  Minutes: 32  Time Code Minutes   Timed Code Treatment Minutes: 15 Minutes    Electronically signed by DARION Alvarez on 10/25/22 at 10:48 AM EDT

## 2022-10-26 ENCOUNTER — APPOINTMENT (OUTPATIENT)
Dept: GENERAL RADIOLOGY | Age: 32
DRG: 368 | End: 2022-10-26
Payer: MEDICARE

## 2022-10-26 LAB
ABSOLUTE EOS #: 0.5 K/UL (ref 0–0.4)
ABSOLUTE LYMPH #: 2.3 K/UL (ref 1–4.8)
ABSOLUTE MONO #: 0.8 K/UL (ref 0.1–1.3)
ANION GAP SERPL CALCULATED.3IONS-SCNC: 14 MMOL/L (ref 9–17)
BASOPHILS # BLD: 0 % (ref 0–2)
BASOPHILS ABSOLUTE: 0 K/UL (ref 0–0.2)
BUN BLDV-MCNC: 32 MG/DL (ref 6–20)
CALCIUM SERPL-MCNC: 9.6 MG/DL (ref 8.6–10.4)
CHLORIDE BLD-SCNC: 102 MMOL/L (ref 98–107)
CO2: 26 MMOL/L (ref 20–31)
CREAT SERPL-MCNC: 6.45 MG/DL (ref 0.5–0.9)
EOSINOPHILS RELATIVE PERCENT: 7 % (ref 0–4)
GFR SERPL CREATININE-BSD FRML MDRD: 8 ML/MIN/1.73M2
GLUCOSE BLD-MCNC: 134 MG/DL (ref 65–105)
GLUCOSE BLD-MCNC: 154 MG/DL (ref 65–105)
GLUCOSE BLD-MCNC: 155 MG/DL (ref 65–105)
GLUCOSE BLD-MCNC: 164 MG/DL (ref 65–105)
GLUCOSE BLD-MCNC: 194 MG/DL (ref 70–99)
HCT VFR BLD CALC: 28.8 % (ref 36–46)
HEMOGLOBIN: 9.2 G/DL (ref 12–16)
LYMPHOCYTES # BLD: 33 % (ref 24–44)
MCH RBC QN AUTO: 29.4 PG (ref 26–34)
MCHC RBC AUTO-ENTMCNC: 32 G/DL (ref 31–37)
MCV RBC AUTO: 92.1 FL (ref 80–100)
MONOCYTES # BLD: 12 % (ref 1–7)
PDW BLD-RTO: 17.5 % (ref 11.5–14.9)
PLATELET # BLD: 390 K/UL (ref 150–450)
PMV BLD AUTO: 6.6 FL (ref 6–12)
POTASSIUM SERPL-SCNC: 4.1 MMOL/L (ref 3.7–5.3)
RBC # BLD: 3.13 M/UL (ref 4–5.2)
SEG NEUTROPHILS: 48 % (ref 36–66)
SEGMENTED NEUTROPHILS ABSOLUTE COUNT: 3.4 K/UL (ref 1.3–9.1)
SODIUM BLD-SCNC: 142 MMOL/L (ref 135–144)
WBC # BLD: 7.1 K/UL (ref 3.5–11)

## 2022-10-26 PROCEDURE — 85025 COMPLETE CBC W/AUTO DIFF WBC: CPT

## 2022-10-26 PROCEDURE — 90935 HEMODIALYSIS ONE EVALUATION: CPT

## 2022-10-26 PROCEDURE — 36415 COLL VENOUS BLD VENIPUNCTURE: CPT

## 2022-10-26 PROCEDURE — 2500000003 HC RX 250 WO HCPCS: Performed by: NURSE PRACTITIONER

## 2022-10-26 PROCEDURE — 74018 RADEX ABDOMEN 1 VIEW: CPT

## 2022-10-26 PROCEDURE — 99232 SBSQ HOSP IP/OBS MODERATE 35: CPT | Performed by: INTERNAL MEDICINE

## 2022-10-26 PROCEDURE — 6370000000 HC RX 637 (ALT 250 FOR IP): Performed by: NURSE PRACTITIONER

## 2022-10-26 PROCEDURE — 2580000003 HC RX 258: Performed by: NURSE PRACTITIONER

## 2022-10-26 PROCEDURE — 2060000000 HC ICU INTERMEDIATE R&B

## 2022-10-26 PROCEDURE — 82947 ASSAY GLUCOSE BLOOD QUANT: CPT

## 2022-10-26 PROCEDURE — 6360000002 HC RX W HCPCS: Performed by: NURSE PRACTITIONER

## 2022-10-26 PROCEDURE — 80048 BASIC METABOLIC PNL TOTAL CA: CPT

## 2022-10-26 PROCEDURE — 6360000002 HC RX W HCPCS: Performed by: INTERNAL MEDICINE

## 2022-10-26 PROCEDURE — 6370000000 HC RX 637 (ALT 250 FOR IP): Performed by: INTERNAL MEDICINE

## 2022-10-26 RX ORDER — CALCIUM CARBONATE 200(500)MG
500 TABLET,CHEWABLE ORAL 3 TIMES DAILY PRN
Status: DISCONTINUED | OUTPATIENT
Start: 2022-10-26 | End: 2022-10-31 | Stop reason: HOSPADM

## 2022-10-26 RX ORDER — ONDANSETRON 2 MG/ML
4 INJECTION INTRAMUSCULAR; INTRAVENOUS EVERY 6 HOURS PRN
Status: DISCONTINUED | OUTPATIENT
Start: 2022-10-26 | End: 2022-10-31 | Stop reason: HOSPADM

## 2022-10-26 RX ORDER — ONDANSETRON 4 MG/1
4 TABLET, ORALLY DISINTEGRATING ORAL EVERY 4 HOURS PRN
Status: DISCONTINUED | OUTPATIENT
Start: 2022-10-26 | End: 2022-10-31 | Stop reason: HOSPADM

## 2022-10-26 RX ADMIN — ONDANSETRON 4 MG: 2 INJECTION INTRAMUSCULAR; INTRAVENOUS at 18:32

## 2022-10-26 RX ADMIN — ONDANSETRON 4 MG: 4 TABLET, ORALLY DISINTEGRATING ORAL at 03:26

## 2022-10-26 RX ADMIN — ANTACID TABLETS 500 MG: 500 TABLET, CHEWABLE ORAL at 14:32

## 2022-10-26 RX ADMIN — SODIUM CHLORIDE, PRESERVATIVE FREE 10 ML: 5 INJECTION INTRAVENOUS at 18:17

## 2022-10-26 RX ADMIN — ACETAMINOPHEN 650 MG: 325 TABLET, FILM COATED ORAL at 11:35

## 2022-10-26 RX ADMIN — ONDANSETRON 4 MG: 2 INJECTION INTRAMUSCULAR; INTRAVENOUS at 14:32

## 2022-10-26 RX ADMIN — CALCIUM ACETATE 2001 MG: 667 CAPSULE ORAL at 11:34

## 2022-10-26 NOTE — PLAN OF CARE
Problem: Discharge Planning  Goal: Discharge to home or other facility with appropriate resources  Outcome: Progressing     Problem: Pain  Goal: Verbalizes/displays adequate comfort level or baseline comfort level  Outcome: Progressing     Problem: Safety - Adult  Goal: Free from fall injury  Outcome: Progressing     Problem: Skin/Tissue Integrity  Goal: Absence of new skin breakdown  Description: 1. Monitor for areas of redness and/or skin breakdown  2. Assess vascular access sites hourly  3. Every 4-6 hours minimum:  Change oxygen saturation probe site  4. Every 4-6 hours:  If on nasal continuous positive airway pressure, respiratory therapy assess nares and determine need for appliance change or resting period.   Outcome: Progressing     Problem: Chronic Conditions and Co-morbidities  Goal: Patient's chronic conditions and co-morbidity symptoms are monitored and maintained or improved  Outcome: Not Progressing  Note: Pt remains symptomatic during shift with constant emesis     Problem: ABCDS Injury Assessment  Goal: Absence of physical injury  Outcome: Progressing     Problem: Chronic Conditions and Co-morbidities  Goal: Patient's chronic conditions and co-morbidity symptoms are monitored and maintained or improved  Outcome: Not Progressing  Note: Pt remains symptomatic during shift with constant emesis

## 2022-10-26 NOTE — FLOWSHEET NOTE
10/26/22 1456   Vital Signs   /69   Temp 96.9 °F (36.1 °C)   Heart Rate 81   Resp 18   Post-Hemodialysis Assessment   Post-Treatment Procedures Blood returned; Access bleeding time < 10 minutes   Machine Disinfection Process Acid/Vinegar Clean;Heat Disinfect; Exterior Machine Disinfection   Rinseback Volume (ml) 300 ml   Blood Volume Processed (Liters) 53.1 l/min   Dialyzer Clearance Lightly streaked   Duration of Treatment (minutes) 206 minutes   Hemodialysis Output (ml) 2000 ml   Tolerated Treatment Poor   Interventions Taken Medication   Patient Response to Treatment toll poorly   Edema Generalized   Edema Generalized Trace   Physician Notified Yes   Time Off 1456   Patient Disposition Return to room   completed 3 hr 26 min. HD TX and removed 2 Liters of fluid. pt tolerated HD TX poorly. pt nauseous and vomiting during Dialysis TX. pt given Zofran during HD [address]. pt requested to come off Machine 4 min. early. Dr. Steve Carroll notified and gave order to take pt off machine. pt left access arm dressing is clean, dry and intact. report given to primary nurse, Belkis Braun RN.

## 2022-10-26 NOTE — CARE COORDINATION
GISELLA attempted to get transport arranged for johnathon to go to [facility]. Patient does not have a stretcher need that is charted, and [facility] is not agreeable to take patient via stretcher. GISELLA faxed documentation to [phone] [facility]. [id number] [facility] reported that it will be around 9:00pm before they can transport. Once again, patient does not have a medical necessity to go in a stretcher that is listed in the chart. Patient will have to go by a wheelchair. GISELLA called Mat Arambula RN bedside nurse and attempted to brainstorm. GISELLA suggested that nurse uses a radha to lift patient into a wheelchair. SW is waiting on a call back from [facility] to see if they can radha lift her out of the wheelchair. Electronically signed by SHANDA Blackman on 10/26/2022 at 12:50 PM     [facility] can not accommodate patient. [facility] is agreeable to bring a radha sling pad from facility to accommodate patient using radha lift. The radha lift that comes from [facility] must go with patient back to the facility so that they can take patient out of wheelchair. GISELLA has attempted to get patient to go via stretcher multiple times, but there is no medical necessity for patient. Electronically signed by SHANDA Blackman on 10/26/2022 at 1:56 PM     Follow up with [facility] tomorrow to see if they can bring out radha lift for patient to go to [facility].  Electronically signed by SHANDA Blackman on 10/26/2022 at 4:17 PM

## 2022-10-26 NOTE — PROGRESS NOTES
Department of Internal Medicine  Nephrology Maggy Ramey MD Progress Note    Reason for consultation: Management of hemodialysis dependent end-stage renal disease. Consulting physician: Rosemarie Recinos MD.    Interval history: Patient does not have any new complaints but she remains quite lethargic and apathetic. She is not in respiratory distress. History of present: This is a 28 y.o. 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 Rahel Coppola at of nephrology as consultants of [facility], Presented to the emergency department yesterday with complaints of abdominal pain that started after hemodialysis. This was associated with 1 episode of vomiting at dialysis. She has a recent history of GI bleed requiring transfusion and was admitted from October 11 to October 14, 2022. Flexible sigmoidoscopy performed on that admission showed multiple rectal ulcers which were cauterized and patient was taken off Xarelto.     Scheduled Meds:   mirtazapine  15 mg Oral Nightly    lidocaine  1 patch TransDERmal Daily    epoetin juventino-epbx  2,000 Units SubCUTAneous Once per day on Mon Wed Fri    aspirin  81 mg Oral Daily    atorvastatin  10 mg Oral Nightly    calcium acetate  3 capsule Oral TID WC    carvedilol  25 mg Oral BID    clopidogrel  75 mg Oral Daily    [Held by provider] escitalopram  10 mg Oral Daily    hydrALAZINE  100 mg Oral TID    NIFEdipine  90 mg Oral Daily    vitamin D3  5,000 Units Oral Daily    sodium chloride flush  5-40 mL IntraVENous 2 times per day    [Held by provider] insulin glargine  24 Units SubCUTAneous Nightly     Continuous Infusions:   sodium chloride      dextrose Stopped (10/23/22 2044) PRN Meds:.sodium chloride flush, acetaminophen, metoprolol, melatonin, midodrine, albuterol sulfate HFA, sodium chloride flush, sodium chloride, ondansetron **OR** ondansetron, glucose, dextrose bolus **OR** dextrose bolus, glucagon (rDNA), dextrose    Physical Exam:    VITALS:  /70   Pulse 82   Temp 98.6 °F (37 °C) (Oral)   Resp 18   Ht 5' 4.02\" (1.626 m)   Wt 179 lb 10.8 oz (81.5 kg)   LMP  (LMP Unknown)   SpO2 98%   BMI 30.83 kg/m²   TEMPERATURE:  Current - Temp: 98.6 °F (37 °C); Max - Temp  Av.7 °F (37.1 °C)  Min: 98 °F (36.7 °C)  Max: 99.4 °F (37.4 °C)  RESPIRATIONS RANGE: Resp  Av.2  Min: 16  Max: 18  PULSE RANGE: Pulse  Av.9  Min: 82  Max: 100  BLOOD PRESSURE RANGE:  Systolic (75YEW), CSP:694 , Min:100 , VMI:293 ; Diastolic (05ABY), CHR:62, Min:59, Max:70  PULSE OXIMETRY RANGE: SpO2  Av.6 %  Min: 96 %  Max: 99 %  24HR INTAKE/OUTPUT:    Intake/Output Summary (Last 24 hours) at 10/26/2022 1048  Last data filed at 10/25/2022 1811  Gross per 24 hour   Intake 150 ml   Output --   Net 150 ml       Constitutional: alert, appears stated age, and cooperative    Skin: Skin color, texture, turgor normal. No rashes or lesions    Head: Normocephalic, without obvious abnormality, atraumatic     Cardiovascular/Edema: regular rate and rhythm, S1, S2 normal, no murmur, click, rub or gallop    Respiratory: Lungs: clear to auscultation bilaterally    Abdomen:  Full with periumbilical tenderness; normal bowel sounds. Back: symmetric, no curvature. ROM normal. No CVA tenderness. Extremities: Trace bilateral pitting pedal edema with hyperpigmented dermatosis.     Neuro:  Grossly normal      CBC:   Recent Labs     10/24/22  1826 10/26/22  0814   WBC  --  7.1   HGB 10.7* 9.2*   PLT  --  390       BMP:    Recent Labs     10/26/22  0814      K 4.1      CO2 26   BUN 32*   CREATININE 6.45*   GLUCOSE 194*       Lab Results   Component Value Date/Time    NITRU NEGATIVE 10/19/2022 08:00 PM    COLORU Dark Yellow 10/19/2022 08:00 PM    PHUR 5.0 10/19/2022 08:00 PM    WBCUA 0 TO 2 10/19/2022 08:00 PM    RBCUA 0 TO 2 10/19/2022 08:00 PM    MUCUS NOT REPORTED 12/29/2017 11:33 PM    TRICHOMONAS MODERATE 12/29/2017 11:33 PM    YEAST NOT REPORTED 12/29/2017 11:33 PM    BACTERIA None 10/19/2022 08:00 PM    SPECGRAV 1.021 10/19/2022 08:00 PM    LEUKOCYTESUR TRACE 10/19/2022 08:00 PM    UROBILINOGEN Normal 10/19/2022 08:00 PM    BILIRUBINUR NEGATIVE  Verified by ictotest. 10/19/2022 08:00 PM    [address] NEGATIVE 01/07/2012 03:53 AM    GLUCOSEU NEGATIVE 10/19/2022 08:00 PM    GLUCOSEU 3+ 01/07/2012 03:53 AM    KETUA TRACE 10/19/2022 08:00 PM    AMORPHOUS NOT REPORTED 12/29/2017 11:33 PM     Urine Creatinine:     Lab Results   Component Value Date/Time    LABCREA 254.2 12/06/2015 02:31 PM     IMPRESSION/RECOMMENDATIONS:    1.  End-stage renal disease - We will maintain Monday, Wednesday and Friday hemodialysis schedule. Target weight changed to 80 kg. . AV fistula functioning well. Renal diet,i.e 2-gram sodium,2-gram potassium,1500 ml fluid restriction,1-gram phosphorus, 1800 KCal and 1.2 gram/kg protein per day. 2.  Systemic hypertension - Blood pressure control is acceptable at this time. We will continue as needed midodrine for intradialytic episodes of hypotension. 3.  Mineral bone disease profile - serum phosphorus level 4.5 mg/dL is within target range. .    4.  Anemia of chronic kidney disease - hemoglobin 92 g/dL today. Continue Retacrit 2000 units subcutaneously 3 times a week. Prognosis is guarded.     Benita Cornell MD FACP  Attending Nephrologist  10/26/2022 10:48 AM

## 2022-10-26 NOTE — PROGRESS NOTES
[address] [facility] Internal Medicine  Nereyda Ramírez MD; Gio Frazier MD; Hero Mo MD; MD Elijah Bar MD; MD FRANCES Samuels [facility] Internal Medicine   [facility]    Progress note               Date:   10/26/2022  Patient name:  Yuval Rosales  Date of admission:  10/19/2022  4:05 PM  MRN:   [id number]  Account:  [de-identified]  YOB: 1990  PCP:    No primary care provider on file. Room:   [address] [place] [address]  Code Status:    Full Code    Chief Complaint:     Chief Complaint   Patient presents with    Abdominal Pain     Abdominal pain, emesis post 2-4 hours dialysis treatment. Abdomen is softly distended. History Obtained From:     Pt medical record and nursing staff    History of Present Illness:     Yuval Rosales is a 28 y.o. Non- / non  female who presents with Abdominal Pain (Abdominal pain, emesis post 2-4 hours dialysis treatment. Abdomen is softly distended. )   and is admitted to the hospital for the management of Pancreatitis, unspecified pancreatitis type. Yuval Rosales is a 28 y.o. Non- / non  female who presents with Abdominal Pain (Abdominal pain, emesis post 2-4 hours dialysis treatment. Abdomen is softly distended.) and is admitted to the hospital for the management of acute Pancreatitis. Medical history includes ESRD on hemodialysis, DM type I, HTN and CAD. Also had a recent admission for GI bleed. Patient presented from [facility]. She is experiencing some altered mental status where she is unable to hold a conversation regarding her condition. She is able to stay awake for 1-2 word answer but is not consistent. CT head shows no evidence of acute changes  compared to CT head completed on 10/10/22. He was given 50 mcg IV of fentanyl prior to assessment which may be affecting her mentation.  CT of abdomen and pelvis shows mild right and minimal left hydronephrosis which is correlated with distended urinary bladder. She is experiencing acute urinary retention requiring barros catheter placement in ED. UA shows trace ketones, 3+ protein and trace leukocytes. Pending culture. WBC 10.9. AST 59, ALT 82, Alk Phos 110, Lipase 117 suggesting acute pancreatitis without infection  10/23  Patient, very sleepy as per nursing report  Has poor oral intake  Has nausea, 1 episode of vomiting  Complaining of chest pain  Very poor historian    10/24  Patient is much alert, awake, she is oriented to time place person to my assessment today  Still have poor oral intake  Lantus was held last night  Blood sugar running low  10/26   Patient have multiple episodes of vomiting,  Complaining abdominal pain    Past Medical History:     Past Medical History:   Diagnosis Date    Asthma     Atherosclerosis of native arteries of extremities with rest pain, unspecified extremity ([address]     Depression 10/25/2022    Diabetes mellitus ([address]     since 12 y/o, unsure if type 1 or 2    Headache, migraine     for 2 years    Hypertension     Lichen simplex chronicus     Noninflammatory disorder of vagina, unspecified     Patient's noncompliance with other medical treatment and regimen for other reason     Type 2 diabetes mellitus without complication ([address] 21/43/1668        Past Surgical History:     Past Surgical History:   Procedure Laterality Date    OVARY REMOVAL      July 2010    OVARY REMOVAL          Medications Prior to Admission:     Prior to Admission medications    Medication Sig Start Date End Date Taking?  Authorizing Provider   ondansetron (ZOFRAN-ODT) 4 MG disintegrating tablet Take 1 tablet by mouth every 8 hours as needed for Nausea or Vomiting 10/21/22  Yes Jem Ray MD   aspirin 81 MG EC tablet Take 81 mg by mouth daily   Yes Historical Provider, MD   atorvastatin (LIPITOR) 10 MG tablet Take 10 mg by mouth at bedtime   Yes Historical Provider, MD   calcium acetate (PHOSLO) 667 MG CAPS capsule Take 3 capsules by mouth 3 times daily (with meals)   Yes Historical Provider, MD   carvedilol (COREG) 25 MG tablet Take 25 mg by mouth 2 times daily   Yes Historical Provider, MD   vitamin D (CHOLECALCIFEROL) 125 MCG (5000 UT) CAPS capsule Take 5,000 Units by mouth daily   Yes Historical Provider, MD   clopidogrel (PLAVIX) 75 MG tablet Take 75 mg by mouth daily   Yes Historical Provider, MD   escitalopram (LEXAPRO) 10 MG tablet Take 10 mg by mouth daily   Yes Historical Provider, MD   hydrALAZINE (APRESOLINE) 100 MG tablet Take 100 mg by mouth 3 times daily   Yes Historical Provider, MD   insulin detemir (LEVEMIR FLEXTOUCH) 100 UNIT/ML injection pen Inject 24 Units into the skin nightly   Yes Historical Provider, MD   midodrine (PROAMATINE) 10 MG tablet Take 30 mg by mouth daily Indications: Mon, Wed, Fri at dialysis   Yes Historical Provider, MD   NIFEdipine (ADALAT CC) 90 MG extended release tablet Take 90 mg by mouth daily   Yes Historical Provider, MD   sennosides-docusate sodium (SENOKOT-S) 8.6-50 MG tablet Take 2 tablets by mouth at bedtime   Yes Historical Provider, MD   B Complex-C-Folic Acid (TRIPHROCAPS) 1 MG CAPS Take 1 capsule by mouth daily   Yes Historical Provider, MD   patiromer sorbitex calcium (VELTASSA) 8.4 g PACK packet Take 8.4 g by mouth Twice a Week Indications: twice per day on Thursdays and Sundays   Yes Historical Provider, MD   acetaminophen (TYLENOL) 325 MG tablet Take 650 mg by mouth every 6 hours as needed for Pain or Fever (do not exceed 3 gm in 24 hours)    Historical Provider, MD   albuterol sulfate HFA (PROVENTIL;VENTOLIN;PROAIR) 108 (90 Base) MCG/ACT inhaler Inhale 2 puffs into the lungs every 6 hours as needed for Wheezing or Shortness of Breath    Historical Provider, MD   polyethylene glycol (GLYCOLAX) 17 GM/SCOOP powder Take 17 g by mouth daily as needed (constipation)    Historical Provider, MD        Allergies:     Morphine and Uzieluvia [sitagliptin]    Social History:     Tobacco:    reports that she has never smoked. She has never used smokeless tobacco.  Alcohol:      reports no history of alcohol use. Drug Use:  reports no history of drug use. Family History:     Family History   Problem Relation Age of Onset    High Blood Pressure Mother     Diabetes Mother     Other Mother 27        Lung cancer    High Blood Pressure Father     Diabetes Father     Other Father 27        Prostate cancer       Review of Systems:     Positive and Negative as described in HPI.     CONSTITUTIONAL:  negative for fevers, chills, sweats, fatigue, weight loss  HEENT:  negative for vision, hearing changes, runny nose, throat pain  RESPIRATORY:  negative for shortness of breath, cough, congestion, wheezing  CARDIOVASCULAR:  negative for chest pain, palpitations  GASTROINTESTINAL: abdo pain n v    GENITOURINARY:  negative for difficulty of urination, burning with urination, frequency   INTEGUMENT:  negative for rash, skin lesions, easy bruising   HEMATOLOGIC/LYMPHATIC:  negative for swelling/edema   ALLERGIC/IMMUNOLOGIC:  negative for urticaria , itching  ENDOCRINE:  negative increase in drinking, increase in urination, hot or cold intolerance  MUSCULOSKELETAL:  negative joint pains, muscle aches, swelling of joints  NEUROLOGICAL:  negative for headaches, dizziness, lightheadedness, numbness, pain, tingling extremities      Physical Exam:   /60   Pulse 78   Temp 98.6 °F (37 °C)   Resp 18   Ht 5' 4.02\" (1.626 m)   Wt 179 lb 10.8 oz (81.5 kg)   LMP  (LMP Unknown)   SpO2 98%   BMI 30.83 kg/m²   Temp (24hrs), Av.8 °F (37.1 °C), Min:98.1 °F (36.7 °C), Max:99.4 °F (37.4 °C)    Recent Labs     10/25/22  1059 10/25/22  2045 10/26/22  0631 10/26/22  1051   POCGLU 124* 151* 164* 154*       Intake/Output Summary (Last 24 hours) at 10/26/2022 1530  Last data filed at 10/25/2022 1811  Gross per 24 hour   Intake 100 ml   Output --   Net 100 ml General Appearance: alert, well appearing, and in no acute distress  Mental status: oriented to person, place, and time  Head: normocephalic, atraumatic  Eye: no icterus, redness, pupils equal and reactive, extraocular eye movements intact, conjunctiva clear  Ear: normal external ear, no discharge, hearing intact  Nose: no drainage noted  Mouth: mucous membranes moist  Neck: supple, no carotid bruits, thyroid not palpable  Lungs: Bilateral equal air entry, clear to ausculation, no wheezing, rales or rhonchi, normal effort  Cardiovascular: normal rate, regular rhythm, no murmur, gallop, rub  Abdomen: Soft, nontender, nondistended, normal bowel sounds, no hepatomegaly or splenomegaly   No tenderness  Surg scar noted lower mid abdo laparotomy noted    Neurologic: There are no new focal motor or sensory deficits, normal muscle tone and bulk, no abnormal sensation, normal speech, cranial nerves II through XII grossly intact  Skin: No gross lesions, rashes, bruising or bleeding on exposed skin area  Extremities: peripheral pulses palpable, no pedal edema or calf pain with palpation  Psych: flat affect drowsy    Investigations:      Laboratory Testing:  Recent Results (from the past 24 hour(s))   POC Glucose Fingerstick    Collection Time: 10/25/22  8:45 PM   Result Value Ref Range    POC Glucose 151 (H) 65 - 105 mg/dL   POC Glucose Fingerstick    Collection Time: 10/26/22  6:31 AM   Result Value Ref Range    POC Glucose 164 (H) 65 - 105 mg/dL   CBC with Auto Differential    Collection Time: 10/26/22  8:14 AM   Result Value Ref Range    WBC 7.1 3.5 - 11.0 k/uL    RBC 3.13 (L) 4.0 - 5.2 m/uL    Hemoglobin 9.2 (L) 12.0 - 16.0 g/dL    Hematocrit 28.8 (L) 36 - 46 %    MCV 92.1 80 - 100 fL    MCH 29.4 26 - 34 pg    MCHC 32.0 31 - 37 g/dL    RDW 17.5 (H) 11.5 - 14.9 %    Platelets 745 369 - 280 k/uL    MPV 6.6 6.0 - 12.0 fL    Seg Neutrophils 48 36 - 66 %    Lymphocytes 33 24 - 44 %    Monocytes 12 (H) 1 - 7 % Eosinophils % 7 (H) 0 - 4 %    Basophils 0 0 - 2 %    Segs Absolute 3.40 1.3 - 9.1 k/uL    Absolute Lymph # 2.30 1.0 - 4.8 k/uL    Absolute Mono # 0.80 0.1 - 1.3 k/uL    Absolute Eos # 0.50 (H) 0.0 - 0.4 k/uL    Basophils Absolute 0.00 0.0 - 0.2 k/uL   Basic Metabolic Panel    Collection Time: 10/26/22  8:14 AM   Result Value Ref Range    Glucose 194 (H) 70 - 99 mg/dL    BUN 32 (H) 6 - 20 mg/dL    Creatinine 6.45 (HH) 0.50 - 0.90 mg/dL    Est, Glom Filt Rate 8 (L) >60 mL/min/1.73m2    Calcium 9.6 8.6 - 10.4 mg/dL    Sodium 142 135 - 144 mmol/L    Potassium 4.1 3.7 - 5.3 mmol/L    Chloride 102 98 - 107 mmol/L    CO2 26 20 - 31 mmol/L    Anion Gap 14 9 - 17 mmol/L   POC Glucose Fingerstick    Collection Time: 10/26/22 10:51 AM   Result Value Ref Range    POC Glucose 154 (H) 65 - 105 mg/dL       Imaging/Diagnostics:  CT ABDOMEN PELVIS WO CONTRAST Additional Contrast? None    Result Date: 10/19/2022  Limited noncontrast examination. Mild right and minimal left hydronephrosis, which may be due to distended urinary bladder containing contrast material.  Consider Mcnair catheter placement as clinically warranted. Suggestion of colonic wall thickening of the mid transverse and descending colonic loops, which may have been exaggerated by underdistention but may reflect underlying infectious/inflammatory colitis. 2.7 cm ill-defined density seen within the left groin region beneath the skin staples, which may reflect liquefying hematoma. Correlate clinically with signs of infection as there are adjacent inflammatory changes and prominent left inguinal lymph nodes. CT HEAD WO CONTRAST    Result Date: 10/19/2022  No convincing CT evidence of intracranial hemorrhage, mass effect or acute territorial infarct seen. RECOMMENDATIONS: If there is persistent clinical concern, consider short-term follow-up examination.        Assessment :      Hospital Problems             Last Modified POA    * (Principal) Pancreatitis, unspecified pancreatitis type 10/19/2022 Yes    Acute urinary retention 10/20/2022 Yes    Gastroenteritis 10/20/2022 Yes    Elevated lipase 10/20/2022 Yes    Diffuse abdominal pain 10/20/2022 Yes    Depression 10/25/2022 Yes    Lethargy 10/24/2022 Yes     Plan:     Patient status inpatient in the Progressive Unit/Step down    79-year-old -American lady class I obese BMI 31.74  History of end-stage kidney disease on hemodialysis with a left arm fistula  Uncontrolled diabetes mellitus type 1  Very poor historian unable to get much story from her  Abdominal pain associate with nausea vomiting 10 out of 10  Denies any fever chills   Elevated lipase CT scan noted  Clinical diagnosis of acute pancreatitis IV fluid resuscitation gentle hydration with dialysis  Discontinue IV Dilaudid and oral Norco  Low-fat diet as tolerated  Nephrology consult for  Nonspecific Trope elevation no NSTEMI patient has no chest pain troponins flat at 90  Anemia of chronic kidney disease  Hyponatremia sodium 134 conservative  Hypokalemia less than 4 potassium will be managed with  Oct 22  Troponin elevation is flat EKG noted chest pain and noncardiac in origin  Reproducible  Patient complains abdominal pain asking for IV Dilaudid  GI input noted  Patient has dependence on opioid and abuse  Advance diet discharge home with family patient is refusing ECF  High risk of readmission with the dependence and opioid seeking behavior  PT/OT   More nausea today   GI input needed ,   Labs ordered   10/23  Complaining of chest pain, ordering EKG, 2 sets of troponin  Patient sleepy, did not receive any pain medication, sleeping medication  Ordering serum ammonia level  TSH, VBG  Patient has CT head done on 10/20 which was negative  Holding Lexapro  Will start patient on gentle hydration  ESRD, recent femoropopliteal bypass surgery, on left side, hypertension, diabetes, coronary artery disease, ESRD on hemodialysis    10/24  Patient much alert, awake today  Underwent CTA chest which was negative for PE  I encourage patient to eat, she told me that she will try to eat  Abdominal pain, chest pain has resolved  Patient will need sleep hygiene, as per nursing report she was up all night talking on phone  Seen in dialysis being  Will work on discharge planning  Patient need to eat  Will start patient on Remeron    10/25  Patient, essentially same  Still have poor oral intake  Started Remeron yesterday  Blood sugars better controlled  Patient lost her mother recently, looks like she has some element of depression, requesting psych consult    10/26  Ordering x-ray KUB  Request GI to see the patient  Seen by Psychiatrist   DC plan  Consultations:   IP CONSULT TO INTERNAL MEDICINE  IP CONSULT TO GI  IP CONSULT TO NEPHROLOGY  IP CONSULT TO [facility]     Patient is admitted as inpatient status because of co-morbidities listed above, severity of signs and symptoms as outlined, requirement for current medical therapies and most importantly because of direct risk to patient if care not provided in a hospital setting. Expected length of stay > 48 hours. Aneesh Hutton MD  10/26/2022  3:30 PM    Copy sent to Dr. Cho primary care provider on file. Please note that this chart was generated using voice recognition Dragon dictation software. Although every effort was made to ensure the accuracy of this automated transcription, some errors in transcription may have occurred.

## 2022-10-26 NOTE — PROGRESS NOTES
Physician Progress Note      Keena Rodriguez  CSN #:                  [id number]  :                       1990  ADMIT DATE:       10/19/2022 4:05 PM  [address] [place] DATE:  RESPONDING  PROVIDER #:        Aneesh Hutton MD          QUERY TEXT:    Pt admitted w/ abd pain, N&V. Noted documentation of acute pancreatitis in   10/20 H&P & \"Mild elevation of the pancreatic enzymes related to renal failure   does not appear to be related to pancreatitis\" in GI Consult 10/20. Additional documentation & CT reports note diabetic autonomic neuropathy,   hydronephrosis, gastroenteritis, colitis, esophagitis, & IBS. If possible,   please document in PNs & d/c summary if:    The medical record reflects the following:  Risk Factors: Uncontolled Type I DM, ESRD on HD, & diabetic autonomic   neuropathy. Clinical Indicators: ED Provider Note 10/19: presents w/ Abd Pain - appears   uncomfortable. Pain is suprapubic. CT showed distended bladder. Admitted for   acute hydronephrosis & urinary retention. H&P 10/20: Admitted for   Pancreatitis. Acute urinary retention requiring barros placement in ED. Elevated lipase. Clinical diagnosis of acute pancreatitis. GI Consult 10/20:   Gastroenteritis-like symptoms abd pain, N&V. Mild elevation of the pancreatic   enzymes related to ESRD does not appear to be related to pancreatitis. Neuro   Consult 10/24: Presented w/ abd pain, was initially suspec  Treatment: GI consult, CT scans, IV pain meds & antiemetics.   Options provided:  -- Acute pancreatitis confirmed present on admission  -- Pancreatitis r/o, symptoms d/t, Please document etiology of abd pain, N&V.  -- Other - I will add my own diagnosis  -- Disagree - Not applicable / Not valid  -- Disagree - Clinically unable to determine / Unknown  -- Refer to Clinical Documentation Reviewer    PROVIDER RESPONSE TEXT:    The diagnosis of pancreatitis was r/o, symptoms d/t Nonspecific symptoms    Query created by: Lyly Warner, Seth Munoz on 10/25/2022 10:10 AM      QUERY TEXT:    Pt admitted w/ abd pain, nausea, & vomiting. Pt noted to have AMS documented. If possible, please document in the PNs & D/C summary if you are evaluating   &/or treating any of the following: The medical record reflects the following:  Risk Factors: PMH of ESRD on HD & chronic opiate dependence. Clinical Indicators: ED Provider Note 10/19: Uncomfortable appearing &   intermittently answering questions, but mostly stating she is in pain. H&P   10/20: She is experiencing some AMS, unable to hold a conversation regarding   her condition. She is able to stay awake for 1-2 word answer but is not   consistent. Clinical diagnosis of acute pancreatitis. Discontinue IV Dilaudid   & oral [place]. GI Consult 10/20: She is not able to communicate very well, very   drowsy. IM PN 10/21: Pt c/o abd pain asking for IV Dilaudid. Pt has   dependence on opioid & abuse. IM PN 10/23: Sleepy, did not re  Treatment: Neuro Consult. 100 ml bolus w/ IVFs @ 50 ml/ hr started 10/24. All   pain meds d/c'd 10/21  Options provided:  -- Drug-induced encephalopathy d/t opiates  -- Metabolic encephalopathy  -- Toxic encephalopathy  -- Toxic metabolic encephalopathy  -- Other - I will add my own diagnosis  -- Disagree - Not applicable / Not valid  -- Disagree - Clinically unable to determine / Unknown  -- Refer to Clinical Documentation Reviewer    PROVIDER RESPONSE TEXT:    This pt has metabolic encephalopathy.     Query created by: Joy Dietrich on 10/25/2022 10:33 AM      Electronically signed by:  Jeromy Stover MD 10/26/2022 8:47 AM

## 2022-10-26 NOTE — PROGRESS NOTES
Department of Psychiatry  Behavioral Health Consult    REASON FOR CONSULT: Depression and lack of appetite    CONSULTING PHYSICIAN: Dr. Machado Banner    History obtained from: patient and chart    Interim history  The patient was seen at bedside. She has been vomiting. She is complaining of abdominal pain. She is in distress. The patient has difficulty sleeping at night. She has not had any side effects from Remeron. No changes have been made to her medication. She was reassured. We will continue to follow    HISTORY OF PRESENT ILLNESS:    The patient is a 28 y.o. female with significant past psychiatric history of Depression who presents with abdominal pain. I discussed the patient with the primary team.  The patient has a history of opioid seeking behavior the patient has had a lack of appetite. The patient medical history significant for diabetes mellitus and ESRD on hemodialysis    The patient was seen at bedside. She was somnolent. She was able to wake up for me and answer my questions. The patient does not have any specific medical complaints at this time. She was oriented to time place and person. She was able to give me a reasonable account of her medical history though she was unmotivated. She admits that she does not have an appetite. She minimizes her depressive symptoms. She denies any past psychiatry history. She also denies a history of drug abuse. The patient denies any auditory or visual hallucinations or psychotic phenomenon. She denies any suicidal thoughts      The patient  is currently receiving care for the above psychiatric illness.       Psychiatric Review of Systems           Obsessions and Compulsions: Denies       Lenora or Hypomania: Denies     Hallucinations: Denies     Panic Attacks:  Denies     Delusions:  Denies     Phobias:  Denies     Trauma: Denies      Substance Abuse History:  Denies any history of alcohol or recreational substance use but this is in contradiction to reported history of drug-seeking behavior      Past Psychiatric History:  The patient has recently been started on Remeron. She denies any history of admission to psychiatry or suicide attempts    Personal History: The patient claims to have a stable living situation. She is living with her boyfriend and her son. She only has 1 child.   She is on disability    Past Medical History:        Diagnosis Date    Asthma     Atherosclerosis of native arteries of extremities with rest pain, unspecified extremity ([facility] [address]     Depression 10/25/2022    Diabetes mellitus ([facility]     since 12 y/o, unsure if type 1 or 2    Headache, migraine     for 2 years    Hypertension     Lichen simplex chronicus     Noninflammatory disorder of vagina, unspecified     Patient's noncompliance with other medical treatment and regimen for other reason     Type 2 diabetes mellitus without complication ([facility] 14/46/4126       Past Surgical History:        Procedure Laterality Date    OVARY REMOVAL      July 2010    OVARY REMOVAL           Medications Prior to Admission:   Medications Prior to Admission: aspirin 81 MG EC tablet, Take 81 mg by mouth daily  atorvastatin (LIPITOR) 10 MG tablet, Take 10 mg by mouth at bedtime  calcium acetate (PHOSLO) 667 MG CAPS capsule, Take 3 capsules by mouth 3 times daily (with meals)  carvedilol (COREG) 25 MG tablet, Take 25 mg by mouth 2 times daily  vitamin D (CHOLECALCIFEROL) 125 MCG (5000 UT) CAPS capsule, Take 5,000 Units by mouth daily  clopidogrel (PLAVIX) 75 MG tablet, Take 75 mg by mouth daily  escitalopram (LEXAPRO) 10 MG tablet, Take 10 mg by mouth daily  hydrALAZINE (APRESOLINE) 100 MG tablet, Take 100 mg by mouth 3 times daily  insulin detemir (LEVEMIR FLEXTOUCH) 100 UNIT/ML injection pen, Inject 24 Units into the skin nightly  midodrine (PROAMATINE) 10 MG tablet, Take 30 mg by mouth daily Indications: Mon, Wed, Fri at dialysis  NIFEdipine (ADALAT CC) 90 MG extended release tablet, Take 90 mg by mouth daily  sennosides-docusate sodium (SENOKOT-S) 8.6-50 MG tablet, Take 2 tablets by mouth at bedtime  B Complex-C-Folic Acid (TRIPHROCAPS) 1 MG CAPS, Take 1 capsule by mouth daily  patiromer sorbitex calcium (VELTASSA) 8.4 g PACK packet, Take 8.4 g by mouth Twice a Week Indications: twice per day on Thursdays and Sundays  acetaminophen (TYLENOL) 325 MG tablet, Take 650 mg by mouth every 6 hours as needed for Pain or Fever (do not exceed 3 gm in 24 hours)  albuterol sulfate HFA (PROVENTIL;VENTOLIN;PROAIR) 108 (90 Base) MCG/ACT inhaler, Inhale 2 puffs into the lungs every 6 hours as needed for Wheezing or Shortness of Breath  polyethylene glycol (GLYCOLAX) 17 GM/SCOOP powder, Take 17 g by mouth daily as needed (constipation)    Allergies:  Morphine and Januvia [sitagliptin]    FAMILY/SOCIAL HISTORY:  Family History   Problem Relation Age of Onset    High Blood Pressure Mother     Diabetes Mother     Other Mother 27        Lung cancer    High Blood Pressure Father     Diabetes Father     Other Father 27        Prostate cancer     Social History     Socioeconomic History    Marital status: Single     Spouse name: Not on file    Number of children: Not on file    Years of education: Not on file    Highest education level: Not on file   Occupational History    Not on file   Tobacco Use    Smoking status: Never    Smokeless tobacco: Never   Substance and Sexual Activity    Alcohol use: No    Drug use: No    Sexual activity: Not on file   Other Topics Concern    Not on file   Social History Narrative    Not on file     Social Determinants of Health     Financial Resource Strain: Not on file   Food Insecurity: Not on file   Transportation Needs: Not on file   Physical Activity: Not on file   Stress: Not on file   Social Connections: Not on file   Intimate Partner Violence: Not on file   Housing Stability: Not on file       REVIEW OF SYSTEMS    Constitutional: [] fever  [] chills  [] weight loss  []weakness [] Other:  Eyes:  [] photophobia  [] discharge [] acuity change   [] Diplopia   [] Other:  HENT:  [] sore throat  [] ear pain [] Tinnitus   [] Other  Respiratory:  [] Cough  [] Shortness of breath   [] Sputum   [] Other:   Cardiac: []Chest pain   []Palpitations []Edema  []PND  [] Other:  GI:  []Abdominal pain   []Nausea  []Vomiting  []Diarrhea  [] Other:  :  [] Dysuria   []Frequency  []Hematuria  []Discharge  [] Other:  Possible Pregnancy: []Yes   []No   LMP:   Musculoskeletal:  []Back pain  []Neck pain  []Recent Injury   Skin:  []Rash  [] Itching  [] Other:  Neurologic:  [] Headache  [] Focal weakness  [] Sensory changes []Other:  Endocrine:  [] Polyuria  [] Polydipsia  [] Hair Loss  [] Other:  Lymphatic:   [] Swollen glands   Psychiatric:  As per HPI      All other systems negative except as marked or mentioned/indicated in the HPI. Tanda Bun      PHYSICAL EXAM:  Vitals:  /69   Pulse 81   Temp 96.9 °F (36.1 °C)   Resp 18   Ht 5' 4.02\" (1.626 m)   Wt 179 lb 10.8 oz (81.5 kg)   LMP  (LMP Unknown)   SpO2 98%   BMI 30.83 kg/m²      Neuro Exam:      Involuntary Movements: No    Mental Status Examination:    Level of consciousness:   Appearance:  hospital attire  Behavior/Motor:  no abnormalities noted  Attitude toward examiner: [address] engagement  Speech:  slow and increased latency   Mood: Anxious, depressed and distressed  Affect: Distressed  Thought processes:  slow   Thought content:  Suicidal Ideation:  denies suicidal ideation  Delusions:  no evidence of delusions  Perceptual Disturbance:  denies any perceptual disturbance  Cognition:  oriented to person, place, and time   Concentration intact  Memory intact  Insight fair   Judgement poor   Fund of Knowledge adequate        LABS: REVIEWED TODAY:  Recent Labs     10/24/22  1826 10/26/22  0814   WBC  --  7.1   HGB 10.7* 9.2*   PLT  --  390     Recent Labs     10/26/22  0814      K 4.1      CO2 26   BUN 32*   CREATININE 6.45*   GLUCOSE 194*     No results for input(s): BILITOT, ALKPHOS, AST, ALT in the last 72 hours. No results found for: Avril Locket, LABBENZ, CANNAB, COCAINESCRN, LABMETH, OPIATESCREENURINE, PHENCYCLIDINESCREENURINE, PPXUR, ETOH  Lab Results   Component Value Date/Time    TSH 1.04 10/23/2022 12:06 PM     No results found for: LITHIUM  No results found for: VALPROATE, CBMZ  No results found for: LITHIUM, VALPROATE    FURTHER LABS ORDERED :      Radiology   CT ABDOMEN PELVIS WO CONTRAST Additional Contrast? None      CT HEAD WO CONTRAST    Result Date: 10/20/2022  EXAMINATION: CT OF THE HEAD WITHOUT CONTRAST  10/19/2022 6:19 pm TECHNIQUE: CT of the head was performed without the administration of intravenous contrast. Automated exposure control, iterative reconstruction, and/or weight based adjustment of the mA/kV was utilized to reduce the radiation dose to as low as reasonably achievable. COMPARISON: 10/10/2022 HISTORY: ORDERING SYSTEM PROVIDED HISTORY: unwitnessed fall on Saturday, on plavix TECHNOLOGIST PROVIDED HISTORY: unwitnessed fall on saturday, on plavix Decision Support Exception - unselect if not a suspected or confirmed emergency medical condition->Emergency Medical Condition (MA) Is the patient pregnant?->No Reason for Exam: AMS, unwittnessed fall last Saturday FINDINGS: BRAIN/VENTRICLES: No convincing evidence of intracranial hemorrhage, mass effect or midline shift seen. No evidence of acute territorial infarct is seen. ORBITS: The visualized portion of the orbits demonstrate no acute abnormality. SINUSES: The visualized paranasal sinuses and mastoid air cells demonstrate no acute abnormality. SOFT TISSUES/SKULL:  No acute abnormality of the visualized skull or soft tissues. No convincing CT evidence of intracranial hemorrhage, mass effect or acute territorial infarct seen. RECOMMENDATIONS: If there is persistent clinical concern, consider short-term follow-up examination.      CT HEAD WO CONTRAST    Result Date: 10/10/2022  EXAMINATION: CT OF THE HEAD WITHOUT CONTRAST  10/10/2022 7:37 pm TECHNIQUE: CT of the head was performed without the administration of intravenous contrast. Automated exposure control, iterative reconstruction, and/or weight based adjustment of the mA/kV was utilized to reduce the radiation dose to as low as reasonably achievable. COMPARISON: None. HISTORY: ORDERING SYSTEM PROVIDED HISTORY: AMS TECHNOLOGIST PROVIDED HISTORY: AMS Decision Support Exception - unselect if not a suspected or confirmed emergency medical condition->Emergency Medical Condition (MA) Is the patient pregnant?->No Reason for Exam: Altered Mental Status Additional signs and symptoms: dialysis patient FINDINGS: BRAIN/VENTRICLES: The gyri and sulci have a normal appearance. Ventricles and extra-axial spaces appear normal. The gray-white matter differentiation is preserved throughout. There is no acute intracranial hemorrhage. No intra-axial or extra-axial mass or findings of mass effect. No shift of midline structures. No abnormal extra-axial fluid collections. No acute territorial infarct. ORBITS: The visualized portion of the orbits demonstrate no acute abnormality. SINUSES: The mastoid air cells are normally aerated. The visualized paranasal sinuses are grossly clear. SOFT TISSUES/SKULL: No significant abnormality of the visualized skull or soft tissues. No acute fracture. No scalp hematoma. No evidence of acute intracranial process. DIAGNOSIS:  Depression, unspecified  Rule out delirium        RISK ASSESSMENT: low risk of suicide or harm to others      RECOMMENDATIONS  Disposition: No indication for admission to psychiatry  Risk Management:  routine:  no special precautions necessary    Medications: Continue Remeron 15 mg nightly.   Recommend outpatient follow-up  Discussed with the treating physician/ team about the patient and treatment plan  Reviewed the chart    Discussed with the patient risk, benefit, alternative and common side effects for the  proposed medication treatment. Patient is consenting to the treatment. Thanks for the consult. Please call me if needed. Electronically signed by Sheela Stout MD on 10/26/2022 at 6:05 PM    Please note that this chart was generated using voice recognition Dragon dictation software. Although every effort was made to ensure the accuracy of this automated transcription, some errors in transcription may have occurred.

## 2022-10-26 NOTE — CONSULTS
Department of Psychiatry  Behavioral Health Consult    REASON FOR CONSULT: Depression and lack of appetite    CONSULTING PHYSICIAN: Dr. Grzegorz Orantes    History obtained from: patient and chart    HISTORY OF PRESENT ILLNESS:    The patient is a 28 y.o. female with significant past psychiatric history of Depression who presents with abdominal pain. I discussed the patient with the primary team.  The patient has a history of opioid seeking behavior the patient has had a lack of appetite. The patient medical history significant for diabetes mellitus and ESRD on hemodialysis    The patient was seen at bedside. She was somnolent. She was able to wake up for me and answer my questions. The patient does not have any specific medical complaints at this time. She was oriented to time place and person. She was able to give me a reasonable account of her medical history though she was unmotivated. She admits that she does not have an appetite. She minimizes her depressive symptoms. She denies any past psychiatry history. She also denies a history of drug abuse. The patient denies any auditory or visual hallucinations or psychotic phenomenon. She denies any suicidal thoughts      The patient  is currently receiving care for the above psychiatric illness. Psychiatric Review of Systems           Obsessions and Compulsions: Denies       Lenora or Hypomania: Denies     Hallucinations: Denies     Panic Attacks:  Denies     Delusions:  Denies     Phobias:  Denies     Trauma: Denies      Substance Abuse History:  Denies any history of alcohol or recreational substance use but this is in contradiction to reported history of drug-seeking behavior      Past Psychiatric History:  The patient has recently been started on Remeron. She denies any history of admission to psychiatry or suicide attempts    Personal History: The patient claims to have a stable living situation. She is living with her boyfriend and her son. She only has 1 child.   She is on disability    Past Medical History:        Diagnosis Date    Asthma     Atherosclerosis of native arteries of extremities with rest pain, unspecified extremity ([facility]     Diabetes mellitus ([facility]     since 12 y/o, unsure if type 1 or 2    Headache, migraine     for 2 years    Hypertension     Lichen simplex chronicus     Noninflammatory disorder of vagina, unspecified     Patient's noncompliance with other medical treatment and regimen for other reason     Type 2 diabetes mellitus without complication ([facility] 73/67/1172       Past Surgical History:        Procedure Laterality Date    OVARY REMOVAL      July 2010    OVARY REMOVAL           Medications Prior to Admission:   Medications Prior to Admission: aspirin 81 MG EC tablet, Take 81 mg by mouth daily  atorvastatin (LIPITOR) 10 MG tablet, Take 10 mg by mouth at bedtime  calcium acetate (PHOSLO) 667 MG CAPS capsule, Take 3 capsules by mouth 3 times daily (with meals)  carvedilol (COREG) 25 MG tablet, Take 25 mg by mouth 2 times daily  vitamin D (CHOLECALCIFEROL) 125 MCG (5000 UT) CAPS capsule, Take 5,000 Units by mouth daily  clopidogrel (PLAVIX) 75 MG tablet, Take 75 mg by mouth daily  escitalopram (LEXAPRO) 10 MG tablet, Take 10 mg by mouth daily  hydrALAZINE (APRESOLINE) 100 MG tablet, Take 100 mg by mouth 3 times daily  insulin detemir (LEVEMIR FLEXTOUCH) 100 UNIT/ML injection pen, Inject 24 Units into the skin nightly  midodrine (PROAMATINE) 10 MG tablet, Take 30 mg by mouth daily Indications: Mon, Wed, Fri at dialysis  NIFEdipine (ADALAT CC) 90 MG extended release tablet, Take 90 mg by mouth daily  sennosides-docusate sodium (SENOKOT-S) 8.6-50 MG tablet, Take 2 tablets by mouth at bedtime  B Complex-C-Folic Acid (TRIPHROCAPS) 1 MG CAPS, Take 1 capsule by mouth daily  patiromer sorbitex calcium (VELTASSA) 8.4 g PACK packet, Take 8.4 g by mouth Twice a Week Indications: twice per day on Thursdays and Sundays  acetaminophen (TYLENOL) 325 MG tablet, Take 650 mg by mouth every 6 hours as needed for Pain or Fever (do not exceed 3 gm in 24 hours)  albuterol sulfate HFA (PROVENTIL;VENTOLIN;PROAIR) 108 (90 Base) MCG/ACT inhaler, Inhale 2 puffs into the lungs every 6 hours as needed for Wheezing or Shortness of Breath  polyethylene glycol (GLYCOLAX) 17 GM/SCOOP powder, Take 17 g by mouth daily as needed (constipation)    Allergies:  Morphine and Januvia [sitagliptin]    FAMILY/SOCIAL HISTORY:  Family History   Problem Relation Age of Onset    High Blood Pressure Mother     Diabetes Mother     Other Mother 27        Lung cancer    High Blood Pressure Father     Diabetes Father     Other Father 27        Prostate cancer     Social History     Socioeconomic History    Marital status: Single     Spouse name: Not on file    Number of children: Not on file    Years of education: Not on file    Highest education level: Not on file   Occupational History    Not on file   Tobacco Use    Smoking status: Never    Smokeless tobacco: Never   Substance and Sexual Activity    Alcohol use: No    Drug use: No    Sexual activity: Not on file   Other Topics Concern    Not on file   Social History Narrative    Not on file     Social Determinants of Health     Financial Resource Strain: Not on file   Food Insecurity: Not on file   Transportation Needs: Not on file   Physical Activity: Not on file   Stress: Not on file   Social Connections: Not on file   Intimate Partner Violence: Not on file   Housing Stability: Not on file       REVIEW OF SYSTEMS    Constitutional: [] fever  [] chills  [] weight loss  []weakness [] Other:  Eyes:  [] photophobia  [] discharge [] acuity change   [] Diplopia   [] Other:  HENT:  [] sore throat  [] ear pain [] Tinnitus   [] Other  Respiratory:  [] Cough  [] Shortness of breath   [] Sputum   [] Other:   Cardiac: []Chest pain   []Palpitations []Edema  []PND  [] Other:  GI:  []Abdominal pain   []Nausea  []Vomiting  []Diarrhea  [] Other:  :  [] Dysuria   []Frequency  []Hematuria  []Discharge  [] Other:  Possible Pregnancy: []Yes   []No   LMP:   Musculoskeletal:  []Back pain  []Neck pain  []Recent Injury   Skin:  []Rash  [] Itching  [] Other:  Neurologic:  [] Headache  [] Focal weakness  [] Sensory changes []Other:  Endocrine:  [] Polyuria  [] Polydipsia  [] Hair Loss  [] Other:  Lymphatic:   [] Swollen glands   Psychiatric:  As per HPI      All other systems negative except as marked or mentioned/indicated in the HPI. Liana Merchant PHYSICAL EXAM:  Vitals:  /61   Pulse 85   Temp 98.1 °F (36.7 °C) (Oral)   Resp 16   Ht 5' 4.02\" (1.626 m)   Wt 183 lb 3.2 oz (83.1 kg)   LMP  (LMP Unknown)   SpO2 97%   BMI 31.43 kg/m²      Neuro Exam:      Involuntary Movements: No    Mental Status Examination:    Level of consciousness:  somnolent and lethargic   Appearance:  hospital attire  Behavior/Motor:  no abnormalities noted  Attitude toward examiner:  withdrawn  Speech:  slow and increased latency   Mood: depressed  Affect:  flat  Thought processes:  slow   Thought content:  Suicidal Ideation:  denies suicidal ideation  Delusions:  no evidence of delusions  Perceptual Disturbance:  denies any perceptual disturbance  Cognition:  oriented to person, place, and time   Concentration intact  Memory intact  Insight fair   Judgement poor   Fund of Knowledge adequate        LABS: REVIEWED TODAY:  Recent Labs     10/24/22  1826   HGB 10.7*     No results for input(s): NA, K, CL, CO2, BUN, CREATININE, GLUCOSE in the last 72 hours. No results for input(s): BILITOT, ALKPHOS, AST, ALT in the last 72 hours.   No results found for: Lei Eaves, BARBSCNU, LABBENZ, CANNAB, COCAINESCRN, LABMETH, OPIATESCREENURINE, PHENCYCLIDINESCREENURINE, PPXUR, ETOH  Lab Results   Component Value Date/Time    TSH 1.04 10/23/2022 12:06 PM     No results found for: LITHIUM  No results found for: VALPROATE, CBMZ  No results found for: LITHIUM, VALPROATE    FURTHER LABS ORDERED :      Radiology   CT ABDOMEN PELVIS WO CONTRAST Additional Contrast? None    Result Date: 10/21/2022  EXAMINATION: CT OF THE ABDOMEN AND PELVIS WITHOUT CONTRAST 10/19/2022 6:19 pm TECHNIQUE: CT of the abdomen and pelvis was performed without the administration of intravenous contrast. Multiplanar reformatted images are provided for review. Automated exposure control, iterative reconstruction, and/or weight based adjustment of the mA/kV was utilized to reduce the radiation dose to as low as reasonably achievable. COMPARISON: CTA dated 10/10/2022. HISTORY: ORDERING SYSTEM PROVIDED HISTORY: abdominal pain, recent bleed TECHNOLOGIST PROVIDED HISTORY: abdominal pain, recent bleed Decision Support Exception - unselect if not a suspected or confirmed emergency medical condition->Emergency Medical Condition ([place]) Is the patient pregnant?->No Reason for Exam: abdominal pain, recent bleed FINDINGS: Evaluation is partially limited due to patient motion artifact. Lower Chest: Mild scarring/atelectatic changes are seen at the left lung base. Organs: Evaluation of visceral organs is limited without the benefit of intravenous contrast.  The attenuation of the liver appears within normal limits. No contour deforming hepatic mass is seen. The patient is again noted be status post cholecystectomy. The spleen is not enlarged. The adrenal glands appear within normal limits. There is mild right and minimal left hydroureteronephrosis. GI/Bowel: There is contrast material seen in the rectum and the colon. There is suggestion of colonic wall thickening of the mid transverse and descending colonic loop. The patient appears to be status post appendectomy. No evidence of bowel obstruction seen. Pelvis: The urinary bladder is distended containing contrast.  The uterus appears anteverted. Peritoneum/Retroperitoneum: No evidence of intra-abdominal free air is seen. No evidence of ascites is seen. The abdominal aorta is normal in caliber.  Bones/Soft Tissues: Vascular calcification changes are seen which are more advanced than expected findings of the patient's age. There is 2.7 x 1.5 cm ill-defined density seen within the left groin region beneath the skin staples. This can conceivably represent liquefying hematoma. There are adjacent borderline prominent left inguinal lymph nodes, which may be reactive in origin. Limited noncontrast examination. Mild right and minimal left hydronephrosis, which may be due to distended urinary bladder containing contrast material.  Consider Mcnair catheter placement as clinically warranted. Suggestion of colonic wall thickening of the mid transverse and descending colonic loops, which may have been exaggerated by underdistention but may reflect underlying infectious/inflammatory colitis. 2.7 cm ill-defined density seen within the left groin region beneath the skin staples, which may reflect liquefying hematoma. Correlate clinically with signs of infection as there are adjacent inflammatory changes and prominent left inguinal lymph nodes. CT HEAD WO CONTRAST    Result Date: 10/20/2022  EXAMINATION: CT OF THE HEAD WITHOUT CONTRAST  10/19/2022 6:19 pm TECHNIQUE: CT of the head was performed without the administration of intravenous contrast. Automated exposure control, iterative reconstruction, and/or weight based adjustment of the mA/kV was utilized to reduce the radiation dose to as low as reasonably achievable. COMPARISON: 10/10/2022 HISTORY: ORDERING SYSTEM PROVIDED HISTORY: unwitnessed fall on Saturday, on plavix TECHNOLOGIST PROVIDED HISTORY: unwitnessed fall on saturday, on plavix Decision Support Exception - unselect if not a suspected or confirmed emergency medical condition->Emergency Medical Condition ([place]) Is the patient pregnant?->No Reason for Exam: AMS, unwittnessed fall last Saturday FINDINGS: BRAIN/VENTRICLES: No convincing evidence of intracranial hemorrhage, mass effect or midline shift seen.   No evidence of acute territorial infarct is seen. ORBITS: The visualized portion of the orbits demonstrate no acute abnormality. SINUSES: The visualized paranasal sinuses and mastoid air cells demonstrate no acute abnormality. SOFT TISSUES/SKULL:  No acute abnormality of the visualized skull or soft tissues. No convincing CT evidence of intracranial hemorrhage, mass effect or acute territorial infarct seen. RECOMMENDATIONS: If there is persistent clinical concern, consider short-term follow-up examination. CT HEAD WO CONTRAST    Result Date: 10/10/2022  EXAMINATION: CT OF THE HEAD WITHOUT CONTRAST  10/10/2022 7:37 pm TECHNIQUE: CT of the head was performed without the administration of intravenous contrast. Automated exposure control, iterative reconstruction, and/or weight based adjustment of the mA/kV was utilized to reduce the radiation dose to as low as reasonably achievable. COMPARISON: None. HISTORY: ORDERING SYSTEM PROVIDED HISTORY: AMS TECHNOLOGIST PROVIDED HISTORY: AMS Decision Support Exception - unselect if not a suspected or confirmed emergency medical condition->Emergency Medical Condition ([place]) Is the patient pregnant?->No Reason for Exam: Altered Mental Status Additional signs and symptoms: dialysis patient FINDINGS: BRAIN/VENTRICLES: The gyri and sulci have a normal appearance. Ventricles and extra-axial spaces appear normal. The gray-white matter differentiation is preserved throughout. There is no acute intracranial hemorrhage. No intra-axial or extra-axial mass or findings of mass effect. No shift of midline structures. No abnormal extra-axial fluid collections. No acute territorial infarct. ORBITS: The visualized portion of the orbits demonstrate no acute abnormality. SINUSES: The mastoid air cells are normally aerated. The visualized paranasal sinuses are grossly clear. SOFT TISSUES/SKULL: No significant abnormality of the visualized skull or soft tissues. No acute fracture. No scalp hematoma. No evidence of acute intracranial process. CT CHEST PULMONARY EMBOLISM W CONTRAST    Result Date: 10/23/2022  EXAMINATION: CTA OF THE CHEST 10/23/2022 1:22 pm TECHNIQUE: CTA of the chest was performed after the administration of intravenous contrast.  Multiplanar reformatted images are provided for review. MIP images are provided for review. Automated exposure control, iterative reconstruction, and/or weight based adjustment of the mA/kV was utilized to reduce the radiation dose to as low as reasonably achievable. COMPARISON: None. HISTORY: ORDERING SYSTEM PROVIDED HISTORY: r/o PE TECHNOLOGIST PROVIDED HISTORY: r/o PE Reason for Exam: r/o PE History of asthma, diabetes, and pancreatitis. FINDINGS: Pulmonary Arteries: Pulmonary arteries are adequately opacified for evaluation. No evidence of intraluminal filling defect to suggest pulmonary embolism. Main pulmonary artery is normal in caliber. Mediastinum: No evidence of mediastinal lymphadenopathy. The heart and pericardium demonstrate no acute abnormality; LV size and wall thickness upper limits-borderline enlarged. No pericardial effusion. There is no acute abnormality of the thoracic aorta. Concentric esophageal wall thickening suspected mid-lower segments. Lungs/pleura: The lungs are without definite acute process. No focal consolidation or pulmonary edema. Several small foci subtle tree-in-bud or ground-glass identified, best seen RML, superior segment RLL, latter associated with minimal atelectasis and mosaic attenuation. Mild bronchial dilatation/wall thickening suspected. No evidence of pleural effusion or pneumothorax. Upper Abdomen: Limited images of the upper abdomen are unremarkable. Soft Tissues/Bones: No acute bone or soft tissue abnormality. No evidence of pulmonary embolism. Mild bronchitis and subtle findings RML/RLL suggesting mild patchy bronchiolitis. Correlate clinically. No consolidation, or large pleural effusion. Esophageal wall thickening; correlate for esophagitis. CTA ABDOMEN PELVIS W CONTRAST    Addendum Date: 10/10/2022    ADDENDUM: Findings were discussed with Dr. Pamela Walker. Diffuse presacral edema is present and there is also a moderate stenosis of the right renal artery, findings which were not mentioned in the original report. Result Date: 10/10/2022  EXAMINATION: CTA OF THE ABDOMEN AND PELVIS WITH CONTRAST 10/10/2022 7:40 pm: TECHNIQUE: CTA of the abdomen and pelvis was performed with the administration of intravenous contrast. Multiplanar reformatted images are provided for review. MIP images are provided for review. Automated exposure control, iterative reconstruction, and/or weight based adjustment of the mA/kV was utilized to reduce the radiation dose to as low as reasonably achievable. COMPARISON: None. HISTORY: ORDERING SYSTEM PROVIDED HISTORY: GI bleed TECHNOLOGIST PROVIDED HISTORY: GI bleed Decision Support Exception - unselect if not a suspected or confirmed emergency medical condition->Emergency Medical Condition ([place]) Reason for Exam: GI bleed Additional signs and symptoms: dialysis patient. fyi - patient did not drink oral contrast Relevant Medical/Surgical History: ovary removal FINDINGS: Lower Chest: The heart size is normal.  Minimal atelectasis is present at the visualized lung bases. Vascular: The abdominal aorta and the bilateral common iliac, internal iliac, and external iliac arteries are patent and of normal caliber, without evidence of aneurysm, significant stenosis, or acute abnormality. Mild stenosis of the proximal right common iliac artery with less than 50% luminal narrowing. The celiac, superior mesenteric, inferior mesenteric, and renal arteries are all patent and of normal caliber, without significant stenosis or vessel irregularity. The visualized mesenteric artery branch vessels are patent and free of thrombus.   Delayed phase imaging shows wide patency of the mesenteric, splenic, portal, and hepatic veins which are all normal in caliber and free of thrombus. A small focus of active bleeding is present within the rectal lumen distally on the right (series 4, image 177) which arises from the anterior or right lateral aspect. Organs: The liver, spleen, pancreas, kidneys, and adrenal glands appear normal. The gallbladder is surgically absent. GI/Bowel: Moderate colonic and rectal stool. The stomach and the small and large bowel loops are otherwise normal in caliber, contour, and morphology. No dilated loops or areas of bowel wall thickening. No appreciable colonic or rectal mass. Peritoneum/Retroperitoneum: There is no free fluid or extraluminal gas. No mesenteric, retroperitoneal, or body wall hematoma. No enlarged or suspicious mesenteric or retroperitoneal lymphadenopathy. Pelvis: No pelvic free fluid or fluid collection. There are scattered small bilateral inguinal region lymph nodes which are nonspecific and not abnormally enlarged. No appreciable uterine or adnexal abnormality. The urinary bladder is fluid-filled and moderately distended. Bones/Soft Tissues: No significant osseous abnormality. There are post-surgical changes of the left inguinal region lead to superficial femoral artery bypass surgery. The metallic skin staples in place. No pseudoaneurysm or significant groin hematoma. Severe stenosis of the visualized right superficial femoral artery. Moderate amount of colonic and rectal stool. Diffuse rectal mucosal enhancement with a small focus of active bleeding within the distal rectum and pooling of blood in the dependent rectal lumen on delayed phase imaging. No significant mesenteric artery occlusive disease. Mild stenosis of the right common iliac artery.             DIAGNOSIS:  Depression, unspecified  Rule out delirium        RISK ASSESSMENT: low risk of suicide or harm to others      RECOMMENDATIONS  Disposition: No indication for admission to psychiatry  Risk Management:  routine:  no special precautions necessary    Medications: Continue Remeron 15 mg nightly. Recommend outpatient follow-up  Discussed with the treating physician/ team about the patient and treatment plan  Reviewed the chart    Discussed with the patient risk, benefit, alternative and common side effects for the  proposed medication treatment. Patient is consenting to the treatment. Thanks for the consult. Please call me if needed. Electronically signed by Sherlyn Victoria MD on 10/25/2022 at 9:59 PM    Please note that this chart was generated using voice recognition Dragon dictation software. Although every effort was made to ensure the accuracy of this automated transcription, some errors in transcription may have occurred.

## 2022-10-26 NOTE — PROGRESS NOTES
AM meds not given due to pt going to dialysis. Pt was in dialysis and started to vomit. Emesis continued after dialysis. Writer was unable to administer any PO meds due to same. Writer obtained orders to increase frequency of Zofran.

## 2022-10-27 ENCOUNTER — APPOINTMENT (OUTPATIENT)
Dept: CT IMAGING | Age: 32
DRG: 368 | End: 2022-10-27
Payer: MEDICARE

## 2022-10-27 PROBLEM — K59.00 CONSTIPATION: Status: ACTIVE | Noted: 2022-10-27

## 2022-10-27 PROBLEM — E43 SEVERE MALNUTRITION (HCC): Status: ACTIVE | Noted: 2022-10-27

## 2022-10-27 LAB
BACTERIA: ABNORMAL
BILIRUBIN URINE: ABNORMAL
CASTS UA: ABNORMAL /LPF
COLOR: ABNORMAL
EPITHELIAL CELLS UA: ABNORMAL /HPF
FERRITIN: 406 NG/ML (ref 13–150)
GLUCOSE BLD-MCNC: 106 MG/DL (ref 65–105)
GLUCOSE BLD-MCNC: 110 MG/DL (ref 65–105)
GLUCOSE BLD-MCNC: 112 MG/DL (ref 65–105)
GLUCOSE BLD-MCNC: 94 MG/DL (ref 65–105)
GLUCOSE URINE: NEGATIVE
IRON SATURATION: 22 % (ref 20–55)
IRON: 38 UG/DL (ref 37–145)
KETONES, URINE: ABNORMAL
LEUKOCYTE ESTERASE, URINE: ABNORMAL
LIPASE: 162 U/L (ref 13–60)
NITRITE, URINE: NEGATIVE
PH UA: 5 (ref 5–8)
PROTEIN UA: ABNORMAL
RBC UA: ABNORMAL /HPF
SPECIFIC GRAVITY UA: 1.03 (ref 1–1.03)
TOTAL IRON BINDING CAPACITY: 173 UG/DL (ref 250–450)
TURBIDITY: ABNORMAL
UNSATURATED IRON BINDING CAPACITY: 135 UG/DL (ref 112–347)
URINE HGB: NEGATIVE
UROBILINOGEN, URINE: NORMAL
WBC UA: ABNORMAL /HPF

## 2022-10-27 PROCEDURE — 2500000003 HC RX 250 WO HCPCS: Performed by: INTERNAL MEDICINE

## 2022-10-27 PROCEDURE — 6360000002 HC RX W HCPCS: Performed by: INTERNAL MEDICINE

## 2022-10-27 PROCEDURE — APPSS30 APP SPLIT SHARED TIME 16-30 MINUTES: Performed by: NURSE PRACTITIONER

## 2022-10-27 PROCEDURE — 6370000000 HC RX 637 (ALT 250 FOR IP): Performed by: NURSE PRACTITIONER

## 2022-10-27 PROCEDURE — 82947 ASSAY GLUCOSE BLOOD QUANT: CPT

## 2022-10-27 PROCEDURE — 51702 INSERT TEMP BLADDER CATH: CPT

## 2022-10-27 PROCEDURE — 6360000002 HC RX W HCPCS: Performed by: NURSE PRACTITIONER

## 2022-10-27 PROCEDURE — 83690 ASSAY OF LIPASE: CPT

## 2022-10-27 PROCEDURE — 99233 SBSQ HOSP IP/OBS HIGH 50: CPT | Performed by: INTERNAL MEDICINE

## 2022-10-27 PROCEDURE — 6360000004 HC RX CONTRAST MEDICATION

## 2022-10-27 PROCEDURE — 2580000003 HC RX 258: Performed by: NURSE PRACTITIONER

## 2022-10-27 PROCEDURE — 6370000000 HC RX 637 (ALT 250 FOR IP): Performed by: INTERNAL MEDICINE

## 2022-10-27 PROCEDURE — 83550 IRON BINDING TEST: CPT

## 2022-10-27 PROCEDURE — 74176 CT ABD & PELVIS W/O CONTRAST: CPT

## 2022-10-27 PROCEDURE — 2500000003 HC RX 250 WO HCPCS: Performed by: NURSE PRACTITIONER

## 2022-10-27 PROCEDURE — A4216 STERILE WATER/SALINE, 10 ML: HCPCS | Performed by: NURSE PRACTITIONER

## 2022-10-27 PROCEDURE — 82728 ASSAY OF FERRITIN: CPT

## 2022-10-27 PROCEDURE — 36415 COLL VENOUS BLD VENIPUNCTURE: CPT

## 2022-10-27 PROCEDURE — 2060000000 HC ICU INTERMEDIATE R&B

## 2022-10-27 PROCEDURE — 81001 URINALYSIS AUTO W/SCOPE: CPT

## 2022-10-27 PROCEDURE — 51798 US URINE CAPACITY MEASURE: CPT

## 2022-10-27 PROCEDURE — 99222 1ST HOSP IP/OBS MODERATE 55: CPT | Performed by: INTERNAL MEDICINE

## 2022-10-27 PROCEDURE — 83540 ASSAY OF IRON: CPT

## 2022-10-27 PROCEDURE — C9113 INJ PANTOPRAZOLE SODIUM, VIA: HCPCS | Performed by: NURSE PRACTITIONER

## 2022-10-27 PROCEDURE — 2580000003 HC RX 258: Performed by: INTERNAL MEDICINE

## 2022-10-27 RX ORDER — POLYETHYLENE GLYCOL 3350 17 G/17G
17 POWDER, FOR SOLUTION ORAL DAILY
Status: DISCONTINUED | OUTPATIENT
Start: 2022-10-27 | End: 2022-10-31 | Stop reason: HOSPADM

## 2022-10-27 RX ORDER — SODIUM CHLORIDE 9 MG/ML
INJECTION, SOLUTION INTRAVENOUS CONTINUOUS
Status: DISCONTINUED | OUTPATIENT
Start: 2022-10-27 | End: 2022-10-30

## 2022-10-27 RX ADMIN — MIRTAZAPINE 15 MG: 15 TABLET, ORALLY DISINTEGRATING ORAL at 23:51

## 2022-10-27 RX ADMIN — CARVEDILOL 25 MG: 25 TABLET, FILM COATED ORAL at 01:36

## 2022-10-27 RX ADMIN — METOPROLOL TARTRATE 2.5 MG: 5 INJECTION, SOLUTION INTRAVENOUS at 18:34

## 2022-10-27 RX ADMIN — SODIUM CHLORIDE: 9 INJECTION, SOLUTION INTRAVENOUS at 13:20

## 2022-10-27 RX ADMIN — ACETAMINOPHEN 650 MG: 325 TABLET, FILM COATED ORAL at 23:47

## 2022-10-27 RX ADMIN — ATORVASTATIN CALCIUM 10 MG: 10 TABLET, FILM COATED ORAL at 23:49

## 2022-10-27 RX ADMIN — HYDRALAZINE HYDROCHLORIDE 100 MG: 50 TABLET, FILM COATED ORAL at 23:49

## 2022-10-27 RX ADMIN — MIRTAZAPINE 15 MG: 15 TABLET, ORALLY DISINTEGRATING ORAL at 01:43

## 2022-10-27 RX ADMIN — ONDANSETRON 4 MG: 2 INJECTION INTRAMUSCULAR; INTRAVENOUS at 10:26

## 2022-10-27 RX ADMIN — IOHEXOL 50 ML: 240 INJECTION, SOLUTION INTRATHECAL; INTRAVASCULAR; INTRAVENOUS; ORAL at 16:46

## 2022-10-27 RX ADMIN — ATORVASTATIN CALCIUM 10 MG: 10 TABLET, FILM COATED ORAL at 01:35

## 2022-10-27 RX ADMIN — ONDANSETRON 4 MG: 2 INJECTION INTRAMUSCULAR; INTRAVENOUS at 22:36

## 2022-10-27 RX ADMIN — ONDANSETRON 4 MG: 2 INJECTION INTRAMUSCULAR; INTRAVENOUS at 16:30

## 2022-10-27 RX ADMIN — POLYETHYLENE GLYCOL 3350 17 G: 17 POWDER, FOR SOLUTION ORAL at 11:48

## 2022-10-27 RX ADMIN — SODIUM CHLORIDE, PRESERVATIVE FREE 10 ML: 5 INJECTION INTRAVENOUS at 20:29

## 2022-10-27 RX ADMIN — SODIUM CHLORIDE, PRESERVATIVE FREE 40 MG: 5 INJECTION INTRAVENOUS at 11:48

## 2022-10-27 RX ADMIN — SODIUM CHLORIDE, PRESERVATIVE FREE 10 ML: 5 INJECTION INTRAVENOUS at 09:32

## 2022-10-27 RX ADMIN — CARVEDILOL 25 MG: 25 TABLET, FILM COATED ORAL at 23:49

## 2022-10-27 RX ADMIN — SODIUM CHLORIDE, PRESERVATIVE FREE 10 ML: 5 INJECTION INTRAVENOUS at 01:46

## 2022-10-27 RX ADMIN — ONDANSETRON 4 MG: 4 TABLET, ORALLY DISINTEGRATING ORAL at 01:35

## 2022-10-27 RX ADMIN — Medication 3 MG: at 23:48

## 2022-10-27 ASSESSMENT — PAIN DESCRIPTION - LOCATION: LOCATION: ABDOMEN

## 2022-10-27 ASSESSMENT — PAIN SCALES - GENERAL: PAINLEVEL_OUTOF10: 9

## 2022-10-27 NOTE — PROGRESS NOTES
Dr Graciela Bean notified of bladder scan showing 341. Pt stating does not feel as if she has any urine in there but having ongoing abdomen pressure. New order for barros insertion and to send UA. 300 ml tea color urine returned. UA sent.

## 2022-10-27 NOTE — PROGRESS NOTES
[facility]    Gastroenterology Daily Progress Note      Patient:   Yonis Singh   :    1990   Facility:   [facility]  Date:     10/27/2022  Consultant:   CUAUHTEMOC Conde CNP, CNP      SUBJECTIVE  28 y.o. female admitted 10/19/2022 with Pancreatitis, unspecified pancreatitis type [K85.90] and seen for abnormal abdominal imaging, nausea and emesis. The pt was seen and examined. She is a reconsult. She was seen by our service on 10/20 abdominal pain with nausea emesis and mild elevation of pancreatic enzymes. She has had continued nausea and green emesis, kub yesterday showed constipation. She had a normal appearing BM last night. Reports mild mid abdominal pain that does not radiate.          OBJECTIVE  Scheduled Meds:   mirtazapine  15 mg Oral Nightly    lidocaine  1 patch TransDERmal Daily    epoetin juventino-epbx  2,000 Units SubCUTAneous Once per day on     aspirin  81 mg Oral Daily    atorvastatin  10 mg Oral Nightly    calcium acetate  3 capsule Oral TID WC    carvedilol  25 mg Oral BID    clopidogrel  75 mg Oral Daily    [Held by provider] escitalopram  10 mg Oral Daily    hydrALAZINE  100 mg Oral TID    NIFEdipine  90 mg Oral Daily    vitamin D3  5,000 Units Oral Daily    sodium chloride flush  5-40 mL IntraVENous 2 times per day    [Held by provider] insulin glargine  24 Units SubCUTAneous Nightly       Vital Signs:  /85   Pulse 96   Temp 98.9 °F (37.2 °C) (Oral)   Resp 18   Ht 5' 4.02\" (1.626 m)   Wt 170 lb 13.7 oz (77.5 kg)   LMP  (LMP Unknown)   SpO2 96%   BMI 29.31 kg/m²    Review of Systems - History obtained from chart review and the patient  General ROS: negative  Respiratory ROS: no cough, shortness of breath, or wheezing  Cardiovascular ROS: no chest pain or dyspnea on exertion  Gastrointestinal ROS: positive for - abdominal pain, constipation, and nausea/vomiting  negative for - blood in stools, diarrhea, hematemesis, melena, or swallowing difficulty/pain   Physical Exam:     General Appearance: alert and oriented to person, place and time, well-developed and well-nourished, in no acute distress  Skin: warm and dry, no rash or erythema  Head: normocephalic and atraumatic  Eyes: pupils equal, round, and reactive to light, extraocular eye movements intact, conjunctivae normal  ENT: hearing grossly normal bilaterally  Neck: neck supple and non tender without mass, no thyromegaly or thyroid nodules, no cervical lymphadenopathy   Pulmonary/Chest: clear to auscultation bilaterally- no wheezes, rales or rhonchi, normal air movement, no respiratory distress  Cardiovascular: normal rate, regular rhythm, normal S1 and S2, no murmurs, rubs, clicks or gallops, distal pulses intact, no carotid bruits  Abdomen: soft,obese  non-tender, non-distended, normal bowel sounds, no masses or organomegaly  Extremities: no cyanosis, clubbing or edema  Musculoskeletal: normal range of motion, no joint swelling, deformity or tenderness  Neurologic: no cranial nerve deficit and muscle strength normal    Lab and Imaging Review     CBC  Recent Labs     10/24/22  1826 10/26/22  0814   WBC  --  7.1   HGB 10.7* 9.2*   HCT 32.5* 28.8*   MCV  --  92.1   PLT  --  390       BMP  Recent Labs     10/26/22  0814      K 4.1      CO2 26   BUN 32*   CREATININE 6.45*   GLUCOSE 194*   CALCIUM 9.6     FINDINGS:ct abd 10/19/22   Evaluation is partially limited due to patient motion artifact. Lower Chest: Mild scarring/atelectatic changes are seen at the left lung base. Organs: Evaluation of visceral organs is limited without the benefit of   intravenous contrast.  The attenuation of the liver appears within normal   limits. No contour deforming hepatic mass is seen. The patient is again   noted be status post cholecystectomy. The spleen is not enlarged. The   adrenal glands appear within normal limits.   There is mild right and minimal   left hydroureteronephrosis. GI/Bowel: There is contrast material seen in the rectum and the colon. There   is suggestion of colonic wall thickening of the mid transverse and descending   colonic loop. The patient appears to be status post appendectomy. No   evidence of bowel obstruction seen. Pelvis: The urinary bladder is distended containing contrast.  The uterus   appears anteverted. Peritoneum/Retroperitoneum: No evidence of intra-abdominal free air is seen. No evidence of ascites is seen. The abdominal aorta is normal in caliber. Bones/Soft Tissues: Vascular calcification changes are seen which are more   advanced than expected findings of the patient's age. There is 2.7 x 1.5 cm   ill-defined density seen within the left groin region beneath the skin   staples. This can conceivably represent liquefying hematoma. There are   adjacent borderline prominent left inguinal lymph nodes, which may be   reactive in origin. Impression   Limited noncontrast examination. Mild right and minimal left hydronephrosis, which may be due to distended   urinary bladder containing contrast material.  Consider Mcnair catheter   placement as clinically warranted. Suggestion of colonic wall thickening of the mid transverse and descending   colonic loops, which may have been exaggerated by underdistention but may   reflect underlying infectious/inflammatory colitis. 2.7 cm ill-defined density seen within the left groin region beneath the skin   staples, which may reflect liquefying hematoma. Correlate clinically with   signs of infection as there are adjacent inflammatory changes and prominent   left inguinal lymph nodes. FINDINGS:ct chest 10/23/22   Pulmonary Arteries: Pulmonary arteries are adequately opacified for   evaluation. No evidence of intraluminal filling defect to suggest pulmonary   embolism. Main pulmonary artery is normal in caliber.        Mediastinum: No evidence of mediastinal lymphadenopathy. The heart and   pericardium demonstrate no acute abnormality; LV size and wall thickness   upper limits-borderline enlarged. No pericardial effusion. There is no   acute abnormality of the thoracic aorta. Concentric esophageal wall   thickening suspected mid-lower segments. Lungs/pleura: The lungs are without definite acute process. No focal   consolidation or pulmonary edema. Several small foci subtle tree-in-bud or   ground-glass identified, best seen RML, superior segment RLL, latter   associated with minimal atelectasis and mosaic attenuation. Mild bronchial   dilatation/wall thickening suspected. No evidence of pleural effusion or   pneumothorax. Upper Abdomen: Limited images of the upper abdomen are unremarkable. Soft Tissues/Bones: No acute bone or soft tissue abnormality. Impression   No evidence of pulmonary embolism. Mild bronchitis and subtle findings RML/RLL suggesting mild patchy   bronchiolitis. Correlate clinically. No consolidation, or large pleural   effusion. Esophageal wall thickening; correlate for esophagitis. FINDINGS:kub 10/26/22   Increase stool and residual contrast throughout the colon. Residual contrast   in the bladder. Osseous structures normal.           Impression   Increased stool and retained contrast in the colon         ASSESSMENT/plan  Abdominal pain with nausea emesis, constipation per kub, ct abd 10/19 suggested mild colitis lft's were normal 10/22  -will add ppi and glycolax , she states she had a normal bm last night  Will repeat lipase  -antiemetics prn  -may need egd if symptoms do not improve  Will d/w md    2.anemia no overt bleeding   Iron level ordered      Time spent reviewing chart assessing pt and d/w md around 30 minutes      This plan was formulated in collaboration with Dr. Robyn Merrill  .     Electronically signed by: CUAUHTEMOC Sloan CNP on 10/27/2022 at 8:52 AM Attending Physician Statement          I have discussed the care of [facility] and   I have examined the patient myselft independently, and taken ros and hpi , including pertinent history and exam findings,  with the author of this note . I have reviewed the key elements of all parts of the encounter with the nurse practitioner/resident. I agree with the assessment, plan and orders as documented by the above health care provider     More than 50% of the time on this visit was spent by me   hysical Exam  Blood pressure (!) 184/95, pulse 90, temperature 99.4 °F (37.4 °C), temperature source Axillary, resp. rate 20, height 5' 4\" (1.626 m), weight 170 lb 13.7 oz (77.5 kg), SpO2 99 %, unknown if currently breastfeeding.          General Appearance: alert and oriented to person, place and time, well-developed and well-nourished, in no acute distress  Skin: warm and dry, no rash or erythema  Head: normocephalic and atraumatic  Eyes: pupils equal, round, and reactive to light, extraocular eye movements intact, conjunctivae normal  ENT: hearing grossly normal bilaterally  Neck: neck supple and non tender without mass, no thyromegaly or thyroid nodules, no cervical lymphadenopathy   Pulmonary/Chest: clear to auscultation bilaterally- no wheezes, rales or rhonchi, normal air movement, no respiratory distress  Cardiovascular: normal rate, regular rhythm, normal S1 and S2, no murmurs, rubs, clicks or gallops, distal pulses intact, no carotid bruits  Abdomen: soft, non-tender, non-distended, normal bowel sounds, no masses or organomegaly  Extremities: no cyanosis, clubbing or edema  Musculoskeletal: normal range of motion, no joint swelling, deformity or tenderness  Neurologic: no cranial nerve deficit and muscle strength normal    Data Review:    Recent Labs     10/26/22  0814   WBC 7.1   HGB 9.2*   HCT 28.8*   MCV 92.1        Recent Labs     10/26/22  0814      K 4.1      CO2 26 BUN 32*   CREATININE 6.45*     No results for input(s): AST, ALT, ALB, BILIDIR, BILITOT, ALKPHOS in the last 72 hours. Recent Labs     10/27/22  1308   LIPASE 162*     No results for input(s): PROTIME, INR in the last 72 hours. No results for input(s): PTT in the last 72 hours. No results for input(s): OCCULTBLD in the last 72 hours.   CEA:  No results found for: CEA  Ca 125:  No results found for:   Ca 19-9:  No results found for:   Ca 15-3:  No results found for:   AFP:  No components found for: AFAFP  Beta HCG:  No components found for: BHCG  Neuron Specific Enolase:  No results found for: NSE      Additional :  As above  Chart was reviewed  The diagnosis of pancreatitis was not very solid  As the patient had no CT scan finding  But the patient still symptomatic and the lipase is slightly elevated again  For which we can proceed with an EGD rule out other cause of nausea vomiting abdominal pain  And will repeat the CAT scan to rule out any complications or any finding of pancreatitis at this time  Patient still symptomatic not able to tolerate food  We will follow with you        Electronically signed by Demetris Baltazar MD

## 2022-10-27 NOTE — PROGRESS NOTES
Physical Therapy        Physical Therapy Cancel Note      DATE: 10/27/2022    NAME: Rica Kawasaki  MRN: [id number]   : 1990      Patient not seen this date for Physical Therapy due to:    Pt refused, pt initially opening eyes but then closing eyes and refusing to open eyes or participate, Case Management Jovanni Fabian updated and Clinical Lead RN Jovanni Fabian updated. Time In: 1356  Time Out: 1403  Total Time: 7 min.       Electronically signed by Leobardo Gramajo PTA on 10/27/2022 at 5:17 PM

## 2022-10-27 NOTE — PROGRESS NOTES
Inquired with pt why she takes plavix. She did state she was at [facility] last month for a stroke. Educated pt regarding the importance of her medications in preventing future strokes. Pt still too nauseous to take any pills and refusing to try. Giving new protonix order and glycolax now in hopes to ease nausea enough to take medications. Zofran given at 1026 am last. Due next at 426 pm.   GI was notified this AM regarding ongoing nausea. Dr Gregory Blanca also aware.

## 2022-10-27 NOTE — PLAN OF CARE
Problem: Discharge Planning  Goal: Discharge to home or other facility with appropriate resources  Outcome: Progressing     Problem: Pain  Goal: Verbalizes/displays adequate comfort level or baseline comfort level  Outcome: Progressing     Problem: Safety - Adult  Goal: Free from fall injury  Outcome: Progressing  Call light kept within reach. Problem: Skin/Tissue Integrity  Goal: Absence of new skin breakdown  Description: 1. Monitor for areas of redness and/or skin breakdown  2. Assess vascular access sites hourly  3. Every 4-6 hours minimum:  Change oxygen saturation probe site  4. Every 4-6 hours:  If on nasal continuous positive airway pressure, respiratory therapy assess nares and determine need for appliance change or resting period. Outcome: Progressing   Assist patient to reposition. Problem: Chronic Conditions and Co-morbidities  Goal: Patient's chronic conditions and co-morbidity symptoms are monitored and maintained or improved  Outcome: Progressing  Q4 neuro checks. Problem: ABCDS Injury Assessment  Goal: Absence of physical injury  Outcome: Progressing   Bed kept locked and in lowest position.

## 2022-10-27 NOTE — PROGRESS NOTES
Relation Age of Onset    High Blood Pressure Mother     Diabetes Mother     Other Mother 27        Lung cancer    High Blood Pressure Father     Diabetes Father     Other Father 27        Prostate cancer     Social History     Socioeconomic History    Marital status: Single     Spouse name: Not on file    Number of children: Not on file    Years of education: Not on file    Highest education level: Not on file   Occupational History    Not on file   Tobacco Use    Smoking status: Never    Smokeless tobacco: Never   Substance and Sexual Activity    Alcohol use: No    Drug use: No    Sexual activity: Not on file   Other Topics Concern    Not on file   Social History Narrative    Not on file     Social Determinants of Health     Financial Resource Strain: Not on file   Food Insecurity: Not on file   Transportation Needs: Not on file   Physical Activity: Not on file   Stress: Not on file   Social Connections: Not on file   Intimate Partner Violence: Not on file   Housing Stability: Not on file           ROS:  [x] All negative/unchanged except if checked.  Explain positive(checked items) below:  [] Constitutional  [] Eyes  [] Ear/Nose/Mouth/Throat  [] Respiratory  [] CV  [] GI  []   [] Musculoskeletal  [] Skin/Breast  [] Neurological  [] Endocrine  [] Heme/Lymph  [] Allergic/Immunologic    Explanation:     MEDICATIONS:    Current Facility-Administered Medications:     polyethylene glycol (GLYCOLAX) packet 17 g, 17 g, Oral, Daily, Meridee Fogo, APRN - CNP    pantoprazole (PROTONIX) 40 mg in sodium chloride (PF) 0.9 % 10 mL injection, 40 mg, IntraVENous, Daily, Meridee Fogevelia, APRN - CNP    calcium carbonate (TUMS) chewable tablet 500 mg, 500 mg, Oral, TID PRN, Vani Mills MD, 500 mg at 10/26/22 1432    ondansetron (ZOFRAN-ODT) disintegrating tablet 4 mg, 4 mg, Oral, Q4H PRN, 4 mg at 10/27/22 0135 **OR** ondansetron (ZOFRAN) injection 4 mg, 4 mg, IntraVENous, Q6H PRN, Aleah Sol MD, 4 mg at 10/27/22 1026 mirtazapine (REMERON SOL-TAB) disintegrating tablet 15 mg, 15 mg, Oral, Nightly, Morales Patel MD, 15 mg at 10/27/22 0143    sodium chloride flush 0.9 % injection 10 mL, 10 mL, IntraVENous, PRN, Emily Bass MD, 10 mL at 10/23/22 1332    lidocaine 4 % external patch 1 patch, 1 patch, TransDERmal, Daily, Jelani Cardenas MD, 1 patch at 10/27/22 0931    acetaminophen (TYLENOL) tablet 650 mg, 650 mg, Oral, Q4H PRN, CUAUHTEMOC Dominguez NP, 650 mg at 10/26/22 1135    epoetin juventino-epbx (RETACRIT) injection 2,000 Units, 2,000 Units, SubCUTAneous, Once per day on Mon Wed Fri, Emily Bass MD, 2,000 Units at 10/21/22 1755    metoprolol (LOPRESSOR) injection 2.5 mg, 2.5 mg, IntraVENous, Q6H PRN, Emily Bass MD    melatonin tablet 3 mg, 3 mg, Oral, Nightly PRN, CUAUHTEMOC Dominguez - NP, 3 mg at 10/24/22 2109    midodrine (PROAMATINE) tablet 5 mg, 5 mg, Oral, TID PRN, Emily Bass MD, 5 mg at 10/25/22 2321    albuterol sulfate HFA (PROVENTIL;VENTOLIN;PROAIR) 108 (90 Base) MCG/ACT inhaler 2 puff, 2 puff, Inhalation, Q6H PRN, CUAUHTEMOC Dominguez NP    aspirin EC tablet 81 mg, 81 mg, Oral, Daily, Shahla Carpenter APRN - NP, 81 mg at 10/27/22 3975    atorvastatin (LIPITOR) tablet 10 mg, 10 mg, Oral, Nightly, Shahla Carpenter APRN - NP, 10 mg at 10/27/22 0135    calcium acetate (PHOSLO) capsule 2,001 mg, 3 capsule, Oral, TID WC, Shahla Carpenter APRN - NP, 2,001 mg at 10/27/22 1026    carvedilol (COREG) tablet 25 mg, 25 mg, Oral, BID, Shahla Carpenter APRN - NP, 25 mg at 10/27/22 7364    clopidogrel (PLAVIX) tablet 75 mg, 75 mg, Oral, Daily, CUAUHTEMOC Aleman - NP, 75 mg at 10/25/22 1039    [Held by provider] escitalopram (LEXAPRO) tablet 10 mg, 10 mg, Oral, Daily, CUAUHTEMOC Aleman - NP, 10 mg at 10/22/22 1113    hydrALAZINE (APRESOLINE) tablet 100 mg, 100 mg, Oral, TID, CUAUHTEMOC Dominguez - NP, 100 mg at 10/27/22 0634    NIFEdipine (ADALAT CC) extended release tablet 90 mg, 90 mg, Oral, Daily, CUAUHTEMOC Natarajan - NP, 90 mg at 10/27/22 3389    vitamin D3 (CHOLECALCIFEROL) tablet 5,000 Units, 5,000 Units, Oral, Daily, Reji Lindsay APRN - NP, 5,000 Units at 10/27/22 9076    sodium chloride flush 0.9 % injection 5-40 mL, 5-40 mL, IntraVENous, 2 times per day, Reji Lindsay APRN - NP, 10 mL at 10/27/22 0932    sodium chloride flush 0.9 % injection 5-40 mL, 5-40 mL, IntraVENous, PRN, Reji Lindsay APRN - NP, 10 mL at 10/22/22 1054    0.9 % sodium chloride infusion, , IntraVENous, PRN, CUAUHTEMOC Natarajan - NP    [Held by provider] insulin glargine (LANTUS) injection vial 24 Units, 24 Units, SubCUTAneous, Nightly, CUAUHTEMOC Aleman - NP, 24 Units at 10/22/22 7728    glucose chewable tablet 16 g, 4 tablet, Oral, PRN, Reji Lindsay APRN - NP    dextrose bolus 10% 125 mL, 125 mL, IntraVENous, PRN **OR** dextrose bolus 10% 250 mL, 250 mL, IntraVENous, PRN, Shahla Carpenter APRN - NP    glucagon (rDNA) injection 1 mg, 1 mg, SubCUTAneous, PRN, Reji Lindsay, APRN - NP    dextrose 10 % infusion, , IntraVENous, Continuous PRN, Reji Lindsay APRN - NP, Stopped at 10/23/22 2044      Examination:  /85   Pulse 96   Temp 98.9 °F (37.2 °C) (Oral)   Resp 18   Ht 5' 4.02\" (1.626 m)   Wt 170 lb 13.7 oz (77.5 kg)   LMP  (LMP Unknown)   SpO2 96%   BMI 29.31 kg/m²   Gait -not tested  Medication side effects(SE): Denies    Mental Status Examination:    Level of consciousness:  within normal limits   Appearance:  poor grooming and poor hygiene  Behavior/Motor:  psychomotor retardation  Attitude toward examiner:  cooperative and attentive  Speech:  slow and low volume  Mood: depressed  Affect:  blunted  Thought processes:  linear, goal directed, and coherent   Thought content:  Homicidal ideation - none  Suicidal Ideation:  denies suicidal ideation  Delusions:  no evidence of delusions  Perceptual Disturbance:  denies any perceptual disturbance  Cognition:  oriented to person, place, and time Concentration intact  Insight fair   Judgement fair     ASSESSMENT:   Patient symptoms are:  [] Well controlled  [] Improving  [] Worsening  [x] No change      Diagnosis:   Principal Problem:    Pancreatitis, unspecified pancreatitis type  Active Problems:    Acute urinary retention    Gastroenteritis    Elevated lipase    Diffuse abdominal pain    Depression    Lethargy  Resolved Problems:    * No resolved hospital problems. *      LABS:    Recent Labs     10/24/22  1826 10/26/22  0814   WBC  --  7.1   HGB 10.7* 9.2*   PLT  --  390     Recent Labs     10/26/22  0814      K 4.1      CO2 26   BUN 32*   CREATININE 6.45*   GLUCOSE 194*     No results for input(s): BILITOT, ALKPHOS, AST, ALT in the last 72 hours. No results found for: Gerhardt Rings, LABBENZ, CANNAB, COCAINESCRN, LABMETH, OPIATESCREENURINE, PHENCYCLIDINESCREENURINE, PPXUR, ETOH  Lab Results   Component Value Date/Time    TSH 1.04 10/23/2022 12:06 PM     No results found for: LITHIUM  No results found for: VALPROATE, CBMZ    RISK ASSESSMENT: Low risk for suicide or self-harm    Treatment Plan:  Reviewed current Medications with the patient. Continue Remeron 15 mg nightly. Recommend outpatient follow-up. Does not require admission to [facility]. Risks, benefits, side effects, drug-to-drug interactions and alternatives to treatment were discussed. The patient consented to treatment. PSYCHOTHERAPY/COUNSELING:  [] Therapeutic interview  [x] Supportive  [] CBT  [] Ongoing  [] Other                                    Danna Laird is a 28 y.o. female being evaluated face to face. --Hector Burris MD on 10/27/2022 at 11:32 AM    An electronic signature was used to authenticate this note. **This report has been created using voice recognition software. It may contain minor errors which are inherent in voice recognition technology. **  I independently saw and evaluated the patient. I reviewed the  documentation above. Any additional comments or changes to the    documentation are stated below otherwise agree with assessment.      -The patient was seen at bedside. She is somnolent. She continues to complain of abdominal pain and vomiting. She denies suicidal thoughts. She is unsure if she has side effects from medication. PLAN  Medications as noted above  Attempt to develop insight    Psycho-education conducted. Supportive Therapy conducted.   Follow-up daily while on inpatient unit    Electronically signed by John Gross MD on 10/27/22 at 6:47 PM EDT

## 2022-10-27 NOTE — PROGRESS NOTES
Dr Radha Saldana notified pt has been too nauseous to take oral meds or eat. ongoing vomiting over night and today. Reviewed GI ordered protonix and glycolax. Pt recevied zofran at 56 am. Informed him of pt's wounds on both great toes. He will place wound care orders and start IV fluids.

## 2022-10-27 NOTE — PROGRESS NOTES
Comprehensive Nutrition Assessment    Type and Reason for Visit:  Initial, RD Nutrition Re-Screen/LOS    Nutrition Recommendations/Plan:   Will continue to provide Diet as tolerated with Ensure Clear all trays     Malnutrition Assessment:  Malnutrition Status:  Severe malnutrition (10/27/22 1524)    Context:  Acute Illness     Findings of the 6 clinical characteristics of malnutrition:  Energy Intake:  50% or less of estimated energy requirements for 5 or more days  Weight Loss:  Greater than 5% over 1 month (due in part to fluid losses)     Body Fat Loss:  Unable to assess     Muscle Mass Loss:  Unable to assess    Fluid Accumulation:  No significant fluid accumulation     Strength:  Not Performed    Nutrition Assessment:    Discharge was cancelled on 10/26 due to N/V which continues. Intake has been less than 25% since admit. Wounds Care Consult noted. Nutrition Related Findings:    no edema, labs: (10.21) Phos 4.5, (10/26) K 4.5, Glu 194,  Meds: Remeron, Phoslo, BM (10/27) Wound Type: Wound Consult Pending       Current Nutrition Intake & Therapies:    Average Meal Intake: 0%, 1-25%     ADULT DIET; Regular  Diet NPO Exceptions are: Sips of Water with Meds    Anthropometric Measures:  Height: 5' 4\" (162.6 cm)  Ideal Body Weight (IBW): 120 lbs (55 kg)    Admission Body Weight: 195 lb (88.5 kg) (10/21)  Current Body Weight: 170 lb (77.1 kg) (10/27), 141.7 % IBW. Weight Source: Bed Scale  Current BMI (kg/m2): 29.2                          BMI Categories: Overweight (BMI 25.0-29. 9)    Estimated Daily Nutrient Needs:  Energy Requirements Based On: Formula  Weight Used for Energy Requirements: Current  Energy (kcal/day): 1.2g/kg= 3201-4795 kcal  Weight Used for Protein Requirements: Current  Protein (g/day): minimum of 1.5g/kg= 120 g       Nutrition Diagnosis:   Severe malnutrition related to  (current medical condition) as evidenced by Criteria as identified in malnutrition assessment    Nutrition Interventions: Food and/or Nutrient Delivery: Continue Current Diet, Start Oral Nutrition Supplement  Nutrition Education/Counseling: No recommendation at this time  Coordination of Nutrition Care: Continue to monitor while inpatient       Goals:     Goals: PO intake 50% or greater       Nutrition Monitoring and Evaluation:      Food/Nutrient Intake Outcomes: Food and Nutrient Intake, Supplement Intake  Physical Signs/Symptoms Outcomes: Biochemical Data, GI Status, Nausea or Vomiting, Fluid Status or Edema, Skin, Weight    Discharge Planning:    Continue current diet     Sonja Edwards, [address] [place] [address],   Contact: [phone]

## 2022-10-27 NOTE — PROGRESS NOTES
Dr Nasrin Salmon notified of KUB results: Increased stool and retained contrast in the colon    Reviewed pt had 3 episodes of green emesis overnight. New order to reconsult GI. Bear Chapman on floor notified. Dr Danielle Freeman also notified of the above.

## 2022-10-27 NOTE — PROGRESS NOTES
[address] Internal Medicine  Solo Kaba MD; Oliver Sparks MD; Chava Brown MD; MD Jj Johnson MD; MD FRANCES Rubalcava [facility] Internal Medicine   [facility]    Progress note               Date:   10/27/2022  Patient name:  Gloris Dance  Date of admission:  10/19/2022  4:05 PM  MRN:   [id number]  Account:  [de-identified]  YOB: 1990  PCP:    No primary care provider on file. Room:   [address] [place] [address]  Code Status:    Full Code    Chief Complaint:     Chief Complaint   Patient presents with    Abdominal Pain     Abdominal pain, emesis post 2-4 hours dialysis treatment. Abdomen is softly distended. History Obtained From:     Pt medical record and nursing staff    History of Present Illness:     Gloris Dance is a 28 y.o. Non- / non  female who presents with Abdominal Pain (Abdominal pain, emesis post 2-4 hours dialysis treatment. Abdomen is softly distended. )   and is admitted to the hospital for the management of Pancreatitis, unspecified pancreatitis type. Gloris Dance is a 28 y.o. Non- / non  female who presents with Abdominal Pain (Abdominal pain, emesis post 2-4 hours dialysis treatment. Abdomen is softly distended.) and is admitted to the hospital for the management of acute Pancreatitis. Medical history includes ESRD on hemodialysis, DM type I, HTN and CAD. Also had a recent admission for GI bleed. Patient presented from [facility]. She is experiencing some altered mental status where she is unable to hold a conversation regarding her condition. She is able to stay awake for 1-2 word answer but is not consistent. CT head shows no evidence of acute changes  compared to CT head completed on 10/10/22. He was given 50 mcg IV of fentanyl prior to assessment which may be affecting her mentation.  CT of abdomen and pelvis shows mild right and minimal left hydronephrosis which is correlated with distended urinary bladder. She is experiencing acute urinary retention requiring barros catheter placement in ED. UA shows trace ketones, 3+ protein and trace leukocytes. Pending culture. WBC 10.9. AST 59, ALT 82, Alk Phos 110, Lipase 117 suggesting acute pancreatitis without infection  10/23  Patient, very sleepy as per nursing report  Has poor oral intake  Has nausea, 1 episode of vomiting  Complaining of chest pain  Very poor historian    10/24  Patient is much alert, awake, she is oriented to time place person to my assessment today  Still have poor oral intake  Lantus was held last night  Blood sugar running low  10/26   Patient have multiple episodes of vomiting,  Complaining abdominal pain  10/27   Patient complaining of diffuse abdominal pain, nausea, vomiting  Refusing pills  Past Medical History:     Past Medical History:   Diagnosis Date    Asthma     Atherosclerosis of native arteries of extremities with rest pain, unspecified extremity ([address]     Depression 10/25/2022    Diabetes mellitus ([address]     since 12 y/o, unsure if type 1 or 2    Headache, migraine     for 2 years    Hypertension     Lichen simplex chronicus     Noninflammatory disorder of vagina, unspecified     Patient's noncompliance with other medical treatment and regimen for other reason     Type 2 diabetes mellitus without complication ([address] 28/50/5250        Past Surgical History:     Past Surgical History:   Procedure Laterality Date    OVARY REMOVAL      July 2010    OVARY REMOVAL          Medications Prior to Admission:     Prior to Admission medications    Medication Sig Start Date End Date Taking?  Authorizing Provider   ondansetron (ZOFRAN-ODT) 4 MG disintegrating tablet Take 1 tablet by mouth every 8 hours as needed for Nausea or Vomiting 10/21/22  Yes Chas Beth MD   aspirin 81 MG EC tablet Take 81 mg by mouth daily   Yes Historical Provider, MD atorvastatin (LIPITOR) 10 MG tablet Take 10 mg by mouth at bedtime   Yes Historical Provider, MD   calcium acetate (PHOSLO) 667 MG CAPS capsule Take 3 capsules by mouth 3 times daily (with meals)   Yes Historical Provider, MD   carvedilol (COREG) 25 MG tablet Take 25 mg by mouth 2 times daily   Yes Historical Provider, MD   vitamin D (CHOLECALCIFEROL) 125 MCG (5000 UT) CAPS capsule Take 5,000 Units by mouth daily   Yes Historical Provider, MD   clopidogrel (PLAVIX) 75 MG tablet Take 75 mg by mouth daily   Yes Historical Provider, MD   escitalopram (LEXAPRO) 10 MG tablet Take 10 mg by mouth daily   Yes Historical Provider, MD   hydrALAZINE (APRESOLINE) 100 MG tablet Take 100 mg by mouth 3 times daily   Yes Historical Provider, MD   insulin detemir (LEVEMIR FLEXTOUCH) 100 UNIT/ML injection pen Inject 24 Units into the skin nightly   Yes Historical Provider, MD   midodrine (PROAMATINE) 10 MG tablet Take 30 mg by mouth daily Indications: Mon, Wed, Fri at dialysis   Yes Historical Provider, MD   NIFEdipine (ADALAT CC) 90 MG extended release tablet Take 90 mg by mouth daily   Yes Historical Provider, MD   sennosides-docusate sodium (SENOKOT-S) 8.6-50 MG tablet Take 2 tablets by mouth at bedtime   Yes Historical Provider, MD ADAME Complex-C-Folic Acid (TRIPHROCAPS) 1 MG CAPS Take 1 capsule by mouth daily   Yes Historical Provider, MD   patiromer sorbitex calcium (VELTASSA) 8.4 g PACK packet Take 8.4 g by mouth Twice a Week Indications: twice per day on Thursdays and Sundays   Yes Historical Provider, MD   acetaminophen (TYLENOL) 325 MG tablet Take 650 mg by mouth every 6 hours as needed for Pain or Fever (do not exceed 3 gm in 24 hours)    Historical Provider, MD   albuterol sulfate HFA (PROVENTIL;VENTOLIN;PROAIR) 108 (90 Base) MCG/ACT inhaler Inhale 2 puffs into the lungs every 6 hours as needed for Wheezing or Shortness of Breath    Historical Provider, MD   polyethylene glycol (GLYCOLAX) 17 GM/SCOOP powder Take 17 g by mouth daily as needed (constipation)    Historical Provider, MD        Allergies:     Morphine and Januvia [sitagliptin]    Social History:     Tobacco:    reports that she has never smoked. She has never used smokeless tobacco.  Alcohol:      reports no history of alcohol use. Drug Use:  reports no history of drug use. Family History:     Family History   Problem Relation Age of Onset    High Blood Pressure Mother     Diabetes Mother     Other Mother 27        Lung cancer    High Blood Pressure Father     Diabetes Father     Other Father 27        Prostate cancer       Review of Systems:     Positive and Negative as described in HPI.     CONSTITUTIONAL:  negative for fevers, chills, sweats, fatigue, weight loss  HEENT:  negative for vision, hearing changes, runny nose, throat pain  RESPIRATORY:  negative for shortness of breath, cough, congestion, wheezing  CARDIOVASCULAR:  negative for chest pain, palpitations  GASTROINTESTINAL: abdo pain n v    GENITOURINARY:  negative for difficulty of urination, burning with urination, frequency   INTEGUMENT:  negative for rash, skin lesions, easy bruising   HEMATOLOGIC/LYMPHATIC:  negative for swelling/edema   ALLERGIC/IMMUNOLOGIC:  negative for urticaria , itching  ENDOCRINE:  negative increase in drinking, increase in urination, hot or cold intolerance  MUSCULOSKELETAL:  negative joint pains, muscle aches, swelling of joints  NEUROLOGICAL:  negative for headaches, dizziness, lightheadedness, numbness, pain, tingling extremities      Physical Exam:   /85   Pulse 96   Temp 98.9 °F (37.2 °C) (Oral)   Resp 18   Ht 5' 4.02\" (1.626 m)   Wt 170 lb 13.7 oz (77.5 kg)   LMP  (LMP Unknown)   SpO2 96%   BMI 29.31 kg/m²   Temp (24hrs), Av.9 °F (36.6 °C), Min:96.9 °F (36.1 °C), Max:98.9 °F (37.2 °C)    Recent Labs     10/26/22  1511 10/26/22  2016 10/27/22  0630 10/27/22  1106   POCGLU 155* 134* 106* 112*       Intake/Output Summary (Last 24 hours) at 10/27/2022 1217  Last data filed at 10/27/2022 0405  Gross per 24 hour   Intake 0 ml   Output 2520 ml   Net -2520 ml       General Appearance: alert, well appearing, and in no acute distress  Mental status: oriented to person, place, and time  Head: normocephalic, atraumatic  Eye: no icterus, redness, pupils equal and reactive, extraocular eye movements intact, conjunctiva clear  Ear: normal external ear, no discharge, hearing intact  Nose: no drainage noted  Mouth: mucous membranes moist  Neck: supple, no carotid bruits, thyroid not palpable  Lungs: Bilateral equal air entry, clear to ausculation, no wheezing, rales or rhonchi, normal effort  Cardiovascular: normal rate, regular rhythm, no murmur, gallop, rub  Abdomen: Soft, nontender, nondistended, normal bowel sounds, no hepatomegaly or splenomegaly   No tenderness  Surg scar noted lower mid abdo laparotomy noted    Neurologic: There are no new focal motor or sensory deficits, normal muscle tone and bulk, no abnormal sensation, normal speech, cranial nerves II through XII grossly intact  Skin: No gross lesions, rashes, bruising or bleeding on exposed skin area  Extremities: peripheral pulses palpable, no pedal edema or calf pain with palpation, superficial wounds in both foot  Psych: flat affect drowsy    Investigations:      Laboratory Testing:  Recent Results (from the past 24 hour(s))   POC Glucose Fingerstick    Collection Time: 10/26/22  3:11 PM   Result Value Ref Range    POC Glucose 155 (H) 65 - 105 mg/dL   POC Glucose Fingerstick    Collection Time: 10/26/22  8:16 PM   Result Value Ref Range    POC Glucose 134 (H) 65 - 105 mg/dL   POC Glucose Fingerstick    Collection Time: 10/27/22  6:30 AM   Result Value Ref Range    POC Glucose 106 (H) 65 - 105 mg/dL   POC Glucose Fingerstick    Collection Time: 10/27/22 11:06 AM   Result Value Ref Range    POC Glucose 112 (H) 65 - 105 mg/dL       Imaging/Diagnostics:  CT ABDOMEN PELVIS WO CONTRAST Additional Contrast? None    Result Date: 10/19/2022  Limited noncontrast examination. Mild right and minimal left hydronephrosis, which may be due to distended urinary bladder containing contrast material.  Consider Mcnair catheter placement as clinically warranted. Suggestion of colonic wall thickening of the mid transverse and descending colonic loops, which may have been exaggerated by underdistention but may reflect underlying infectious/inflammatory colitis. 2.7 cm ill-defined density seen within the left groin region beneath the skin staples, which may reflect liquefying hematoma. Correlate clinically with signs of infection as there are adjacent inflammatory changes and prominent left inguinal lymph nodes. CT HEAD WO CONTRAST    Result Date: 10/19/2022  No convincing CT evidence of intracranial hemorrhage, mass effect or acute territorial infarct seen. RECOMMENDATIONS: If there is persistent clinical concern, consider short-term follow-up examination.        Assessment :      Hospital Problems             Last Modified POA    * (Principal) Pancreatitis, unspecified pancreatitis type 10/19/2022 Yes    Acute urinary retention 10/20/2022 Yes    Gastroenteritis 10/20/2022 Yes    Elevated lipase 10/20/2022 Yes    Diffuse abdominal pain 10/20/2022 Yes    Depression 10/25/2022 Yes    Lethargy 10/24/2022 Yes     Plan:     Patient status inpatient in the Progressive Unit/Step down    35-year-old -American lady class I obese BMI 31.74  History of end-stage kidney disease on hemodialysis with a left arm fistula  Uncontrolled diabetes mellitus type 1  Very poor historian unable to get much story from her  Abdominal pain associate with nausea vomiting 10 out of 10  Denies any fever chills   Elevated lipase CT scan noted  Clinical diagnosis of acute pancreatitis IV fluid resuscitation gentle hydration with dialysis  Discontinue IV Dilaudid and oral Norco  Low-fat diet as tolerated  Nephrology consult for  Nonspecific Trope elevation no NSTEMI patient has no chest pain troponins flat at 90  Anemia of chronic kidney disease  Hyponatremia sodium 134 conservative  Hypokalemia less than 4 potassium will be managed with  Oct 22  Troponin elevation is flat EKG noted chest pain and noncardiac in origin  Reproducible  Patient complains abdominal pain asking for IV Dilaudid  GI input noted  Patient has dependence on opioid and abuse  Advance diet discharge home with family patient is refusing ECF  High risk of readmission with the dependence and opioid seeking behavior  PT/OT   More nausea today   GI input needed ,   Labs ordered   10/23  Complaining of chest pain, ordering EKG, 2 sets of troponin  Patient sleepy, did not receive any pain medication, sleeping medication  Ordering serum ammonia level  TSH, VBG  Patient has CT head done on 10/20 which was negative  Holding Lexapro  Will start patient on gentle hydration  ESRD, recent femoropopliteal bypass surgery, on left side, hypertension, diabetes, coronary artery disease, ESRD on hemodialysis    10/24  Patient much alert, awake today  Underwent CTA chest which was negative for PE  I encourage patient to eat, she told me that she will try to eat  Abdominal pain, chest pain has resolved  Patient will need sleep hygiene, as per nursing report she was up all night talking on phone  Seen in dialysis being  Will work on discharge planning  Patient need to eat  Will start patient on Remeron    10/25  Patient, essentially same  Still have poor oral intake  Started Remeron yesterday  Blood sugars better controlled  Patient lost her mother recently, looks like she has some element of depression, requesting psych consult    10/26  Ordering x-ray KUB  Request GI to see the patient  Seen by Psychiatrist     10/27   Patient complaining of nausea, vomiting abdominal pain  Has not eaten any food  Refusing pills  Counseling about importance of medication  Will start on gentle hydration given history of ESRD  Will consult wound care  Consultations:   IP CONSULT TO INTERNAL MEDICINE  IP CONSULT TO GI  IP CONSULT TO NEPHROLOGY  IP CONSULT TO NEUROLOGY  IP CONSULT TO PSYCHIATRY  IP CONSULT TO GI     Patient is admitted as inpatient status because of co-morbidities listed above, severity of signs and symptoms as outlined, requirement for current medical therapies and most importantly because of direct risk to patient if care not provided in a hospital setting. Expected length of stay > 48 hours. Jus Almonte MD  10/27/2022  12:17 PM    Copy sent to Dr. Cho primary care provider on file. Please note that this chart was generated using voice recognition Dragon dictation software. Although every effort was made to ensure the accuracy of this automated transcription, some errors in transcription may have occurred.

## 2022-10-27 NOTE — PROGRESS NOTES
Department of Internal Medicine  Nephrology David Sutton MD Progress Note    Reason for consultation: Management of hemodialysis dependent end-stage renal disease. Consulting physician: Ángel Petit MD.    Interval history: Patient continues to have nausea and vomiting throughout dialysis yesterday. GI has been reconsulted. She is not in respiratory distress. History of present: This is a 28 y.o. 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 Lanie Duvall at of nephrology as consultants of [facility], Presented to the emergency department yesterday with complaints of abdominal pain that started after hemodialysis. This was associated with 1 episode of vomiting at dialysis. She has a recent history of GI bleed requiring transfusion and was admitted from October 11 to October 14, 2022. Flexible sigmoidoscopy performed on that admission showed multiple rectal ulcers which were cauterized and patient was taken off Xarelto.     Scheduled Meds:   polyethylene glycol  17 g Oral Daily    pantoprazole (PROTONIX) 40 mg injection  40 mg IntraVENous Daily    mirtazapine  15 mg Oral Nightly    lidocaine  1 patch TransDERmal Daily    epoetin juventino-epbx  2,000 Units SubCUTAneous Once per day on Mon Wed Fri    aspirin  81 mg Oral Daily    atorvastatin  10 mg Oral Nightly    calcium acetate  3 capsule Oral TID WC    carvedilol  25 mg Oral BID    clopidogrel  75 mg Oral Daily    [Held by provider] escitalopram  10 mg Oral Daily    hydrALAZINE  100 mg Oral TID    NIFEdipine  90 mg Oral Daily    vitamin D3  5,000 Units Oral Daily    sodium chloride flush  5-40 mL IntraVENous 2 times per day    [Held by provider] insulin glargine  24 Units SubCUTAneous Nightly     Continuous Infusions:   sodium chloride      dextrose Stopped (10/23/22 2044)     PRN Meds:.calcium carbonate, ondansetron **OR** ondansetron, sodium chloride flush, acetaminophen, metoprolol, melatonin, midodrine, albuterol sulfate HFA, sodium chloride flush, sodium chloride, glucose, dextrose bolus **OR** dextrose bolus, glucagon (rDNA), dextrose    Physical Exam:    VITALS:  /85   Pulse 96   Temp 98.9 °F (37.2 °C) (Oral)   Resp 18   Ht 5' 4.02\" (1.626 m)   Wt 170 lb 13.7 oz (77.5 kg)   LMP  (LMP Unknown)   SpO2 96%   BMI 29.31 kg/m²   TEMPERATURE:  Current - Temp: 98.9 °F (37.2 °C); Max - Temp  Av.1 °F (36.7 °C)  Min: 96.9 °F (36.1 °C)  Max: 98.9 °F (37.2 °C)  RESPIRATIONS RANGE: Resp  Av  Min: 18  Max: 18  PULSE RANGE: Pulse  Av.1  Min: 78  Max: 96  BLOOD PRESSURE RANGE:  Systolic (31IXA), IPZ:125 , Min:104 , NTT:610 ; Diastolic (37LKU), WJT:68, Min:60, Max:89  PULSE OXIMETRY RANGE: SpO2  Av %  Min: 96 %  Max: 100 %  24HR INTAKE/OUTPUT:    Intake/Output Summary (Last 24 hours) at 10/27/2022 1056  Last data filed at 10/27/2022 0405  Gross per 24 hour   Intake 0 ml   Output 2520 ml   Net -2520 ml       Constitutional: alert, appears stated age, and cooperative    Skin: Skin color, texture, turgor normal. No rashes or lesions    Head: Normocephalic, without obvious abnormality, atraumatic     Cardiovascular/Edema: regular rate and rhythm, S1, S2 normal, no murmur, click, rub or gallop    Respiratory: Lungs: clear to auscultation bilaterally    Abdomen:  Full with periumbilical tenderness; normal bowel sounds. Back: symmetric, no curvature. ROM normal. No CVA tenderness. Extremities: Trace bilateral pitting pedal edema with hyperpigmented dermatosis.     Neuro:  Grossly normal      CBC:   Recent Labs     10/24/22  1826 10/26/22  0814   WBC  --  7.1   HGB 10.7* 9.2*   PLT  --  390       BMP:    Recent Labs     10/26/22  0814    K 4.1      CO2 26   BUN 32*   CREATININE 6.45*   GLUCOSE 194*       Lab Results   Component Value Date/Time    NITRU NEGATIVE 10/19/2022 08:00 PM    COLORU Dark Yellow 10/19/2022 08:00 PM    PHUR 5.0 10/19/2022 08:00 PM    WBCUA 0 TO 2 10/19/2022 08:00 PM    RBCUA 0 TO 2 10/19/2022 08:00 PM    MUCUS NOT REPORTED 12/29/2017 11:33 PM    TRICHOMONAS MODERATE 12/29/2017 11:33 PM    YEAST NOT REPORTED 12/29/2017 11:33 PM    BACTERIA None 10/19/2022 08:00 PM    SPECGRAV 1.021 10/19/2022 08:00 PM    LEUKOCYTESUR TRACE 10/19/2022 08:00 PM    UROBILINOGEN Normal 10/19/2022 08:00 PM    BILIRUBINUR NEGATIVE  Verified by ictotest. 10/19/2022 08:00 PM    [address] NEGATIVE 01/07/2012 03:53 AM    GLUCOSEU NEGATIVE 10/19/2022 08:00 PM    GLUCOSEU 3+ 01/07/2012 03:53 AM    KETUA TRACE 10/19/2022 08:00 PM    AMORPHOUS NOT REPORTED 12/29/2017 11:33 PM     Urine Creatinine:     Lab Results   Component Value Date/Time    LABCREA 254.2 12/06/2015 02:31 PM     IMPRESSION/RECOMMENDATIONS:    1.  End-stage renal disease - We will maintain Monday, Wednesday and Friday hemodialysis schedule. Target weight changed to 80 kg. . AV fistula functioning well. Next hemodialysis will be tomorrow  Renal diet,i.e 2-gram sodium,2-gram potassium,1500 ml fluid restriction,1-gram phosphorus, 1800 KCal and 1.2 gram/kg protein per day. 2.  Systemic hypertension - Blood pressure control is acceptable at this time. We will continue as needed midodrine for intradialytic episodes of hypotension. 3.  Mineral bone disease profile - serum phosphorus level 4.5 mg/dL is within target range. .    4.  Anemia of chronic kidney disease - hemoglobin 92 g/dL and will continue Retacrit 2000 units subcutaneously 3 times a week. Prognosis is guarded.     Quincy Glaser MD FACP  Attending Nephrologist  10/27/2022 10:56 AM

## 2022-10-28 ENCOUNTER — ANESTHESIA (OUTPATIENT)
Dept: ENDOSCOPY | Age: 32
DRG: 368 | End: 2022-10-28
Payer: MEDICARE

## 2022-10-28 ENCOUNTER — ANESTHESIA EVENT (OUTPATIENT)
Dept: ENDOSCOPY | Age: 32
DRG: 368 | End: 2022-10-28
Payer: MEDICARE

## 2022-10-28 LAB
ANION GAP SERPL CALCULATED.3IONS-SCNC: 16 MMOL/L (ref 9–17)
BUN BLDV-MCNC: 29 MG/DL (ref 6–20)
CALCIUM SERPL-MCNC: 9.8 MG/DL (ref 8.6–10.4)
CHLORIDE BLD-SCNC: 99 MMOL/L (ref 98–107)
CO2: 27 MMOL/L (ref 20–31)
CREAT SERPL-MCNC: 5.76 MG/DL (ref 0.5–0.9)
EKG ATRIAL RATE: 79 BPM
EKG P AXIS: 34 DEGREES
EKG P-R INTERVAL: 138 MS
EKG Q-T INTERVAL: 416 MS
EKG QRS DURATION: 82 MS
EKG QTC CALCULATION (BAZETT): 477 MS
EKG R AXIS: 55 DEGREES
EKG T AXIS: 54 DEGREES
EKG VENTRICULAR RATE: 79 BPM
GFR SERPL CREATININE-BSD FRML MDRD: 9 ML/MIN/1.73M2
GLUCOSE BLD-MCNC: 100 MG/DL (ref 65–105)
GLUCOSE BLD-MCNC: 101 MG/DL (ref 65–105)
GLUCOSE BLD-MCNC: 111 MG/DL (ref 65–105)
GLUCOSE BLD-MCNC: 231 MG/DL (ref 65–105)
GLUCOSE BLD-MCNC: 94 MG/DL (ref 70–99)
GLUCOSE BLD-MCNC: 96 MG/DL (ref 65–105)
HCG, PREGNANCY URINE (POC): NEGATIVE
HCT VFR BLD CALC: 35.5 % (ref 36–46)
HEMOGLOBIN: 11.6 G/DL (ref 12–16)
MCH RBC QN AUTO: 29.5 PG (ref 26–34)
MCHC RBC AUTO-ENTMCNC: 32.7 G/DL (ref 31–37)
MCV RBC AUTO: 90.2 FL (ref 80–100)
PDW BLD-RTO: 16.3 % (ref 11.5–14.9)
PLATELET # BLD: 340 K/UL (ref 150–450)
PMV BLD AUTO: 6.9 FL (ref 6–12)
POTASSIUM SERPL-SCNC: 4.2 MMOL/L (ref 3.7–5.3)
RBC # BLD: 3.94 M/UL (ref 4–5.2)
SODIUM BLD-SCNC: 142 MMOL/L (ref 135–144)
WBC # BLD: 7.6 K/UL (ref 3.5–11)

## 2022-10-28 PROCEDURE — 80048 BASIC METABOLIC PNL TOTAL CA: CPT

## 2022-10-28 PROCEDURE — 7100000001 HC PACU RECOVERY - ADDTL 15 MIN: Performed by: INTERNAL MEDICINE

## 2022-10-28 PROCEDURE — 6370000000 HC RX 637 (ALT 250 FOR IP): Performed by: NURSE PRACTITIONER

## 2022-10-28 PROCEDURE — 2709999900 HC NON-CHARGEABLE SUPPLY: Performed by: INTERNAL MEDICINE

## 2022-10-28 PROCEDURE — 3609012400 HC EGD TRANSORAL BIOPSY SINGLE/MULTIPLE: Performed by: INTERNAL MEDICINE

## 2022-10-28 PROCEDURE — 6370000000 HC RX 637 (ALT 250 FOR IP): Performed by: INTERNAL MEDICINE

## 2022-10-28 PROCEDURE — A4216 STERILE WATER/SALINE, 10 ML: HCPCS | Performed by: NURSE PRACTITIONER

## 2022-10-28 PROCEDURE — 2580000003 HC RX 258: Performed by: NURSE ANESTHETIST, CERTIFIED REGISTERED

## 2022-10-28 PROCEDURE — 2060000000 HC ICU INTERMEDIATE R&B

## 2022-10-28 PROCEDURE — 85027 COMPLETE CBC AUTOMATED: CPT

## 2022-10-28 PROCEDURE — 88305 TISSUE EXAM BY PATHOLOGIST: CPT

## 2022-10-28 PROCEDURE — 36415 COLL VENOUS BLD VENIPUNCTURE: CPT

## 2022-10-28 PROCEDURE — 6360000002 HC RX W HCPCS: Performed by: NURSE ANESTHETIST, CERTIFIED REGISTERED

## 2022-10-28 PROCEDURE — C9113 INJ PANTOPRAZOLE SODIUM, VIA: HCPCS | Performed by: NURSE PRACTITIONER

## 2022-10-28 PROCEDURE — 81025 URINE PREGNANCY TEST: CPT

## 2022-10-28 PROCEDURE — 6360000002 HC RX W HCPCS: Performed by: INTERNAL MEDICINE

## 2022-10-28 PROCEDURE — 3700000001 HC ADD 15 MINUTES (ANESTHESIA): Performed by: INTERNAL MEDICINE

## 2022-10-28 PROCEDURE — 6360000002 HC RX W HCPCS: Performed by: NURSE PRACTITIONER

## 2022-10-28 PROCEDURE — 7100000000 HC PACU RECOVERY - FIRST 15 MIN: Performed by: INTERNAL MEDICINE

## 2022-10-28 PROCEDURE — 2500000003 HC RX 250 WO HCPCS: Performed by: NURSE ANESTHETIST, CERTIFIED REGISTERED

## 2022-10-28 PROCEDURE — 2580000003 HC RX 258: Performed by: ANESTHESIOLOGY

## 2022-10-28 PROCEDURE — 99211 OFF/OP EST MAY X REQ PHY/QHP: CPT

## 2022-10-28 PROCEDURE — 90935 HEMODIALYSIS ONE EVALUATION: CPT

## 2022-10-28 PROCEDURE — 2580000003 HC RX 258: Performed by: NURSE PRACTITIONER

## 2022-10-28 PROCEDURE — 82947 ASSAY GLUCOSE BLOOD QUANT: CPT

## 2022-10-28 PROCEDURE — 43239 EGD BIOPSY SINGLE/MULTIPLE: CPT | Performed by: INTERNAL MEDICINE

## 2022-10-28 PROCEDURE — 88342 IMHCHEM/IMCYTCHM 1ST ANTB: CPT

## 2022-10-28 PROCEDURE — 3700000000 HC ANESTHESIA ATTENDED CARE: Performed by: INTERNAL MEDICINE

## 2022-10-28 PROCEDURE — 99233 SBSQ HOSP IP/OBS HIGH 50: CPT | Performed by: INTERNAL MEDICINE

## 2022-10-28 PROCEDURE — 0DB58ZX EXCISION OF ESOPHAGUS, VIA NATURAL OR ARTIFICIAL OPENING ENDOSCOPIC, DIAGNOSTIC: ICD-10-PCS | Performed by: INTERNAL MEDICINE

## 2022-10-28 PROCEDURE — 88312 SPECIAL STAINS GROUP 1: CPT

## 2022-10-28 RX ORDER — SODIUM CHLORIDE 0.9 % (FLUSH) 0.9 %
5-40 SYRINGE (ML) INJECTION EVERY 12 HOURS SCHEDULED
Status: DISCONTINUED | OUTPATIENT
Start: 2022-10-28 | End: 2022-10-30

## 2022-10-28 RX ORDER — FENTANYL CITRATE 50 UG/ML
25 INJECTION, SOLUTION INTRAMUSCULAR; INTRAVENOUS EVERY 5 MIN PRN
Status: DISCONTINUED | OUTPATIENT
Start: 2022-10-28 | End: 2022-10-31 | Stop reason: HOSPADM

## 2022-10-28 RX ORDER — FLUCONAZOLE 100 MG/1
200 TABLET ORAL DAILY
Status: DISCONTINUED | OUTPATIENT
Start: 2022-10-28 | End: 2022-10-28 | Stop reason: DRUGHIGH

## 2022-10-28 RX ORDER — SODIUM CHLORIDE 0.9 % (FLUSH) 0.9 %
5-40 SYRINGE (ML) INJECTION PRN
Status: DISCONTINUED | OUTPATIENT
Start: 2022-10-28 | End: 2022-10-31 | Stop reason: HOSPADM

## 2022-10-28 RX ORDER — LIDOCAINE HYDROCHLORIDE 20 MG/ML
INJECTION, SOLUTION EPIDURAL; INFILTRATION; INTRACAUDAL; PERINEURAL PRN
Status: DISCONTINUED | OUTPATIENT
Start: 2022-10-28 | End: 2022-10-28 | Stop reason: SDUPTHER

## 2022-10-28 RX ORDER — METOCLOPRAMIDE 10 MG/1
10 TABLET ORAL
Status: DISCONTINUED | OUTPATIENT
Start: 2022-10-28 | End: 2022-10-31 | Stop reason: HOSPADM

## 2022-10-28 RX ORDER — PROPOFOL 10 MG/ML
INJECTION, EMULSION INTRAVENOUS PRN
Status: DISCONTINUED | OUTPATIENT
Start: 2022-10-28 | End: 2022-10-28 | Stop reason: SDUPTHER

## 2022-10-28 RX ORDER — SODIUM CHLORIDE 9 MG/ML
INJECTION, SOLUTION INTRAVENOUS CONTINUOUS PRN
Status: DISCONTINUED | OUTPATIENT
Start: 2022-10-28 | End: 2022-10-28 | Stop reason: SDUPTHER

## 2022-10-28 RX ORDER — FLUCONAZOLE 100 MG/1
400 TABLET ORAL ONCE
Status: DISCONTINUED | OUTPATIENT
Start: 2022-10-28 | End: 2022-10-31 | Stop reason: HOSPADM

## 2022-10-28 RX ORDER — DIPHENHYDRAMINE HYDROCHLORIDE 50 MG/ML
12.5 INJECTION INTRAMUSCULAR; INTRAVENOUS
Status: ACTIVE | OUTPATIENT
Start: 2022-10-28 | End: 2022-10-29

## 2022-10-28 RX ORDER — SODIUM CHLORIDE 9 MG/ML
INJECTION, SOLUTION INTRAVENOUS PRN
Status: DISCONTINUED | OUTPATIENT
Start: 2022-10-28 | End: 2022-10-31 | Stop reason: HOSPADM

## 2022-10-28 RX ORDER — ONDANSETRON 2 MG/ML
4 INJECTION INTRAMUSCULAR; INTRAVENOUS
Status: ACTIVE | OUTPATIENT
Start: 2022-10-28 | End: 2022-10-29

## 2022-10-28 RX ORDER — METOCLOPRAMIDE HYDROCHLORIDE 5 MG/ML
10 INJECTION INTRAMUSCULAR; INTRAVENOUS
Status: ACTIVE | OUTPATIENT
Start: 2022-10-28 | End: 2022-10-29

## 2022-10-28 RX ORDER — FLUCONAZOLE 100 MG/1
200 TABLET ORAL DAILY
Status: DISCONTINUED | OUTPATIENT
Start: 2022-10-29 | End: 2022-10-31 | Stop reason: HOSPADM

## 2022-10-28 RX ADMIN — CARVEDILOL 25 MG: 25 TABLET, FILM COATED ORAL at 10:01

## 2022-10-28 RX ADMIN — SODIUM CHLORIDE, PRESERVATIVE FREE 40 MG: 5 INJECTION INTRAVENOUS at 11:43

## 2022-10-28 RX ADMIN — Medication 5000 UNITS: at 10:01

## 2022-10-28 RX ADMIN — EPOETIN ALFA-EPBX 2000 UNITS: 2000 INJECTION, SOLUTION INTRAVENOUS; SUBCUTANEOUS at 22:29

## 2022-10-28 RX ADMIN — LIDOCAINE HYDROCHLORIDE 40 MG: 20 INJECTION, SOLUTION EPIDURAL; INFILTRATION; INTRACAUDAL; PERINEURAL at 13:23

## 2022-10-28 RX ADMIN — PROPOFOL 220 MG: 10 INJECTION, EMULSION INTRAVENOUS at 13:24

## 2022-10-28 RX ADMIN — Medication 10 ML: at 21:43

## 2022-10-28 RX ADMIN — POLYETHYLENE GLYCOL 3350 17 G: 17 POWDER, FOR SOLUTION ORAL at 10:01

## 2022-10-28 RX ADMIN — SODIUM CHLORIDE: 9 INJECTION, SOLUTION INTRAVENOUS at 13:18

## 2022-10-28 RX ADMIN — ONDANSETRON 4 MG: 2 INJECTION INTRAMUSCULAR; INTRAVENOUS at 21:59

## 2022-10-28 RX ADMIN — HYDRALAZINE HYDROCHLORIDE 100 MG: 50 TABLET, FILM COATED ORAL at 10:00

## 2022-10-28 RX ADMIN — NIFEDIPINE 90 MG: 90 TABLET, EXTENDED RELEASE ORAL at 11:16

## 2022-10-28 RX ADMIN — ANTACID TABLETS 500 MG: 500 TABLET, CHEWABLE ORAL at 00:11

## 2022-10-28 RX ADMIN — MIDODRINE HYDROCHLORIDE 5 MG: 5 TABLET ORAL at 17:29

## 2022-10-28 ASSESSMENT — PAIN SCALES - WONG BAKER: WONGBAKER_NUMERICALRESPONSE: 0

## 2022-10-28 ASSESSMENT — PAIN DESCRIPTION - LOCATION: LOCATION: LEG

## 2022-10-28 ASSESSMENT — PAIN DESCRIPTION - ONSET: ONSET: AWAKENED FROM SLEEP

## 2022-10-28 ASSESSMENT — PAIN SCALES - GENERAL
PAINLEVEL_OUTOF10: 8
PAINLEVEL_OUTOF10: 0

## 2022-10-28 ASSESSMENT — PAIN DESCRIPTION - ORIENTATION: ORIENTATION: RIGHT;LEFT

## 2022-10-28 ASSESSMENT — PAIN DESCRIPTION - DESCRIPTORS: DESCRIPTORS: NAGGING

## 2022-10-28 ASSESSMENT — PAIN - FUNCTIONAL ASSESSMENT: PAIN_FUNCTIONAL_ASSESSMENT: 0-10

## 2022-10-28 NOTE — PROGRESS NOTES
[address] [address] 44 NOTE     10/28/2022     Patient was seen and examined in person, Chart reviewed   Patient's case discussed with staff/team    Chief Complaint: Depression and lack of appetite    Interim History:     Patient was seen at bedside. Was comfortably sitting in bed. Patient stated that she wanted to leave and does not want to be in the hospital anymore. Seemed very irritable and stated that she did not know what the plan was and why she was being kept NPO. Patient denied any nausea this morning. Also denied any delusions, hallucinations, SI or HI. In response to questions about her mood stated \" I am very irritated right now\" and just want to leave the hospital.  Patient subsequently calmed down during the encounter and was willing to speak to GI team regarding evaluation and treatment plan. Patient going for EGD and dialysis today.     /68   Pulse 90   Temp 97.9 °F (36.6 °C) (Axillary)   Resp 14   Ht 5' 4\" (1.626 m)   Wt 170 lb 3.1 oz (77.2 kg)   LMP  (LMP Unknown)   SpO2 98%   BMI 29.21 kg/m²   Appetite:  [] Normal/Unchanged  [] Increased  [x] Decreased      Sleep:       [x] Normal/Unchanged  [] Fair       [] Poor              Energy:    [] Normal/Unchanged  [] Increased  [x] Decreased        Aggression:  [] yes  [x] no    Patient is [] able  [] unable to CONTRACT FOR SAFETY ON THE UNIT    PAST MEDICAL/PSYCHIATRIC HISTORY:   Past Medical History:   Diagnosis Date    Asthma     Atherosclerosis of native arteries of extremities with rest pain, unspecified extremity ([address]     Depression 10/25/2022    Diabetes mellitus ([address]     since 14 y/o, unsure if type 1 or 2    Headache, migraine     for 2 years    Hypertension     Lichen simplex chronicus     Noninflammatory disorder of vagina, unspecified     Patient's noncompliance with other medical treatment and regimen for other reason     Type 2 diabetes mellitus without complication ([address] 04/06/9474       FAMILY/SOCIAL HISTORY:  Family History   Problem Relation Age of Onset    High Blood Pressure Mother     Diabetes Mother     Other Mother 27        Lung cancer    High Blood Pressure Father     Diabetes Father     Other Father 27        Prostate cancer     Social History     Socioeconomic History    Marital status: Single     Spouse name: Not on file    Number of children: Not on file    Years of education: Not on file    Highest education level: Not on file   Occupational History    Not on file   Tobacco Use    Smoking status: Never    Smokeless tobacco: Never   Substance and Sexual Activity    Alcohol use: No    Drug use: No    Sexual activity: Not on file   Other Topics Concern    Not on file   Social History Narrative    Not on file     Social Determinants of Health     Financial Resource Strain: Not on file   Food Insecurity: Not on file   Transportation Needs: Not on file   Physical Activity: Not on file   Stress: Not on file   Social Connections: Not on file   Intimate Partner Violence: Not on file   Housing Stability: Not on file           ROS:  [x] All negative/unchanged except if checked.  Explain positive(checked items) below:  [] Constitutional  [] Eyes  [] Ear/Nose/Mouth/Throat  [] Respiratory  [] CV  [] GI  []   [] Musculoskeletal  [] Skin/Breast  [] Neurological  [] Endocrine  [] Heme/Lymph  [] Allergic/Immunologic    Explanation:     MEDICATIONS:    Current Facility-Administered Medications:     polyethylene glycol (GLYCOLAX) packet 17 g, 17 g, Oral, Daily, Roxy TRACE ChavezN - CNP, 17 g at 10/27/22 1148    pantoprazole (PROTONIX) 40 mg in sodium chloride (PF) 0.9 % 10 mL injection, 40 mg, IntraVENous, Daily, Roxy TRACE ChavezN - CNP, 40 mg at 10/27/22 1148    0.9 % sodium chloride infusion, , IntraVENous, Continuous, Zahra Ingram MD, Last Rate: 50 mL/hr at 10/27/22 1320, New Bag at 10/27/22 1320    calcium carbonate (TUMS) chewable tablet 500 mg, 500 mg, Oral, TID PRN, Zahra Ingram MD, 500 mg at 10/28/22 0011    ondansetron (ZOFRAN-ODT) disintegrating tablet 4 mg, 4 mg, Oral, Q4H PRN, 4 mg at 10/27/22 0135 **OR** ondansetron (ZOFRAN) injection 4 mg, 4 mg, IntraVENous, Q6H PRN, Aleah Sol MD, 4 mg at 10/27/22 2236    mirtazapine (REMERON SOL-TAB) disintegrating tablet 15 mg, 15 mg, Oral, Nightly, Sebastian De La Cruz MD, 15 mg at 10/27/22 2351    sodium chloride flush 0.9 % injection 10 mL, 10 mL, IntraVENous, PRN, Dahiana Cantu MD, 10 mL at 10/23/22 1332    lidocaine 4 % external patch 1 patch, 1 patch, TransDERmal, Daily, Sebastian De La Cruz MD, 1 patch at 10/27/22 0931    acetaminophen (TYLENOL) tablet 650 mg, 650 mg, Oral, Q4H PRN, CUAUHTEMOC Iqbal NP, 650 mg at 10/27/22 2347    epoetin juventino-epbx (RETACRIT) injection 2,000 Units, 2,000 Units, SubCUTAneous, Once per day on Mon Wed Fri, Dahiana Cantu MD, 2,000 Units at 10/21/22 1755    metoprolol (LOPRESSOR) injection 2.5 mg, 2.5 mg, IntraVENous, Q6H PRN, Dahiana Cantu MD, 2.5 mg at 10/27/22 1834    melatonin tablet 3 mg, 3 mg, Oral, Nightly PRN, CUAUHTEMOC Iqbal NP, 3 mg at 10/27/22 2348    midodrine (PROAMATINE) tablet 5 mg, 5 mg, Oral, TID PRN, Dahiana Cantu MD, 5 mg at 10/25/22 2321    albuterol sulfate HFA (PROVENTIL;VENTOLIN;PROAIR) 108 (90 Base) MCG/ACT inhaler 2 puff, 2 puff, Inhalation, Q6H PRN, CUAUHTEMOC Iqbal NP    [Held by provider] aspirin EC tablet 81 mg, 81 mg, Oral, Daily, CUAUHTEMOC Aleman NP, 81 mg at 10/25/22 1039    atorvastatin (LIPITOR) tablet 10 mg, 10 mg, Oral, Nightly, Shahla Carpenter, APRN - NP, 10 mg at 10/27/22 2349    calcium acetate (PHOSLO) capsule 2,001 mg, 3 capsule, Oral, TID WC, Shahla Carpenter, APRN - NP, 2,001 mg at 10/26/22 1134    carvedilol (COREG) tablet 25 mg, 25 mg, Oral, BID, Shahla Carpenter APRN - NP, 25 mg at 10/27/22 2349    [Held by provider] clopidogrel (PLAVIX) tablet 75 mg, 75 mg, Oral, Daily, Shahla Carpenter, APRN - NP, 75 mg at 10/25/22 1039    hydrALAZINE (APRESOLINE) tablet 100 mg, 100 mg, Oral, TID, Chelsey Purchase, APRN - NP, 100 mg at 10/27/22 2349    NIFEdipine (ADALAT CC) extended release tablet 90 mg, 90 mg, Oral, Daily, Chelsey Purchase, APRN - NP, 90 mg at 10/25/22 1039    vitamin D3 (CHOLECALCIFEROL) tablet 5,000 Units, 5,000 Units, Oral, Daily, Chelsey Purchase, APRN - NP, 5,000 Units at 10/25/22 1039    sodium chloride flush 0.9 % injection 5-40 mL, 5-40 mL, IntraVENous, 2 times per day, Chelsey Purchase, APRN - NP, 10 mL at 10/27/22 2029    sodium chloride flush 0.9 % injection 5-40 mL, 5-40 mL, IntraVENous, PRN, Chelsey Purchase, APRN - NP, 10 mL at 10/22/22 1054    0.9 % sodium chloride infusion, , IntraVENous, PRN, Chelsey Purchase, APRN - NP    [Held by provider] insulin glargine (LANTUS) injection vial 24 Units, 24 Units, SubCUTAneous, Nightly, Shalha Goldi, APRN - NP, 24 Units at 10/22/22 2348    glucose chewable tablet 16 g, 4 tablet, Oral, PRN, Chelsey Purchase, APRN - NP    dextrose bolus 10% 125 mL, 125 mL, IntraVENous, PRN **OR** dextrose bolus 10% 250 mL, 250 mL, IntraVENous, PRN, Shahla Goldi, APRN - NP    glucagon (rDNA) injection 1 mg, 1 mg, SubCUTAneous, PRN, Chelsey Purchase, APRN - NP    dextrose 10 % infusion, , IntraVENous, Continuous PRN, Chelsey Purchase, APRN - NP, Stopped at 10/23/22 2044      Examination:  /68   Pulse 90   Temp 97.9 °F (36.6 °C) (Axillary)   Resp 14   Ht 5' 4\" (1.626 m)   Wt 170 lb 3.1 oz (77.2 kg)   LMP  (LMP Unknown)   SpO2 98%   BMI 29.21 kg/m²   Gait -not tested  Medication side effects(SE): Denies    Mental Status Examination:    Level of consciousness:  within normal limits   Appearance: Fair grooming and fair hygiene  Behavior/Motor:  psychomotor retardation  Attitude toward examiner:  cooperative and attentive, irritable  Speech: Normal rate and normal volume  Mood: depressed  Affect:  blunted  Thought processes:  linear, goal directed, and coherent   Thought content:  Homicidal ideation - none  Suicidal Ideation:  denies suicidal ideation  Delusions:  no evidence of delusions  Perceptual Disturbance:  denies any perceptual disturbance  Cognition:  oriented to person, place, and time   Concentration intact  Insight fair   Judgement fair     ASSESSMENT:   Patient symptoms are:  [] Well controlled  [] Improving  [] Worsening  [x] No change      Diagnosis:   Principal Problem:    Pancreatitis, unspecified pancreatitis type  Active Problems:    Acute urinary retention    Gastroenteritis    Elevated lipase    Generalized abdominal pain    Depression    Constipation    Severe malnutrition (HCC)    Lethargy  Resolved Problems:    * No resolved hospital problems. *      LABS:    Recent Labs     10/26/22  0814 10/28/22  0830   WBC 7.1 7.6   HGB 9.2* 11.6*    340       Recent Labs     10/26/22  0814 10/28/22  0830    142   K 4.1 4.2    99   CO2 26 27   BUN 32* 29*   CREATININE 6.45* 5.76*   GLUCOSE 194* 94       No results for input(s): BILITOT, ALKPHOS, AST, ALT in the last 72 hours. No results found for: Vertell Gitelman, LABBENZ, CANNAB, COCAINESCRN, LABMETH, OPIATESCREENURINE, PHENCYCLIDINESCREENURINE, PPXUR, ETOH  Lab Results   Component Value Date/Time    TSH 1.04 10/23/2022 12:06 PM     No results found for: LITHIUM  No results found for: VALPROATE, CBMZ    RISK ASSESSMENT: Low risk for suicide or self-harm    Treatment Plan:  Reviewed current Medications with the patient. Continue Remeron 15 mg nightly. Recommend outpatient follow-up. Does not require admission to [facility]. Risks, benefits, side effects, drug-to-drug interactions and alternatives to treatment were discussed. The patient consented to treatment. PSYCHOTHERAPY/COUNSELING:  [] Therapeutic interview  [x] Supportive  [] CBT  [] Ongoing  [] Other                                    Wendy Shelley is a 28 y.o. female being evaluated face to face.      --Camden Thompson MD on 10/28/2022 at 9:33 AM    An electronic signature was used to authenticate this note. **This report has been created using voice recognition software. It may contain minor errors which are inherent in voice recognition technology. **

## 2022-10-28 NOTE — PROGRESS NOTES
HEMODIALYSIS PRE-TREATMENT NOTE    Patient Identifiers prior to treatment: name, band and birthdate    Isolation Required: na                      Isolation Type: na       (please document if patient is being managed as a PUI/COVID-19 patient)        Hepatitis status:                           Date Drawn                             Result  Hepatitis B Surface Antigen 10/10/2022     neg                     Hepatitis B Surface Antibody 10/10/2022 neg        Hepatitis B Core Antibody            How was Hepatitis Status verified: labs     Was a copy of the labs you documented provided to facility for the patient's chart: yes    Hemodialysis orders verified: yes    Access Within normal limits ( I.e. s/s of infection,...): yes     Pre-Assessment completed: yes    Pre-dialysis report received from: [address]                      Time: 1600

## 2022-10-28 NOTE — PROGRESS NOTES
PT CANCEL NOTE    Kerline Savannah:  [id number]     10/28/22          Pt off floor earlier for endoscopy; Just left floor this pm for hemodialysis. Will re-attempt PT treatment 10/29.       Jeff Marques PT

## 2022-10-28 NOTE — ANESTHESIA POSTPROCEDURE EVALUATION
POST- ANESTHESIA EVALUATION       Pt Name: Stiven San  MRN: [id number]  YOB: 1990  Date of evaluation: 10/28/2022  Time:  2:19 PM      BP (!) 79/39   Pulse 88   Temp 97.3 °F (36.3 °C) (Infrared)   Resp 24   Ht 5' 4\" (1.626 m)   Wt 170 lb 3.1 oz (77.2 kg)   LMP  (LMP Unknown)   SpO2 100%   BMI 29.21 kg/m²      Consciousness Level  Awake  Cardiopulmonary Status  Stable  Pain Adequately Treated YES  Nausea / Vomiting  NO  Adequate Hydration  YES  Anesthesia Related Complications NONE      Electronically signed by Marlon Larsen MD on 10/28/2022 at 2:19 PM       Department of Anesthesiology  Postprocedure Note    Patient: Stiven San  MRN: [id number]  YOB: 1990  Date of evaluation: 10/28/2022      Procedure Summary     Date: 10/28/22 Room / Location: [place] [address] / [facility]    Anesthesia Start: 1321 Anesthesia Stop: 2419    Procedure: EGD BIOPSY (Esophagus) Diagnosis:       Nausea and vomiting, unspecified vomiting type      Abdominal pain, unspecified abdominal location      (Nausea and vomiting, unspecified vomiting type [R11.2])      (Abdominal pain, unspecified abdominal location [R10.9])    Surgeons: Gisel Andrews MD Responsible Provider: Marlon Larsen MD    Anesthesia Type: MAC ASA Status: 3          Anesthesia Type: MAC    Tarsha Phase I: Tarsha Score: 9    Tarsha Phase II:        Anesthesia Post Evaluation

## 2022-10-28 NOTE — PROGRESS NOTES
[facility]: REGINA NICK   OCCUPATIONAL THERAPY MISSED TREATMENT NOTE   INPATIENT   Date: 10/28/22  Patient Name: Gloris Dance       Room: 2103  MRN: [id number]   Account #: [de-identified]    : 1990  (28 y.o.)  Gender: female   Referring Practitioner: Dr. Yasmin Weems  Diagnosis: Pancreatitis             REASON FOR MISSED TREATMENT:  Patient refusal   -   Pt was initially agreeable to therapy, but when asked to participate in activity pt states \" I am to tired, I can't\" . Writer will try back as time allows.         [address], PIA

## 2022-10-28 NOTE — CARE COORDINATION
Patient is unable to discharge this weekend. Patient can discharge on Monday once dialysis is approved.   RN notified

## 2022-10-28 NOTE — PROGRESS NOTES
informed EGD will be around 1 pm. Dialysis notified that she would go later after this procedure per dr Bri Whitfield    Pt will be picked up for egd at noon

## 2022-10-28 NOTE — PLAN OF CARE
Problem: Safety - Adult  Goal: Free from fall injury  10/28/2022 0338 by Dc Marr RN  Outcome: Progressing  Note: No falls noted this shift. Bed kept in low position. Safe environment maintained. Bedside table & call light in reach. Uses call light appropriately when needing assistance. Problem: Pain  Goal: Verbalizes/displays adequate comfort level or baseline comfort level  10/28/2022 0338 by Dc Marr RN  Outcome: Progressing  Note: Patient given PRN pain medications for pain control.       Problem: Chronic Conditions and Co-morbidities  Goal: Patient's chronic conditions and co-morbidity symptoms are monitored and maintained or improved  10/28/2022 0338 by Dc Marr RN  Outcome: Progressing     Problem: Nutrition Deficit:  Goal: Optimize nutritional status  Outcome: Progressing     Problem: Discharge Planning  Goal: Discharge to home or other facility with appropriate resources  10/28/2022 0338 by Dc Marr RN  Outcome: Progressing

## 2022-10-28 NOTE — PROGRESS NOTES
Department of Internal Medicine  Nephrology Linda Edmondson MD Progress Note    Reason for consultation: Management of hemodialysis dependent end-stage renal disease. Consulting physician: Fco Dejesus MD.    Interval history: Patient is scheduled for EGD later this morning. She continues to have nausea. She does not have shortness of breath or chest pain. History of present: This is a 28 y.o. 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 Saw Lema at of nephrology as consultants of [facility], Presented to the emergency department yesterday with complaints of abdominal pain that started after hemodialysis. This was associated with 1 episode of vomiting at dialysis. She has a recent history of GI bleed requiring transfusion and was admitted from October 11 to October 14, 2022. Flexible sigmoidoscopy performed on that admission showed multiple rectal ulcers which were cauterized and patient was taken off Xarelto.     Scheduled Meds:   polyethylene glycol  17 g Oral Daily    pantoprazole (PROTONIX) 40 mg injection  40 mg IntraVENous Daily    mirtazapine  15 mg Oral Nightly    lidocaine  1 patch TransDERmal Daily    epoetin juventino-epbx  2,000 Units SubCUTAneous Once per day on Mon Wed Fri    [Held by provider] aspirin  81 mg Oral Daily    atorvastatin  10 mg Oral Nightly    calcium acetate  3 capsule Oral TID WC    carvedilol  25 mg Oral BID    [Held by provider] clopidogrel  75 mg Oral Daily    hydrALAZINE  100 mg Oral TID    NIFEdipine  90 mg Oral Daily    vitamin D3  5,000 Units Oral Daily    sodium chloride flush  5-40 mL IntraVENous 2 times per day    [Held by provider] insulin glargine  24 Units SubCUTAneous Nightly     Continuous Infusions:   sodium chloride 50 mL/hr at 10/27/22 1320    sodium chloride      dextrose Stopped (10/23/22 2044)     PRN Meds:.calcium carbonate, ondansetron **OR** ondansetron, sodium chloride flush, acetaminophen, metoprolol, melatonin, midodrine, albuterol sulfate HFA, sodium chloride flush, sodium chloride, glucose, dextrose bolus **OR** dextrose bolus, glucagon (rDNA), dextrose    Physical Exam:    VITALS:  /75   Pulse 89   Temp 97.9 °F (36.6 °C) (Axillary)   Resp 14   Ht 5' 4\" (1.626 m)   Wt 170 lb 3.1 oz (77.2 kg)   LMP  (LMP Unknown)   SpO2 98%   BMI 29.21 kg/m²   TEMPERATURE:  Current - Temp: 97.9 °F (36.6 °C); Max - Temp  Av.7 °F (37.1 °C)  Min: 97.9 °F (36.6 °C)  Max: 99.4 °F (37.4 °C)  RESPIRATIONS RANGE: Resp  Av.4  Min: 14  Max: 21  PULSE RANGE: Pulse  Av.2  Min: 81  Max: 93  BLOOD PRESSURE RANGE:  Systolic (33RHI), HUZ:202 , Min:108 , GRH:329 ; Diastolic (58UCV), BWL:85, Min:68, Max:96  PULSE OXIMETRY RANGE: SpO2  Av.5 %  Min: 98 %  Max: 100 %  24HR INTAKE/OUTPUT:    Intake/Output Summary (Last 24 hours) at 10/28/2022 1012  Last data filed at 10/28/2022 0815  Gross per 24 hour   Intake 363.34 ml   Output 475 ml   Net -111.66 ml       Constitutional: alert, appears stated age, and cooperative    Skin: Skin color, texture, turgor normal. No rashes or lesions    Head: Normocephalic, without obvious abnormality, atraumatic     Cardiovascular/Edema: regular rate and rhythm, S1, S2 normal, no murmur, click, rub or gallop    Respiratory: Lungs: clear to auscultation bilaterally    Abdomen:  Full with periumbilical tenderness; normal bowel sounds. Back: symmetric, no curvature. ROM normal. No CVA tenderness. Extremities: Trace bilateral pitting pedal edema with hyperpigmented dermatosis.     Neuro:  Grossly normal    CBC:   Recent Labs     10/26/22  0814 10/28/22  0830   WBC 7.1 7.6   HGB 9.2* 11.6*    340       BMP:    Recent Labs     10/26/22  0814 10/28/22  0830    142   K 4.1 4.2    99   CO2 26 27   BUN 32* 29*   CREATININE 6.45* 5.76*   GLUCOSE 194* 94       Lab Results   Component Value Date/Time    NITRU NEGATIVE 10/27/2022 05:55 PM    COLORU Dark Yellow 10/27/2022 05:55 PM    PHUR 5.0 10/27/2022 05:55 PM    WBCUA 0 TO 2 10/27/2022 05:55 PM    RBCUA 0 TO 2 10/27/2022 05:55 PM    MUCUS NOT REPORTED 12/29/2017 11:33 PM    TRICHOMONAS MODERATE 12/29/2017 11:33 PM    YEAST NOT REPORTED 12/29/2017 11:33 PM    BACTERIA MANY 10/27/2022 05:55 PM    SPECGRAV 1.031 10/27/2022 05:55 PM    LEUKOCYTESUR SMALL 10/27/2022 05:55 PM    UROBILINOGEN Normal 10/27/2022 05:55 PM    BILIRUBINUR NEGATIVE  Verified by ictotest. 10/27/2022 05:55 PM    BILIRUBINUR NEGATIVE 01/07/2012 03:53 AM    GLUCOSEU NEGATIVE 10/27/2022 05:55 PM    GLUCOSEU 3+ 01/07/2012 03:53 AM    KETUA TRACE 10/27/2022 05:55 PM    AMORPHOUS NOT REPORTED 12/29/2017 11:33 PM     Urine Creatinine:     Lab Results   Component Value Date/Time    LABCREA 254.2 12/06/2015 02:31 PM     IMPRESSION/RECOMMENDATIONS:    1.  End-stage renal disease - We will maintain Monday, Wednesday and Friday hemodialysis schedule. We will keep target weight at 80 kg. AV fistula functioning well. Renal diet,i.e 2-gram sodium,2-gram potassium,1500 ml fluid restriction,1-gram phosphorus, 1800 KCal and 1.2 gram/kg protein per day. 2.  Systemic hypertension - Blood pressure control is acceptable at this time. We will continue as needed midodrine for intradialytic episodes of hypotension. 3.  Mineral bone disease profile - serum phosphorus level 4.5 mg/dL is within target range. 4.  Anemia of chronic kidney disease - Hemoglobin 11.6 g/dL today is within target range. We will continue Retacrit 2000 units subcutaneously 3 times a week. 5.  Nausea and vomiting - likely secondary to diabetic gastroparesis. Patient is scheduled for EGD today. Prognosis is guarded.     Valencia Hobbs MD FACP  Attending Nephrologist  10/28/2022 10:12 AM

## 2022-10-28 NOTE — PLAN OF CARE
Problem: Discharge Planning  Goal: Discharge to home or other facility with appropriate resources  10/28/2022 1723 by Rai Hanson RN  Outcome: Progressing     Problem: Pain  Goal: Verbalizes/displays adequate comfort level or baseline comfort level  10/28/2022 1723 by Rai Hanson RN  Outcome: Progressing     Problem: Safety - Adult  Goal: Free from fall injury  10/28/2022 1723 by Rai Hanson RN  Outcome: Progressing     Problem: Skin/Tissue Integrity  Goal: Absence of new skin breakdown  Description: 1. Monitor for areas of redness and/or skin breakdown  2. Assess vascular access sites hourly  3. Every 4-6 hours minimum:  Change oxygen saturation probe site  4. Every 4-6 hours:  If on nasal continuous positive airway pressure, respiratory therapy assess nares and determine need for appliance change or resting period.   10/28/2022 1723 by Rai Hanson RN  Outcome: Progressing

## 2022-10-28 NOTE — ANESTHESIA PRE PROCEDURE
Department of Anesthesiology  Preprocedure Note       Name:  Alicia Guadarrama   Age:  28 y.o.  :  1990                                          MRN:  [id number]         Date:  10/28/2022      Surgeon: Lashawn Crews):  Demetris Baltazar MD    Procedure: Procedure(s):  EGD BIOPSY    Medications prior to admission:   Prior to Admission medications    Medication Sig Start Date End Date Taking?  Authorizing Provider   ondansetron (ZOFRAN-ODT) 4 MG disintegrating tablet Take 1 tablet by mouth every 8 hours as needed for Nausea or Vomiting 10/21/22  Yes Nathaniel Rainey MD   aspirin 81 MG EC tablet Take 81 mg by mouth daily   Yes Historical Provider, MD   atorvastatin (LIPITOR) 10 MG tablet Take 10 mg by mouth at bedtime   Yes Historical Provider, MD   calcium acetate (PHOSLO) 667 MG CAPS capsule Take 3 capsules by mouth 3 times daily (with meals)   Yes Historical Provider, MD   carvedilol (COREG) 25 MG tablet Take 25 mg by mouth 2 times daily   Yes Historical Provider, MD   vitamin D (CHOLECALCIFEROL) 125 MCG (5000 UT) CAPS capsule Take 5,000 Units by mouth daily   Yes Historical Provider, MD   clopidogrel (PLAVIX) 75 MG tablet Take 75 mg by mouth daily   Yes Historical Provider, MD   escitalopram (LEXAPRO) 10 MG tablet Take 10 mg by mouth daily   Yes Historical Provider, MD   hydrALAZINE (APRESOLINE) 100 MG tablet Take 100 mg by mouth 3 times daily   Yes Historical Provider, MD   insulin detemir (LEVEMIR FLEXTOUCH) 100 UNIT/ML injection pen Inject 24 Units into the skin nightly   Yes Historical Provider, MD   midodrine (PROAMATINE) 10 MG tablet Take 30 mg by mouth daily Indications: Mon, Wed, Fri at dialysis   Yes Historical Provider, MD   NIFEdipine (ADALAT CC) 90 MG extended release tablet Take 90 mg by mouth daily   Yes Historical Provider, MD   sennosides-docusate sodium (SENOKOT-S) 8.6-50 MG tablet Take 2 tablets by mouth at bedtime   Yes Historical Provider, MD   B Complex-C-Folic Acid (TRIPHROCAPS) 1 MG CAPS Take 1 capsule by mouth daily   Yes Historical Provider, MD   patiromer sorbitex calcium (VELTASSA) 8.4 g PACK packet Take 8.4 g by mouth Twice a Week Indications: twice per day on Thursdays and Sundays   Yes Historical Provider, MD   acetaminophen (TYLENOL) 325 MG tablet Take 650 mg by mouth every 6 hours as needed for Pain or Fever (do not exceed 3 gm in 24 hours)    Historical Provider, MD   albuterol sulfate HFA (PROVENTIL;VENTOLIN;PROAIR) 108 (90 Base) MCG/ACT inhaler Inhale 2 puffs into the lungs every 6 hours as needed for Wheezing or Shortness of Breath    Historical Provider, MD   polyethylene glycol (GLYCOLAX) 17 GM/SCOOP powder Take 17 g by mouth daily as needed (constipation)    Historical Provider, MD       Current medications:    Current Facility-Administered Medications   Medication Dose Route Frequency Provider Last Rate Last Admin    [MAR Hold] polyethylene glycol (GLYCOLAX) packet 17 g  17 g Oral Daily Elby Castleman, APRN - CNP   17 g at 10/28/22 1001    [MAR Hold] pantoprazole (PROTONIX) 40 mg in sodium chloride (PF) 0.9 % 10 mL injection  40 mg IntraVENous Daily Elby Castleman, APRN - CNP   40 mg at 10/28/22 1143    [MAR Hold] 0.9 % sodium chloride infusion   IntraVENous Continuous Mary Morales MD 50 mL/hr at 10/27/22 1320 New Bag at 10/27/22 1320    [MAR Hold] calcium carbonate (TUMS) chewable tablet 500 mg  500 mg Oral TID PRN Mary Morales MD   500 mg at 10/28/22 0011    [MAR Hold] ondansetron (ZOFRAN-ODT) disintegrating tablet 4 mg  4 mg Oral Q4H PRN Aleah Sol MD   4 mg at 10/27/22 0135    Or    [MAR Hold] ondansetron (ZOFRAN) injection 4 mg  4 mg IntraVENous Q6H PRN Aleah Sol MD   4 mg at 10/27/22 2236    [MAR Hold] mirtazapine (REMERON SOL-TAB) disintegrating tablet 15 mg  15 mg Oral Nightly Mary Morales MD   15 mg at 10/27/22 2351    [MAR Hold] sodium chloride flush 0.9 % injection 10 mL  10 mL IntraVENous PRN Shruthi Zarate MD   10 mL at 10/23/22 2206    [MAR Hold] lidocaine 4 % external patch 1 patch  1 patch TransDERmal Daily Coryr Mesa MD   1 patch at 10/28/22 1001    [MAR Hold] acetaminophen (TYLENOL) tablet 650 mg  650 mg Oral Q4H PRN [facility], APRN - NP   650 mg at 10/27/22 2347    [MAR Hold] epoetin juventino-epbx (RETACRIT) injection 2,000 Units  2,000 Units SubCUTAneous Once per day on Mon Wed Fri Clair Driscoll MD   2,000 Units at 10/21/22 1755    [MAR Hold] metoprolol (LOPRESSOR) injection 2.5 mg  2.5 mg IntraVENous Q6H PRN Clair Driscoll MD   2.5 mg at 10/27/22 1834    [MAR Hold] melatonin tablet 3 mg  3 mg Oral Nightly PRN [facility], APRN - NP   3 mg at 10/27/22 2348    [MAR Hold] midodrine (PROAMATINE) tablet 5 mg  5 mg Oral TID PRN Clair Driscoll MD   5 mg at 10/25/22 2321    [MAR Hold] albuterol sulfate HFA (PROVENTIL;VENTOLIN;PROAIR) 108 (90 Base) MCG/ACT inhaler 2 puff  2 puff Inhalation Q6H PRN [facility], APRN - NP        [Held by provider] aspirin EC tablet 81 mg  81 mg Oral Daily [facility], APRN - NP   81 mg at 10/25/22 1039    [MAR Hold] atorvastatin (LIPITOR) tablet 10 mg  10 mg Oral Nightly [facility], APRN - NP   10 mg at 10/27/22 2349    [MAR Hold] calcium acetate (PHOSLO) capsule 2,001 mg  3 capsule Oral TID [facility], APRN - NP   2,001 mg at 10/26/22 1134    [MAR Hold] carvedilol (COREG) tablet 25 mg  25 mg Oral BID [facility], APRN - NP   25 mg at 10/28/22 1001    [Held by provider] clopidogrel (PLAVIX) tablet 75 mg  75 mg Oral Daily [facility], APRN - NP   75 mg at 10/25/22 1039    [MAR Hold] hydrALAZINE (APRESOLINE) tablet 100 mg  100 mg Oral TID [facility], APRN - NP   100 mg at 10/28/22 1000    [MAR Hold] NIFEdipine (ADALAT CC) extended release tablet 90 mg  90 mg Oral Daily Chrissie Resides, APRN - NP   90 mg at 10/28/22 1116    [MAR Hold] vitamin D3 (CHOLECALCIFEROL) tablet 5,000 Units  5,000 Units Oral Daily Chrissie Resides, APRN - NP   5,000 Units at 10/28/22 1001    [MAR Hold] sodium chloride flush 0.9 % injection 5-40 mL  5-40 mL IntraVENous 2 times per day CUAUHTEMOC Chaparro NP   10 mL at 10/27/22 2029    [MAR Hold] sodium chloride flush 0.9 % injection 5-40 mL  5-40 mL IntraVENous PRN CUAUHTEMOC Chaparro NP   10 mL at 10/22/22 1054    [MAR Hold] 0.9 % sodium chloride infusion   IntraVENous PRN CUAUHTEMOC Chaparro NP        [Held by provider] insulin glargine (LANTUS) injection vial 24 Units  24 Units SubCUTAneous Nightly CUAUHTEMOC Chaparro NP   24 Units at 10/22/22 2348    [MAR Hold] glucose chewable tablet 16 g  4 tablet Oral PRN CUAUHTEMOC Chaparro - YAMILET        [facility] Hold] dextrose bolus 10% 125 mL  125 mL IntraVENous PRN CUAUHTEMOC Chaparro - NP        Or   [facility] Hold] dextrose bolus 10% 250 mL  250 mL IntraVENous PRN CUAUHTEMOC Chaparro - YAMILET        [MAR Hold] glucagon (rDNA) injection 1 mg  1 mg SubCUTAneous PRN CUAUHTEMOC Chaparro - NP        [facility] Hold] dextrose 10 % infusion   IntraVENous Continuous PRN CUAUHTEMOC Chaparro NP   Stopped at 10/23/22 2044       Allergies:     Allergies   Allergen Reactions    Morphine Shortness Of Breath and Other (See Comments)     Chest pain    Januvia [Sitagliptin] Nausea And Vomiting       Problem List:    Patient Active Problem List   Diagnosis Code    Threatened  O20.0    Ovarian cyst N83.209    Hx of  section Z98.891    Asthma J45.909    Diabetes ([facility] [address] E11.9    Uncontrolled diabetes mellitus [id number]    Vitamin D deficiency E55.9    Chronic fatigue R53.82    Medically noncompliant Z91.199    Type 2 diabetes mellitus without complication ([facility] [address] Y61.6    Dizziness R42    Type 1 diabetes mellitus without complication (HCC) N16.3    HTN, goal below 140/90 I10    Nausea and vomiting R11.2    Microalbuminuria R80.9    Pain of right lower extremity M79.604    Pain in both lower extremities M79.604, M79.605    Lethargy R53.83    Diabetic ketoacidosis associated with type 1 diabetes mellitus ([facility] [address] E10.10    Chest pain R07.9    Diabetic ketoacidosis without coma associated with type 1 diabetes mellitus ([address] E10.10    Pancreatitis, unspecified pancreatitis type K85.90    Acute urinary retention R33.8    Gastroenteritis K52.9    Elevated lipase R74.8    Generalized abdominal pain R10.84    Depression F32. A    Constipation K59.00    Severe malnutrition ([address] E43       Past Medical History:        Diagnosis Date    Asthma     Atherosclerosis of native arteries of extremities with rest pain, unspecified extremity ([address]     Depression 10/25/2022    Diabetes mellitus ([facility] [address]     since 14 y/o, unsure if type 1 or 2    Headache, migraine     for 2 years    Hypertension     Lichen simplex chronicus     Noninflammatory disorder of vagina, unspecified     Patient's noncompliance with other medical treatment and regimen for other reason     Type 2 diabetes mellitus without complication ([address] 22/88/0445       Past Surgical History:        Procedure Laterality Date    OVARY REMOVAL      July 2010    OVARY REMOVAL         Social History:    Social History     Tobacco Use    Smoking status: Never    Smokeless tobacco: Never   Substance Use Topics    Alcohol use:  No                                Counseling given: Not Answered      Vital Signs (Current):   Vitals:    10/28/22 0915 10/28/22 0945 10/28/22 1116 10/28/22 1209   BP: 130/68 131/75 127/75 118/74   Pulse: 90 89  93   Resp:    18   Temp:    97.1 °F (36.2 °C)   TempSrc:    Infrared   SpO2: 98%   100%   Weight:       Height:                                                  BP Readings from Last 3 Encounters:   10/28/22 118/74   10/11/22 (!) 70/36   12/30/17 (!) 170/85       NPO Status: Time of last liquid consumption: 2300                        Time of last solid consumption: 2300                        Date of last liquid consumption: 10/27/22                        Date of last solid food consumption: 10/27/22    BMI: Wt Readings from Last 3 Encounters:   10/28/22 170 lb 3.1 oz (77.2 kg)   10/10/22 185 lb (83.9 kg)   12/29/17 172 lb (78 kg)     Body mass index is 29.21 kg/m².     CBC:   Lab Results   Component Value Date/Time    WBC 7.6 10/28/2022 08:30 AM    RBC 3.94 10/28/2022 08:30 AM    RBC 4.73 05/07/2019 11:18 AM    HGB 11.6 10/28/2022 08:30 AM    HCT 35.5 10/28/2022 08:30 AM    MCV 90.2 10/28/2022 08:30 AM    RDW 16.3 10/28/2022 08:30 AM     10/28/2022 08:30 AM     01/07/2012 03:56 AM       CMP:   Lab Results   Component Value Date/Time     10/28/2022 08:30 AM    K 4.2 10/28/2022 08:30 AM    CL 99 10/28/2022 08:30 AM    CO2 27 10/28/2022 08:30 AM    BUN 29 10/28/2022 08:30 AM    CREATININE 5.76 10/28/2022 08:30 AM    GFRAA >60 07/06/2017 10:17 PM    LABGLOM 9 10/28/2022 08:30 AM    GLUCOSE 94 10/28/2022 08:30 AM    GLUCOSE 492 05/07/2019 11:18 AM    PROT 7.8 10/22/2022 07:15 PM    PROT 7.0 02/20/2019 10:46 AM    CALCIUM 9.8 10/28/2022 08:30 AM    BILITOT 0.3 10/22/2022 07:15 PM    ALKPHOS 80 10/22/2022 07:15 PM    AST 15 10/22/2022 07:15 PM    ALT 26 10/22/2022 07:15 PM       POC Tests:   Recent Labs     10/28/22  1130   POCGLU 231*       Coags:   Lab Results   Component Value Date/Time    PROTIME 23.3 10/10/2022 07:37 PM    PROTIME 13.5 05/07/2019 11:18 AM    INR 2.1 10/10/2022 07:37 PM    APTT 40.8 10/10/2022 07:37 PM       HCG (If Applicable):   Lab Results   Component Value Date    PREGTESTUR NEGATIVE 05/31/2019    HCG NEGATIVE 01/11/2013    HCGQUANT 2,367 (H) 01/06/2015        ABGs: No results found for: PHART, PO2ART, FAR2ADA, OOZ3DNC, BEART, D3HSWKVP     Type & Screen (If Applicable):  No results found for: LABABO, LABRH    Drug/Infectious Status (If Applicable):  No results found for: HIV, HEPCAB    COVID-19 Screening (If Applicable): No results found for: COVID19        Anesthesia Evaluation  Patient summary reviewed and Nursing notes reviewed no history of anesthetic complications:   Airway: Mallampati: III  TM distance: >3 FB   Neck ROM: full  Mouth opening: > = 3 FB   Dental:    (+) partials      Pulmonary: breath sounds clear to auscultation  (+) asthma:                            Cardiovascular:    (+) hypertension:,         Rhythm: regular  Rate: normal                    Neuro/Psych:   (+) neuromuscular disease:, headaches:, psychiatric history:            GI/Hepatic/Renal:   (+) renal disease: ESRD and dialysis,           Endo/Other:    (+) DiabetesType II DM, , .                 Abdominal:             Vascular: negative vascular ROS. Other Findings:           Anesthesia Plan      general     ASA 3       Induction: intravenous. Anesthetic plan and risks discussed with patient. Plan discussed with CRNA.                     Ileana Temple MD   10/28/2022

## 2022-10-28 NOTE — PROGRESS NOTES
10/28/22 1609   Encounter Summary   Encounter Overview/Reason  Attempted Encounter   Service Provided For: Patient not available  (patient in dialysis)

## 2022-10-28 NOTE — PROGRESS NOTES
Messaged Renaldo Hopping to coordinate care today between  EGD and dialysis. Asked to check with surgery. Called surgery and they are checking for time. They said they will get back to me.  They are aware we need to coordinate times for dialysis and the EGD today

## 2022-10-28 NOTE — OP NOTE
PROCEDURE NOTE    DATE OF PROCEDURE: 10/28/2022     SURGEON: Jacklyn Huang MD  Facility: [facility]  ASSISTANT: None  Anesthesia: MAC  PREOPERATIVE DIAGNOSIS:   Nausea vomiting and abdominal pain    Diagnosis:  Severe esophagitis with most likely Candida esophagitis biopsies were taken      Severe gastritis with erythema and edema throughout the entire stomach biopsies were taken    Retained liquid in the stomach indicating delay in emptying    Mild duodenitis      POSTOPERATIVE DIAGNOSIS: As described below    OPERATION: Upper GI endoscopy with Biopsy    ANESTHESIA: Moderate Sedation     ESTIMATED BLOOD LOSS: Less than 50 ml    COMPLICATIONS: None. SPECIMENS:  Was Obtained:     As above     HISTORY: The patient is a 28y.o. year old female with history of above preop diagnosis. I recommended esophagogastroduodenoscopy with possible biopsy and I explained the risk, benefits, expected outcome, and alternatives to the procedure. Risks included but are not limited to bleeding, infection, respiratory distress, hypotension, and perforation of the esophagus, stomach, or duodenum. Patient understands and is in agreement. The patient was counseled at length about the risks of hailey Covid-19 during their perioperative period and any recovery window from their procedure. The patient was made aware that hailey Covid-19  may worsen their prognosis for recovering from their procedure  and lend to a higher morbidity and/or mortality risk. All material risks, benefits, and reasonable alternatives including postponing the procedure were discussed. The patient does wish to proceed with the procedure at this time. PROCEDURE: The patient was given IV conscious sedation. The patient's SPO2 remained above 90% throughout the procedure. The gastroscope was inserted orally and advanced under direct vision through the esophagus, through the stomach, through the pylorus, and into the descending duodenum. Post sedation note : The patient's SPO2 remained above 90% throughout the procedure. the vital signs remained stable , and no immediate complication form the procedure noted, patient will be ready for d/c when criteria is met . Findings:    Retropharyngeal area was grossly normal appearing    Esophagus: abnormal: Severe esophagitis with most likely Candida esophagitis biopsies were taken        Stomach:      Severe gastritis with erythema and edema throughout the entire stomach biopsies were taken    Retained liquid in the stomach indicating delay in emptying    Duodenum:     Descending: normal    Bulb: abnormal:   Mild duodenitis    The scope was removed and the patient tolerated the procedure well.      Recommendations/Plan:   F/U Biopsies  Diflucan  PPI  Okay to discharge and follow as an outpatient    Electronically signed by Candido Valentin MD  on 10/28/2022 at 1:38 PM

## 2022-10-28 NOTE — PROGRESS NOTES
HEMODIALYSIS POST TREATMENT NOTE    Treatment time ordered: 180    Actual treatment time: 180    UltraFiltration Goal: 227  UltraFiltration Removed: 227      Pre Treatment weight: 77.2  Post Treatment weight: 77.0  Estimated Dry Weight: na    Access used:     Central Venous Catheter:          Tunneled or Non-tunneled: na           Site: na          Access Flow: na      Internal Access:       AV Fistula or AV Graft: fistula         Site: left upper arm       Access Flow: good       Sign and symptoms of infection: none       If YES: na    Medications or blood products given: midodrine    Chronic outpatient schedule: [facility]    Chronic outpatient unit: [facility]    Summary of response to treatment: pt's b/p was low the entire treatment. Uf was off most of it    Explain if orders NOT met, was physician notified:gama      ACES flowsheet faxed to patient unit/ placed in patient chart: yes    Post assessment completed: yes    Report given to: Paulo Araiza documented Safety Checks include the followin) Access and face visible at all times. 2) All connections and blood lines are secure with no kinks. 3) NVL alarm engaged. 4) Hemosafe device applied (if applicable). 5) No collapse of Arterial or Venous blood chambers. 6) All blood lines / pump segments in the air detectors.

## 2022-10-28 NOTE — PROGRESS NOTES
shows mild right and minimal left hydronephrosis which is correlated with distended urinary bladder. She is experiencing acute urinary retention requiring barros catheter placement in ED. UA shows trace ketones, 3+ protein and trace leukocytes. Pending culture. WBC 10.9. AST 59, ALT 82, Alk Phos 110, Lipase 117 suggesting acute pancreatitis without infection  10/23  Patient, very sleepy as per nursing report  Has poor oral intake  Has nausea, 1 episode of vomiting  Complaining of chest pain  Very poor historian    10/24  Patient is much alert, awake, she is oriented to time place person to my assessment today  Still have poor oral intake  Lantus was held last night  Blood sugar running low  10/26   Patient have multiple episodes of vomiting,  Complaining abdominal pain  10/27   Patient complaining of diffuse abdominal pain, nausea, vomiting  Refusing pills    10/28   Patient, underwent EGD today suggestive of Candida esophagitis  Patient told me that abdominal pain is improved  She wanted to go from this place desperately    Past Medical History:     Past Medical History:   Diagnosis Date    Asthma     Atherosclerosis of native arteries of extremities with rest pain, unspecified extremity ([address]     Depression 10/25/2022    Diabetes mellitus ([address]     since 14 y/o, unsure if type 1 or 2    Headache, migraine     for 2 years    Hypertension     Lichen simplex chronicus     Noninflammatory disorder of vagina, unspecified     Patient's noncompliance with other medical treatment and regimen for other reason     Type 2 diabetes mellitus without complication ([address] 57/13/5142        Past Surgical History:     Past Surgical History:   Procedure Laterality Date    OVARY REMOVAL      July 2010    OVARY REMOVAL          Medications Prior to Admission:     Prior to Admission medications    Medication Sig Start Date End Date Taking?  Authorizing Provider   ondansetron (ZOFRAN-ODT) 4 MG disintegrating tablet Take 1 tablet by mouth every 8 hours as needed for Nausea or Vomiting 10/21/22  Yes Skipper Slider, MD   aspirin 81 MG EC tablet Take 81 mg by mouth daily   Yes Historical Provider, MD   atorvastatin (LIPITOR) 10 MG tablet Take 10 mg by mouth at bedtime   Yes Historical Provider, MD   calcium acetate (PHOSLO) 667 MG CAPS capsule Take 3 capsules by mouth 3 times daily (with meals)   Yes Historical Provider, MD   carvedilol (COREG) 25 MG tablet Take 25 mg by mouth 2 times daily   Yes Historical Provider, MD   vitamin D (CHOLECALCIFEROL) 125 MCG (5000 UT) CAPS capsule Take 5,000 Units by mouth daily   Yes Historical Provider, MD   clopidogrel (PLAVIX) 75 MG tablet Take 75 mg by mouth daily   Yes Historical Provider, MD   escitalopram (LEXAPRO) 10 MG tablet Take 10 mg by mouth daily   Yes Historical Provider, MD   hydrALAZINE (APRESOLINE) 100 MG tablet Take 100 mg by mouth 3 times daily   Yes Historical Provider, MD   insulin detemir (LEVEMIR FLEXTOUCH) 100 UNIT/ML injection pen Inject 24 Units into the skin nightly   Yes Historical Provider, MD   midodrine (PROAMATINE) 10 MG tablet Take 30 mg by mouth daily Indications: Mon, Wed, Fri at dialysis   Yes Historical Provider, MD   NIFEdipine (ADALAT CC) 90 MG extended release tablet Take 90 mg by mouth daily   Yes Historical Provider, MD   sennosides-docusate sodium (SENOKOT-S) 8.6-50 MG tablet Take 2 tablets by mouth at bedtime   Yes Historical Provider, MD   B Complex-C-Folic Acid (TRIPHROCAPS) 1 MG CAPS Take 1 capsule by mouth daily   Yes Historical Provider, MD   patiromer sorbitex calcium (VELTASSA) 8.4 g PACK packet Take 8.4 g by mouth Twice a Week Indications: twice per day on Thursdays and Sundays   Yes Historical Provider, MD   acetaminophen (TYLENOL) 325 MG tablet Take 650 mg by mouth every 6 hours as needed for Pain or Fever (do not exceed 3 gm in 24 hours)    Historical Provider, MD   albuterol sulfate HFA (PROVENTIL;VENTOLIN;PROAIR) 108 (90 Base) MCG/ACT inhaler Inhale 2 puffs into the lungs every 6 hours as needed for Wheezing or Shortness of Breath    Historical Provider, MD   polyethylene glycol (GLYCOLAX) 17 GM/SCOOP powder Take 17 g by mouth daily as needed (constipation)    Historical Provider, MD        Allergies:     Morphine and Januvia [sitagliptin]    Social History:     Tobacco:    reports that she has never smoked. She has never used smokeless tobacco.  Alcohol:      reports no history of alcohol use. Drug Use:  reports no history of drug use. Family History:     Family History   Problem Relation Age of Onset    High Blood Pressure Mother     Diabetes Mother     Other Mother 27        Lung cancer    High Blood Pressure Father     Diabetes Father     Other Father 27        Prostate cancer       Review of Systems:     Positive and Negative as described in HPI.     CONSTITUTIONAL:  negative for fevers, chills, sweats, fatigue, weight loss  HEENT:  negative for vision, hearing changes, runny nose, throat pain  RESPIRATORY:  negative for shortness of breath, cough, congestion, wheezing  CARDIOVASCULAR:  negative for chest pain, palpitations  GASTROINTESTINAL: abdo pain n v    GENITOURINARY:  negative for difficulty of urination, burning with urination, frequency   INTEGUMENT:  negative for rash, skin lesions, easy bruising   HEMATOLOGIC/LYMPHATIC:  negative for swelling/edema   ALLERGIC/IMMUNOLOGIC:  negative for urticaria , itching  ENDOCRINE:  negative increase in drinking, increase in urination, hot or cold intolerance  MUSCULOSKELETAL:  negative joint pains, muscle aches, swelling of joints  NEUROLOGICAL:  negative for headaches, dizziness, lightheadedness, numbness, pain, tingling extremities      Physical Exam:   BP (!) 143/83   Pulse 94   Temp 98.4 °F (36.9 °C) (Infrared)   Resp 16   Ht 5' 4\" (1.626 m)   Wt 170 lb 3.1 oz (77.2 kg)   LMP  (LMP Unknown)   SpO2 99%   BMI 29.21 kg/m²   Temp (24hrs), Av.2 °F (36.8 °C), Min:97.1 °F (36.2 °C), Max:99.4 °F (37.4 °C)    Recent Labs     10/27/22  1954 10/28/22  0612 10/28/22  1130 10/28/22  1358   POCGLU 110* 111* 231* 101       Intake/Output Summary (Last 24 hours) at 10/28/2022 1529  Last data filed at 10/28/2022 1510  Gross per 24 hour   Intake 713.34 ml   Output 525 ml   Net 188.34 ml       General Appearance: alert, well appearing, and in no acute distress  Mental status: oriented to person, place, and time  Head: normocephalic, atraumatic  Eye: no icterus, redness, pupils equal and reactive, extraocular eye movements intact, conjunctiva clear  Ear: normal external ear, no discharge, hearing intact  Nose: no drainage noted  Mouth: mucous membranes moist  Neck: supple, no carotid bruits, thyroid not palpable  Lungs: Bilateral equal air entry, clear to ausculation, no wheezing, rales or rhonchi, normal effort  Cardiovascular: normal rate, regular rhythm, no murmur, gallop, rub  Abdomen: Soft, nontender, nondistended, normal bowel sounds, no hepatomegaly or splenomegaly   No tenderness  Surg scar noted lower mid abdo laparotomy noted    Neurologic: There are no new focal motor or sensory deficits, normal muscle tone and bulk, no abnormal sensation, normal speech, cranial nerves II through XII grossly intact  Skin: No gross lesions, rashes, bruising or bleeding on exposed skin area  Extremities: peripheral pulses palpable, no pedal edema or calf pain with palpation, superficial wounds in both foot  Psych: flat affect drowsy    Investigations:      Laboratory Testing:  Recent Results (from the past 24 hour(s))   POC Glucose Fingerstick    Collection Time: 10/27/22  5:04 PM   Result Value Ref Range    POC Glucose 94 65 - 105 mg/dL   Urinalysis with Reflex to Culture    Collection Time: 10/27/22  5:55 PM    Specimen: Urine   Result Value Ref Range    Color, UA Dark Yellow (A) Yellow    Turbidity UA Cloudy (A) Clear    Glucose, Ur NEGATIVE NEGATIVE    Bilirubin Urine NEGATIVE  Verified by ictotest. (A) NEGATIVE    Ketones, Urine TRACE (A) NEGATIVE    Specific Gravity, UA 1.031 (H) 1.000 - 1.030    Urine Hgb NEGATIVE NEGATIVE    pH, UA 5.0 5.0 - 8.0    Protein, UA 3+ (A) NEGATIVE    Urobilinogen, Urine Normal Normal    Nitrite, Urine NEGATIVE NEGATIVE    Leukocyte Esterase, Urine SMALL (A) NEGATIVE   Microscopic Urinalysis    Collection Time: 10/27/22  5:55 PM   Result Value Ref Range    WBC, UA 0 TO 2 /HPF    RBC, UA 0 TO 2 /HPF    Casts UA 3 to 5 /LPF    Epithelial Cells UA 0 TO 2 /HPF    Bacteria, UA MANY (A) None   POC Glucose Fingerstick    Collection Time: 10/27/22  7:54 PM   Result Value Ref Range    POC Glucose 110 (H) 65 - 105 mg/dL   POC Glucose Fingerstick    Collection Time: 10/28/22  6:12 AM   Result Value Ref Range    POC Glucose 111 (H) 65 - 105 mg/dL   CBC    Collection Time: 10/28/22  8:30 AM   Result Value Ref Range    WBC 7.6 3.5 - 11.0 k/uL    RBC 3.94 (L) 4.0 - 5.2 m/uL    Hemoglobin 11.6 (L) 12.0 - 16.0 g/dL    Hematocrit 35.5 (L) 36 - 46 %    MCV 90.2 80 - 100 fL    MCH 29.5 26 - 34 pg    MCHC 32.7 31 - 37 g/dL    RDW 16.3 (H) 11.5 - 14.9 %    Platelets 730 332 - 889 k/uL    MPV 6.9 6.0 - 12.0 fL   Basic Metabolic Panel    Collection Time: 10/28/22  8:30 AM   Result Value Ref Range    Glucose 94 70 - 99 mg/dL    BUN 29 (H) 6 - 20 mg/dL    Creatinine 5.76 (HH) 0.50 - 0.90 mg/dL    Est, Glom Filt Rate 9 (L) >60 mL/min/1.73m2    Calcium 9.8 8.6 - 10.4 mg/dL    Sodium 142 135 - 144 mmol/L    Potassium 4.2 3.7 - 5.3 mmol/L    Chloride 99 98 - 107 mmol/L    CO2 27 20 - 31 mmol/L    Anion Gap 16 9 - 17 mmol/L   POC Glucose Fingerstick    Collection Time: 10/28/22 11:30 AM   Result Value Ref Range    POC Glucose 231 (H) 65 - 105 mg/dL   POCT HCG, Prenancy, Ur    Collection Time: 10/28/22 12:20 PM   Result Value Ref Range    HCG, Pregnancy Urine (POC) NEGATIVE NEGATIVE   POC Glucose Fingerstick    Collection Time: 10/28/22  1:58 PM   Result Value Ref Range    POC Glucose 101 65 - 105 mg/dL       Imaging/Diagnostics:  CT ABDOMEN PELVIS WO CONTRAST Additional Contrast? None    Result Date: 10/19/2022  Limited noncontrast examination. Mild right and minimal left hydronephrosis, which may be due to distended urinary bladder containing contrast material.  Consider Mcnair catheter placement as clinically warranted. Suggestion of colonic wall thickening of the mid transverse and descending colonic loops, which may have been exaggerated by underdistention but may reflect underlying infectious/inflammatory colitis. 2.7 cm ill-defined density seen within the left groin region beneath the skin staples, which may reflect liquefying hematoma. Correlate clinically with signs of infection as there are adjacent inflammatory changes and prominent left inguinal lymph nodes. CT HEAD WO CONTRAST    Result Date: 10/19/2022  No convincing CT evidence of intracranial hemorrhage, mass effect or acute territorial infarct seen. RECOMMENDATIONS: If there is persistent clinical concern, consider short-term follow-up examination.        Assessment :      Hospital Problems             Last Modified POA    * (Principal) Pancreatitis, unspecified pancreatitis type 10/19/2022 Yes    Acute urinary retention 10/20/2022 Yes    Gastroenteritis 10/20/2022 Yes    Elevated lipase 10/20/2022 Yes    Generalized abdominal pain 10/27/2022 Yes    Depression 10/25/2022 Yes    Constipation 10/27/2022 Yes    Severe malnutrition ([address] 10/27/2022 Yes    Lethargy 10/24/2022 Yes     Plan:     Patient status inpatient in the Progressive Unit/Step down    69-year-old -American lady class I obese BMI 31.74  History of end-stage kidney disease on hemodialysis with a left arm fistula  Uncontrolled diabetes mellitus type 1  Very poor historian unable to get much story from her  Abdominal pain associate with nausea vomiting 10 out of 10  Denies any fever chills   Elevated lipase CT scan noted  Clinical diagnosis of acute pancreatitis IV fluid resuscitation gentle hydration with dialysis  Discontinue IV Dilaudid and oral Norco  Low-fat diet as tolerated  Nephrology consult for  Nonspecific Trope elevation no NSTEMI patient has no chest pain troponins flat at 90  Anemia of chronic kidney disease  Hyponatremia sodium 134 conservative  Hypokalemia less than 4 potassium will be managed with  Oct 22  Troponin elevation is flat EKG noted chest pain and noncardiac in origin  Reproducible  Patient complains abdominal pain asking for IV Dilaudid  GI input noted  Patient has dependence on opioid and abuse  Advance diet discharge home with family patient is refusing ECF  High risk of readmission with the dependence and opioid seeking behavior  PT/OT   More nausea today   GI input needed ,   Labs ordered   10/23  Complaining of chest pain, ordering EKG, 2 sets of troponin  Patient sleepy, did not receive any pain medication, sleeping medication  Ordering serum ammonia level  TSH, VBG  Patient has CT head done on 10/20 which was negative  Holding Lexapro  Will start patient on gentle hydration  ESRD, recent femoropopliteal bypass surgery, on left side, hypertension, diabetes, coronary artery disease, ESRD on hemodialysis    10/24  Patient much alert, awake today  Underwent CTA chest which was negative for PE  I encourage patient to eat, she told me that she will try to eat  Abdominal pain, chest pain has resolved  Patient will need sleep hygiene, as per nursing report she was up all night talking on phone  Seen in dialysis being  Will work on discharge planning  Patient need to eat  Will start patient on Remeron    10/25  Patient, essentially same  Still have poor oral intake  Started Remeron yesterday  Blood sugars better controlled  Patient lost her mother recently, looks like she has some element of depression, requesting psych consult    10/26  Ordering x-ray KUB  Request GI to see the patient  Seen by Psychiatrist     10/27   Patient complaining of nausea, vomiting abdominal pain  Has not eaten any food  Refusing pills  Counseling about importance of medication  Will start on gentle hydration given history of ESRD  Will consult wound care    10/28   Patient, clinically doing better, abdominal pain is improved  S/p EGD suggestive of Candida esophagitis  Will start on fluconazole  Will encourage her to eat and drink, if patient tolerate diet, likely discharge  Consultations:   Shimon Hidalgo TO GI  IP CONSULT TO NEPHROLOGY  IP CONSULT TO NEUROLOGY  IP CONSULT TO PSYCHIATRY  IP CONSULT TO GI     Patient is admitted as inpatient status because of co-morbidities listed above, severity of signs and symptoms as outlined, requirement for current medical therapies and most importantly because of direct risk to patient if care not provided in a hospital setting. Expected length of stay > 48 hours. Marcy Duverney, MD  10/28/2022  3:29 PM    Copy sent to Dr. Cho primary care provider on file. Please note that this chart was generated using voice recognition Dragon dictation software. Although every effort was made to ensure the accuracy of this automated transcription, some errors in transcription may have occurred.

## 2022-10-29 LAB
GLUCOSE BLD-MCNC: 109 MG/DL (ref 65–105)
GLUCOSE BLD-MCNC: 97 MG/DL (ref 65–105)
GLUCOSE BLD-MCNC: 98 MG/DL (ref 65–105)
GLUCOSE BLD-MCNC: 99 MG/DL (ref 65–105)

## 2022-10-29 PROCEDURE — 99232 SBSQ HOSP IP/OBS MODERATE 35: CPT | Performed by: INTERNAL MEDICINE

## 2022-10-29 PROCEDURE — 82947 ASSAY GLUCOSE BLOOD QUANT: CPT

## 2022-10-29 PROCEDURE — 6360000002 HC RX W HCPCS: Performed by: INTERNAL MEDICINE

## 2022-10-29 PROCEDURE — C9113 INJ PANTOPRAZOLE SODIUM, VIA: HCPCS | Performed by: INTERNAL MEDICINE

## 2022-10-29 PROCEDURE — 6370000000 HC RX 637 (ALT 250 FOR IP): Performed by: INTERNAL MEDICINE

## 2022-10-29 PROCEDURE — 2500000003 HC RX 250 WO HCPCS: Performed by: INTERNAL MEDICINE

## 2022-10-29 PROCEDURE — 2580000003 HC RX 258: Performed by: INTERNAL MEDICINE

## 2022-10-29 PROCEDURE — 2060000000 HC ICU INTERMEDIATE R&B

## 2022-10-29 RX ADMIN — ATORVASTATIN CALCIUM 10 MG: 10 TABLET, FILM COATED ORAL at 21:42

## 2022-10-29 RX ADMIN — HYDRALAZINE HYDROCHLORIDE 100 MG: 50 TABLET, FILM COATED ORAL at 21:42

## 2022-10-29 RX ADMIN — METOCLOPRAMIDE 10 MG: 10 TABLET ORAL at 16:39

## 2022-10-29 RX ADMIN — SODIUM CHLORIDE, PRESERVATIVE FREE 40 MG: 5 INJECTION INTRAVENOUS at 11:01

## 2022-10-29 RX ADMIN — SODIUM CHLORIDE, PRESERVATIVE FREE 10 ML: 5 INJECTION INTRAVENOUS at 11:11

## 2022-10-29 RX ADMIN — MIRTAZAPINE 15 MG: 15 TABLET, ORALLY DISINTEGRATING ORAL at 21:42

## 2022-10-29 RX ADMIN — CARVEDILOL 25 MG: 25 TABLET, FILM COATED ORAL at 21:42

## 2022-10-29 RX ADMIN — ACETAMINOPHEN 650 MG: 325 TABLET, FILM COATED ORAL at 21:42

## 2022-10-29 RX ADMIN — FLUCONAZOLE 200 MG: 100 TABLET ORAL at 11:02

## 2022-10-29 ASSESSMENT — PAIN SCALES - GENERAL: PAINLEVEL_OUTOF10: 9

## 2022-10-29 NOTE — PROGRESS NOTES
[address] Internal Medicine  Ever Jaquez MD; Travon Guzman MD; Ramesh Mike MD; MD Anson Nick MD; MD FRANCES Frazier [facility] Internal Medicine   [facility]    Progress note               Date:   10/29/2022  Patient name:  Alicia Guadarrama  Date of admission:  10/19/2022  4:05 PM  MRN:   [id number]  Account:  [de-identified]  YOB: 1990  PCP:    No primary care provider on file. Room:   [address] [place] [address]  Code Status:    Full Code    Chief Complaint:     Chief Complaint   Patient presents with    Abdominal Pain     Abdominal pain, emesis post 2-4 hours dialysis treatment. Abdomen is softly distended. History Obtained From:     Pt medical record and nursing staff    History of Present Illness:     Alicia Guadarrama is a 28 y.o. Non- / non  female who presents with Abdominal Pain (Abdominal pain, emesis post 2-4 hours dialysis treatment. Abdomen is softly distended. )   and is admitted to the hospital for the management of Pancreatitis, unspecified pancreatitis type. Alicia Guadarrama is a 28 y.o. Non- / non  female who presents with Abdominal Pain (Abdominal pain, emesis post 2-4 hours dialysis treatment. Abdomen is softly distended.) and is admitted to the hospital for the management of acute Pancreatitis. Medical history includes ESRD on hemodialysis, DM type I, HTN and CAD. Also had a recent admission for GI bleed. Patient presented from [facility]. She is experiencing some altered mental status where she is unable to hold a conversation regarding her condition. She is able to stay awake for 1-2 word answer but is not consistent. CT head shows no evidence of acute changes  compared to CT head completed on 10/10/22. He was given 50 mcg IV of fentanyl prior to assessment which may be affecting her mentation.  CT of abdomen and pelvis shows mild right and minimal left hydronephrosis which is correlated with distended urinary bladder. She is experiencing acute urinary retention requiring barros catheter placement in ED. UA shows trace ketones, 3+ protein and trace leukocytes. Pending culture. WBC 10.9.  AST 59, ALT 82, Alk Phos 110, Lipase 117 suggesting acute pancreatitis without infection  10/23  Patient, very sleepy as per nursing report  Has poor oral intake  Has nausea, 1 episode of vomiting  Complaining of chest pain  Very poor historian    10/24  Patient is much alert, awake, she is oriented to time place person to my assessment today  Still have poor oral intake  Lantus was held last night  Blood sugar running low  10/26   Patient have multiple episodes of vomiting,  Complaining abdominal pain  10/27   Patient complaining of diffuse abdominal pain, nausea, vomiting  Refusing pills    10/28   Patient, underwent EGD today suggestive of Candida esophagitis  Patient told me that abdominal pain is improved  She wanted to go from this place desperately  10/29   Patient, underwent EGD yesterday  Still having nausea, vomiting  Poor oral intake    Past Medical History:     Past Medical History:   Diagnosis Date    Asthma     Atherosclerosis of native arteries of extremities with rest pain, unspecified extremity ([address]     Depression 10/25/2022    Diabetes mellitus ([address]     since 14 y/o, unsure if type 1 or 2    Headache, migraine     for 2 years    Hypertension     Lichen simplex chronicus     Noninflammatory disorder of vagina, unspecified     Patient's noncompliance with other medical treatment and regimen for other reason     Type 2 diabetes mellitus without complication ([address] 29/86/7188        Past Surgical History:     Past Surgical History:   Procedure Laterality Date    OVARY REMOVAL      July 2010    OVARY REMOVAL      UPPER GASTROINTESTINAL ENDOSCOPY N/A 10/28/2022    EGD BIOPSY performed by Nima Kiser MD at [facility] Prior to Admission:     Prior to Admission medications    Medication Sig Start Date End Date Taking?  Authorizing Provider   ondansetron (ZOFRAN-ODT) 4 MG disintegrating tablet Take 1 tablet by mouth every 8 hours as needed for Nausea or Vomiting 10/21/22  Yes Marina Ramirez MD   aspirin 81 MG EC tablet Take 81 mg by mouth daily   Yes Historical Provider, MD   atorvastatin (LIPITOR) 10 MG tablet Take 10 mg by mouth at bedtime   Yes Historical Provider, MD   calcium acetate (PHOSLO) 667 MG CAPS capsule Take 3 capsules by mouth 3 times daily (with meals)   Yes Historical Provider, MD   carvedilol (COREG) 25 MG tablet Take 25 mg by mouth 2 times daily   Yes Historical Provider, MD   vitamin D (CHOLECALCIFEROL) 125 MCG (5000 UT) CAPS capsule Take 5,000 Units by mouth daily   Yes Historical Provider, MD   clopidogrel (PLAVIX) 75 MG tablet Take 75 mg by mouth daily   Yes Historical Provider, MD   escitalopram (LEXAPRO) 10 MG tablet Take 10 mg by mouth daily   Yes Historical Provider, MD   hydrALAZINE (APRESOLINE) 100 MG tablet Take 100 mg by mouth 3 times daily   Yes Historical Provider, MD   insulin detemir (LEVEMIR FLEXTOUCH) 100 UNIT/ML injection pen Inject 24 Units into the skin nightly   Yes Historical Provider, MD   midodrine (PROAMATINE) 10 MG tablet Take 30 mg by mouth daily Indications: Mon, Wed, Fri at dialysis   Yes Historical Provider, MD   NIFEdipine (ADALAT CC) 90 MG extended release tablet Take 90 mg by mouth daily   Yes Historical Provider, MD   sennosides-docusate sodium (SENOKOT-S) 8.6-50 MG tablet Take 2 tablets by mouth at bedtime   Yes Historical Provider, MD   B Complex-C-Folic Acid (TRIPHROCAPS) 1 MG CAPS Take 1 capsule by mouth daily   Yes Historical Provider, MD   patiromer sorbitex calcium (VELTASSA) 8.4 g PACK packet Take 8.4 g by mouth Twice a Week Indications: twice per day on Thursdays and Sundays   Yes Historical Provider, MD   acetaminophen (TYLENOL) 325 MG tablet Take 650 mg by mouth every 6 hours as needed for Pain or Fever (do not exceed 3 gm in 24 hours)    Historical Provider, MD   albuterol sulfate HFA (PROVENTIL;VENTOLIN;PROAIR) 108 (90 Base) MCG/ACT inhaler Inhale 2 puffs into the lungs every 6 hours as needed for Wheezing or Shortness of Breath    Historical Provider, MD   polyethylene glycol (GLYCOLAX) 17 GM/SCOOP powder Take 17 g by mouth daily as needed (constipation)    Historical Provider, MD        Allergies:     Morphine and Januvia [sitagliptin]    Social History:     Tobacco:    reports that she has never smoked. She has never used smokeless tobacco.  Alcohol:      reports no history of alcohol use. Drug Use:  reports no history of drug use. Family History:     Family History   Problem Relation Age of Onset    High Blood Pressure Mother     Diabetes Mother     Other Mother 27        Lung cancer    High Blood Pressure Father     Diabetes Father     Other Father 27        Prostate cancer       Review of Systems:     Positive and Negative as described in HPI.     CONSTITUTIONAL:  negative for fevers, chills, sweats, fatigue, weight loss  HEENT:  negative for vision, hearing changes, runny nose, throat pain  RESPIRATORY:  negative for shortness of breath, cough, congestion, wheezing  CARDIOVASCULAR:  negative for chest pain, palpitations  GASTROINTESTINAL: abdo pain n v    GENITOURINARY:  negative for difficulty of urination, burning with urination, frequency   INTEGUMENT:  negative for rash, skin lesions, easy bruising   HEMATOLOGIC/LYMPHATIC:  negative for swelling/edema   ALLERGIC/IMMUNOLOGIC:  negative for urticaria , itching  ENDOCRINE:  negative increase in drinking, increase in urination, hot or cold intolerance  MUSCULOSKELETAL:  negative joint pains, muscle aches, swelling of joints  NEUROLOGICAL:  negative for headaches, dizziness, lightheadedness, numbness, pain, tingling extremities      Physical Exam:   /60   Pulse 86   Temp 98.6 °F (37 °C) (Axillary)   Resp 18    5' 4\" (1.626 m)   Wt 169 lb 8.5 oz (76.9 kg)   LMP  (LMP Unknown)   SpO2 98%   BMI 29.10 kg/m²   Temp (24hrs), Av.2 °F (36.8 °C), Min:97.1 °F (36.2 °C), Max:98.6 °F (37 °C)    Recent Labs     10/28/22  1358 10/28/22  1601 10/28/22  2012 10/29/22  0622   POCGLU 101 96 100 98       Intake/Output Summary (Last 24 hours) at 10/29/2022 1126  Last data filed at 10/28/2022 1928  Gross per 24 hour   Intake 1630 ml   Output 777 ml   Net 853 ml       General Appearance: alert, well appearing, and in no acute distress  Mental status: oriented to person, place, and time  Head: normocephalic, atraumatic  Eye: no icterus, redness, pupils equal and reactive, extraocular eye movements intact, conjunctiva clear  Ear: normal external ear, no discharge, hearing intact  Nose: no drainage noted  Mouth: mucous membranes moist  Neck: supple, no carotid bruits, thyroid not palpable  Lungs: Bilateral equal air entry, clear to ausculation, no wheezing, rales or rhonchi, normal effort  Cardiovascular: normal rate, regular rhythm, no murmur, gallop, rub  Abdomen: Soft, nontender, nondistended, normal bowel sounds, no hepatomegaly or splenomegaly   No tenderness  Surg scar noted lower mid abdo laparotomy noted    Neurologic: There are no new focal motor or sensory deficits, normal muscle tone and bulk, no abnormal sensation, normal speech, cranial nerves II through XII grossly intact  Skin: No gross lesions, rashes, bruising or bleeding on exposed skin area  Extremities: peripheral pulses palpable, no pedal edema or calf pain with palpation, superficial wounds in both foot  Psych: flat affect drowsy    Investigations:      Laboratory Testing:  Recent Results (from the past 24 hour(s))   POC Glucose Fingerstick    Collection Time: 10/28/22 11:30 AM   Result Value Ref Range    POC Glucose 231 (H) 65 - 105 mg/dL   POCT HCG, Prenancy, Ur    Collection Time: 10/28/22 12:20 PM   Result Value Ref Range    HCG, Pregnancy Urine (POC) NEGATIVE NEGATIVE   POC Glucose Fingerstick    Collection Time: 10/28/22  1:58 PM   Result Value Ref Range    POC Glucose 101 65 - 105 mg/dL   POC Glucose Fingerstick    Collection Time: 10/28/22  4:01 PM   Result Value Ref Range    POC Glucose 96 65 - 105 mg/dL   POC Glucose Fingerstick    Collection Time: 10/28/22  8:12 PM   Result Value Ref Range    POC Glucose 100 65 - 105 mg/dL   POC Glucose Fingerstick    Collection Time: 10/29/22  6:22 AM   Result Value Ref Range    POC Glucose 98 65 - 105 mg/dL       Imaging/Diagnostics:  CT ABDOMEN PELVIS WO CONTRAST Additional Contrast? None    Result Date: 10/19/2022  Limited noncontrast examination. Mild right and minimal left hydronephrosis, which may be due to distended urinary bladder containing contrast material.  Consider Mcnair catheter placement as clinically warranted. Suggestion of colonic wall thickening of the mid transverse and descending colonic loops, which may have been exaggerated by underdistention but may reflect underlying infectious/inflammatory colitis. 2.7 cm ill-defined density seen within the left groin region beneath the skin staples, which may reflect liquefying hematoma. Correlate clinically with signs of infection as there are adjacent inflammatory changes and prominent left inguinal lymph nodes. CT HEAD WO CONTRAST    Result Date: 10/19/2022  No convincing CT evidence of intracranial hemorrhage, mass effect or acute territorial infarct seen. RECOMMENDATIONS: If there is persistent clinical concern, consider short-term follow-up examination.        Assessment :      Hospital Problems             Last Modified POA    * (Principal) Pancreatitis, unspecified pancreatitis type 10/19/2022 Yes    Acute urinary retention 10/20/2022 Yes    Gastroenteritis 10/20/2022 Yes    Elevated lipase 10/20/2022 Yes    Generalized abdominal pain 10/27/2022 Yes    Depression 10/25/2022 Yes    Constipation 10/27/2022 Yes    Severe malnutrition ([address] 10/27/2022 Yes    Lethargy 10/24/2022 Yes     Plan:     Patient status inpatient in the Progressive Unit/Step down    79-year-old -American lady class I obese BMI 31.74  History of end-stage kidney disease on hemodialysis with a left arm fistula  Uncontrolled diabetes mellitus type 1  Very poor historian unable to get much story from her  Abdominal pain associate with nausea vomiting 10 out of 10  Denies any fever chills   Elevated lipase CT scan noted  Clinical diagnosis of acute pancreatitis IV fluid resuscitation gentle hydration with dialysis  Discontinue IV Dilaudid and oral Norco  Low-fat diet as tolerated  Nephrology consult for  Nonspecific Trope elevation no NSTEMI patient has no chest pain troponins flat at 90  Anemia of chronic kidney disease  Hyponatremia sodium 134 conservative  Hypokalemia less than 4 potassium will be managed with  Oct 22  Troponin elevation is flat EKG noted chest pain and noncardiac in origin  Reproducible  Patient complains abdominal pain asking for IV Dilaudid  GI input noted  Patient has dependence on opioid and abuse  Advance diet discharge home with family patient is refusing ECF  High risk of readmission with the dependence and opioid seeking behavior  PT/OT   More nausea today   GI input needed ,   Labs ordered   10/23  Complaining of chest pain, ordering EKG, 2 sets of troponin  Patient sleepy, did not receive any pain medication, sleeping medication  Ordering serum ammonia level  TSH, VBG  Patient has CT head done on 10/20 which was negative  Holding Lexapro  Will start patient on gentle hydration  ESRD, recent femoropopliteal bypass surgery, on left side, hypertension, diabetes, coronary artery disease, ESRD on hemodialysis    10/24  Patient much alert, awake today  Underwent CTA chest which was negative for PE  I encourage patient to eat, she told me that she will try to eat  Abdominal pain, chest pain has resolved  Patient will need sleep hygiene, as per nursing report she was up all night talking on phone  Seen in dialysis being  Will work on discharge planning  Patient need to eat  Will start patient on Remeron    10/25  Patient, essentially same  Still have poor oral intake  Started Remeron yesterday  Blood sugars better controlled  Patient lost her mother recently, looks like she has some element of depression, requesting psych consult    10/26  Ordering x-ray KUB  Request GI to see the patient  Seen by Psychiatrist     10/27   Patient complaining of nausea, vomiting abdominal pain  Has not eaten any food  Refusing pills  Counseling about importance of medication  Will start on gentle hydration given history of ESRD  Will consult wound care    10/28   Patient, clinically doing better, abdominal pain is improved  S/p EGD suggestive of Candida esophagitis  Will start on fluconazole  Will encourage her to eat and drink, if patient tolerate diet, likely discharge    10/29   Will encourage her to eat more  On antifungals with Candida esophagitis  DC planning likely on Monday  Consultations:   IP CONSULT TO INTERNAL MEDICINE  IP CONSULT TO GI  IP CONSULT TO NEPHROLOGY  IP CONSULT TO NEUROLOGY  IP CONSULT TO PSYCHIATRY  IP CONSULT TO GI     Patient is admitted as inpatient status because of co-morbidities listed above, severity of signs and symptoms as outlined, requirement for current medical therapies and most importantly because of direct risk to patient if care not provided in a hospital setting. Expected length of stay > 48 hours. Kelly Henderson MD  10/29/2022  11:26 AM    Copy sent to Dr. Cho primary care provider on file. Please note that this chart was generated using voice recognition Dragon dictation software. Although every effort was made to ensure the accuracy of this automated transcription, some errors in transcription may have occurred.

## 2022-10-29 NOTE — PROGRESS NOTES
GI Progress notes    10/29/2022   1:30 PM    Name:  Indigo Hernández  MRN:    [id number]     Acct:     [de-identified]   Room:  2103/2103-01   Day: 8     Admit Date: 10/19/2022  4:05 PM  PCP: No primary care provider on file. Subjective:     C/C:   Chief Complaint   Patient presents with    Abdominal Pain     Abdominal pain, emesis post 2-4 hours dialysis treatment. Abdomen is softly distended. Interval History: Status: not changed. Patient seen and examined  S/p EGD yesterday revealing possible Candida esophagitis, gastritis. Patient continues to have some intermittent nausea. She is sitting in bed with baseline. No vomiting. Appears to be spitting. Continues to have poor oral intake    Patient uncooperative. Unable to examine abdomen    ROS:  Constitutional: negative for chills, fevers and sweats  Gastrointestinal: negative for abdominal pain, constipation, diarrhea  Neurological: negative for dizziness and headaches    Medications: Allergies:    Allergies   Allergen Reactions    Morphine Shortness Of Breath and Other (See Comments)     Chest pain    Januvia [Sitagliptin] Nausea And Vomiting       Current Meds: sodium chloride flush 0.9 % injection 5-40 mL, 2 times per day  sodium chloride flush 0.9 % injection 5-40 mL, PRN  0.9 % sodium chloride infusion, PRN  fentaNYL (SUBLIMAZE) injection 25 mcg, Q5 Min PRN  ondansetron (ZOFRAN) injection 4 mg, Once PRN  metoclopramide (REGLAN) injection 10 mg, Once PRN  diphenhydrAMINE (BENADRYL) injection 12.5 mg, Once PRN  metoclopramide (REGLAN) tablet 10 mg, TID AC  fluconazole (DIFLUCAN) tablet 400 mg, Once   Followed by  fluconazole (DIFLUCAN) tablet 200 mg, Daily  polyethylene glycol (GLYCOLAX) packet 17 g, Daily  pantoprazole (PROTONIX) 40 mg in sodium chloride (PF) 0.9 % 10 mL injection, Daily  0.9 % sodium chloride infusion, Continuous  calcium carbonate (TUMS) chewable tablet 500 mg, TID PRN  ondansetron (ZOFRAN-ODT) disintegrating tablet 4 mg, Q4H PRN   Or  ondansetron (ZOFRAN) injection 4 mg, Q6H PRN  mirtazapine (REMERON SOL-TAB) disintegrating tablet 15 mg, Nightly  sodium chloride flush 0.9 % injection 10 mL, PRN  lidocaine 4 % external patch 1 patch, Daily  acetaminophen (TYLENOL) tablet 650 mg, Q4H PRN  epoetin juventino-epbx (RETACRIT) injection 2,000 Units, Once per day on Mon Wed Fri  metoprolol (LOPRESSOR) injection 2.5 mg, Q6H PRN  melatonin tablet 3 mg, Nightly PRN  midodrine (PROAMATINE) tablet 5 mg, TID PRN  albuterol sulfate HFA (PROVENTIL;VENTOLIN;PROAIR) 108 (90 Base) MCG/ACT inhaler 2 puff, Q6H PRN  aspirin EC tablet 81 mg, Daily  atorvastatin (LIPITOR) tablet 10 mg, Nightly  calcium acetate (PHOSLO) capsule 2,001 mg, TID WC  carvedilol (COREG) tablet 25 mg, BID  clopidogrel (PLAVIX) tablet 75 mg, Daily  hydrALAZINE (APRESOLINE) tablet 100 mg, TID  NIFEdipine (ADALAT CC) extended release tablet 90 mg, Daily  vitamin D3 (CHOLECALCIFEROL) tablet 5,000 Units, Daily  sodium chloride flush 0.9 % injection 5-40 mL, 2 times per day  sodium chloride flush 0.9 % injection 5-40 mL, PRN  0.9 % sodium chloride infusion, PRN  [Held by provider] insulin glargine (LANTUS) injection vial 24 Units, Nightly  glucose chewable tablet 16 g, PRN  dextrose bolus 10% 125 mL, PRN   Or  dextrose bolus 10% 250 mL, PRN  glucagon (rDNA) injection 1 mg, PRN  dextrose 10 % infusion, Continuous PRN        Data:     Code Status:  Full Code    Family History   Problem Relation Age of Onset    High Blood Pressure Mother     Diabetes Mother     Other Mother 27        Lung cancer    High Blood Pressure Father     Diabetes Father     Other Father 27        Prostate cancer       Social History     Socioeconomic History    Marital status: Single     Spouse name: Not on file    Number of children: Not on file    Years of education: Not on file    Highest education level: Not on file   Occupational History    Not on file   Tobacco Use    Smoking status: Never    Smokeless tobacco: Never   Vaping Use    Vaping Use: Never used   Substance and Sexual Activity    Alcohol use: No    Drug use: No    Sexual activity: Not on file   Other Topics Concern    Not on file   Social History Narrative    Not on file     Social Determinants of Health     Financial Resource Strain: Not on file   Food Insecurity: Not on file   Transportation Needs: Not on file   Physical Activity: Not on file   Stress: Not on file   Social Connections: Not on file   Intimate Partner Violence: Not on file   Housing Stability: Not on file       Vitals:  /60   Pulse 86   Temp 98.6 °F (37 °C) (Axillary)   Resp 18   Ht 5' 4\" (1.626 m)   Wt 169 lb 8.5 oz (76.9 kg)   LMP  (LMP Unknown)   SpO2 98%   BMI 29.10 kg/m²   Temp (24hrs), Av.3 °F (36.8 °C), Min:97.3 °F (36.3 °C), Max:98.6 °F (37 °C)    Recent Labs     10/28/22  1601 10/28/22  2012 10/29/22  0622 10/29/22  1140   POCGLU 96 100 98 97       I/O (24Hr):     Intake/Output Summary (Last 24 hours) at 10/29/2022 1330  Last data filed at 10/28/2022 1928  Gross per 24 hour   Intake 1430 ml   Output 727 ml   Net 703 ml       Labs:      CBC:   Lab Results   Component Value Date/Time    WBC 7.6 10/28/2022 08:30 AM    RBC 3.94 10/28/2022 08:30 AM    RBC 4.73 2019 11:18 AM    HGB 11.6 10/28/2022 08:30 AM    HCT 35.5 10/28/2022 08:30 AM    MCV 90.2 10/28/2022 08:30 AM    MCH 29.5 10/28/2022 08:30 AM    MCHC 32.7 10/28/2022 08:30 AM    RDW 16.3 10/28/2022 08:30 AM     10/28/2022 08:30 AM     2012 03:56 AM    MPV 6.9 10/28/2022 08:30 AM     CBC with Differential:    Lab Results   Component Value Date/Time    WBC 7.6 10/28/2022 08:30 AM    RBC 3.94 10/28/2022 08:30 AM    RBC 4.73 2019 11:18 AM    HGB 11.6 10/28/2022 08:30 AM    HCT 35.5 10/28/2022 08:30 AM     10/28/2022 08:30 AM     2012 03:56 AM    MCV 90.2 10/28/2022 08:30 AM    MCH 29.5 10/28/2022 08:30 AM    MCHC 32.7 10/28/2022 08:30 AM    RDW 16.3 10/28/2022 08:30 AM    NRBC 0 05/07/2019 11:18 AM    LYMPHOPCT 33 10/26/2022 08:14 AM    MONOPCT 12 10/26/2022 08:14 AM    BASOPCT 0 10/26/2022 08:14 AM    MONOSABS 0.80 10/26/2022 08:14 AM    LYMPHSABS 2.30 10/26/2022 08:14 AM    EOSABS 0.50 10/26/2022 08:14 AM    BASOSABS 0.00 10/26/2022 08:14 AM    DIFFTYPE NOT REPORTED 07/06/2017 10:17 PM     Hemoglobin/Hematocrit:    Lab Results   Component Value Date/Time    HGB 11.6 10/28/2022 08:30 AM    HCT 35.5 10/28/2022 08:30 AM     CMP:    Lab Results   Component Value Date/Time     10/28/2022 08:30 AM    K 4.2 10/28/2022 08:30 AM    CL 99 10/28/2022 08:30 AM    CO2 27 10/28/2022 08:30 AM    BUN 29 10/28/2022 08:30 AM    CREATININE 5.76 10/28/2022 08:30 AM    GFRAA >60 07/06/2017 10:17 PM    LABGLOM 9 10/28/2022 08:30 AM    GLUCOSE 94 10/28/2022 08:30 AM    GLUCOSE 492 05/07/2019 11:18 AM    PROT 7.8 10/22/2022 07:15 PM    PROT 7.0 02/20/2019 10:46 AM    LABALBU 4.7 10/22/2022 07:15 PM    LABALBU 3.6 02/20/2019 10:46 AM    CALCIUM 9.8 10/28/2022 08:30 AM    BILITOT 0.3 10/22/2022 07:15 PM    ALKPHOS 80 10/22/2022 07:15 PM    AST 15 10/22/2022 07:15 PM    ALT 26 10/22/2022 07:15 PM     BMP:    Lab Results   Component Value Date/Time     10/28/2022 08:30 AM    K 4.2 10/28/2022 08:30 AM    CL 99 10/28/2022 08:30 AM    CO2 27 10/28/2022 08:30 AM    BUN 29 10/28/2022 08:30 AM    LABALBU 4.7 10/22/2022 07:15 PM    LABALBU 3.6 02/20/2019 10:46 AM    CREATININE 5.76 10/28/2022 08:30 AM    CALCIUM 9.8 10/28/2022 08:30 AM    GFRAA >60 07/06/2017 10:17 PM    LABGLOM 9 10/28/2022 08:30 AM    GLUCOSE 94 10/28/2022 08:30 AM    GLUCOSE 492 05/07/2019 11:18 AM     PT/INR:    Lab Results   Component Value Date/Time    PROTIME 23.3 10/10/2022 07:37 PM    PROTIME 13.5 05/07/2019 11:18 AM    INR 2.1 10/10/2022 07:37 PM     PTT:    Lab Results   Component Value Date/Time    APTT 40.8 10/10/2022 07:37 PM   [APTT}    Physical Examination:        General appearance: alert, uncooperative and no distress  Mental Status: oriented to person, place and time and flat afect  Abdomen: Unable to examine, uncooperative  Extremities: no edema, redness or tenderness in the calves  Skin: no gross lesions, rashes, or induration    Assessment:        Primary Problem  Pancreatitis, unspecified pancreatitis type     Active Hospital Problems    Diagnosis Date Noted    Constipation [K59.00] 10/27/2022     Priority: Medium    Severe malnutrition ([facility] [address] [E43] 10/27/2022     Priority: Medium    Depression [F32.A] 10/25/2022     Priority: Medium    Acute urinary retention [R33.8] 10/20/2022     Priority: Medium    Gastroenteritis [K52.9] 10/20/2022     Priority: Medium    Elevated lipase [R74.8] 10/20/2022     Priority: Medium    Generalized abdominal pain [R10.84] 10/20/2022     Priority: Medium    Pancreatitis, unspecified pancreatitis type [K85.90] 10/19/2022     Priority: Medium    Lethargy [R53.83] 03/07/2016     Past Medical History:   Diagnosis Date    Asthma     Atherosclerosis of native arteries of extremities with rest pain, unspecified extremity ([address]     Depression 10/25/2022    Diabetes mellitus ([address]     since 14 y/o, unsure if type 1 or 2    Headache, migraine     for 2 years    Hypertension     Lichen simplex chronicus     Noninflammatory disorder of vagina, unspecified     Patient's noncompliance with other medical treatment and regimen for other reason     Type 2 diabetes mellitus without complication ([address] 22/76/6098        Plan:        Nausea, vomiting status post EGD revealing possible Candida esophagitis, gastritis. Follow-up biopsies  Continue Diflucan  Antiemetics as needed  PPI  Abdominal pain, elevated lipase  Repeat CT no acute findings  Ultrasound liver  Lipase, liver battery in am    Time spent reviewing the chart, seeing the patient, and discussing with the attending MD around 30 minutes.       Explained to the patient and d/W Nursing Staff  Will F/U with you  Please call or Page for any issues or change in status  Thanks    Electronically signed by CUAUHTEMOC Beauchamp NP on 10/29/2022 at 1:30 PM       GI attending physician note. Patient seen with APRN     I independently performed an evaluation on [facility]. I have reviewed the above documentation completed by the Nurse Practitioner and agree with the history, examination, and management plan. Recommendations discussed. Patient seen along with the nursing staff and GI APRN. Patient is not cooperative for examination. Nursing staff indicates that patient continues to be like that/noncooperative. Patient has been followed by psychiatry service. Advised to have ultrasound of the gallbladder and the liver. Also labs ordered. Discussed with the GI APRN.

## 2022-10-29 NOTE — PLAN OF CARE
Problem: Safety - Adult  Goal: Free from fall injury  10/29/2022 0421 by Talita Barber RN  Outcome: Progressing  Note: No falls noted this shift. Bed kept in low position. Safe environment maintained. Bedside table & call light in reach. Uses call light appropriately when needing assistance. Problem: Skin/Tissue Integrity  Goal: Absence of new skin breakdown  Description: 1. Monitor for areas of redness and/or skin breakdown  2. Assess vascular access sites hourly  3. Every 4-6 hours minimum:  Change oxygen saturation probe site  4. Every 4-6 hours:  If on nasal continuous positive airway pressure, respiratory therapy assess nares and determine need for appliance change or resting period.   10/29/2022 0421 by Talita Barber RN  Outcome: Progressing     Problem: Chronic Conditions and Co-morbidities  Goal: Patient's chronic conditions and co-morbidity symptoms are monitored and maintained or improved  10/29/2022 0421 by Talita Barber RN  Outcome: Progressing     Problem: Nutrition Deficit:  Goal: Optimize nutritional status  10/29/2022 0421 by Talita Barber RN  Outcome: Progressing     Problem: Pain  Goal: Verbalizes/displays adequate comfort level or baseline comfort level  10/29/2022 0421 by Talita Barber RN  Outcome: Progressing     Problem: Discharge Planning  Goal: Discharge to home or other facility with appropriate resources  10/29/2022 0421 by Talita Barber RN  Outcome: Progressing

## 2022-10-29 NOTE — PLAN OF CARE
Problem: Discharge Planning  Goal: Discharge to home or other facility with appropriate resources  10/29/2022 1737 by Tessa Hashimoto, RN  Outcome: Progressing  10/29/2022 0421 by Benito Lopez RN  Outcome: Progressing     Problem: Pain  Goal: Verbalizes/displays adequate comfort level or baseline comfort level  10/29/2022 1737 by Tessa Hashimoto, RN  Outcome: Progressing  10/29/2022 0421 by Benito Lopez RN  Outcome: Progressing     Problem: Safety - Adult  Goal: Free from fall injury  10/29/2022 1737 by Tessa Hashimoto, RN  Outcome: Progressing  10/29/2022 0421 by Benito Lopez RN  Outcome: Progressing  Note: No falls noted this shift. Bed kept in low position. Safe environment maintained. Bedside table & call light in reach. Uses call light appropriately when needing assistance. Problem: Skin/Tissue Integrity  Goal: Absence of new skin breakdown  Description: 1. Monitor for areas of redness and/or skin breakdown  2. Assess vascular access sites hourly  3. Every 4-6 hours minimum:  Change oxygen saturation probe site  4. Every 4-6 hours:  If on nasal continuous positive airway pressure, respiratory therapy assess nares and determine need for appliance change or resting period.   10/29/2022 1737 by Tessa Hashimoto, RN  Outcome: Progressing  10/29/2022 0421 by Benito Lopez RN  Outcome: Progressing     Problem: Chronic Conditions and Co-morbidities  Goal: Patient's chronic conditions and co-morbidity symptoms are monitored and maintained or improved  10/29/2022 1737 by Tessa Hashimoto, RN  Outcome: Progressing  10/29/2022 0421 by Benito Lopez RN  Outcome: Progressing     Problem: ABCDS Injury Assessment  Goal: Absence of physical injury  10/29/2022 1737 by Tessa Hashimoto, RN  Outcome: Progressing  10/29/2022 0421 by Benito Lopez RN  Outcome: Progressing     Problem: Nutrition Deficit:  Goal: Optimize nutritional status  10/29/2022 1737 by Tessa Hashimoto, RN  Outcome: Progressing  10/29/2022 0421 by Jolanda Harada, RN  Outcome: Progressing

## 2022-10-29 NOTE — PROGRESS NOTES
[facility] FOLLOW-UP NOTE     10/29/2022     Patient was seen and examined in person, Chart reviewed   Patient's case discussed with staff/team    Chief Complaint: Depression and lack of appetite    Interim History:   The patient was seen at bedside. She is complaining of a lot of abdominal pain. She wants pain medications. She continues to have emesis. Her EGD revealed candida esophagitis. The patient's aunt was present at bedside. The patient's mother has passed away recently and the patient is grieving. She is depressed. The patient has been vomiting today. Noted that the patient's mirtazapine was held yesterday because she was too nauseous.   We will continue with current medication at this time    /60   Pulse 86   Temp 98.6 °F (37 °C) (Axillary)   Resp 18   Ht 5' 4\" (1.626 m)   Wt 169 lb 8.5 oz (76.9 kg)   LMP  (LMP Unknown)   SpO2 98%   BMI 29.10 kg/m²   Appetite:  [] Normal/Unchanged  [] Increased  [x] Decreased      Sleep:       [] Normal/Unchanged  [] Fair       [x] Poor              Energy:    [] Normal/Unchanged  [] Increased  [x] Decreased        Aggression:  [] yes  [x] no    Patient is [] able  [] unable to CONTRACT FOR SAFETY ON THE UNIT    PAST MEDICAL/PSYCHIATRIC HISTORY:   Past Medical History:   Diagnosis Date    Asthma     Atherosclerosis of native arteries of extremities with rest pain, unspecified extremity ([address]     Depression 10/25/2022    Diabetes mellitus ([address]     since 14 y/o, unsure if type 1 or 2    Headache, migraine     for 2 years    Hypertension     Lichen simplex chronicus     Noninflammatory disorder of vagina, unspecified     Patient's noncompliance with other medical treatment and regimen for other reason     Type 2 diabetes mellitus without complication ([address] 37/48/5750       FAMILY/SOCIAL HISTORY:  Family History   Problem Relation Age of Onset    High Blood Pressure Mother     Diabetes Mother     Other Mother 27        Lung cancer    High Blood Pressure Father     Diabetes Father     Other Father [id number] [address] [place]        Prostate cancer     Social History     Socioeconomic History    Marital status: Single     Spouse name: Not on file    Number of children: Not on file    Years of education: Not on file    Highest education level: Not on file   Occupational History    Not on file   Tobacco Use    Smoking status: Never    Smokeless tobacco: Never   Vaping Use    Vaping Use: Never used   Substance and Sexual Activity    Alcohol use: No    Drug use: No    Sexual activity: Not on file   Other Topics Concern    Not on file   Social History Narrative    Not on file     Social Determinants of Health     Financial Resource Strain: Not on file   Food Insecurity: Not on file   Transportation Needs: Not on file   Physical Activity: Not on file   Stress: Not on file   Social Connections: Not on file   Intimate Partner Violence: Not on file   Housing Stability: Not on file           ROS:  [x] All negative/unchanged except if checked.  Explain positive(checked items) below:  [] Constitutional  [] Eyes  [] Ear/Nose/Mouth/Throat  [] Respiratory  [] CV  [] GI  []   [] Musculoskeletal  [] Skin/Breast  [] Neurological  [] Endocrine  [] Heme/Lymph  [] Allergic/Immunologic    Explanation:     MEDICATIONS:    Current Facility-Administered Medications:     sodium chloride flush 0.9 % injection 5-40 mL, 5-40 mL, IntraVENous, 2 times per day, Marlon Larsen MD, 10 mL at 10/28/22 2143    sodium chloride flush 0.9 % injection 5-40 mL, 5-40 mL, IntraVENous, PRN, Marlon Larsen MD    0.9 % sodium chloride infusion, , IntraVENous, PRN, Marlon Larsen MD    fentaNYL (SUBLIMAZE) injection 25 mcg, 25 mcg, IntraVENous, Q5 Min PRN, Marlon Larsen MD    metoclopramide (REGLAN) tablet 10 mg, 10 mg, Oral, TID AC, Aleah Sol MD, 10 mg at 10/29/22 1639    fluconazole (DIFLUCAN) tablet 400 mg, 400 mg, Oral, Once **FOLLOWED BY** fluconazole (DIFLUCAN) tablet 200 mg, 200 mg, Oral, Daily, Aleah Sol MD, 200 mg at 10/29/22 1102    polyethylene glycol (GLYCOLAX) packet 17 g, 17 g, Oral, Daily, Aleah Sol MD, 17 g at 10/28/22 1001    pantoprazole (PROTONIX) 40 mg in sodium chloride (PF) 0.9 % 10 mL injection, 40 mg, IntraVENous, Daily, Aleah Sol MD, 40 mg at 10/29/22 1101    0.9 % sodium chloride infusion, , IntraVENous, Continuous, Aleah Sol MD, Stopped at 10/28/22 1722    calcium carbonate (TUMS) chewable tablet 500 mg, 500 mg, Oral, TID PRN, Aleah Sol MD, 500 mg at 10/28/22 0011    ondansetron (ZOFRAN-ODT) disintegrating tablet 4 mg, 4 mg, Oral, Q4H PRN, 4 mg at 10/27/22 0135 **OR** ondansetron (ZOFRAN) injection 4 mg, 4 mg, IntraVENous, Q6H PRN, Aleah Sol MD, 4 mg at 10/28/22 2159    mirtazapine (REMERON SOL-TAB) disintegrating tablet 15 mg, 15 mg, Oral, Nightly, Aleah Sol MD, 15 mg at 10/27/22 2351    sodium chloride flush 0.9 % injection 10 mL, 10 mL, IntraVENous, PRN, Aleah Sol MD, 10 mL at 10/23/22 1332    lidocaine 4 % external patch 1 patch, 1 patch, TransDERmal, Daily, Aleah Sol MD, 1 patch at 10/29/22 1101    acetaminophen (TYLENOL) tablet 650 mg, 650 mg, Oral, Q4H PRN, Aleah Sol MD, 650 mg at 10/27/22 2347    epoetin juventino-epbx (RETACRIT) injection 2,000 Units, 2,000 Units, SubCUTAneous, Once per day on Mon Wed Fri, Servando Roberson MD, 2,000 Units at 10/28/22 2229    metoprolol (LOPRESSOR) injection 2.5 mg, 2.5 mg, IntraVENous, Q6H PRN, Aleah Sol MD, 2.5 mg at 10/27/22 1834    melatonin tablet 3 mg, 3 mg, Oral, Nightly PRN, Aleah Sol MD, 3 mg at 10/27/22 2348    midodrine (PROAMATINE) tablet 5 mg, 5 mg, Oral, TID PRN, Aleah Sol MD, 5 mg at 10/28/22 1729    albuterol sulfate HFA (PROVENTIL;VENTOLIN;PROAIR) 108 (90 Base) MCG/ACT inhaler 2 puff, 2 puff, Inhalation, Q6H PRN, Aleah Sol MD    aspirin EC tablet 81 mg, 81 mg, Oral, Daily, CUAUHTEMOC Aleman - NP, 81 mg at 10/25/22 1039    atorvastatin (LIPITOR) tablet 10 mg, 10 mg, Oral, Nightly, Aleah Sol MD, 10 mg at 10/27/22 6256 cooperative and attentive  Speech:  slow and low volume  Mood: depressed and labile  Affect:  blunted  Thought processes:  linear, goal directed, and coherent   Thought content:  Homicidal ideation - none  Suicidal Ideation:  denies suicidal ideation  Delusions:  no evidence of delusions  Perceptual Disturbance:  denies any perceptual disturbance  Cognition:  oriented to person, place, and time   Concentration intact  Insight fair   Judgement fair     ASSESSMENT:   Patient symptoms are:  [] Well controlled  [] Improving  [] Worsening  [x] No change      Diagnosis:   Principal Problem:    Pancreatitis, unspecified pancreatitis type  Active Problems:    Acute urinary retention    Gastroenteritis    Elevated lipase    Generalized abdominal pain    Depression    Constipation    Severe malnutrition (HCC)    Lethargy  Resolved Problems:    * No resolved hospital problems. *      LABS:    Recent Labs     10/28/22  0830   WBC 7.6   HGB 11.6*        Recent Labs     10/28/22  0830      K 4.2   CL 99   CO2 27   BUN 29*   CREATININE 5.76*   GLUCOSE 94     No results for input(s): BILITOT, ALKPHOS, AST, ALT in the last 72 hours. No results found for: Jullie Bullion, LABBENZ, CANNAB, COCAINESCRN, LABMETH, OPIATESCREENURINE, PHENCYCLIDINESCREENURINE, PPXUR, ETOH  Lab Results   Component Value Date/Time    TSH 1.04 10/23/2022 12:06 PM     No results found for: LITHIUM  No results found for: VALPROATE, CBMZ    RISK ASSESSMENT: Low risk for suicide or self-harm    Treatment Plan:  Reviewed current Medications with the patient. Continue Remeron 15 mg nightly. Recommend outpatient follow-up. Does not require admission to [facility]. Risks, benefits, side effects, drug-to-drug interactions and alternatives to treatment were discussed. The patient consented to treatment.        PSYCHOTHERAPY/COUNSELING:  [] Therapeutic interview  [x] Supportive  [] CBT  [] Ongoing  [] Other                                    Kennedy Jovita Perez is a 28 y.o. female being evaluated face to face.     --Sedrick Ruggiero MD on 10/29/2022 at 6:02 PM

## 2022-10-29 NOTE — PROGRESS NOTES
Department of Internal Medicine  Nephrology Chantal Caldwell MD Progress Note    Reason for consultation: Management of hemodialysis dependent end-stage renal disease. Consulting physician: Rochelle Dalton MD.    Interval history: Patient had EGD yesterday showing Candida esophagitis. .  She continues to have nausea. She does not have shortness of breath or chest pain. Hemodialysis was done with no fluid removal due to volume depletion. History of present: This is a 28 y.o. female with a significant past medical history of Bronchial asthma, systemic hypertension, type 2 diabetes mellitus , Lichen simplex chronicus and end-stage renal disease secondary to diabetic glomerulosclerosis [on routine hemodialysis Monday/Wednesday/Friday at [facility] on [facility] using left arm AV fistula under the care of Dr. Pieter Davila at of nephrology as consultants of [facility], Presented to the emergency department yesterday with complaints of abdominal pain that started after hemodialysis. This was associated with 1 episode of vomiting at dialysis. She has a recent history of GI bleed requiring transfusion and was admitted from October 11 to October 14, 2022. Flexible sigmoidoscopy performed on that admission showed multiple rectal ulcers which were cauterized and patient was taken off Xarelto.     Scheduled Meds:   sodium chloride flush  5-40 mL IntraVENous 2 times per day    metoclopramide  10 mg Oral TID AC    fluconazole  400 mg Oral Once    Followed by    fluconazole  200 mg Oral Daily    polyethylene glycol  17 g Oral Daily    pantoprazole (PROTONIX) 40 mg injection  40 mg IntraVENous Daily    mirtazapine  15 mg Oral Nightly    lidocaine  1 patch TransDERmal Daily    epoetin juventino-epbx  2,000 Units SubCUTAneous Once per day on Mon Wed Fri    aspirin  81 mg Oral Daily    atorvastatin  10 mg Oral Nightly    calcium acetate  3 capsule Oral TID WC    carvedilol  25 mg Oral BID    clopidogrel  75 mg Oral Daily    hydrALAZINE  100 mg Oral TID    NIFEdipine  90 mg Oral Daily    vitamin D3  5,000 Units Oral Daily    sodium chloride flush  5-40 mL IntraVENous 2 times per day    [Held by provider] insulin glargine  24 Units SubCUTAneous Nightly     Continuous Infusions:   sodium chloride      sodium chloride Stopped (10/28/22 1722)    sodium chloride      dextrose Stopped (10/23/22 2044)     PRN Meds:.sodium chloride flush, sodium chloride, fentanNYL, ondansetron, metoclopramide, diphenhydrAMINE, calcium carbonate, ondansetron **OR** ondansetron, sodium chloride flush, acetaminophen, metoprolol, melatonin, midodrine, albuterol sulfate HFA, sodium chloride flush, sodium chloride, glucose, dextrose bolus **OR** dextrose bolus, glucagon (rDNA), dextrose    Physical Exam:    VITALS:  /60   Pulse 86   Temp 98.6 °F (37 °C) (Axillary)   Resp 18   Ht 5' 4\" (1.626 m)   Wt 169 lb 8.5 oz (76.9 kg)   LMP  (LMP Unknown)   SpO2 98%   BMI 29.10 kg/m²   TEMPERATURE:  Current - Temp: 98.6 °F (37 °C);  Max - Temp  Av.5 °F (36.9 °C)  Min: 98.2 °F (36.8 °C)  Max: 98.6 °F (37 °C)  RESPIRATIONS RANGE: Resp  Av.2  Min: 15  Max: 19  PULSE RANGE: Pulse  Av  Min: 55  Max: 94  BLOOD PRESSURE RANGE:  Systolic (98SWX), BF , Min:71 , WCV:419 ; Diastolic (23JKV), AJX:24, Min:31, Max:83  PULSE OXIMETRY RANGE: SpO2  Av %  Min: 96 %  Max: 100 %  24HR INTAKE/OUTPUT:    Intake/Output Summary (Last 24 hours) at 10/29/2022 1419  Last data filed at 10/28/2022 1928  Gross per 24 hour   Intake 1330 ml   Output 727 ml   Net 603 ml       Constitutional: alert, appears stated age, and cooperative    Skin: Skin color, texture, turgor normal. No rashes or lesions    Head: Normocephalic, without obvious abnormality, atraumatic     Cardiovascular/Edema: regular rate and rhythm, S1, S2 normal, no murmur, click, rub or gallop    Respiratory: Lungs: clear to auscultation bilaterally    Abdomen:  Full with periumbilical tenderness; normal bowel sounds. Back: symmetric, no curvature. ROM normal. No CVA tenderness. Extremities: Trace bilateral pitting pedal edema with hyperpigmented dermatosis. Neuro:  Grossly normal    CBC:   Recent Labs     10/28/22  0830   WBC 7.6   HGB 11.6*          BMP:    Recent Labs     10/28/22  0830      K 4.2   CL 99   CO2 27   BUN 29*   CREATININE 5.76*   GLUCOSE 94       Lab Results   Component Value Date/Time    NITRU NEGATIVE 10/27/2022 05:55 PM    COLORU Dark Yellow 10/27/2022 05:55 PM    PHUR 5.0 10/27/2022 05:55 PM    WBCUA 0 TO 2 10/27/2022 05:55 PM    RBCUA 0 TO 2 10/27/2022 05:55 PM    MUCUS NOT REPORTED 12/29/2017 11:33 PM    TRICHOMONAS MODERATE 12/29/2017 11:33 PM    YEAST NOT REPORTED 12/29/2017 11:33 PM    BACTERIA MANY 10/27/2022 05:55 PM    SPECGRAV 1.031 10/27/2022 05:55 PM    LEUKOCYTESUR SMALL 10/27/2022 05:55 PM    UROBILINOGEN Normal 10/27/2022 05:55 PM    BILIRUBINUR NEGATIVE  Verified by ictotest. 10/27/2022 05:55 PM    BILIRUBINUR NEGATIVE 01/07/2012 03:53 AM    GLUCOSEU NEGATIVE 10/27/2022 05:55 PM    GLUCOSEU 3+ 01/07/2012 03:53 AM    KETUA TRACE 10/27/2022 05:55 PM    AMORPHOUS NOT REPORTED 12/29/2017 11:33 PM     Urine Creatinine:     Lab Results   Component Value Date/Time    LABCREA 254.2 12/06/2015 02:31 PM     IMPRESSION/RECOMMENDATIONS:    1.  End-stage renal disease - We will maintain Monday, Wednesday and Friday hemodialysis schedule. We will keep target weight at 80 kg. AV fistula functioning well. Renal diet,i.e 2-gram sodium,2-gram potassium,1500 ml fluid restriction,1-gram phosphorus, 1800 KCal and 1.2 gram/kg protein per day. 2.  Systemic hypertension - Blood pressure control is acceptable at this time. We will continue as needed midodrine for intradialytic episodes of hypotension. 3.  Mineral bone disease profile - serum phosphorus level 4.5 mg/dL is within target range.     4.  Anemia of chronic kidney disease - Hemoglobin 11.6 g/dL today is within target range. We will continue Retacrit 2000 units subcutaneously 3 times a week. 5.  Nausea and vomiting - likely secondary to Candida esophagitis started on fluconazole    Prognosis is guarded.     MD CEDRIC Bradshaw  Attending Nephrologist  10/29/2022 2:19 PM

## 2022-10-30 LAB
ALBUMIN SERPL-MCNC: 4 G/DL (ref 3.5–5.2)
ALP BLD-CCNC: 58 U/L (ref 35–104)
ALT SERPL-CCNC: 21 U/L (ref 5–33)
AST SERPL-CCNC: 25 U/L
BILIRUB SERPL-MCNC: 0.2 MG/DL (ref 0.3–1.2)
BILIRUBIN DIRECT: <0.1 MG/DL
BILIRUBIN, INDIRECT: ABNORMAL MG/DL (ref 0–1)
GLUCOSE BLD-MCNC: 119 MG/DL (ref 65–105)
GLUCOSE BLD-MCNC: 130 MG/DL (ref 65–105)
GLUCOSE BLD-MCNC: 145 MG/DL (ref 65–105)
LIPASE: 132 U/L (ref 13–60)
TOTAL PROTEIN: 7.1 G/DL (ref 6.4–8.3)

## 2022-10-30 PROCEDURE — 99232 SBSQ HOSP IP/OBS MODERATE 35: CPT | Performed by: INTERNAL MEDICINE

## 2022-10-30 PROCEDURE — 36415 COLL VENOUS BLD VENIPUNCTURE: CPT

## 2022-10-30 PROCEDURE — 83690 ASSAY OF LIPASE: CPT

## 2022-10-30 PROCEDURE — 82947 ASSAY GLUCOSE BLOOD QUANT: CPT

## 2022-10-30 PROCEDURE — 2060000000 HC ICU INTERMEDIATE R&B

## 2022-10-30 PROCEDURE — 6370000000 HC RX 637 (ALT 250 FOR IP): Performed by: INTERNAL MEDICINE

## 2022-10-30 PROCEDURE — 80076 HEPATIC FUNCTION PANEL: CPT

## 2022-10-30 PROCEDURE — 6370000000 HC RX 637 (ALT 250 FOR IP): Performed by: NURSE PRACTITIONER

## 2022-10-30 RX ADMIN — METOCLOPRAMIDE 10 MG: 10 TABLET ORAL at 10:08

## 2022-10-30 RX ADMIN — CLOPIDOGREL BISULFATE 75 MG: 75 TABLET ORAL at 10:08

## 2022-10-30 RX ADMIN — ONDANSETRON 4 MG: 4 TABLET, ORALLY DISINTEGRATING ORAL at 13:53

## 2022-10-30 RX ADMIN — FLUCONAZOLE 200 MG: 100 TABLET ORAL at 10:08

## 2022-10-30 NOTE — PROGRESS NOTES
10/30/22 1234   Encounter Summary   Encounter Overview/Reason  Attempted Encounter   Service Provided For: Patient not available  (pt was sleeping)

## 2022-10-30 NOTE — CARE COORDINATION
DISCHARGE PLANNING NOTE:    Plan is for patient to be discharged to [facility]. Likely tomorrow once HD is approved at [facility].     Electronically signed by Neville Mensah RN on 10/30/2022 at 2:06 PM

## 2022-10-30 NOTE — PROGRESS NOTES
GI Progress notes    10/30/2022   12:56 PM    Name:  Wendy Shelley  MRN:    [id number]     Acct:     [de-identified]   [facility]:  [facility] Day: 6     Admit Date: 10/19/2022  4:05 PM  PCP: No primary care provider on file. Subjective:     C/C:   Chief Complaint   Patient presents with    Abdominal Pain     Abdominal pain, emesis post 2-4 hours dialysis treatment. Abdomen is softly distended. Interval History: Status: improved. Patient seen and examined  No acute events overnight  Patient tolerating diet a bit better today  Took her medications    ROS:  Constitutional: negative for chills, fevers and sweats  Gastrointestinal: negative for abdominal pain, constipation, diarrhea, nausea and vomiting  Neurological: negative for dizziness and headaches    Medications: Allergies:    Allergies   Allergen Reactions    Morphine Shortness Of Breath and Other (See Comments)     Chest pain    Januvia [Sitagliptin] Nausea And Vomiting       Current Meds: sodium chloride flush 0.9 % injection 5-40 mL, PRN  0.9 % sodium chloride infusion, PRN  fentaNYL (SUBLIMAZE) injection 25 mcg, Q5 Min PRN  metoclopramide (REGLAN) tablet 10 mg, TID AC  fluconazole (DIFLUCAN) tablet 400 mg, Once   Followed by  fluconazole (DIFLUCAN) tablet 200 mg, Daily  polyethylene glycol (GLYCOLAX) packet 17 g, Daily  pantoprazole (PROTONIX) 40 mg in sodium chloride (PF) 0.9 % 10 mL injection, Daily  calcium carbonate (TUMS) chewable tablet 500 mg, TID PRN  ondansetron (ZOFRAN-ODT) disintegrating tablet 4 mg, Q4H PRN   Or  ondansetron (ZOFRAN) injection 4 mg, Q6H PRN  mirtazapine (REMERON SOL-TAB) disintegrating tablet 15 mg, Nightly  sodium chloride flush 0.9 % injection 10 mL, PRN  lidocaine 4 % external patch 1 patch, Daily  acetaminophen (TYLENOL) tablet 650 mg, Q4H PRN  epoetin juventino-epbx (RETACRIT) injection 2,000 Units, Once per day on Mon Wed Fri  metoprolol (LOPRESSOR) injection 2.5 mg, Q6H PRN  melatonin tablet 3 mg, Nightly PRN  midodrine (PROAMATINE) tablet 5 mg, TID PRN  albuterol sulfate HFA (PROVENTIL;VENTOLIN;PROAIR) 108 (90 Base) MCG/ACT inhaler 2 puff, Q6H PRN  aspirin EC tablet 81 mg, Daily  atorvastatin (LIPITOR) tablet 10 mg, Nightly  calcium acetate (PHOSLO) capsule 2,001 mg, TID WC  carvedilol (COREG) tablet 25 mg, BID  clopidogrel (PLAVIX) tablet 75 mg, Daily  hydrALAZINE (APRESOLINE) tablet 100 mg, TID  NIFEdipine (ADALAT CC) extended release tablet 90 mg, Daily  vitamin D3 (CHOLECALCIFEROL) tablet 5,000 Units, Daily  sodium chloride flush 0.9 % injection 5-40 mL, PRN  0.9 % sodium chloride infusion, PRN  [Held by provider] insulin glargine (LANTUS) injection vial 24 Units, Nightly  glucose chewable tablet 16 g, PRN  dextrose bolus 10% 125 mL, PRN   Or  dextrose bolus 10% 250 mL, PRN  glucagon (rDNA) injection 1 mg, PRN  dextrose 10 % infusion, Continuous PRN        Data:     Code Status:  Full Code    Family History   Problem Relation Age of Onset    High Blood Pressure Mother     Diabetes Mother     Other Mother 27        Lung cancer    High Blood Pressure Father     Diabetes Father     Other Father 27        Prostate cancer       Social History     Socioeconomic History    Marital status: Single     Spouse name: Not on file    Number of children: Not on file    Years of education: Not on file    Highest education level: Not on file   Occupational History    Not on file   Tobacco Use    Smoking status: Never    Smokeless tobacco: Never   Vaping Use    Vaping Use: Never used   Substance and Sexual Activity    Alcohol use: No    Drug use: No    Sexual activity: Not on file   Other Topics Concern    Not on file   Social History Narrative    Not on file     Social Determinants of Health     Financial Resource Strain: Not on file   Food Insecurity: Not on file   Transportation Needs: Not on file   Physical Activity: Not on file   Stress: Not on file   Social Connections: Not on file   Intimate Partner Violence: Not on file   Housing Stability: Not on file       Vitals:  /70   Pulse 86   Temp 98.8 °F (37.1 °C) (Axillary)   Resp 18   Ht 5' 4\" (1.626 m)   Wt 171 lb 15.3 oz (78 kg)   LMP  (LMP Unknown)   SpO2 100%   BMI 29.52 kg/m²   Temp (24hrs), Av.7 °F (37.1 °C), Min:98.5 °F (36.9 °C), Max:98.8 °F (37.1 °C)    Recent Labs     10/29/22  1616 10/29/22  2020 10/30/22  0638 10/30/22  1145   POCGLU 99 109* 130* 145*       I/O (24Hr):   No intake or output data in the 24 hours ending 10/30/22 1256    Labs:      CBC:   Lab Results   Component Value Date/Time    WBC 7.6 10/28/2022 08:30 AM    RBC 3.94 10/28/2022 08:30 AM    RBC 4.73 2019 11:18 AM    HGB 11.6 10/28/2022 08:30 AM    HCT 35.5 10/28/2022 08:30 AM    MCV 90.2 10/28/2022 08:30 AM    MCH 29.5 10/28/2022 08:30 AM    MCHC 32.7 10/28/2022 08:30 AM    RDW 16.3 10/28/2022 08:30 AM     10/28/2022 08:30 AM     2012 03:56 AM    MPV 6.9 10/28/2022 08:30 AM     CBC with Differential:    Lab Results   Component Value Date/Time    WBC 7.6 10/28/2022 08:30 AM    RBC 3.94 10/28/2022 08:30 AM    RBC 4.73 2019 11:18 AM    HGB 11.6 10/28/2022 08:30 AM    HCT 35.5 10/28/2022 08:30 AM     10/28/2022 08:30 AM     2012 03:56 AM    MCV 90.2 10/28/2022 08:30 AM    MCH 29.5 10/28/2022 08:30 AM    MCHC 32.7 10/28/2022 08:30 AM    RDW 16.3 10/28/2022 08:30 AM    NRBC 0 2019 11:18 AM    LYMPHOPCT 33 10/26/2022 08:14 AM    MONOPCT 12 10/26/2022 08:14 AM    BASOPCT 0 10/26/2022 08:14 AM    MONOSABS 0.80 10/26/2022 08:14 AM    LYMPHSABS 2.30 10/26/2022 08:14 AM    EOSABS 0.50 10/26/2022 08:14 AM    BASOSABS 0.00 10/26/2022 08:14 AM    DIFFTYPE NOT REPORTED 2017 10:17 PM     Hemoglobin/Hematocrit:    Lab Results   Component Value Date/Time    HGB 11.6 10/28/2022 08:30 AM    HCT 35.5 10/28/2022 08:30 AM     CMP:    Lab Results   Component Value Date/Time     10/28/2022 08:30 AM    K 4.2 10/28/2022 08:30 AM    CL 99 10/28/2022 08:30 AM    CO2 27 10/28/2022 08:30 AM    BUN 29 10/28/2022 08:30 AM    CREATININE 5.76 10/28/2022 08:30 AM    GFRAA >60 07/06/2017 10:17 PM    LABGLOM 9 10/28/2022 08:30 AM    GLUCOSE 94 10/28/2022 08:30 AM    GLUCOSE 492 05/07/2019 11:18 AM    PROT 7.1 10/30/2022 06:44 AM    PROT 7.0 02/20/2019 10:46 AM    LABALBU 4.0 10/30/2022 06:44 AM    LABALBU 3.6 02/20/2019 10:46 AM    CALCIUM 9.8 10/28/2022 08:30 AM    BILITOT 0.2 10/30/2022 06:44 AM    ALKPHOS 58 10/30/2022 06:44 AM    AST 25 10/30/2022 06:44 AM    ALT 21 10/30/2022 06:44 AM     BMP:    Lab Results   Component Value Date/Time     10/28/2022 08:30 AM    K 4.2 10/28/2022 08:30 AM    CL 99 10/28/2022 08:30 AM    CO2 27 10/28/2022 08:30 AM    BUN 29 10/28/2022 08:30 AM    LABALBU 4.0 10/30/2022 06:44 AM    LABALBU 3.6 02/20/2019 10:46 AM    CREATININE 5.76 10/28/2022 08:30 AM    CALCIUM 9.8 10/28/2022 08:30 AM    GFRAA >60 07/06/2017 10:17 PM    LABGLOM 9 10/28/2022 08:30 AM    GLUCOSE 94 10/28/2022 08:30 AM    GLUCOSE 492 05/07/2019 11:18 AM     PT/INR:    Lab Results   Component Value Date/Time    PROTIME 23.3 10/10/2022 07:37 PM    PROTIME 13.5 05/07/2019 11:18 AM    INR 2.1 10/10/2022 07:37 PM     PTT:    Lab Results   Component Value Date/Time    APTT 40.8 10/10/2022 07:37 PM   [APTT}    Physical Examination:        General appearance: alert, cooperative and no distress  Mental Status: oriented to person, place and time and normal affect  Abdomen: soft, nontender, nondistended, bowel sounds present   Extremities: no edema, redness or tenderness in the calves  Skin: no gross lesions, rashes, or induration    Assessment:        Primary Problem  Pancreatitis, unspecified pancreatitis type     Active Hospital Problems    Diagnosis Date Noted    Constipation [K59.00] 10/27/2022     Priority: Medium    Severe malnutrition ([address] [E43] 10/27/2022     Priority: Medium    Depression [F32.A] 10/25/2022     Priority: Medium    Acute urinary retention [R33.8] 10/20/2022     Priority: Medium    Gastroenteritis [K52.9] 10/20/2022     Priority: Medium    Elevated lipase [R74.8] 10/20/2022     Priority: Medium    Generalized abdominal pain [R10.84] 10/20/2022     Priority: Medium    Pancreatitis, unspecified pancreatitis type [K85.90] 10/19/2022     Priority: Medium    Lethargy [R53.83] 03/07/2016     Past Medical History:   Diagnosis Date    Asthma     Atherosclerosis of native arteries of extremities with rest pain, unspecified extremity ([facility]     Depression 10/25/2022    Diabetes mellitus ([facility] [address]     since 12 y/o, unsure if type 1 or 2    Headache, migraine     for 2 years    Hypertension     Lichen simplex chronicus     Noninflammatory disorder of vagina, unspecified     Patient's noncompliance with other medical treatment and regimen for other reason     Type 2 diabetes mellitus without complication ([facility] [address] 77/[id number]/7181        Plan:        Nausea, vomiting status post EGD revealing possible Candida esophagitis, gastritis. Follow-up biopsies  Continue Diflucan  Antiemetics as needed  PPI  Diet as tolerated  Abdominal pain, elevated lipase  Repeat CT no acute findings  Ultrasound liver pending  Lipase continues to be mildly elevated, normal LFTs     Time spent reviewing the chart, seeing the patient, and discussing with the attending MD around 30 minutes. Explained to the patient and d/W Nursing Staff  Will F/U with you  Please call or Page for any issues or change in status  Thanks    Electronically signed by CUAUHTEMOC Crawford NP on 10/30/2022 at 12:56 PM         GI attending physician note. Patient seen with CUAUHTEMOC     I independently performed an evaluation on [facility]. I have reviewed the above documentation completed by the Nurse Practitioner and agree with the history, examination, and management plan. Recommendations discussed.

## 2022-10-30 NOTE — PROGRESS NOTES
[facility] 44 NOTE     10/30/2022     Patient was seen and examined in person, Chart reviewed   Patient's case discussed with staff/team    Chief Complaint: Depression and lack of appetite    Interim History:   The patient was seen at bedside. She remains somnolent. She does not believe that she needs any input from psychiatry. She has received Remeron last night. She continues to be depressed. She continues to deny suicidal thoughts.   She is in pain and is limited in engagement    /70   Pulse 86   Temp 98.8 °F (37.1 °C) (Axillary)   Resp 18   Ht 5' 4\" (1.626 m)   Wt 171 lb 15.3 oz (78 kg)   LMP  (LMP Unknown)   SpO2 100%   BMI 29.52 kg/m²   Appetite:  [] Normal/Unchanged  [] Increased  [x] Decreased      Sleep:       [] Normal/Unchanged  [] Fair       [x] Poor              Energy:    [] Normal/Unchanged  [] Increased  [x] Decreased        Aggression:  [] yes  [x] no    Patient is [] able  [] unable to CONTRACT FOR SAFETY ON THE UNIT    PAST MEDICAL/PSYCHIATRIC HISTORY:   Past Medical History:   Diagnosis Date    Asthma     Atherosclerosis of native arteries of extremities with rest pain, unspecified extremity ([address]     Depression 10/25/2022    Diabetes mellitus ([address]     since 14 y/o, unsure if type 1 or 2    Headache, migraine     for 2 years    Hypertension     Lichen simplex chronicus     Noninflammatory disorder of vagina, unspecified     Patient's noncompliance with other medical treatment and regimen for other reason     Type 2 diabetes mellitus without complication ([address] 14/48/[birth year]59       FAMILY/SOCIAL HISTORY:  Family History   Problem Relation Age of Onset    High Blood Pressure Mother     Diabetes Mother     Other Mother 27        Lung cancer    High Blood Pressure Father     Diabetes Father     Other Father 27        Prostate cancer     Social History     Socioeconomic History    Marital status: Single     Spouse name: Not on file    Number of children: Not on file Years of education: Not on file    Highest education level: Not on file   Occupational History    Not on file   Tobacco Use    Smoking status: Never    Smokeless tobacco: Never   Vaping Use    Vaping Use: Never used   Substance and Sexual Activity    Alcohol use: No    Drug use: No    Sexual activity: Not on file   Other Topics Concern    Not on file   Social History Narrative    Not on file     Social Determinants of Health     Financial Resource Strain: Not on file   Food Insecurity: Not on file   Transportation Needs: Not on file   Physical Activity: Not on file   Stress: Not on file   Social Connections: Not on file   Intimate Partner Violence: Not on file   Housing Stability: Not on file           ROS:  [x] All negative/unchanged except if checked.  Explain positive(checked items) below:  [] Constitutional  [] Eyes  [] Ear/Nose/Mouth/Throat  [] Respiratory  [] CV  [] GI  []   [] Musculoskeletal  [] Skin/Breast  [] Neurological  [] Endocrine  [] Heme/Lymph  [] Allergic/Immunologic    Explanation:     MEDICATIONS:    Current Facility-Administered Medications:     sodium chloride flush 0.9 % injection 5-40 mL, 5-40 mL, IntraVENous, PRN, Charleen Deluca MD    0.9 % sodium chloride infusion, , IntraVENous, PRN, Charleen Deluca MD    fentaNYL (SUBLIMAZE) injection 25 mcg, 25 mcg, IntraVENous, Q5 Min PRN, Charleen Deluca MD    metoclopramide (REGLAN) tablet 10 mg, 10 mg, Oral, TID AC, Aleah Sol MD, 10 mg at 10/30/22 1008    fluconazole (DIFLUCAN) tablet 400 mg, 400 mg, Oral, Once **FOLLOWED BY** fluconazole (DIFLUCAN) tablet 200 mg, 200 mg, Oral, Daily, Aleah Sol MD, 200 mg at 10/30/22 1008    polyethylene glycol (GLYCOLAX) packet 17 g, 17 g, Oral, Daily, Aleah Sol MD, 17 g at 10/28/22 1001    pantoprazole (PROTONIX) 40 mg in sodium chloride (PF) 0.9 % 10 mL injection, 40 mg, IntraVENous, Daily, Aleah Sol MD, 40 mg at 10/29/22 1101    calcium carbonate (TUMS) chewable tablet 500 mg, 500 mg, Oral, TID PRN, Aleah MD Sweta, 500 mg at 10/28/22 0011    ondansetron (ZOFRAN-ODT) disintegrating tablet 4 mg, 4 mg, Oral, Q4H PRN, 4 mg at 10/30/22 1353 **OR** ondansetron (ZOFRAN) injection 4 mg, 4 mg, IntraVENous, Q6H PRN, Aleah Sol MD, 4 mg at 10/28/22 2159    mirtazapine (REMERON SOL-TAB) disintegrating tablet 15 mg, 15 mg, Oral, Nightly, Aleah Sol MD, 15 mg at 10/29/22 2142    sodium chloride flush 0.9 % injection 10 mL, 10 mL, IntraVENous, PRN, Aleah Sol MD, 10 mL at 10/23/22 1332    lidocaine 4 % external patch 1 patch, 1 patch, TransDERmal, Daily, Aleah Sol MD, 1 patch at 10/30/22 1007    acetaminophen (TYLENOL) tablet 650 mg, 650 mg, Oral, Q4H PRN, Aleah Sol MD, 650 mg at 10/29/22 2142    epoetin juventino-epbx (RETACRIT) injection 2,000 Units, 2,000 Units, SubCUTAneous, Once per day on Mon Wed Fri, Katelyn Myers MD, 2,000 Units at 10/28/22 2229    metoprolol (LOPRESSOR) injection 2.5 mg, 2.5 mg, IntraVENous, Q6H PRN, Aleah Sol MD, 2.5 mg at 10/27/22 1834    melatonin tablet 3 mg, 3 mg, Oral, Nightly PRN, Aleah Sol MD, 3 mg at 10/27/22 2348    midodrine (PROAMATINE) tablet 5 mg, 5 mg, Oral, TID PRN, Aleah Sol MD, 5 mg at 10/28/22 1729    albuterol sulfate HFA (PROVENTIL;VENTOLIN;PROAIR) 108 (90 Base) MCG/ACT inhaler 2 puff, 2 puff, Inhalation, Q6H PRN, Aleah Sol MD    aspirin EC tablet 81 mg, 81 mg, Oral, Daily, CUAUHTEMOC Aleman - NP, 81 mg at 10/25/22 1039    atorvastatin (LIPITOR) tablet 10 mg, 10 mg, Oral, Nightly, Aleah Sol MD, 10 mg at 10/29/22 2142    calcium acetate (PHOSLO) capsule 2,001 mg, 3 capsule, Oral, TID WC, Aleah Sol MD, 2,001 mg at 10/26/22 1134    carvedilol (COREG) tablet 25 mg, 25 mg, Oral, BID, Aleah Sol MD, 25 mg at 10/29/22 2142    clopidogrel (PLAVIX) tablet 75 mg, 75 mg, Oral, Daily, CUAUHTEMOC Aleman NP, 75 mg at 10/30/22 1008    hydrALAZINE (APRESOLINE) tablet 100 mg, 100 mg, Oral, TID, Aleah Sol MD, 100 mg at 10/29/22 2142    NIFEdipine (ADALAT CC) extended release tablet 90 mg, 90 mg, Oral, Daily, Aleah Sol MD, 90 mg at 10/28/22 1116    vitamin D3 (CHOLECALCIFEROL) tablet 5,000 Units, 5,000 Units, Oral, Daily, Aleah Sol MD, 5,000 Units at 10/28/22 1001    sodium chloride flush 0.9 % injection 5-40 mL, 5-40 mL, IntraVENous, PRN, Aleah Sol MD, 10 mL at 10/22/22 1054    0.9 % sodium chloride infusion, , IntraVENous, PRN, Aleah Sol MD    [Held by provider] insulin glargine (LANTUS) injection vial 24 Units, 24 Units, SubCUTAneous, Nightly, CUAUHTEMOC Aleman NP, 24 Units at 10/22/22 4968    glucose chewable tablet 16 g, 4 tablet, Oral, PRN, Aleah Sol MD    dextrose bolus 10% 125 mL, 125 mL, IntraVENous, PRN **OR** dextrose bolus 10% 250 mL, 250 mL, IntraVENous, PRN, Aleah Sol MD    glucagon (rDNA) injection 1 mg, 1 mg, SubCUTAneous, PRN, Aleah Sol MD    dextrose 10 % infusion, , IntraVENous, Continuous PRN, Aleah Sol MD, Stopped at 10/23/22 2044      Examination:  /70   Pulse 86   Temp 98.8 °F (37.1 °C) (Axillary)   Resp 18   Ht 5' 4\" (1.626 m)   Wt 171 lb 15.3 oz (78 kg)   LMP  (LMP Unknown)   SpO2 100%   BMI 29.52 kg/m²   Gait -not tested  Medication side effects(SE): Denies    Mental Status Examination:    Level of consciousness: Somnolent   appearance: Fair grooming and fair hygiene  Behavior/Motor:  psychomotor retardation  Attitude toward examiner:  cooperative and attentive  Speech:  slow and low volume  Mood: depressed and constricted  Affect:  blunted  Thought processes:  linear, goal directed, and coherent   Thought content:  Homicidal ideation - none  Suicidal Ideation:  denies suicidal ideation  Delusions:  no evidence of delusions  Perceptual Disturbance:  denies any perceptual disturbance  Cognition:  oriented to person, place, and time   Concentration intact  Insight fair   Judgement fair     ASSESSMENT:   Patient symptoms are:  [] Well controlled  [] Improving  [] Worsening  [x] No change      Diagnosis: Principal Problem:    Pancreatitis, unspecified pancreatitis type  Active Problems:    Acute urinary retention    Gastroenteritis    Elevated lipase    Generalized abdominal pain    Depression    Constipation    Severe malnutrition (HCC)    Lethargy  Resolved Problems:    * No resolved hospital problems. *      LABS:    Recent Labs     10/28/22  0830   WBC 7.6   HGB 11.6*        Recent Labs     10/28/22  0830      K 4.2   CL 99   CO2 27   BUN 29*   CREATININE 5.76*   GLUCOSE 94     Recent Labs     10/30/22  0644   BILITOT 0.2*   ALKPHOS 58   AST 25   ALT 21     No results found for: [facility], BARBSCNU, LABBENZ, CANNAB, COCAINESCRN, LABMETH, OPIATESCREENURINE, PHENCYCLIDINESCREENURINE, PPXUR, ETOH  Lab Results   Component Value Date/Time    TSH 1.04 10/23/2022 12:06 PM     No results found for: LITHIUM  No results found for: VALPROATE, CBMZ    RISK ASSESSMENT: Low risk for suicide or self-harm    Treatment Plan:  Reviewed current Medications with the patient. Continue Remeron 15 mg nightly. Recommend outpatient follow-up. Does not require admission to [facility]. Risks, benefits, side effects, drug-to-drug interactions and alternatives to treatment were discussed. The patient consented to treatment.        PSYCHOTHERAPY/COUNSELING:  [] Therapeutic interview  [x] Supportive  [] CBT  [] Ongoing  [] Other                                    Carlie Francisco is a 28 y.o. female being evaluated face to face.     --Sheela Stout MD on 10/30/2022 at 5:34 PM

## 2022-10-30 NOTE — PLAN OF CARE
Problem: Discharge Planning  Goal: Discharge to home or other facility with appropriate resources  10/30/2022 1517 by Rona Miller RN  Outcome: Progressing  10/30/2022 0210 by Oneyda Hull RN  Outcome: Progressing     Problem: Pain  Goal: Verbalizes/displays adequate comfort level or baseline comfort level  10/30/2022 1517 by Rona Miller RN  Outcome: Progressing  10/30/2022 0210 by Oneyda Hull RN  Outcome: Progressing  Note: Pt able to verbalize comfort level     Problem: Safety - Adult  Goal: Free from fall injury  10/30/2022 1517 by Rona Miller RN  Outcome: Progressing  10/30/2022 0210 by Oneyda Hull RN  Outcome: Progressing  Note: Pt free from fall     Problem: Skin/Tissue Integrity  Goal: Absence of new skin breakdown  Description: 1. Monitor for areas of redness and/or skin breakdown  2. Assess vascular access sites hourly  3. Every 4-6 hours minimum:  Change oxygen saturation probe site  4. Every 4-6 hours:  If on nasal continuous positive airway pressure, respiratory therapy assess nares and determine need for appliance change or resting period.   10/30/2022 1517 by Rona Miller RN  Outcome: Progressing  10/30/2022 0210 by Oneyda Hull RN  Outcome: Progressing  Note: Pt absent of any new skin breakdown     Problem: Chronic Conditions and Co-morbidities  Goal: Patient's chronic conditions and co-morbidity symptoms are monitored and maintained or improved  10/30/2022 1517 by Rona Miller RN  Outcome: Progressing  10/30/2022 0210 by Oneyda Hull RN  Outcome: Progressing     Problem: ABCDS Injury Assessment  Goal: Absence of physical injury  10/30/2022 1517 by Rona Miller RN  Outcome: Progressing  10/30/2022 0210 by Oneyda Hull RN  Outcome: Progressing     Problem: Nutrition Deficit:  Goal: Optimize nutritional status  10/30/2022 1517 by Rona Miller RN  Outcome: Progressing  10/30/2022 0210 by Oneyda Hull RN  Outcome: Progressing

## 2022-10-30 NOTE — PLAN OF CARE
Problem: Pain  Goal: Verbalizes/displays adequate comfort level or baseline comfort level  10/30/2022 0210 by Yasmin Soto RN  Outcome: Progressing  Note: Pt able to verbalize comfort level     Problem: Safety - Adult  Goal: Free from fall injury  10/30/2022 0210 by Yasmin Soto RN  Outcome: Progressing  Note: Pt free from fall     Problem: Skin/Tissue Integrity  Goal: Absence of new skin breakdown  Description: 1. Monitor for areas of redness and/or skin breakdown  2. Assess vascular access sites hourly  3. Every 4-6 hours minimum:  Change oxygen saturation probe site  4. Every 4-6 hours:  If on nasal continuous positive airway pressure, respiratory therapy assess nares and determine need for appliance change or resting period.   10/30/2022 0210 by Yasmin Soto RN  Outcome: Progressing  Note: Pt absent of any new skin breakdown

## 2022-10-30 NOTE — PROGRESS NOTES
[address] [facility] Internal Medicine  Davide Roman MD; Lainey Donahue MD; Rochelle Dalton MD; MD Nori Michelle MD; MD FRANCES Hernandez [facility] Internal Medicine   [facility]    Progress note               Date:   10/30/2022  Patient name:  Danna Laird  Date of admission:  10/19/2022  4:05 PM  MRN:   [id number]  Account:  [de-identified]  YOB: 1990  PCP:    No primary care provider on file. Room:   [address] [place] [address]  Code Status:    Full Code    Chief Complaint:     Chief Complaint   Patient presents with    Abdominal Pain     Abdominal pain, emesis post 2-4 hours dialysis treatment. Abdomen is softly distended. History Obtained From:     Pt medical record and nursing staff    History of Present Illness:     Danna Laird is a 28 y.o. Non- / non  female who presents with Abdominal Pain (Abdominal pain, emesis post 2-4 hours dialysis treatment. Abdomen is softly distended. )   and is admitted to the hospital for the management of Pancreatitis, unspecified pancreatitis type. Danna Laird is a 28 y.o. Non- / non  female who presents with Abdominal Pain (Abdominal pain, emesis post 2-4 hours dialysis treatment. Abdomen is softly distended.) and is admitted to the hospital for the management of acute Pancreatitis. Medical history includes ESRD on hemodialysis, DM type I, HTN and CAD. Also had a recent admission for GI bleed. Patient presented from [facility]. She is experiencing some altered mental status where she is unable to hold a conversation regarding her condition. She is able to stay awake for 1-2 word answer but is not consistent. CT head shows no evidence of acute changes  compared to CT head completed on 10/10/22. He was given 50 mcg IV of fentanyl prior to assessment which may be affecting her mentation.  CT of abdomen and pelvis shows mild right and minimal left hydronephrosis which is correlated with distended urinary bladder. She is experiencing acute urinary retention requiring barros catheter placement in ED. UA shows trace ketones, 3+ protein and trace leukocytes. Pending culture. WBC 10.9.  AST 59, ALT 82, Alk Phos 110, Lipase 117 suggesting acute pancreatitis without infection  10/23  Patient, very sleepy as per nursing report  Has poor oral intake  Has nausea, 1 episode of vomiting  Complaining of chest pain  Very poor historian    10/24  Patient is much alert, awake, she is oriented to time place person to my assessment today  Still have poor oral intake  Lantus was held last night  Blood sugar running low  10/26   Patient have multiple episodes of vomiting,  Complaining abdominal pain  10/27   Patient complaining of diffuse abdominal pain, nausea, vomiting  Refusing pills    10/28   Patient, underwent EGD today suggestive of Candida esophagitis  Patient told me that abdominal pain is improved  She wanted to go from this place desperately  10/29   Patient, underwent EGD yesterday  Still having nausea, vomiting  Poor oral intake  10/30  Patient, clinically doing better  As per nursing report, to call medication in the nighttime, she told her that she wanted to feel better  Oral intake is slightly better  Past Medical History:     Past Medical History:   Diagnosis Date    Asthma     Atherosclerosis of native arteries of extremities with rest pain, unspecified extremity ([address]     Depression 10/25/2022    Diabetes mellitus ([address]     since 14 y/o, unsure if type 1 or 2    Headache, migraine     for 2 years    Hypertension     Lichen simplex chronicus     Noninflammatory disorder of vagina, unspecified     Patient's noncompliance with other medical treatment and regimen for other reason     Type 2 diabetes mellitus without complication ([address] 88/20/4968        Past Surgical History:     Past Surgical History:   Procedure Laterality Date OVARY REMOVAL      July 2010    OVARY REMOVAL      UPPER GASTROINTESTINAL ENDOSCOPY N/A 10/28/2022    EGD BIOPSY performed by Levar Perry MD at [address] [place] [address]        Medications Prior to Admission:     Prior to Admission medications    Medication Sig Start Date End Date Taking?  Authorizing Provider   ondansetron (ZOFRAN-ODT) 4 MG disintegrating tablet Take 1 tablet by mouth every 8 hours as needed for Nausea or Vomiting 10/21/22  Yes Fabienne Gaffney MD   aspirin 81 MG EC tablet Take 81 mg by mouth daily   Yes Historical Provider, MD   atorvastatin (LIPITOR) 10 MG tablet Take 10 mg by mouth at bedtime   Yes Historical Provider, MD   calcium acetate (PHOSLO) 667 MG CAPS capsule Take 3 capsules by mouth 3 times daily (with meals)   Yes Historical Provider, MD   carvedilol (COREG) 25 MG tablet Take 25 mg by mouth 2 times daily   Yes Historical Provider, MD   vitamin D (CHOLECALCIFEROL) 125 MCG (5000 UT) CAPS capsule Take 5,000 Units by mouth daily   Yes Historical Provider, MD   clopidogrel (PLAVIX) 75 MG tablet Take 75 mg by mouth daily   Yes Historical Provider, MD   escitalopram (LEXAPRO) 10 MG tablet Take 10 mg by mouth daily   Yes Historical Provider, MD   hydrALAZINE (APRESOLINE) 100 MG tablet Take 100 mg by mouth 3 times daily   Yes Historical Provider, MD   insulin detemir (LEVEMIR FLEXTOUCH) 100 UNIT/ML injection pen Inject 24 Units into the skin nightly   Yes Historical Provider, MD   midodrine (PROAMATINE) 10 MG tablet Take 30 mg by mouth daily Indications: Mon, Wed, Fri at dialysis   Yes Historical Provider, MD   NIFEdipine (ADALAT CC) 90 MG extended release tablet Take 90 mg by mouth daily   Yes Historical Provider, MD   sennosides-docusate sodium (SENOKOT-S) 8.6-50 MG tablet Take 2 tablets by mouth at bedtime   Yes Historical Provider, MD   B Complex-C-Folic Acid (TRIPHROCAPS) 1 MG CAPS Take 1 capsule by mouth daily   Yes Historical Provider, MD   patiromer sorbitex calcium (VELTASSA) 8.4 g PACK packet Take 8.4 g by mouth Twice a Week Indications: twice per day on Thursdays and Sundays   Yes Historical Provider, MD   acetaminophen (TYLENOL) 325 MG tablet Take 650 mg by mouth every 6 hours as needed for Pain or Fever (do not exceed 3 gm in 24 hours)    Historical Provider, MD   albuterol sulfate HFA (PROVENTIL;VENTOLIN;PROAIR) 108 (90 Base) MCG/ACT inhaler Inhale 2 puffs into the lungs every 6 hours as needed for Wheezing or Shortness of Breath    Historical Provider, MD   polyethylene glycol (GLYCOLAX) 17 GM/SCOOP powder Take 17 g by mouth daily as needed (constipation)    Historical Provider, MD        Allergies:     Morphine and Januvia [sitagliptin]    Social History:     Tobacco:    reports that she has never smoked. She has never used smokeless tobacco.  Alcohol:      reports no history of alcohol use. Drug Use:  reports no history of drug use. Family History:     Family History   Problem Relation Age of Onset    High Blood Pressure Mother     Diabetes Mother     Other Mother 27        Lung cancer    High Blood Pressure Father     Diabetes Father     Other Father 27        Prostate cancer       Review of Systems:     Positive and Negative as described in HPI.     CONSTITUTIONAL:  negative for fevers, chills, sweats, fatigue, weight loss  HEENT:  negative for vision, hearing changes, runny nose, throat pain  RESPIRATORY:  negative for shortness of breath, cough, congestion, wheezing  CARDIOVASCULAR:  negative for chest pain, palpitations  GASTROINTESTINAL: abdo pain n v    GENITOURINARY:  negative for difficulty of urination, burning with urination, frequency   INTEGUMENT:  negative for rash, skin lesions, easy bruising   HEMATOLOGIC/LYMPHATIC:  negative for swelling/edema   ALLERGIC/IMMUNOLOGIC:  negative for urticaria , itching  ENDOCRINE:  negative increase in drinking, increase in urination, hot or cold intolerance  MUSCULOSKELETAL:  negative joint pains, muscle aches, swelling of joints  NEUROLOGICAL: negative for headaches, dizziness, lightheadedness, numbness, pain, tingling extremities      Physical Exam:   /70   Pulse 86   Temp 98.8 °F (37.1 °C) (Axillary)   Resp 18   Ht 5' 4\" (1.626 m)   Wt 171 lb 15.3 oz (78 kg)   LMP  (LMP Unknown)   SpO2 100%   BMI 29.52 kg/m²   Temp (24hrs), Av.7 °F (37.1 °C), Min:98.5 °F (36.9 °C), Max:98.8 °F (37.1 °C)    Recent Labs     10/29/22  1140 10/29/22  1616 10/29/22  2020 10/30/22  0638   POCGLU 97 99 109* 130*     No intake or output data in the 24 hours ending 10/30/22 1059      General Appearance: alert, well appearing, and in no acute distress  Mental status: oriented to person, place, and time  Head: normocephalic, atraumatic  Eye: no icterus, redness, pupils equal and reactive, extraocular eye movements intact, conjunctiva clear  Ear: normal external ear, no discharge, hearing intact  Nose: no drainage noted  Mouth: mucous membranes moist  Neck: supple, no carotid bruits, thyroid not palpable  Lungs: Bilateral equal air entry, clear to ausculation, no wheezing, rales or rhonchi, normal effort  Cardiovascular: normal rate, regular rhythm, no murmur, gallop, rub  Abdomen: Soft, nontender, nondistended, normal bowel sounds, no hepatomegaly or splenomegaly   No tenderness  Surg scar noted lower mid abdo laparotomy noted    Neurologic: There are no new focal motor or sensory deficits, normal muscle tone and bulk, no abnormal sensation, normal speech, cranial nerves II through XII grossly intact  Skin: No gross lesions, rashes, bruising or bleeding on exposed skin area  Extremities: peripheral pulses palpable, no pedal edema or calf pain with palpation, superficial wounds in both foot  Psych: flat affect drowsy    Investigations:      Laboratory Testing:  Recent Results (from the past 24 hour(s))   POC Glucose Fingerstick    Collection Time: 10/29/22 11:40 AM   Result Value Ref Range    POC Glucose 97 65 - 105 mg/dL   POC Glucose Fingerstick    Collection Time: 10/29/22  4:16 PM   Result Value Ref Range    POC Glucose 99 65 - 105 mg/dL   POC Glucose Fingerstick    Collection Time: 10/29/22  8:20 PM   Result Value Ref Range    POC Glucose 109 (H) 65 - 105 mg/dL   POC Glucose Fingerstick    Collection Time: 10/30/22  6:38 AM   Result Value Ref Range    POC Glucose 130 (H) 65 - 105 mg/dL   Hepatic Function Panel    Collection Time: 10/30/22  6:44 AM   Result Value Ref Range    Albumin 4.0 3.5 - 5.2 g/dL    Alkaline Phosphatase 58 35 - 104 U/L    ALT 21 5 - 33 U/L    AST 25 <32 U/L    Total Bilirubin 0.2 (L) 0.3 - 1.2 mg/dL    Bilirubin, Direct <0.1 <0.31 mg/dL    Bilirubin, Indirect Can not be calculated 0.00 - 1.00 mg/dL    Total Protein 7.1 6.4 - 8.3 g/dL   Lipase    Collection Time: 10/30/22  6:44 AM   Result Value Ref Range    Lipase 132 (H) 13 - 60 U/L       Imaging/Diagnostics:  CT ABDOMEN PELVIS WO CONTRAST Additional Contrast? None    Result Date: 10/19/2022  Limited noncontrast examination. Mild right and minimal left hydronephrosis, which may be due to distended urinary bladder containing contrast material.  Consider Mcnair catheter placement as clinically warranted. Suggestion of colonic wall thickening of the mid transverse and descending colonic loops, which may have been exaggerated by underdistention but may reflect underlying infectious/inflammatory colitis. 2.7 cm ill-defined density seen within the left groin region beneath the skin staples, which may reflect liquefying hematoma. Correlate clinically with signs of infection as there are adjacent inflammatory changes and prominent left inguinal lymph nodes. CT HEAD WO CONTRAST    Result Date: 10/19/2022  No convincing CT evidence of intracranial hemorrhage, mass effect or acute territorial infarct seen. RECOMMENDATIONS: If there is persistent clinical concern, consider short-term follow-up examination.        Assessment :      Hospital Problems             Last Modified POA    * (Principal) Pancreatitis, unspecified pancreatitis type 10/19/2022 Yes    Acute urinary retention 10/20/2022 Yes    Gastroenteritis 10/20/2022 Yes    Elevated lipase 10/20/2022 Yes    Generalized abdominal pain 10/27/2022 Yes    Depression 10/25/2022 Yes    Constipation 10/27/2022 Yes    Severe malnutrition ([address] 10/27/2022 Yes    Lethargy 10/24/2022 Yes     Plan:     Patient status inpatient in the Progressive Unit/Step down    55-year-old -American lady class I obese BMI 31.74  History of end-stage kidney disease on hemodialysis with a left arm fistula  Uncontrolled diabetes mellitus type 1  Very poor historian unable to get much story from her  Abdominal pain associate with nausea vomiting 10 out of 10  Denies any fever chills   Elevated lipase CT scan noted  Clinical diagnosis of acute pancreatitis IV fluid resuscitation gentle hydration with dialysis  Discontinue IV Dilaudid and oral Norco  Low-fat diet as tolerated  Nephrology consult for  Nonspecific Trope elevation no NSTEMI patient has no chest pain troponins flat at 90  Anemia of chronic kidney disease  Hyponatremia sodium 134 conservative  Hypokalemia less than 4 potassium will be managed with  Oct 22  Troponin elevation is flat EKG noted chest pain and noncardiac in origin  Reproducible  Patient complains abdominal pain asking for IV Dilaudid  GI input noted  Patient has dependence on opioid and abuse  Advance diet discharge home with family patient is refusing ECF  High risk of readmission with the dependence and opioid seeking behavior  PT/OT   More nausea today   GI input needed ,   Labs ordered   10/23  Complaining of chest pain, ordering EKG, 2 sets of troponin  Patient sleepy, did not receive any pain medication, sleeping medication  Ordering serum ammonia level  TSH, VBG  Patient has CT head done on 10/20 which was negative  Holding Lexapro  Will start patient on gentle hydration  ESRD, recent femoropopliteal bypass surgery, on left side, hypertension, diabetes, coronary artery disease, ESRD on hemodialysis    10/24  Patient much alert, awake today  Underwent CTA chest which was negative for PE  I encourage patient to eat, she told me that she will try to eat  Abdominal pain, chest pain has resolved  Patient will need sleep hygiene, as per nursing report she was up all night talking on phone  Seen in dialysis being  Will work on discharge planning  Patient need to eat  Will start patient on Remeron    10/25  Patient, essentially same  Still have poor oral intake  Started Remeron yesterday  Blood sugars better controlled  Patient lost her mother recently, looks like she has some element of depression, requesting psych consult    10/26  Ordering x-ray KUB  Request GI to see the patient  Seen by Psychiatrist     10/27   Patient complaining of nausea, vomiting abdominal pain  Has not eaten any food  Refusing pills  Counseling about importance of medication  Will start on gentle hydration given history of ESRD  Will consult wound care    10/28   Patient, clinically doing better, abdominal pain is improved  S/p EGD suggestive of Candida esophagitis  Will start on fluconazole  Will encourage her to eat and drink, if patient tolerate diet, likely discharge    10/29   Will encourage her to eat more  On antifungals with Candida esophagitis  DC planning likely on Monday    10/30  Discharge plan to SNF tomorrow  Consultations:   IP CONSULT TO INTERNAL MEDICINE  IP CONSULT TO GI  IP CONSULT TO NEPHROLOGY  IP CONSULT TO NEUROLOGY  IP CONSULT TO PSYCHIATRY  IP CONSULT TO GI     Patient is admitted as inpatient status because of co-morbidities listed above, severity of signs and symptoms as outlined, requirement for current medical therapies and most importantly because of direct risk to patient if care not provided in a hospital setting. Expected length of stay > 48 hours. Melany Qiu MD  10/30/2022  10:59 AM    Copy sent to Dr. Cho primary care provider on file.     Please note that this chart was generated using voice recognition Dragon dictation software. Although every effort was made to ensure the accuracy of this automated transcription, some errors in transcription may have occurred.

## 2022-10-30 NOTE — PROGRESS NOTES
Department of Internal Medicine  Nephrology Fredis Moise MD Progress Note    Reason for consultation: Management of hemodialysis dependent end-stage renal disease. Consulting physician: Ami Chi MD.    Interval history: Patient had EGD yesterday showing Candida esophagitis. .  She continues to have nausea. She does not have shortness of breath or chest pain. Hemodialysis was done on Friday with no fluid removal due to volume depletion. History of present: This is a 28 y.o. 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 Wanda hammonds of nephrology as consultants of [facility], Presented to the emergency department yesterday with complaints of abdominal pain that started after hemodialysis. This was associated with 1 episode of vomiting at dialysis. She has a recent history of GI bleed requiring transfusion and was admitted from October 11 to October 14, 2022. Flexible sigmoidoscopy performed on that admission showed multiple rectal ulcers which were cauterized and patient was taken off Xarelto.     Scheduled Meds:   metoclopramide  10 mg Oral TID AC    fluconazole  400 mg Oral Once    Followed by    fluconazole  200 mg Oral Daily    polyethylene glycol  17 g Oral Daily    pantoprazole (PROTONIX) 40 mg injection  40 mg IntraVENous Daily    mirtazapine  15 mg Oral Nightly    lidocaine  1 patch TransDERmal Daily    epoetin juventino-epbx  2,000 Units SubCUTAneous Once per day on Mon Wed Fri    aspirin  81 mg Oral Daily    atorvastatin  10 mg Oral Nightly    calcium acetate  3 capsule Oral TID WC    carvedilol  25 mg Oral BID    clopidogrel  75 mg Oral Daily    hydrALAZINE  100 mg Oral TID    NIFEdipine 90 mg Oral Daily    vitamin D3  5,000 Units Oral Daily    [Held by provider] insulin glargine  24 Units SubCUTAneous Nightly     Continuous Infusions:   sodium chloride      sodium chloride      dextrose Stopped (10/23/22 2044)     PRN Meds:.sodium chloride flush, sodium chloride, fentanNYL, calcium carbonate, ondansetron **OR** ondansetron, sodium chloride flush, acetaminophen, metoprolol, melatonin, midodrine, albuterol sulfate HFA, sodium chloride flush, sodium chloride, glucose, dextrose bolus **OR** dextrose bolus, glucagon (rDNA), dextrose    Physical Exam:    VITALS:  /70   Pulse 86   Temp 98.8 °F (37.1 °C) (Axillary)   Resp 18   Ht 5' 4\" (1.626 m)   Wt 171 lb 15.3 oz (78 kg)   LMP  (LMP Unknown)   SpO2 100%   BMI 29.52 kg/m²   TEMPERATURE:  Current - Temp: 98.8 °F (37.1 °C); Max - Temp  Av.7 °F (37.1 °C)  Min: 98.5 °F (36.9 °C)  Max: 98.8 °F (37.1 °C)  RESPIRATIONS RANGE: Resp  Av.3  Min: 16  Max: 18  PULSE RANGE: Pulse  Av.5  Min: 86  Max: 96  BLOOD PRESSURE RANGE:  Systolic (02IQF), MIGDALIA:613 , Min:110 , RJJ:927 ; Diastolic (02CET), ATI:39, Min:62, Max:94  PULSE OXIMETRY RANGE: SpO2  Av %  Min: 100 %  Max: 100 %  24HR INTAKE/OUTPUT:  No intake or output data in the 24 hours ending 10/30/22 1428    Constitutional: alert, appears stated age, and cooperative    Skin: Skin color, texture, turgor normal. No rashes or lesions    Head: Normocephalic, without obvious abnormality, atraumatic     Cardiovascular/Edema: regular rate and rhythm, S1, S2 normal, no murmur, click, rub or gallop    Respiratory: Lungs: clear to auscultation bilaterally    Abdomen:  Full with periumbilical tenderness; normal bowel sounds. Back: symmetric, no curvature. ROM normal. No CVA tenderness. Extremities: Trace bilateral pitting pedal edema with hyperpigmented dermatosis.     Neuro:  Grossly normal    CBC:   Recent Labs     10/28/22  0830   WBC 7.6   HGB 11.6*          BMP:    Recent Labs 10/28/22  0830      K 4.2   CL 99   CO2 27   BUN 29*   CREATININE 5.76*   GLUCOSE 94       Lab Results   Component Value Date/Time    NITRU NEGATIVE 10/27/2022 05:55 PM    COLORU Dark Yellow 10/27/2022 05:55 PM    PHUR 5.0 10/27/2022 05:55 PM    WBCUA 0 TO 2 10/27/2022 05:55 PM    RBCUA 0 TO 2 10/27/2022 05:55 PM    MUCUS NOT REPORTED 12/29/2017 11:33 PM    TRICHOMONAS MODERATE 12/29/2017 11:33 PM    YEAST NOT REPORTED 12/29/2017 11:33 PM    BACTERIA MANY 10/27/2022 05:55 PM    SPECGRAV 1.031 10/27/2022 05:55 PM    LEUKOCYTESUR SMALL 10/27/2022 05:55 PM    UROBILINOGEN Normal 10/27/2022 05:55 PM    BILIRUBINUR NEGATIVE  Verified by ictotest. 10/27/2022 05:55 PM    BILIRUBINUR NEGATIVE 01/07/2012 03:53 AM    GLUCOSEU NEGATIVE 10/27/2022 05:55 PM    GLUCOSEU 3+ 01/07/2012 03:53 AM    KETUA TRACE 10/27/2022 05:55 PM    AMORPHOUS NOT REPORTED 12/29/2017 11:33 PM     Urine Creatinine:     Lab Results   Component Value Date/Time    LABCREA 254.2 12/06/2015 02:31 PM     IMPRESSION/RECOMMENDATIONS:    1.  End-stage renal disease - We will maintain Monday, Wednesday and Friday hemodialysis schedule. We will keep target weight at 80 kg. AV fistula functioning well. Renal diet,i.e 2-gram sodium,2-gram potassium,1500 ml fluid restriction,1-gram phosphorus, 1800 KCal and 1.2 gram/kg protein per day. 2.  Systemic hypertension - Blood pressure control is acceptable at this time. We will continue as needed midodrine for intradialytic episodes of hypotension. 3.  Mineral bone disease profile - serum phosphorus level 4.5 mg/dL is within target range. 4.  Anemia of chronic kidney disease - Hemoglobin 11.6 g/dL today is within target range. We will continue Retacrit 2000 units subcutaneously 3 times a week. 5.  Nausea and vomiting - likely secondary to Candida esophagitis started on fluconazole. As needed Phenergan    Prognosis is guarded.     MD CEDRIC Miller  Attending Nephrologist  10/30/2022 2:28 PM

## 2022-10-31 ENCOUNTER — APPOINTMENT (OUTPATIENT)
Dept: ULTRASOUND IMAGING | Age: 32
DRG: 368 | End: 2022-10-31
Payer: MEDICARE

## 2022-10-31 VITALS
OXYGEN SATURATION: 100 % | RESPIRATION RATE: 16 BRPM | BODY MASS INDEX: 28.98 KG/M2 | HEIGHT: 64 IN | WEIGHT: 169.75 LBS | HEART RATE: 90 BPM | DIASTOLIC BLOOD PRESSURE: 66 MMHG | TEMPERATURE: 97.7 F | SYSTOLIC BLOOD PRESSURE: 107 MMHG

## 2022-10-31 LAB
GLUCOSE BLD-MCNC: 116 MG/DL (ref 65–105)
GLUCOSE BLD-MCNC: 172 MG/DL (ref 65–105)
HCT VFR BLD CALC: 31.1 % (ref 36–46)
HEMOGLOBIN: 10.4 G/DL (ref 12–16)
SARS-COV-2, RAPID: NOT DETECTED
SPECIMEN DESCRIPTION: NORMAL

## 2022-10-31 PROCEDURE — 85018 HEMOGLOBIN: CPT

## 2022-10-31 PROCEDURE — 97530 THERAPEUTIC ACTIVITIES: CPT

## 2022-10-31 PROCEDURE — 90935 HEMODIALYSIS ONE EVALUATION: CPT

## 2022-10-31 PROCEDURE — 87635 SARS-COV-2 COVID-19 AMP PRB: CPT

## 2022-10-31 PROCEDURE — 6360000002 HC RX W HCPCS: Performed by: INTERNAL MEDICINE

## 2022-10-31 PROCEDURE — 82947 ASSAY GLUCOSE BLOOD QUANT: CPT

## 2022-10-31 PROCEDURE — 99232 SBSQ HOSP IP/OBS MODERATE 35: CPT | Performed by: INTERNAL MEDICINE

## 2022-10-31 PROCEDURE — 97110 THERAPEUTIC EXERCISES: CPT

## 2022-10-31 PROCEDURE — 97535 SELF CARE MNGMENT TRAINING: CPT

## 2022-10-31 PROCEDURE — 6370000000 HC RX 637 (ALT 250 FOR IP): Performed by: INTERNAL MEDICINE

## 2022-10-31 PROCEDURE — 85014 HEMATOCRIT: CPT

## 2022-10-31 PROCEDURE — 6370000000 HC RX 637 (ALT 250 FOR IP): Performed by: NURSE PRACTITIONER

## 2022-10-31 PROCEDURE — 99213 OFFICE O/P EST LOW 20 MIN: CPT

## 2022-10-31 PROCEDURE — 99239 HOSP IP/OBS DSCHRG MGMT >30: CPT | Performed by: INTERNAL MEDICINE

## 2022-10-31 PROCEDURE — 76705 ECHO EXAM OF ABDOMEN: CPT

## 2022-10-31 PROCEDURE — 36415 COLL VENOUS BLD VENIPUNCTURE: CPT

## 2022-10-31 PROCEDURE — 2500000003 HC RX 250 WO HCPCS: Performed by: INTERNAL MEDICINE

## 2022-10-31 RX ORDER — MIRTAZAPINE 15 MG/1
15 TABLET, ORALLY DISINTEGRATING ORAL NIGHTLY
Qty: 30 TABLET | Refills: 3 | Status: SHIPPED | OUTPATIENT
Start: 2022-10-31

## 2022-10-31 RX ORDER — FLUCONAZOLE 200 MG/1
200 TABLET ORAL DAILY
Qty: 11 TABLET | Refills: 0 | Status: SHIPPED | OUTPATIENT
Start: 2022-10-31 | End: 2022-11-11

## 2022-10-31 RX ORDER — HYDROCODONE BITARTRATE AND ACETAMINOPHEN 5; 325 MG/1; MG/1
1 TABLET ORAL EVERY 4 HOURS PRN
Qty: 18 TABLET | Refills: 0
Start: 2022-10-31 | End: 2022-11-03

## 2022-10-31 RX ADMIN — METOCLOPRAMIDE 10 MG: 10 TABLET ORAL at 06:11

## 2022-10-31 RX ADMIN — NIFEDIPINE 90 MG: 90 TABLET, EXTENDED RELEASE ORAL at 15:45

## 2022-10-31 RX ADMIN — FLUCONAZOLE 200 MG: 100 TABLET ORAL at 15:49

## 2022-10-31 RX ADMIN — METOCLOPRAMIDE 10 MG: 10 TABLET ORAL at 15:45

## 2022-10-31 RX ADMIN — Medication 5000 UNITS: at 15:45

## 2022-10-31 RX ADMIN — CALCIUM ACETATE 2001 MG: 667 CAPSULE ORAL at 17:42

## 2022-10-31 RX ADMIN — ASPIRIN 81 MG: 81 TABLET, COATED ORAL at 15:46

## 2022-10-31 RX ADMIN — CLOPIDOGREL BISULFATE 75 MG: 75 TABLET ORAL at 15:45

## 2022-10-31 RX ADMIN — EPOETIN ALFA-EPBX 2000 UNITS: 2000 INJECTION, SOLUTION INTRAVENOUS; SUBCUTANEOUS at 18:40

## 2022-10-31 RX ADMIN — CARVEDILOL 25 MG: 25 TABLET, FILM COATED ORAL at 15:46

## 2022-10-31 NOTE — PROGRESS NOTES
GI Progress notes    10/31/2022   12:06 PM    Name:  Tello Cuellar  MRN:    [id number]     Acct:     [de-identified]   Room:  [facility] Day: 12     Admit Date: 10/19/2022  4:05 PM  PCP: No primary care provider on file. Subjective:     C/C:   Chief Complaint   Patient presents with    Abdominal Pain     Abdominal pain, emesis post 2-4 hours dialysis treatment. Abdomen is softly distended. Interval History: Status: improved. Patient seen and examined  No acute events overnight  Sitting up at edge of bed working with therapy at time of visit  Reports improvement of pain  No nausea, vomiting  Feels hungry. US liver unremarkable    ROS:  Constitutional: negative for chills, fevers and sweats  Gastrointestinal: negative for abdominal pain, constipation, diarrhea, nausea and vomiting  Neurological: negative for dizziness and headaches    Medications: Allergies:    Allergies   Allergen Reactions    Morphine Shortness Of Breath and Other (See Comments)     Chest pain    Januvia [Sitagliptin] Nausea And Vomiting       Current Meds: sodium chloride flush 0.9 % injection 5-40 mL, PRN  0.9 % sodium chloride infusion, PRN  fentaNYL (SUBLIMAZE) injection 25 mcg, Q5 Min PRN  metoclopramide (REGLAN) tablet 10 mg, TID AC  fluconazole (DIFLUCAN) tablet 400 mg, Once   Followed by  fluconazole (DIFLUCAN) tablet 200 mg, Daily  polyethylene glycol (GLYCOLAX) packet 17 g, Daily  pantoprazole (PROTONIX) 40 mg in sodium chloride (PF) 0.9 % 10 mL injection, Daily  calcium carbonate (TUMS) chewable tablet 500 mg, TID PRN  ondansetron (ZOFRAN-ODT) disintegrating tablet 4 mg, Q4H PRN   Or  ondansetron (ZOFRAN) injection 4 mg, Q6H PRN  mirtazapine (REMERON SOL-TAB) disintegrating tablet 15 mg, Nightly  sodium chloride flush 0.9 % injection 10 mL, PRN  lidocaine 4 % external patch 1 patch, Daily  acetaminophen (TYLENOL) tablet 650 mg, Q4H PRN  epoetin juventino-epbx (RETACRIT) injection 2,000 Units, Once per day on Mon Wed Fri  metoprolol (LOPRESSOR) injection 2.5 mg, Q6H PRN  melatonin tablet 3 mg, Nightly PRN  midodrine (PROAMATINE) tablet 5 mg, TID PRN  albuterol sulfate HFA (PROVENTIL;VENTOLIN;PROAIR) 108 (90 Base) MCG/ACT inhaler 2 puff, Q6H PRN  aspirin EC tablet 81 mg, Daily  atorvastatin (LIPITOR) tablet 10 mg, Nightly  calcium acetate (PHOSLO) capsule 2,001 mg, TID WC  carvedilol (COREG) tablet 25 mg, BID  clopidogrel (PLAVIX) tablet 75 mg, Daily  hydrALAZINE (APRESOLINE) tablet 100 mg, TID  NIFEdipine (ADALAT CC) extended release tablet 90 mg, Daily  vitamin D3 (CHOLECALCIFEROL) tablet 5,000 Units, Daily  sodium chloride flush 0.9 % injection 5-40 mL, PRN  0.9 % sodium chloride infusion, PRN  [Held by provider] insulin glargine (LANTUS) injection vial 24 Units, Nightly  glucose chewable tablet 16 g, PRN  dextrose bolus 10% 125 mL, PRN   Or  dextrose bolus 10% 250 mL, PRN  glucagon (rDNA) injection 1 mg, PRN  dextrose 10 % infusion, Continuous PRN        Data:     Code Status:  Full Code    Family History   Problem Relation Age of Onset    High Blood Pressure Mother     Diabetes Mother     Other Mother 27        Lung cancer    High Blood Pressure Father     Diabetes Father     Other Father 27        Prostate cancer       Social History     Socioeconomic History    Marital status: Single     Spouse name: Not on file    Number of children: Not on file    Years of education: Not on file    Highest education level: Not on file   Occupational History    Not on file   Tobacco Use    Smoking status: Never    Smokeless tobacco: Never   Vaping Use    Vaping Use: Never used   Substance and Sexual Activity    Alcohol use: No    Drug use: No    Sexual activity: Not on file   Other Topics Concern    Not on file   Social History Narrative    Not on file     Social Determinants of Health     Financial Resource Strain: Not on file   Food Insecurity: Not on file   Transportation Needs: Not on file   Physical Activity: Not on file Stress: Not on file   Social Connections: Not on file   Intimate Partner Violence: Not on file   Housing Stability: Not on file       Vitals:  BP 99/64   Pulse 84   Temp 98.6 °F (37 °C)   Resp 18   Ht 5' 4\" (1.626 m)   Wt 171 lb 15.3 oz (78 kg)   LMP  (LMP Unknown)   SpO2 100%   BMI 29.52 kg/m²   Temp (24hrs), Av.1 °F (37.3 °C), Min:98.6 °F (37 °C), Max:99.7 °F (37.6 °C)    Recent Labs     10/30/22  0638 10/30/22  1145 10/30/22  2040 10/31/22  0719   POCGLU 130* 145* 119* 116*       I/O (24Hr):     Intake/Output Summary (Last 24 hours) at 10/31/2022 1206  Last data filed at 10/31/2022 0715  Gross per 24 hour   Intake --   Output 175 ml   Net -175 ml       Labs:      CBC:   Lab Results   Component Value Date/Time    WBC 7.6 10/28/2022 08:30 AM    RBC 3.94 10/28/2022 08:30 AM    RBC 4.73 2019 11:18 AM    HGB 11.6 10/28/2022 08:30 AM    HCT 35.5 10/28/2022 08:30 AM    MCV 90.2 10/28/2022 08:30 AM    MCH 29.5 10/28/2022 08:30 AM    MCHC 32.7 10/28/2022 08:30 AM    RDW 16.3 10/28/2022 08:30 AM     10/28/2022 08:30 AM     2012 03:56 AM    MPV 6.9 10/28/2022 08:30 AM     CBC with Differential:    Lab Results   Component Value Date/Time    WBC 7.6 10/28/2022 08:30 AM    RBC 3.94 10/28/2022 08:30 AM    RBC 4.73 2019 11:18 AM    HGB 11.6 10/28/2022 08:30 AM    HCT 35.5 10/28/2022 08:30 AM     10/28/2022 08:30 AM     2012 03:56 AM    MCV 90.2 10/28/2022 08:30 AM    MCH 29.5 10/28/2022 08:30 AM    MCHC 32.7 10/28/2022 08:30 AM    RDW 16.3 10/28/2022 08:30 AM    NRBC 0 2019 11:18 AM    LYMPHOPCT 33 10/26/2022 08:14 AM    MONOPCT 12 10/26/2022 08:14 AM    BASOPCT 0 10/26/2022 08:14 AM    MONOSABS 0.80 10/26/2022 08:14 AM    LYMPHSABS 2.30 10/26/2022 08:14 AM    EOSABS 0.50 10/26/2022 08:14 AM    BASOSABS 0.00 10/26/2022 08:14 AM    DIFFTYPE NOT REPORTED 2017 10:17 PM     Hemoglobin/Hematocrit:    Lab Results   Component Value Date/Time    HGB 11.6 10/28/2022 08:30 AM    HCT 35.5 10/28/2022 08:30 AM     CMP:    Lab Results   Component Value Date/Time     10/28/2022 08:30 AM    K 4.2 10/28/2022 08:30 AM    CL 99 10/28/2022 08:30 AM    CO2 27 10/28/2022 08:30 AM    BUN 29 10/28/2022 08:30 AM    CREATININE 5.76 10/28/2022 08:30 AM    GFRAA >60 07/06/2017 10:17 PM    LABGLOM 9 10/28/2022 08:30 AM    GLUCOSE 94 10/28/2022 08:30 AM    GLUCOSE 492 05/07/2019 11:18 AM    PROT 7.1 10/30/2022 06:44 AM    PROT 7.0 02/20/2019 10:46 AM    LABALBU 4.0 10/30/2022 06:44 AM    LABALBU 3.6 02/20/2019 10:46 AM    CALCIUM 9.8 10/28/2022 08:30 AM    BILITOT 0.2 10/30/2022 06:44 AM    ALKPHOS 58 10/30/2022 06:44 AM    AST 25 10/30/2022 06:44 AM    ALT 21 10/30/2022 06:44 AM     BMP:    Lab Results   Component Value Date/Time     10/28/2022 08:30 AM    K 4.2 10/28/2022 08:30 AM    CL 99 10/28/2022 08:30 AM    CO2 27 10/28/2022 08:30 AM    BUN 29 10/28/2022 08:30 AM    LABALBU 4.0 10/30/2022 06:44 AM    LABALBU 3.6 02/20/2019 10:46 AM    CREATININE 5.76 10/28/2022 08:30 AM    CALCIUM 9.8 10/28/2022 08:30 AM    GFRAA >60 07/06/2017 10:17 PM    LABGLOM 9 10/28/2022 08:30 AM    GLUCOSE 94 10/28/2022 08:30 AM    GLUCOSE 492 05/07/2019 11:18 AM     PT/INR:    Lab Results   Component Value Date/Time    PROTIME 23.3 10/10/2022 07:37 PM    PROTIME 13.5 05/07/2019 11:18 AM    INR 2.1 10/10/2022 07:37 PM     PTT:    Lab Results   Component Value Date/Time    APTT 40.8 10/10/2022 07:37 PM   [APTT}    Physical Examination:        General appearance: alert, cooperative and no distress  Mental Status: oriented to person, place and time and normal affect  Abdomen: soft, nontender, nondistended, bowel sounds present   Extremities: no edema, redness or tenderness in the calves  Skin: no gross lesions, rashes, or induration    Assessment:        Primary Problem  Pancreatitis, unspecified pancreatitis type     Active Hospital Problems    Diagnosis Date Noted    Constipation [K59.00] 10/27/2022     Priority: Medium    Severe malnutrition ([facility] [address] [E43] 10/27/2022     Priority: Medium    Depression [F32.A] 10/25/2022     Priority: Medium    Acute urinary retention [R33.8] 10/20/2022     Priority: Medium    Gastroenteritis [K52.9] 10/20/2022     Priority: Medium    Elevated lipase [R74.8] 10/20/2022     Priority: Medium    Generalized abdominal pain [R10.84] 10/20/2022     Priority: Medium    Pancreatitis, unspecified pancreatitis type [K85.90] 10/19/2022     Priority: Medium    Lethargy [R53.83] 03/07/2016     Past Medical History:   Diagnosis Date    Asthma     Atherosclerosis of native arteries of extremities with rest pain, unspecified extremity ([facility] [address]     Depression 10/25/2022    Diabetes mellitus ([facility] [address]     since 14 y/o, unsure if type 1 or 2    Headache, migraine     for 2 years    Hypertension     Lichen simplex chronicus     Noninflammatory disorder of vagina, unspecified     Patient's noncompliance with other medical treatment and regimen for other reason     Type 2 diabetes mellitus without complication ([facility] [address] 73/98/4948        Plan:        Nausea, vomiting status post EGD revealing possible Candida esophagitis, gastritis. Continue Diflucan x 14 days  Antiemetics as needed  PPI  Diet as tolerated  Abdominal pain, elevated lipase  Repeat CT no acute findings  Ultrasound liver negative  Lipase continues to be mildly elevated, normal LFTs  Discussed with admitting  May d/c per GI    Time spent reviewing the chart, seeing the patient, and discussing with the attending MD around 30 minutes. Explained to the patient and d/W Nursing Staff  Will F/U with you  Please call or Page for any issues or change in status  Thanks    Electronically signed by CUAUHTEMOC Crawford NP on 10/31/2022 at 12:06 PM       GI attending physician note. Patient seen with CUAUHTEMOC     I independently performed an evaluation on [facility].   I have reviewed the above documentation completed by the Nurse Practitioner and agree with the history, examination, and management plan. Recommendations discussed.      Also discussed with Dr. Brodie Pierce, and from GI point of view may be discharged and followed as an outpatient

## 2022-10-31 NOTE — PROGRESS NOTES
Department of Internal Medicine  Nephrology [facility], MD Progress Note    Reason for consultation: Management of hemodialysis dependent end-stage renal disease. Consulting physician: Mitesh Bell MD.    Interval history:   Patient seen and examined during dialysis   Status post EGD showing Candida esophagitis. She does not have shortness of breath or chest pain. History of present: This is a 28 y.o. 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 Martin Garcia at of nephrology as consultants of [facility], Presented to the emergency department yesterday with complaints of abdominal pain that started after hemodialysis. This was associated with 1 episode of vomiting at dialysis. She has a recent history of GI bleed requiring transfusion and was admitted from October 11 to October 14, 2022. Flexible sigmoidoscopy performed on that admission showed multiple rectal ulcers which were cauterized and patient was taken off Xarelto.     Scheduled Meds:   metoclopramide  10 mg Oral TID AC    fluconazole  400 mg Oral Once    Followed by    fluconazole  200 mg Oral Daily    polyethylene glycol  17 g Oral Daily    pantoprazole (PROTONIX) 40 mg injection  40 mg IntraVENous Daily    mirtazapine  15 mg Oral Nightly    lidocaine  1 patch TransDERmal Daily    epoetin juventino-epbx  2,000 Units SubCUTAneous Once per day on Mon Wed Fri    aspirin  81 mg Oral Daily    atorvastatin  10 mg Oral Nightly    calcium acetate  3 capsule Oral TID     carvedilol  25 mg Oral BID    clopidogrel  75 mg Oral Daily    hydrALAZINE  100 mg Oral TID    NIFEdipine  90 mg Oral Daily    vitamin D3  5,000 Units Oral Daily    [Held by provider] insulin glargine  24 Units SubCUTAneous Nightly     Continuous Infusions:   sodium chloride      sodium chloride      dextrose Stopped (10/23/22 2044)     PRN Meds:.sodium chloride flush, sodium chloride, fentanNYL, calcium carbonate, ondansetron **OR** ondansetron, sodium chloride flush, acetaminophen, metoprolol, melatonin, midodrine, albuterol sulfate HFA, sodium chloride flush, sodium chloride, glucose, dextrose bolus **OR** dextrose bolus, glucagon (rDNA), dextrose    Physical Exam:    VITALS:  BP 91/63   Pulse 88   Temp 98.6 °F (37 °C)   Resp 18   Ht 5' 4\" (1.626 m)   Wt 171 lb 15.3 oz (78 kg)   LMP  (LMP Unknown)   SpO2 100%   BMI 29.52 kg/m²   TEMPERATURE:  Current - Temp: 98.6 °F (37 °C); Max - Temp  Av.1 °F (37.3 °C)  Min: 98.6 °F (37 °C)  Max: 99.7 °F (37.6 °C)  RESPIRATIONS RANGE: Resp  Av  Min: 18  Max: 18  PULSE RANGE: Pulse  Av.8  Min: 64  Max: 95  BLOOD PRESSURE RANGE:  Systolic (70NZF), TMH:491 , Min:91 , MGR:597 ; Diastolic (41YQK), XGC:84, Min:63, Max:84  PULSE OXIMETRY RANGE: SpO2  Av %  Min: 100 %  Max: 100 %  24HR INTAKE/OUTPUT:    Intake/Output Summary (Last 24 hours) at 10/31/2022 1236  Last data filed at 10/31/2022 0715  Gross per 24 hour   Intake --   Output 175 ml   Net -175 ml     Constitutional: alert, appears stated age, and cooperative    Skin: Skin color, texture, turgor normal. No rashes or lesions    Head: Normocephalic, without obvious abnormality, atraumatic     Cardiovascular/Edema: regular rate and rhythm, S1, S2 normal, no murmur, click, rub or gallop    Respiratory: Lungs: clear to auscultation bilaterally    Abdomen:  Full with periumbilical tenderness; normal bowel sounds. Back: symmetric, no curvature. ROM normal. No CVA tenderness. Extremities: Trace bilateral pitting pedal edema with hyperpigmented dermatosis. Neuro:  Grossly normal    CBC:   No results for input(s): WBC, HGB, PLT in the last 72 hours.   BMP:    No results for input(s): NA, K, CL, CO2, BUN, CREATININE, GLUCOSE in the last 72 hours. Lab Results   Component Value Date/Time    NITRU NEGATIVE 10/27/2022 05:55 PM    COLORU Dark Yellow 10/27/2022 05:55 PM    PHUR 5.0 10/27/2022 05:55 PM    WBCUA 0 TO 2 10/27/2022 05:55 PM    RBCUA 0 TO 2 10/27/2022 05:55 PM    MUCUS NOT REPORTED 12/29/2017 11:33 PM    TRICHOMONAS MODERATE 12/29/2017 11:33 PM    YEAST NOT REPORTED 12/29/2017 11:33 PM    BACTERIA MANY 10/27/2022 05:55 PM    SPECGRAV 1.031 10/27/2022 05:55 PM    LEUKOCYTESUR SMALL 10/27/2022 05:55 PM    UROBILINOGEN Normal 10/27/2022 05:55 PM    BILIRUBINUR NEGATIVE  Verified by ictotest. 10/27/2022 05:55 PM    BILIRUBINUR NEGATIVE 01/07/2012 03:53 AM    GLUCOSEU NEGATIVE 10/27/2022 05:55 PM    GLUCOSEU 3+ 01/07/2012 03:53 AM    KETUA TRACE 10/27/2022 05:55 PM    AMORPHOUS NOT REPORTED 12/29/2017 11:33 PM     Urine Creatinine:     Lab Results   Component Value Date/Time    LABCREA 254.2 12/06/2015 02:31 PM     IMPRESSION/RECOMMENDATIONS:    1.  End-stage renal disease - We will maintain Monday, Wednesday and Friday hemodialysis schedule. We will keep target weight at 80 kg. AV fistula functioning well. Renal diet,i.e 2-gram sodium,2-gram potassium,1500 ml fluid restriction,1-gram phosphorus, 1800 KCal and 1.2 gram/kg protein per day. 2.  Systemic hypertension - Blood pressure control is acceptable at this time. We will continue as needed midodrine for intradialytic episodes of hypotension. 3.  Mineral bone disease profile - serum phosphorus level 4.5 mg/dL is within target range. 4.  Anemia of chronic kidney disease - Hemoglobin is within target range. We will continue Retacrit 2000 units subcutaneously 3 times a week. 5.  Nausea and vomiting - likely secondary to Candida esophagitis started on fluconazole. As needed Phenergan    Prognosis is guarded.     Kennedi Fatima MD   Attending Nephrologist  10/31/2022 12:36 PM

## 2022-10-31 NOTE — PROGRESS NOTES
Physical Therapy  Facility/Department: [facility] PROGRESSIVE CARE  Daily Treatment Note  NAME: Renita Gonsalez  : 1990  MRN: [id number]    Date of Service: 10/31/2022    Discharge Recommendations:  Patient would benefit from continued therapy after discharge, Therapy recommended at discharge   PT Equipment Recommendations  Equipment Needed:  (TBD)    Patient Diagnosis(es): The primary encounter diagnosis was Acute urinary retention. Diagnoses of Lower abdominal pain, Diffuse abdominal pain, Elevated lipase, Nausea and vomiting, unspecified vomiting type, and Abdominal pain, unspecified abdominal location were also pertinent to this visit. Assessment   Activity Tolerance: Patient limited by fatigue;Patient limited by endurance; Patient limited by pain  Equipment Needed:  (TBD)     Plan    Physcial Therapy Plan  General Plan: 5-7 times per week  Current Treatment Recommendations: Strengthening;ROM; Equipment evaluation, education, & procurement;Balance training;Functional mobility training;Home exercise program;Transfer training; Safety education & training;Patient/Caregiver education & training     Restrictions  Restrictions/Precautions  Restrictions/Precautions: Fall Risk, General Precautions (AV fisbibi LUBEVERLY)  Required Braces or Orthoses?: No  Position Activity Restriction  Other position/activity restrictions: NO BP, IV sticks or venipunctures left UE, up w/ assist     Subjective    Subjective  Subjective: \"it hurts\" ppt referring to LLE pain with movement  Pain: 5/10 LLE  Orientation  Overall Orientation Status: Within Functional Limits  Orientation Level: Oriented to place;Oriented to person;Oriented to time;Oriented to situation;Oriented X4  Cognition  Overall Cognitive Status: Exceptions  Memory: Decreased recall of recent events  Cognition Comment: Pt unable to recall how long she had been at SNF or whether or whether or not she had been getting dressed.   Pt also did not recall whether or not she had gotten any therapy in the hospital after her procedure, but did report only 2 sessions at SNF. Objective   Vitals  O2 Device: None (Room air)  Bed Mobility Training  Bed Mobility Training: Yes  Interventions: Demonstration; Safety awareness training; Tactile cues; Verbal cues; Visual cues; Weight shifting training/pressure relief  Rolling: Maximum assistance;Assist X1  Supine to Sit: Maximum assistance;Assist X1  Sit to Supine: Maximum assistance;Assist X2  Balance  Sitting: Impaired  Sitting - Static: Supported sitting; Fair (occasional)  Sitting - Dynamic: Poor (constant support) (assist varied from min to max x 1- fatigued rapidly)  Transfer Training  Transfer Training: No  Gait Training  Gait Training: No     PT Exercises  A/AROM Exercises: completed supine bilateral LE exercises in bed x 10 reps  Static Sitting Balance Exercises: completed seated balance activities for weight shifting  (laterally and anterior/posterioly), trunk elongation/ shortening  Dynamic Sitting Balance Exercises: completed seated balance activities for weight shifting  Postural Correction Exercises: therapist provided assistance for balance at the EOB while working w/ [employer] while washing and drying her face and using sock aid to pull on left slipper sock, 15 minutes     Safety Devices  Type of Devices: Patient at risk for falls; Left in bed;Call light within reach; Bed alarm in place       Goals  Short Term Goals  Time Frame for Short Term Goals: 5-7 treatments/ week  Short Term Goal 1: pt to tolerate 1/2 hour of therapuetic exercise and activity  Short Term Goal 2: pt to demonstrate good technique for LE strengthening and ROM exercises  Short Term Goal 3: pt to demonstrate increased LE  strength by 1/2 MMG to assist movement in bed and transfers  Short Term Goal 4: pt to demonstrate rolling in bed and supine > sit w/ min x 1 for repositioning and sit > supine w/ min x 2  Short Term Goal 5: pt to demonstrate fair sitting balance at the EOB x 20-30 minutes w/ min x 1  Short Term Goal 6: pt to advance to and  demonstrate sit <> stand transfers using [employer] w/ max x 2  Short Term Goal 7: pt to advance to and  demonstrate standing tolerance to 3 minutes on the [employer] w/ max x 2  Patient Goals   Patient Goals : feel better    Education  Patient Education  Education Given To: Patient  Education Provided: Role of Therapy;Plan of Care  Education Method: Demonstration;Verbal  Education Outcome: Continued education needed    Therapy Time   Individual Concurrent Group Co-treatment   Time In 0945         Time Out 1029         Minutes 44         Timed Code Treatment Minutes: [facility]   How much difficulty turning over in bed?: A Lot  How much difficulty sitting down on / standing up from a chair with arms?: Unable  How much difficulty moving from lying on back to sitting on side of bed?: A Lot  How much help from another person moving to and from a bed to a chair?: Total  How much help from another person needed to walk in hospital room?: Total  How much help from another person for climbing 3-5 steps with a railing?: Total  AM-PAC Inpatient Mobility Raw Score : 8  AM-PAC Inpatient T-Scale Score : 28.52  Mobility Inpatient CMS 0-100% Score: 86.62  Mobility Inpatient CMS G-Code Modifier : KATIE Hickey, PT

## 2022-10-31 NOTE — DISCHARGE SUMMARY
[place] [address] Internal Medicine    Discharge Summary     Patient ID: Eduardo Shearer  :  1990   MRN: [id number]     ACCOUNT:  [de-identified]   Patient's PCP: No primary care provider on file. Admit Date: 10/19/2022   Discharge Date: 10/31/2022    Length of Stay: 12  Code Status:  Full Code  Admitting Physician: Cris Hatchet, MD  Discharge Physician: Sara Langley MD     Active Discharge Diagnoses:     Primary Problem  Pancreatitis, unspecified pancreatitis type      [place] Problems    Diagnosis Date Noted    Constipation [K59.00] 10/27/2022     Priority: Medium    Severe malnutrition ([address] [E43] 10/27/2022     Priority: Medium    Depression [F32.A] 10/25/2022     Priority: Medium    Acute urinary retention [R33.8] 10/20/2022     Priority: Medium    Gastroenteritis [K52.9] 10/20/2022     Priority: Medium    Elevated lipase [R74.8] 10/20/2022     Priority: Medium    Generalized abdominal pain [R10.84] 10/20/2022     Priority: Medium    Pancreatitis, unspecified pancreatitis type [K85.90] 10/19/2022     Priority: Medium    Lethargy [R53.83] 2016       Admission Condition: poor     Discharged Condition: fair    Hospital Stay:     Hospital Course:  Eduardo Shearer is a 28 y.o. female who was admitted for the management of Pancreatitis, unspecified pancreatitis type , presented to ER with Abdominal Pain (Abdominal pain, emesis post 2-4 hours dialysis treatment. Abdomen is softly distended. )  Kennedy Swanson is a 28 y.o. Non- / non  female who presents with Abdominal Pain (Abdominal pain, emesis post 2-4 hours dialysis treatment. Abdomen is softly distended.) and is admitted to the hospital for the management of acute Pancreatitis. Medical history includes ESRD on hemodialysis, DM type I, HTN and CAD.    Patient, had very poor oral intake, had reading of low blood sugar, Lantus discontinued  Patient underwent EGD suggestive of fungal esophagitis  Ultrasound liver was negative  Patient was seen by GI  Getting discharged to nursing home  Patient was started on Remeron, was worked with psychiatrist        Significant therapeutic interventions:     Significant Diagnostic Studies:   Labs / Micro:        ,     Radiology:    CT ABDOMEN PELVIS WO CONTRAST Additional Contrast? Radiologist Recommendation    Result Date: 10/27/2022  EXAMINATION: CT OF THE ABDOMEN AND PELVIS WITHOUT CONTRAST 10/27/2022 7:24 pm TECHNIQUE: CT of the abdomen and pelvis was performed without the administration of intravenous contrast. Multiplanar reformatted images are provided for review. Automated exposure control, iterative reconstruction, and/or weight based adjustment of the mA/kV was utilized to reduce the radiation dose to as low as reasonably achievable. COMPARISON: October 19, 2022 HISTORY: ORDERING SYSTEM PROVIDED HISTORY: Abd pain nausea vomiting TECHNOLOGIST PROVIDED HISTORY: Abd pain nausea vomiting Is the patient pregnant?->No Reason for Exam: Abd pain nausea vomiting FINDINGS: Lower Chest: Cardiomegaly. Organs: The abdominal wall appears normal. The liver, spleen, pancreas, and adrenals appear normal.  Gallbladder is not identified. Kidneys appear normal. Intravenous contrast and Mcnair catheter present in the bladder. Uterus normal. GI/Bowel: The stomach,small bowel, and colon appear normal. Oral contrast is noted in the colon. Appendix normal.  Stomach and small bowel normal. Pelvis: Induration and left inguinal lymphadenopathy. Peritoneum/Retroperitoneum: The abdominal aorta and iliac arteries are normal in caliber. There is no pathologic adenopathy. Bones/Soft Tissues: Normal     Left inguinal induration and reactive lymphadenopathy.   Otherwise unremarkable exam.     CT ABDOMEN PELVIS WO CONTRAST Additional Contrast? None    Result Date: 10/21/2022  EXAMINATION: CT OF THE ABDOMEN AND PELVIS WITHOUT CONTRAST 10/19/2022 6:19 pm TECHNIQUE: CT of the abdomen and pelvis was performed without the administration of intravenous contrast. Multiplanar reformatted images are provided for review. Automated exposure control, iterative reconstruction, and/or weight based adjustment of the mA/kV was utilized to reduce the radiation dose to as low as reasonably achievable. COMPARISON: CTA dated 10/10/2022. HISTORY: ORDERING SYSTEM PROVIDED HISTORY: abdominal pain, recent bleed TECHNOLOGIST PROVIDED HISTORY: abdominal pain, recent bleed Decision Support Exception - unselect if not a suspected or confirmed emergency medical condition->Emergency Medical Condition (MA) Is the patient pregnant?->No Reason for Exam: abdominal pain, recent bleed FINDINGS: Evaluation is partially limited due to patient motion artifact. Lower Chest: Mild scarring/atelectatic changes are seen at the left lung base. Organs: Evaluation of visceral organs is limited without the benefit of intravenous contrast.  The attenuation of the liver appears within normal limits. No contour deforming hepatic mass is seen. The patient is again noted be status post cholecystectomy. The spleen is not enlarged. The adrenal glands appear within normal limits. There is mild right and minimal left hydroureteronephrosis. GI/Bowel: There is contrast material seen in the rectum and the colon. There is suggestion of colonic wall thickening of the mid transverse and descending colonic loop. The patient appears to be status post appendectomy. No evidence of bowel obstruction seen. Pelvis: The urinary bladder is distended containing contrast.  The uterus appears anteverted. Peritoneum/Retroperitoneum: No evidence of intra-abdominal free air is seen. No evidence of ascites is seen. The abdominal aorta is normal in caliber. Bones/Soft Tissues: Vascular calcification changes are seen which are more advanced than expected findings of the patient's age.   There is 2.7 x 1.5 cm ill-defined density seen within the left groin region beneath the skin staples. This can conceivably represent liquefying hematoma. There are adjacent borderline prominent left inguinal lymph nodes, which may be reactive in origin. Limited noncontrast examination. Mild right and minimal left hydronephrosis, which may be due to distended urinary bladder containing contrast material.  Consider Mcnair catheter placement as clinically warranted. Suggestion of colonic wall thickening of the mid transverse and descending colonic loops, which may have been exaggerated by underdistention but may reflect underlying infectious/inflammatory colitis. 2.7 cm ill-defined density seen within the left groin region beneath the skin staples, which may reflect liquefying hematoma. Correlate clinically with signs of infection as there are adjacent inflammatory changes and prominent left inguinal lymph nodes. XR ABDOMEN (KUB) (SINGLE AP VIEW)    Result Date: 10/26/2022  EXAMINATION: ONE SUPINE XRAY VIEW(S) OF THE ABDOMEN 10/26/2022 6:47 pm COMPARISON: None. HISTORY: ORDERING SYSTEM PROVIDED HISTORY: abdominal Pain TECHNOLOGIST PROVIDED HISTORY: abdominal Pain Reason for Exam: Abdominal pain with emesis X 1 day FINDINGS: Increase stool and residual contrast throughout the colon. Residual contrast in the bladder. Osseous structures normal.     Increased stool and retained contrast in the colon     CT HEAD WO CONTRAST    Result Date: 10/20/2022  EXAMINATION: CT OF THE HEAD WITHOUT CONTRAST  10/19/2022 6:19 pm TECHNIQUE: CT of the head was performed without the administration of intravenous contrast. Automated exposure control, iterative reconstruction, and/or weight based adjustment of the mA/kV was utilized to reduce the radiation dose to as low as reasonably achievable.  COMPARISON: 10/10/2022 HISTORY: ORDERING SYSTEM PROVIDED HISTORY: unwitnessed fall on Saturday, on plavix TECHNOLOGIST PROVIDED HISTORY: unwitnessed fall on saturday, on plavix Decision Support Exception - unselect if not a suspected or confirmed emergency medical condition->Emergency Medical Condition ([place]) Is the patient pregnant?->No Reason for Exam: AMS, unwittnessed fall last Saturday FINDINGS: BRAIN/VENTRICLES: No convincing evidence of intracranial hemorrhage, mass effect or midline shift seen. No evidence of acute territorial infarct is seen. ORBITS: The visualized portion of the orbits demonstrate no acute abnormality. SINUSES: The visualized paranasal sinuses and mastoid air cells demonstrate no acute abnormality. SOFT TISSUES/SKULL:  No acute abnormality of the visualized skull or soft tissues. No convincing CT evidence of intracranial hemorrhage, mass effect or acute territorial infarct seen. RECOMMENDATIONS: If there is persistent clinical concern, consider short-term follow-up examination. CT HEAD WO CONTRAST    Result Date: 10/10/2022  EXAMINATION: CT OF THE HEAD WITHOUT CONTRAST  10/10/2022 7:37 pm TECHNIQUE: CT of the head was performed without the administration of intravenous contrast. Automated exposure control, iterative reconstruction, and/or weight based adjustment of the mA/kV was utilized to reduce the radiation dose to as low as reasonably achievable. COMPARISON: None. HISTORY: ORDERING SYSTEM PROVIDED HISTORY: AMS TECHNOLOGIST PROVIDED HISTORY: [facility] Decision Support Exception - unselect if not a suspected or confirmed emergency medical condition->Emergency Medical Condition ([place]) Is the patient pregnant?->No Reason for Exam: Altered Mental Status Additional signs and symptoms: dialysis patient FINDINGS: BRAIN/VENTRICLES: The gyri and sulci have a normal appearance. Ventricles and extra-axial spaces appear normal. The gray-white matter differentiation is preserved throughout. There is no acute intracranial hemorrhage. No intra-axial or extra-axial mass or findings of mass effect. No shift of midline structures.  No abnormal extra-axial fluid collections. No acute territorial infarct. ORBITS: The visualized portion of the orbits demonstrate no acute abnormality. SINUSES: The mastoid air cells are normally aerated. The visualized paranasal sinuses are grossly clear. SOFT TISSUES/SKULL: No significant abnormality of the visualized skull or soft tissues. No acute fracture. No scalp hematoma. No evidence of acute intracranial process. US LIVER    Result Date: 10/31/2022  EXAMINATION: RIGHT UPPER QUADRANT ULTRASOUND 10/31/2022 9:38 am COMPARISON: None. HISTORY: ORDERING SYSTEM PROVIDED HISTORY: elevated lipase, elevated lfts TECHNOLOGIST PROVIDED HISTORY: elevated lipase, elevated lfts FINDINGS: LIVER:  The liver demonstrates normal echogenicity without evidence of intrahepatic biliary ductal dilatation. Hepatopetal flow portal vein. Liver 15.7 cm in length. BILIARY SYSTEM:  Prior cholecystectomy Common bile duct is within normal limits measuring 2.5 mm. RIGHT KIDNEY: The right kidney is grossly unremarkable without evidence of hydronephrosis. PANCREAS:  Visualized portions of the pancreas are unremarkable. OTHER: No evidence of right upper quadrant ascites. Prior cholecystectomy Exam otherwise unremarkable     CT CHEST PULMONARY EMBOLISM W CONTRAST    Result Date: 10/23/2022  EXAMINATION: CTA OF THE CHEST 10/23/2022 1:22 pm TECHNIQUE: CTA of the chest was performed after the administration of intravenous contrast.  Multiplanar reformatted images are provided for review. MIP images are provided for review. Automated exposure control, iterative reconstruction, and/or weight based adjustment of the mA/kV was utilized to reduce the radiation dose to as low as reasonably achievable. COMPARISON: None. HISTORY: ORDERING SYSTEM PROVIDED HISTORY: r/o PE TECHNOLOGIST PROVIDED HISTORY: r/o PE Reason for Exam: r/o PE History of asthma, diabetes, and pancreatitis.  FINDINGS: Pulmonary Arteries: Pulmonary arteries are adequately opacified for evaluation. No evidence of intraluminal filling defect to suggest pulmonary embolism. Main pulmonary artery is normal in caliber. Mediastinum: No evidence of mediastinal lymphadenopathy. The heart and pericardium demonstrate no acute abnormality; LV size and wall thickness upper limits-borderline enlarged. No pericardial effusion. There is no acute abnormality of the thoracic aorta. Concentric esophageal wall thickening suspected mid-lower segments. Lungs/pleura: The lungs are without definite acute process. No focal consolidation or pulmonary edema. Several small foci subtle tree-in-bud or ground-glass identified, best seen RML, superior segment RLL, latter associated with minimal atelectasis and mosaic attenuation. Mild bronchial dilatation/wall thickening suspected. No evidence of pleural effusion or pneumothorax. Upper Abdomen: Limited images of the upper abdomen are unremarkable. Soft Tissues/Bones: No acute bone or soft tissue abnormality. No evidence of pulmonary embolism. Mild bronchitis and subtle findings RML/RLL suggesting mild patchy bronchiolitis. Correlate clinically. No consolidation, or large pleural effusion. Esophageal wall thickening; correlate for esophagitis. CTA ABDOMEN PELVIS W CONTRAST    Addendum Date: 10/10/2022    ADDENDUM: Findings were discussed with Dr. Rayray Hollis. Diffuse presacral edema is present and there is also a moderate stenosis of the right renal artery, findings which were not mentioned in the original report. Result Date: 10/10/2022  EXAMINATION: CTA OF THE ABDOMEN AND PELVIS WITH CONTRAST 10/10/2022 7:40 pm: TECHNIQUE: CTA of the abdomen and pelvis was performed with the administration of intravenous contrast. Multiplanar reformatted images are provided for review. MIP images are provided for review.  Automated exposure control, iterative reconstruction, and/or weight based adjustment of the mA/kV was utilized to reduce the radiation dose to as low as reasonably achievable. COMPARISON: None. HISTORY: ORDERING SYSTEM PROVIDED HISTORY: GI bleed TECHNOLOGIST PROVIDED HISTORY: GI bleed Decision Support Exception - unselect if not a suspected or confirmed emergency medical condition->Emergency Medical Condition (MA) Reason for Exam: GI bleed Additional signs and symptoms: dialysis patient. fyi - patient did not drink oral contrast Relevant Medical/Surgical History: ovary removal FINDINGS: Lower Chest: The heart size is normal.  Minimal atelectasis is present at the visualized lung bases. Vascular: The abdominal aorta and the bilateral common iliac, internal iliac, and external iliac arteries are patent and of normal caliber, without evidence of aneurysm, significant stenosis, or acute abnormality. Mild stenosis of the proximal right common iliac artery with less than 50% luminal narrowing. The celiac, superior mesenteric, inferior mesenteric, and renal arteries are all patent and of normal caliber, without significant stenosis or vessel irregularity. The visualized mesenteric artery branch vessels are patent and free of thrombus. Delayed phase imaging shows wide patency of the mesenteric, splenic, portal, and hepatic veins which are all normal in caliber and free of thrombus. A small focus of active bleeding is present within the rectal lumen distally on the right (series 4, image 177) which arises from the anterior or right lateral aspect. Organs: The liver, spleen, pancreas, kidneys, and adrenal glands appear normal. The gallbladder is surgically absent. GI/Bowel: Moderate colonic and rectal stool. The stomach and the small and large bowel loops are otherwise normal in caliber, contour, and morphology. No dilated loops or areas of bowel wall thickening. No appreciable colonic or rectal mass. Peritoneum/Retroperitoneum: There is no free fluid or extraluminal gas. No mesenteric, retroperitoneal, or body wall hematoma.  No enlarged or suspicious mesenteric or retroperitoneal lymphadenopathy. Pelvis: No pelvic free fluid or fluid collection. There are scattered small bilateral inguinal region lymph nodes which are nonspecific and not abnormally enlarged. No appreciable uterine or adnexal abnormality. The urinary bladder is fluid-filled and moderately distended. Bones/Soft Tissues: No significant osseous abnormality. There are post-surgical changes of the left inguinal region lead to superficial femoral artery bypass surgery. The metallic skin staples in place. No pseudoaneurysm or significant groin hematoma. Severe stenosis of the visualized right superficial femoral artery. Moderate amount of colonic and rectal stool. Diffuse rectal mucosal enhancement with a small focus of active bleeding within the distal rectum and pooling of blood in the dependent rectal lumen on delayed phase imaging. No significant mesenteric artery occlusive disease. Mild stenosis of the right common iliac artery. Consultations:    Consults:     Final Specialist Recommendations/Findings:   IP CONSULT TO INTERNAL MEDICINE  IP CONSULT TO GI  IP CONSULT TO NEPHROLOGY  IP CONSULT TO NEUROLOGY  IP CONSULT TO PSYCHIATRY  IP CONSULT TO GI      The patient was seen and examined on day of discharge and this discharge summary is in conjunction with any daily progress note from day of discharge.     Discharge plan:     Disposition: Home    Physician Follow Up:     KAT[address] [id number]  [phone]           Requiring Further Evaluation/Follow Up POST HOSPITALIZATION/Incidental Findings:    Diet: cardiac diet    Activity: As tolerated    Instructions to Patient:     Discharge Medications:      Medication List        START taking these medications      fluconazole 200 MG tablet  Commonly known as: DIFLUCAN  Take 1 tablet by mouth daily for 11 days     HYDROcodone-acetaminophen 5-325 MG per tablet  Commonly known as: Norco  Take 1 tablet by mouth every 4 hours as needed for Pain for up to 3 days. Intended supply: 3 days.  Take lowest dose possible to manage pain     mirtazapine 15 MG disintegrating tablet  Commonly known as: REMERON SOL-TAB  Take 1 tablet by mouth nightly     ondansetron 4 MG disintegrating tablet  Commonly known as: ZOFRAN-ODT  Take 1 tablet by mouth every 8 hours as needed for Nausea or Vomiting            CONTINUE taking these medications      acetaminophen 325 MG tablet  Commonly known as: TYLENOL     albuterol sulfate  (90 Base) MCG/ACT inhaler  Commonly known as: PROVENTIL;VENTOLIN;PROAIR     aspirin 81 MG EC tablet     atorvastatin 10 MG tablet  Commonly known as: LIPITOR     calcium acetate 667 MG Caps capsule  Commonly known as: PHOSLO     carvedilol 25 MG tablet  Commonly known as: COREG     clopidogrel 75 MG tablet  Commonly known as: PLAVIX     hydrALAZINE 100 MG tablet  Commonly known as: APRESOLINE     midodrine 10 MG tablet  Commonly known as: PROAMATINE     NIFEdipine 90 MG extended release tablet  Commonly known as: ADALAT CC     polyethylene glycol 17 GM/SCOOP powder  Commonly known as: GLYCOLAX     sennosides-docusate sodium 8.6-50 MG tablet  Commonly known as: SENOKOT-S     Triphrocaps 1 MG Caps     Veltassa 8.4 g Pack packet  Generic drug: patiromer sorbitex calcium     vitamin D 125 MCG (5000 UT) Caps capsule  Commonly known as: CHOLECALCIFEROL            STOP taking these medications      escitalopram 10 MG tablet  Commonly known as: LEXAPRO     Levemir FlexTouch 100 UNIT/ML injection pen  Generic drug: insulin detemir               Where to Get Your Medications        These medications were sent to [address] [place] [address], [address]      Phone: [phone]   fluconazole 200 MG tablet  mirtazapine 15 MG disintegrating tablet       You can get these medications from any pharmacy    Bring a paper prescription for each of these medications  ondansetron 4 MG disintegrating tablet       Information about where to get these medications is not yet available    Ask your nurse or doctor about these medications  HYDROcodone-acetaminophen 5-325 MG per tablet         Time Spent on discharge is  35 mins in patient examination, evaluation, counseling as well as medication reconciliation, prescriptions for required medications, discharge plan and follow up. Electronically signed by   Jamey Fothergill, MD  10/31/2022  11:25 AM      Thank you Dr. Cho primary care provider on file. for the opportunity to be involved in this patient's care.

## 2022-10-31 NOTE — PROGRESS NOTES
Patient was being d/c tonight to [facility] for rehab. Pt shared of her mom's death earlier this month and her inability to deal with the fact her mom is gone. Mom was huge support to pt and pt's 8 yr old son. Pt is also close with a brother who is moving out of state. Pt lives with her SO and her son. There are a number of family dynamics that pt is struggling with. She just wants to get back home after being in hospital for stroke and \"now this\" which pt didn't seem to understand her current medical condition. She said her stroke has left her confused with memory loss. Writer provided listening presence, emotional support, and prayer. 10/31/22 1916   Encounter Summary   Encounter Overview/Reason  Spiritual/Emotional Needs   Service Provided For: Patient   Referral/Consult From: Other    Support System Significant other;Family members; Children   Last Encounter  10/31/22   Complexity of Encounter High   Begin Time 1730   End Time  1800   Total Time Calculated 30 min   Spiritual/Emotional needs   Type Spiritual Support;Emotional Distress   Grief, Loss, and Adjustments   Type Grief and loss   Assessment/Intervention/Outcome   Assessment Anxious; Compromised coping;Sad; Impaired resilience   Intervention Active listening;Discussed illness injury and its impact; Discussed meaning/purpose;Explored/Affirmed feelings, thoughts, concerns;Explored Coping Skills/Resources;Prayer (assurance of)/[place];Sustaining Presence/Ministry of presence;Grief Care   Outcome Engaged in conversation;Expressed feelings, needs, and concerns;Expressed Gratitude;Receptive;Venting emotion;Grieving

## 2022-10-31 NOTE — CONSULTS
[facility]  Consult Note       NAME:  Gloris Dance  MEDICAL RECORD NUMBER:  [id number]  AGE: 28 y.o.    GENDER: female  : 1990  TODAY'S DATE:  10/31/2022    Subjective:      Gloris Dance is a 28 y.o. female with inpatient referral to [facility] for:  Great toe wounds      Wound Identification:  Wound Type: traumatic  Contributing Factors: diabetes, poor glucose control, and malnutrition    Wound History: pt sleeping through assessment, it appears that toenails were caught on something, dried blood underneath nail  Current Wound Care Treatment:  open to air    Patient Goal of Care:  [x] Wound Healing  [] Odor Control  [] Palliative Care  [] Pain Control   [] Other:         PAST MEDICAL HISTORY        Diagnosis Date    Asthma     Atherosclerosis of native arteries of extremities with rest pain, unspecified extremity ([facility]     Depression 10/25/2022    Diabetes mellitus ([facility]     since 12 y/o, unsure if type 1 or 2    Headache, migraine     for 2 years    Hypertension     Lichen simplex chronicus     Noninflammatory disorder of vagina, unspecified     Patient's noncompliance with other medical treatment and regimen for other reason     Type 2 diabetes mellitus without complication ([facility]        PAST SURGICAL HISTORY    Past Surgical History:   Procedure Laterality Date    OVARY REMOVAL      2010    OVARY REMOVAL      UPPER GASTROINTESTINAL ENDOSCOPY N/A 10/28/2022    EGD BIOPSY performed by Jacquelin Jimenez MD at [address] [place] [address]    Family History   Problem Relation Age of Onset    High Blood Pressure Mother     Diabetes Mother     Other Mother 27        Lung cancer    High Blood Pressure Father     Diabetes Father     Other Father 27        Prostate cancer       SOCIAL HISTORY    Social History     Tobacco Use    Smoking status: Never    Smokeless tobacco: Never   Vaping Use    Vaping Use: Never used   Substance Use Topics    Alcohol use: No    Drug use: No         ALLERGIES    Allergies   Allergen Reactions    Morphine Shortness Of Breath and Other (See Comments)     Chest pain    Januvia [Sitagliptin] Nausea And Vomiting       HOME MEDICATIONS  Prior to Admission medications    Medication Sig Start Date End Date Taking? Authorizing Provider   HYDROcodone-acetaminophen (NORCO) 5-325 MG per tablet Take 1 tablet by mouth every 4 hours as needed for Pain for up to 3 days. Intended supply: 3 days.  Take lowest dose possible to manage pain 10/31/22 11/3/22 Yes Jelani Cardenas MD   mirtazapine (REMERON SOL-TAB) 15 MG disintegrating tablet Take 1 tablet by mouth nightly 10/31/22  Yes Jelani Cardenas MD   fluconazole (DIFLUCAN) 200 MG tablet Take 1 tablet by mouth daily for 11 days 10/31/22 11/11/22 Yes Jelani Cardenas MD   ondansetron (ZOFRAN-ODT) 4 MG disintegrating tablet Take 1 tablet by mouth every 8 hours as needed for Nausea or Vomiting 10/21/22  Yes Irma Taylor MD   aspirin 81 MG EC tablet Take 81 mg by mouth daily   Yes Historical Provider, MD   atorvastatin (LIPITOR) 10 MG tablet Take 10 mg by mouth at bedtime   Yes Historical Provider, MD   calcium acetate (PHOSLO) 667 MG CAPS capsule Take 3 capsules by mouth 3 times daily (with meals)   Yes Historical Provider, MD   carvedilol (COREG) 25 MG tablet Take 25 mg by mouth 2 times daily   Yes Historical Provider, MD   vitamin D (CHOLECALCIFEROL) 125 MCG (5000 UT) CAPS capsule Take 5,000 Units by mouth daily   Yes Historical Provider, MD   clopidogrel (PLAVIX) 75 MG tablet Take 75 mg by mouth daily   Yes Historical Provider, MD   hydrALAZINE (APRESOLINE) 100 MG tablet Take 100 mg by mouth 3 times daily   Yes Historical Provider, MD   midodrine (PROAMATINE) 10 MG tablet Take 30 mg by mouth daily Indications: Mon, Wed, Fri at dialysis   Yes Historical Provider, MD   NIFEdipine (ADALAT CC) 90 MG extended release tablet Take 90 mg by mouth daily   Yes Historical Provider, MD ondansetron (ZOFRAN-ODT) disintegrating tablet 4 mg  4 mg Oral Q4H PRN Aleah Sol MD   4 mg at 10/30/22 1353    Or    ondansetron (ZOFRAN) injection 4 mg  4 mg IntraVENous Q6H PRN Aleah Sol MD   4 mg at 10/28/22 2159    mirtazapine (REMERON SOL-TAB) disintegrating tablet 15 mg  15 mg Oral Nightly Aleah Sol MD   15 mg at 10/29/22 2142    sodium chloride flush 0.9 % injection 10 mL  10 mL IntraVENous PRN Aleah Sol MD   10 mL at 10/23/22 1332    lidocaine 4 % external patch 1 patch  1 patch TransDERmal Daily Aleah Sol MD   1 patch at 10/30/22 1007    acetaminophen (TYLENOL) tablet 650 mg  650 mg Oral Q4H PRN Aleah Sol MD   650 mg at 10/29/22 2142    epoetin juventino-epbx (RETACRIT) injection 2,000 Units  2,000 Units SubCUTAneous Once per day on Mon Wed Fri Dana Andrew MD   2,000 Units at 10/28/22 2229    metoprolol (LOPRESSOR) injection 2.5 mg  2.5 mg IntraVENous Q6H PRN Aleah Sol MD   2.5 mg at 10/27/22 1834    melatonin tablet 3 mg  3 mg Oral Nightly PRN Aleah Sol MD   3 mg at 10/27/22 2348    midodrine (PROAMATINE) tablet 5 mg  5 mg Oral TID PRN Aleah Sol MD   5 mg at 10/28/22 1729    albuterol sulfate HFA (PROVENTIL;VENTOLIN;PROAIR) 108 (90 Base) MCG/ACT inhaler 2 puff  2 puff Inhalation Q6H PRN Aleah Sol MD        aspirin EC tablet 81 mg  81 mg Oral Daily CUAUHTEMOC Aleman NP   81 mg at 10/25/22 1039    atorvastatin (LIPITOR) tablet 10 mg  10 mg Oral Nightly Aleah Sol MD   10 mg at 10/29/22 2142    calcium acetate (PHOSLO) capsule 2,001 mg  3 capsule Oral TID WC Aleah Sol MD   2,001 mg at 10/26/22 1134    carvedilol (COREG) tablet 25 mg  25 mg Oral BID Isam Daboul, MD   25 mg at 10/29/22 2142    clopidogrel (PLAVIX) tablet 75 mg  75 mg Oral Daily CUAUHTEMOC Aleman NP   75 mg at 10/30/22 1008    hydrALAZINE (APRESOLINE) tablet 100 mg  100 mg Oral TID Aleah Sol MD   100 mg at 10/29/22 2142    NIFEdipine (ADALAT CC) extended release tablet 90 mg  90 mg Oral Daily Aleah MD Sweta   90 mg at 10/28/22 1116    vitamin D3 (CHOLECALCIFEROL) tablet 5,000 Units  5,000 Units Oral Daily Aleah Sol MD   5,000 Units at 10/28/22 1001    sodium chloride flush 0.9 % injection 5-40 mL  5-40 mL IntraVENous PRN Aleah Sol MD   10 mL at 10/22/22 1054    0.9 % sodium chloride infusion   IntraVENous PRN Pippa Mahoney MD        [Held by provider] insulin glargine (LANTUS) injection vial 24 Units  24 Units SubCUTAneous Nightly CUAUHTEMOC Marin NP   24 Units at 10/22/22 2348    glucose chewable tablet 16 g  4 tablet Oral PRN Aleah Sol MD        dextrose bolus 10% 125 mL  125 mL IntraVENous PRN Pippa Mahoney MD        Or    dextrose bolus 10% 250 mL  250 mL IntraVENous PRN Aleah Sol MD        glucagon (rDNA) injection 1 mg  1 mg SubCUTAneous PRN Aleah Sol MD        dextrose 10 % infusion   IntraVENous Continuous PRN Aleah Sol MD   Stopped at 10/23/22 2044         Objective:      /76   Pulse 89   Temp 98.6 °F (37 °C)   Resp 18   Ht 5' 4\" (1.626 m)   Wt 171 lb 15.3 oz (78 kg)   LMP  (LMP Unknown)   SpO2 100%   BMI 29.52 kg/m²       LABS    CBC:   Lab Results   Component Value Date/Time    WBC 7.6 10/28/2022 08:30 AM    RBC 3.94 10/28/2022 08:30 AM    RBC 4.73 2019 11:18 AM    HGB 11.6 10/28/2022 08:30 AM     SED RATE: No results found for: SEDRATE    CMP:  Albumin:    Lab Results   Component Value Date/Time    LABALBU 4.0 10/30/2022 06:44 AM    LABALBU 3.6 2019 10:46 AM     PT/INR:    Lab Results   Component Value Date/Time    PROTIME 23.3 10/10/2022 07:37 PM    PROTIME 13.5 2019 11:18 AM    INR 2.1 10/10/2022 07:37 PM     HgBA1c:    Lab Results   Component Value Date/Time    LABA1C 11.1 2018 04:55 PM     PTT: No components found for: LABPTT      Assessment:       Richard Risk Score: Richard Scale Score: 17    Patient Active Problem List   Diagnosis Code    Threatened  O20.0    Ovarian cyst N83.209    Hx of  section Z98.891    Asthma J45.909    Diabetes ([facility] E11.9    Uncontrolled diabetes mellitus [id number]    Vitamin D deficiency E55.9    Chronic fatigue R53.82    Medically noncompliant Z91.199    Type 2 diabetes mellitus without complication (HCC) D49.5    Dizziness R42    Type 1 diabetes mellitus without complication (HCC) U57.5    HTN, goal below 140/90 I10    Nausea and vomiting R11.2    Microalbuminuria R80.9    Pain of right lower extremity M79.604    Pain in both lower extremities M79.604, M79.605    Lethargy R53.83    Diabetic ketoacidosis associated with type 1 diabetes mellitus (HCC) E10.10    Chest pain R07.9    Diabetic ketoacidosis without coma associated with type 1 diabetes mellitus (HCC) E10.10    Pancreatitis, unspecified pancreatitis type K85.90    Acute urinary retention R33.8    Gastroenteritis K52.9    Elevated lipase R74.8    Generalized abdominal pain R10.84    Depression F32. A    Constipation K59.00    Severe malnutrition ([facility] E43         Measurements:  Wound 10/31/22 Right great toe (Active)   Wound Image   10/31/22 1231   Wound Etiology Traumatic 10/31/22 1231   Dressing Status Old drainage noted;New dressing applied 10/31/22 1231   Wound Cleansed Cleansed with saline 10/31/22 1231   Dressing/Treatment Betadine swabs/povidone iodine; Foam 10/31/22 1231   Wound Assessment Dry 10/31/22 1231   Drainage Amount Small 10/31/22 1231   Drainage Description Sanguinous 10/31/22 1231   Odor None 10/31/22 1231   Indy-wound Assessment Dry/flaky; Hyperkeratosis (callous) 10/31/22 1231   Margins Attached edges 10/31/22 1231   Number of days: 0       WOUND DESCRIPTION:   WOC nurse consult for great toe wounds. Pt seen in dialysis and slept through assessment. Toenails thick and long. It appears that pt may have caught them on something causing avulsion of the nail on both great toes, right worse than left. There is dried sanguinous drainage under both great toenails.  Recommend painting with betadine swab and covering with foam or bandaid. Pt will need to follow up with podiatry as outpatient for nail care, as right great toenail appears to only be attached at the base of the nail and left great toenail is loose as well. Response to treatment:  Well tolerated by patient. Plan:     Plan of Care:     Right and left great toenails: Keep dry, paint with betadine swab daily. Cover with foam dressing or bandaid to protect and keep nail from catching on clothing or linens. Podiatry follow up outpatient    -Turn every 2 hours    -Float heels off of bed with pillows under calves. If needed- use offloading boots.    -Sacral foam dressing to sacrococcygeal area. Apply skin barrier wipe to skin first to help adherence. Peel back dressing to inspect skin beneath qshift, re-secure. Change every 72 hours and prn wrinkles, soilage. Discontinue if repeatedly soiled by incontinence.     -Routine incontinence care with foaming cleanser and zinc oxide cream. Apply zinc oxide cream twice daily and prn incontinence. Use moisture wicking under pad (one layer only). -Waffle mattress overlay. Check inflation each shift by sliding hand under the air overlay. Feel for the patient's heaviest/ most dependant body part. Ideally 1/2 to 1\" of air will be between your hand and the patient's body. Add air prn. Specialty Bed Required : Yes   [] Low Air Loss   [x] Pressure Redistribution  [] Fluid Immersion  [] Bariatric  [] Total Pressure Relief  [] Other:     Current Diet: ADULT DIET; Regular; Low Sodium (2 gm); Low Potassium (Less than 3000 mg/day);  Low Phosphorus (Less than 1000 mg); 1500 ml  Dietician consult:  Yes    Discharge Plan:  Placement for patient upon discharge: skilled nursing   Patient appropriate for Outpatient [address] [facility] [address]: N/A    Patient/Caregiver Teaching:  Level of patientunderstanding able to:     [] Indicates understanding       [] Needs reinforcement  [] Unsuccessful      [] Verbal Understanding  [] Demonstrated understanding [x] No evidence of learning  [] Refused teaching         [] N/A       Electronically signed by Yulia Osborn RN on  10/31/2022 at 2:43 PM

## 2022-10-31 NOTE — PROGRESS NOTES
[address] NOTE     10/31/2022     Patient was seen and examined in person, Chart reviewed   Patient's case discussed with staff/team    Chief Complaint: Depression and lack of appetite    Interim History:     Patient was seen at bedside. Was comfortably sitting in bed. Stated she wants to be discharged. Was alert and oriented, denied any nausea. Stated she did not sleep well overnight, unable to identify why. States she has not been given any breakfast this morning and has not eaten anything but is feeling hungry. She denies any delusions, hallucinations, SI or HI. States her mood has been fine but does continue to have blunted affect. Patient has been discharged focused previously and remains discharge focused today. Plan for discharge to facility later today.     BP (!) 157/82   Pulse 92   Temp 99.7 °F (37.6 °C) (Axillary)   Resp 18   Ht 5' 4\" (1.626 m)   Wt 171 lb 15.3 oz (78 kg)   LMP  (LMP Unknown)   SpO2 100%   BMI 29.52 kg/m²   Appetite:  [] Normal/Unchanged  [] Increased  [x] Decreased      Sleep:       [] Normal/Unchanged  [] Fair       [x] Poor              Energy:    [] Normal/Unchanged  [] Increased  [x] Decreased        Aggression:  [] yes  [x] no    Patient is [] able  [] unable to CONTRACT FOR SAFETY ON THE UNIT    PAST MEDICAL/PSYCHIATRIC HISTORY:   Past Medical History:   Diagnosis Date    Asthma     Atherosclerosis of native arteries of extremities with rest pain, unspecified extremity ([address]     Depression 10/25/2022    Diabetes mellitus ([address]     since 14 y/o, unsure if type 1 or 2    Headache, migraine     for 2 years    Hypertension     Lichen simplex chronicus     Noninflammatory disorder of vagina, unspecified     Patient's noncompliance with other medical treatment and regimen for other reason     Type 2 diabetes mellitus without complication ([address] 35/10/0112       FAMILY/SOCIAL HISTORY:  Family History   Problem Relation Age of Onset    High Blood Pressure Mother     Diabetes Mother     Other Mother 27        Lung cancer    High Blood Pressure Father     Diabetes Father     Other Father 27        Prostate cancer     Social History     Socioeconomic History    Marital status: Single     Spouse name: Not on file    Number of children: Not on file    Years of education: Not on file    Highest education level: Not on file   Occupational History    Not on file   Tobacco Use    Smoking status: Never    Smokeless tobacco: Never   Vaping Use    Vaping Use: Never used   Substance and Sexual Activity    Alcohol use: No    Drug use: No    Sexual activity: Not on file   Other Topics Concern    Not on file   Social History Narrative    Not on file     Social Determinants of Health     Financial Resource Strain: Not on file   Food Insecurity: Not on file   Transportation Needs: Not on file   Physical Activity: Not on file   Stress: Not on file   Social Connections: Not on file   Intimate Partner Violence: Not on file   Housing Stability: Not on file           ROS:  [x] All negative/unchanged except if checked.  Explain positive(checked items) below:  [] Constitutional  [] Eyes  [] Ear/Nose/Mouth/Throat  [] Respiratory  [] CV  [] GI  []   [] Musculoskeletal  [] Skin/Breast  [] Neurological  [] Endocrine  [] Heme/Lymph  [] Allergic/Immunologic    Explanation:     MEDICATIONS:    Current Facility-Administered Medications:     sodium chloride flush 0.9 % injection 5-40 mL, 5-40 mL, IntraVENous, PRN, Kain Shepherd MD    0.9 % sodium chloride infusion, , IntraVENous, PRN, Jaida Clancy MD    fentaNYL (SUBLIMAZE) injection 25 mcg, 25 mcg, IntraVENous, Q5 Min PRN, Jaida Clancy MD    metoclopramide (REGLAN) tablet 10 mg, 10 mg, Oral, TID AC, Aleah Sol MD, 10 mg at 10/31/22 0611    fluconazole (DIFLUCAN) tablet 400 mg, 400 mg, Oral, Once **FOLLOWED BY** fluconazole (DIFLUCAN) tablet 200 mg, 200 mg, Oral, Daily, Aleah Sol MD, 200 mg at 10/30/22 1008    polyethylene glycol (GLYCOLAX) packet 17 g, 17 g, Oral, Daily, Aleah Sol MD, 17 g at 10/28/22 1001    pantoprazole (PROTONIX) 40 mg in sodium chloride (PF) 0.9 % 10 mL injection, 40 mg, IntraVENous, Daily, Aleah Sol MD, 40 mg at 10/29/22 1101    calcium carbonate (TUMS) chewable tablet 500 mg, 500 mg, Oral, TID PRN, Aleah Sol MD, 500 mg at 10/28/22 0011    ondansetron (ZOFRAN-ODT) disintegrating tablet 4 mg, 4 mg, Oral, Q4H PRN, 4 mg at 10/30/22 1353 **OR** ondansetron (ZOFRAN) injection 4 mg, 4 mg, IntraVENous, Q6H PRN, Aleah Sol MD, 4 mg at 10/28/22 2159    mirtazapine (REMERON SOL-TAB) disintegrating tablet 15 mg, 15 mg, Oral, Nightly, Aleah Sol MD, 15 mg at 10/29/22 2142    sodium chloride flush 0.9 % injection 10 mL, 10 mL, IntraVENous, PRN, Aleah Sol MD, 10 mL at 10/23/22 1332    lidocaine 4 % external patch 1 patch, 1 patch, TransDERmal, Daily, Aleah Sol MD, 1 patch at 10/30/22 1007    acetaminophen (TYLENOL) tablet 650 mg, 650 mg, Oral, Q4H PRN, Aleah Sol MD, 650 mg at 10/29/22 2142    epoetin juventino-epbx (RETACRIT) injection 2,000 Units, 2,000 Units, SubCUTAneous, Once per day on Mon Wed Fri, Pippa Mahoney MD, 2,000 Units at 10/28/22 2229    metoprolol (LOPRESSOR) injection 2.5 mg, 2.5 mg, IntraVENous, Q6H PRN, Aleah Sol MD, 2.5 mg at 10/27/22 1834    melatonin tablet 3 mg, 3 mg, Oral, Nightly PRN, Aleah Sol MD, 3 mg at 10/27/22 2348    midodrine (PROAMATINE) tablet 5 mg, 5 mg, Oral, TID PRN, Aleah Sol MD, 5 mg at 10/28/22 1729    albuterol sulfate HFA (PROVENTIL;VENTOLIN;PROAIR) 108 (90 Base) MCG/ACT inhaler 2 puff, 2 puff, Inhalation, Q6H PRN, Aleah Sol MD    aspirin EC tablet 81 mg, 81 mg, Oral, Daily, Shahla Carpenter, CUAUHTEMOC - NP, 81 mg at 10/25/22 1039    atorvastatin (LIPITOR) tablet 10 mg, 10 mg, Oral, Nightly, Aleah Sol MD, 10 mg at 10/29/22 2142    calcium acetate (PHOSLO) capsule 2,001 mg, 3 capsule, Oral, TID WC, Aleah Sol MD, 2,001 mg at 10/26/22 1134    carvedilol (COREG) tablet 25 mg, 25 mg, Oral, BID, Aleah Sol MD, 25 mg at 10/29/22 2142    clopidogrel (PLAVIX) tablet 75 mg, 75 mg, Oral, Daily, CUAUHTEMOC Candelaria NP, 75 mg at 10/30/22 1008    hydrALAZINE (APRESOLINE) tablet 100 mg, 100 mg, Oral, TID, Aleah Sol MD, 100 mg at 10/29/22 2142    NIFEdipine (ADALAT CC) extended release tablet 90 mg, 90 mg, Oral, Daily, Aleah Sol MD, 90 mg at 10/28/22 1116    vitamin D3 (CHOLECALCIFEROL) tablet 5,000 Units, 5,000 Units, Oral, Daily, Aleah Sol MD, 5,000 Units at 10/28/22 1001    sodium chloride flush 0.9 % injection 5-40 mL, 5-40 mL, IntraVENous, PRN, Aleah Sol MD, 10 mL at 10/22/22 1054    0.9 % sodium chloride infusion, , IntraVENous, PRNAleah MD    [Held by provider] insulin glargine (LANTUS) injection vial 24 Units, 24 Units, SubCUTAneous, Nightly, CUAUHTEMOC Aleman NP, 24 Units at 10/22/22 2348    glucose chewable tablet 16 g, 4 tablet, Oral, PRNAleah MD    dextrose bolus 10% 125 mL, 125 mL, IntraVENous, PRN **OR** dextrose bolus 10% 250 mL, 250 mL, IntraVENous, PRNAleah MD    glucagon (rDNA) injection 1 mg, 1 mg, SubCUTAneous, PRNAleah MD    dextrose 10 % infusion, , IntraVENous, Continuous PRNAleah MD, Stopped at 10/23/22 2044      Examination:  BP (!) 157/82   Pulse 92   Temp 99.7 °F (37.6 °C) (Axillary)   Resp 18   Ht 5' 4\" (1.626 m)   Wt 171 lb 15.3 oz (78 kg)   LMP  (LMP Unknown)   SpO2 100%   BMI 29.52 kg/m²   Gait -not tested  Medication side effects(SE): Denies    Mental Status Examination:    Level of consciousness:  within normal limits   Appearance: Fair grooming and fair hygiene  Behavior/Motor:  psychomotor retardation  Attitude toward examiner:  cooperative and attentive, discharge focused  Speech: Normal rate and normal volume  Mood: depressed  Affect:  blunted  Thought processes:  linear, goal directed, and coherent   Thought content:  Homicidal ideation - none  Suicidal Ideation:  denies suicidal ideation  Delusions:  no evidence of delusions  Perceptual Disturbance:  denies any perceptual disturbance  Cognition:  oriented to person, place, and time   Concentration intact  Insight fair   Judgement fair     ASSESSMENT:   Patient symptoms are:  [] Well controlled  [] Improving  [] Worsening  [x] No change      Diagnosis:   Principal Problem:    Pancreatitis, unspecified pancreatitis type  Active Problems:    Acute urinary retention    Gastroenteritis    Elevated lipase    Generalized abdominal pain    Depression    Constipation    Severe malnutrition (HCC)    Lethargy  Resolved Problems:    * No resolved hospital problems. *      LABS:    No results for input(s): WBC, HGB, PLT in the last 72 hours. No results for input(s): NA, K, CL, CO2, BUN, CREATININE, GLUCOSE in the last 72 hours. Recent Labs     10/30/22  0644   BILITOT 0.2*   ALKPHOS 58   AST 25   ALT 21     No results found for: Gloria Specking, LABBENZ, CANNAB, COCAINESCRN, LABMETH, OPIATESCREENURINE, PHENCYCLIDINESCREENURINE, PPXUR, ETOH  Lab Results   Component Value Date/Time    TSH 1.04 10/23/2022 12:06 PM     No results found for: LITHIUM  No results found for: VALPROATE, CBMZ    RISK ASSESSMENT: Low risk for suicide or self-harm    Treatment Plan:  Reviewed current Medications with the patient. Continue Remeron 15 mg nightly. Recommend outpatient follow-up. Does not require [facility] to [facility]. Risks, benefits, side effects, drug-to-drug interactions and alternatives to treatment were discussed. The patient consented to treatment. PSYCHOTHERAPY/COUNSELING:  [] Therapeutic interview  [x] Supportive  [] CBT  [] Ongoing  [] Other                                    Drew Brito is a 28 y.o. female being evaluated face to face. --Kristine Reed MD on 10/31/2022 at 10:09 AM    An electronic signature was used to authenticate this note. **This report has been created using voice recognition software.  It may contain minor errors which are inherent in voice recognition technology. **

## 2022-10-31 NOTE — PROGRESS NOTES
[address]    INPATIENT OCCUPATIONAL THERAPY  PROGRESS NOTE  Date: 10/31/2022  Patient Name: Renita Gonsalez       Room: 3-  MRN: [id number]    : 1990  (28 y.o.)  Gender: female   Referring Practitioner: Dr. Cassie Gaffney  Diagnosis: Pancreatitis    Restrictions/Precautions  Restrictions/Precautions  Restrictions/Precautions: Fall Risk;General Precautions (AV fisutla LUE)  Required Braces or Orthoses?: No  Position Activity Restriction  Other position/activity restrictions: NO BP, IV sticks or venipunctures left UE, up w/ assist         Subjective  Subjective  Subjective: \"it hurts\" ppt referring to LLE pain with movement  Pain: 5/10 LLE  Comments: pt alert and cooperative    Objective  Orientation  Overall Orientation Status: Within Functional Limits  Cognition  Overall Cognitive Status: Exceptions  Memory: Decreased recall of recent events  Cognition Comment: Pt unable to recall how long she had been at SNF or whether or whether or not she had been getting dressed. Pt also did not recall whether or not she had gotten any therapy in the hospital after her procedure, but did report only 2 sessions at SNF. ADL  Feeding: Setup  Grooming: Setup (pt washes face/neck this date)  UE Bathing: Maximum assistance  LE Bathing: Maximum assistance (pt only able to reach anterior thighs this date. pt dependent for all other pats 2* pain and low endurance)  UE Dressing: Maximum assistance  LE Dressing: Dependent/Total (pt educated on sock aid use to increase independence with LB dressing. pt attempted to domingo L sock with AE but is unable 2* LLE pain)  Toileting: Dependent/Total  Toileting Skilled Clinical Factors: barros cath, brief  Additional Comments: pt simulates UELB, chest/abdomen bathing sitting EOB. Above levels based on pt report, observation, and clinical reasoning. Pt physically capable of doing more, but limited by pain, low endurance, and poor effort.     IADL Comments: sleeps on the couch at home  Additional Comments: HD 3 X week (M, W and F); pt has been  staying at [address] for rehab since her hospitalization for left LE fem-pop surgery- states she has been transfering and standing w/ 2 assist, pt reports that she has been unable to walk due to pain in bilateral LEs and weakness. Balance  Balance  Sitting Balance: Minimal assistance (tolerates sitting EOB x 15 minutes for static sit and functional tasks with 1-2 UE support, fluctuates Min>max A, Pt reports of dizziness at EOB, occasional posterior LOB)  Standing Balance: Unable to assess(comment) (Unsafe to attempt standing 2* poor tolerance for sitting EOB wiht c/o dizziness)    Transfers/Mobility  Bed mobility  Rolling to Left: Dependent/Total (max A x 2, VCs to utilize rails, P return noted)  Rolling to Right: Dependent/Total (max A x 2, VCs to utilize rails, P return noted)  Supine to Sit: Maximum assistance (A x 1)  Sit to Supine: Dependent/Total (max A x 2)  Scooting: Dependent/Total (max A x 1 to EOB, [place] utilized to [employer])  Bed Mobility Comments: Dangled at the EOB for <5 minutes 2* reports of dizziness  Transfers  Transfer Comments: Unsafe to attempt standing/transfers at this time 2* poor tolerance for sitting EOB, c/o dizziness        Patient Education  Patient Education  Education Given To: Patient  Education Provided: Role of Therapy, Plan of Care  Education Provided Comments: Activity promotion  Education Method: Verbal  Barriers to Learning: Cognition  Education Outcome: Continued education needed      Goals  Patient Goals   Patient goals : Pt reports that she wants to discharge to a different SNF. Short Term Goals  Time Frame for Short Term Goals: By Discharge  Short Term Goal 1: Pt will complete bed mobility with Mod A x1 and tolerate sitting EOB for 10+ minutes with SBA while completing a functional task.   Short Term Goal 2: Pt will actively participate in self-care tasks at EOB or up to chair to promote overall increased strength and activity tolerance. Short Term Goal 3: Pt will complete UB ADL's with Min A and LB ADL's with Mod A and Good safety using AE if appropriate. Short Term Goal 4: Pt will complete sit<>stand with Max A x2 in preparation for functional transfers. Short Term Goal 5: Pt will actively participate in 15-20 minutes of therapeutic exercise/activity to promote increased independence and safety with self-care and mobility. Occupational Therapy Plan  Times Per Week: 5  Times Per Day:  Once a day  Current Treatment Recommendations: Strengthening, Balance training, Functional mobility training, Endurance training, Pain management, Safety education & training, Patient/Caregiver education & training, Equipment evaluation, education, & procurement, Self-Care / ADL      Assessment  Activity Tolerance  Activity Tolerance: Patient limited by fatigue, Patient limited by pain, Treatment limited secondary to medical complications (free text)  Assessment  Performance deficits / Impairments: Decreased functional mobility , Decreased ADL status, Decreased cognition, Decreased endurance, Decreased balance  Treatment Diagnosis: Impaired self-care status  Prognosis: Fair  Decision Making: Medium Complexity  Discharge Recommendations: Patient would benefit from continued therapy after discharge  OT Equipment Recommendations  Other: TBD  Safety Devices  Type of Devices: Patient at risk for falls, Left in bed, Call light within reach, Bed alarm in place      AM-PAC Daily Activities Inpatient  AM-PAC Daily Activity Inpatient   How much help for putting on and taking off regular lower body clothing?: Total  How much help for Bathing?: A Lot  How much help for Toileting?: Total  How much help for putting on and taking off regular upper body clothing?: A Lot  How much help for taking care of personal grooming?: A Little  How much help for eating meals?: A Little  AM-PAC Inpatient Daily Activity Raw Score: 12  AM-PAC Inpatient ADL T-Scale Score : 30.6  ADL Inpatient CMS 0-100% Score: 66.57  ADL Inpatient CMS G-Code Modifier : CL    OT Minutes  OT Individual Minutes  Time In: 1004  Time Out: 363 Luana Wallace  Minutes: Bo Avery 148, PIA

## 2022-10-31 NOTE — PROGRESS NOTES
HEMODIALYSIS POST TREATMENT NOTE    Treatment time ordered: 180     Actual treatment time: 180    UltraFiltration Goal: 1000  UltraFiltration Removed: 1000      Pre Treatment weight: 78.0  Post Treatment weight: 77.0  Estimated Dry Weight: na    Access used:     Central Venous Catheter:          Tunneled or Non-tunneled: na           Site: na          Access Flow: na      Internal Access:       AV Fistula or AV Graft: fistula         Site: left upper arm       Access Flow: poor       Sign and symptoms of infection: non       If YES: na    Medications or blood products given: no    Chronic outpatient schedule: MWF    Chronic outpatient unit: [facility]    Summary of response to treatment: pt's line clotted, new set-up done. Her access runs poorly and alarms continually    Explain if orders NOT met, was physician notified:gama CAMPBELL flowsheet faxed to patient unit/ placed in patient chart: yes    Post assessment completed: yes    Report given to: Ev Blandon 386 documented Safety Checks include the followin) Access and face visible at all times. 2) All connections and blood lines are secure with no kinks. 3) NVL alarm engaged. 4) [place] device applied (if applicable). 5) No collapse of Arterial or Venous blood chambers. 6) All blood lines / pump segments in the air detectors.

## 2022-10-31 NOTE — CARE COORDINATION
Transportation arranged for patient to go to [facility] via [place] at [facility]. Texted admissions at facility. Informed bedside nurse. Informed patient. IMM letter signed within four hours of discharge. No objections from the patient. NEEDS RAPID COVID BEFORE DISCHARGE.     Number for Report: [phone]    Electronically signed by SHANDA Bright on 10/31/2022 at 3:57 PM

## 2022-11-02 ENCOUNTER — HOSPITAL ENCOUNTER (OUTPATIENT)
Age: 32
Setting detail: SPECIMEN
Discharge: HOME | End: 2022-11-02

## 2022-11-02 LAB
ALBUMIN SERPL-MCNC: 3.9 G/DL (ref 3.5–5.2)
ALBUMIN/GLOBULIN RATIO: 1.2 (ref 1–2.5)
ALP BLD-CCNC: 64 U/L (ref 35–104)
ALT SERPL-CCNC: 35 U/L (ref 5–33)
ANION GAP SERPL CALCULATED.3IONS-SCNC: 11 MMOL/L (ref 9–17)
AST SERPL-CCNC: 24 U/L
BILIRUB SERPL-MCNC: 0.2 MG/DL (ref 0.3–1.2)
BUN BLDV-MCNC: 28 MG/DL (ref 6–20)
CALCIUM SERPL-MCNC: 10.6 MG/DL (ref 8.6–10.4)
CHLORIDE BLD-SCNC: 95 MMOL/L (ref 98–107)
CO2: 31 MMOL/L (ref 20–31)
CREAT SERPL-MCNC: 6.58 MG/DL (ref 0.5–0.9)
GFR SERPL CREATININE-BSD FRML MDRD: 8 ML/MIN/1.73M2
GLUCOSE BLD-MCNC: 119 MG/DL (ref 70–99)
HCT VFR BLD CALC: 31.5 % (ref 36.3–47.1)
HEMOGLOBIN: 9.8 G/DL (ref 11.9–15.1)
MCH RBC QN AUTO: 29.6 PG (ref 25.2–33.5)
MCHC RBC AUTO-ENTMCNC: 31.1 G/DL (ref 28.4–34.8)
MCV RBC AUTO: 95.2 FL (ref 82.6–102.9)
NRBC AUTOMATED: 0 PER 100 WBC
PDW BLD-RTO: 14.9 % (ref 11.8–14.4)
PLATELET # BLD: 289 K/UL (ref 138–453)
PMV BLD AUTO: 10.3 FL (ref 8.1–13.5)
POTASSIUM SERPL-SCNC: 4.1 MMOL/L (ref 3.7–5.3)
RBC # BLD: 3.31 M/UL (ref 3.95–5.11)
SODIUM BLD-SCNC: 137 MMOL/L (ref 135–144)
SURGICAL PATHOLOGY REPORT: NORMAL
TOTAL PROTEIN: 7.2 G/DL (ref 6.4–8.3)
WBC # BLD: 8.6 K/UL (ref 3.5–11.3)

## 2022-11-02 PROCEDURE — 85027 COMPLETE CBC AUTOMATED: CPT

## 2022-11-02 PROCEDURE — 80053 COMPREHEN METABOLIC PANEL: CPT

## 2022-11-02 PROCEDURE — 36415 COLL VENOUS BLD VENIPUNCTURE: CPT

## 2022-11-08 ENCOUNTER — HOSPITAL ENCOUNTER (OUTPATIENT)
Age: 32
Setting detail: SPECIMEN
Discharge: HOME | End: 2022-11-08

## 2022-11-08 LAB
ABSOLUTE EOS #: 0.36 K/UL (ref 0–0.44)
ABSOLUTE IMMATURE GRANULOCYTE: 0.03 K/UL (ref 0–0.3)
ABSOLUTE LYMPH #: 2.95 K/UL (ref 1.1–3.7)
ABSOLUTE MONO #: 0.68 K/UL (ref 0.1–1.2)
ALBUMIN SERPL-MCNC: 3.3 G/DL (ref 3.5–5.2)
ALBUMIN/GLOBULIN RATIO: 0.9 (ref 1–2.5)
ALP BLD-CCNC: 104 U/L (ref 35–104)
ALT SERPL-CCNC: 32 U/L (ref 5–33)
ANION GAP SERPL CALCULATED.3IONS-SCNC: 18 MMOL/L (ref 9–17)
AST SERPL-CCNC: 38 U/L
BASOPHILS # BLD: 1 % (ref 0–2)
BASOPHILS ABSOLUTE: 0.04 K/UL (ref 0–0.2)
BILIRUB SERPL-MCNC: <0.1 MG/DL (ref 0.3–1.2)
BUN BLDV-MCNC: 43 MG/DL (ref 6–20)
CALCIUM SERPL-MCNC: 10.9 MG/DL (ref 8.6–10.4)
CHLORIDE BLD-SCNC: 104 MMOL/L (ref 98–107)
CO2: 21 MMOL/L (ref 20–31)
CREAT SERPL-MCNC: 6.1 MG/DL (ref 0.5–0.9)
EOSINOPHILS RELATIVE PERCENT: 4 % (ref 1–4)
GFR SERPL CREATININE-BSD FRML MDRD: 9 ML/MIN/1.73M2
GLUCOSE BLD-MCNC: 105 MG/DL (ref 70–99)
HCT VFR BLD CALC: 30.9 % (ref 36.3–47.1)
HEMOGLOBIN: 9.3 G/DL (ref 11.9–15.1)
IMMATURE GRANULOCYTES: 0 %
LIPASE: 46 U/L (ref 13–60)
LYMPHOCYTES # BLD: 35 % (ref 24–43)
MCH RBC QN AUTO: 28.9 PG (ref 25.2–33.5)
MCHC RBC AUTO-ENTMCNC: 30.1 G/DL (ref 28.4–34.8)
MCV RBC AUTO: 96 FL (ref 82.6–102.9)
MONOCYTES # BLD: 8 % (ref 3–12)
NRBC AUTOMATED: 0 PER 100 WBC
PDW BLD-RTO: 14.4 % (ref 11.8–14.4)
PLATELET # BLD: 317 K/UL (ref 138–453)
PMV BLD AUTO: 10.2 FL (ref 8.1–13.5)
POTASSIUM SERPL-SCNC: 4.2 MMOL/L (ref 3.7–5.3)
RBC # BLD: 3.22 M/UL (ref 3.95–5.11)
SEG NEUTROPHILS: 52 % (ref 36–65)
SEGMENTED NEUTROPHILS ABSOLUTE COUNT: 4.48 K/UL (ref 1.5–8.1)
SODIUM BLD-SCNC: 143 MMOL/L (ref 135–144)
TOTAL PROTEIN: 7 G/DL (ref 6.4–8.3)
WBC # BLD: 8.5 K/UL (ref 3.5–11.3)

## 2022-11-08 PROCEDURE — 85025 COMPLETE CBC W/AUTO DIFF WBC: CPT

## 2022-11-08 PROCEDURE — 80053 COMPREHEN METABOLIC PANEL: CPT

## 2022-11-08 PROCEDURE — P9603 ONE-WAY ALLOW PRORATED MILES: HCPCS

## 2022-11-08 PROCEDURE — 36415 COLL VENOUS BLD VENIPUNCTURE: CPT

## 2022-11-08 PROCEDURE — 83690 ASSAY OF LIPASE: CPT

## 2022-11-11 ENCOUNTER — HOSPITAL ENCOUNTER (OUTPATIENT)
Age: 32
Setting detail: SPECIMEN
Discharge: HOME | End: 2022-11-11

## 2022-11-11 LAB
ANION GAP SERPL CALCULATED.3IONS-SCNC: 12 MMOL/L (ref 9–17)
BUN BLDV-MCNC: 17 MG/DL (ref 6–20)
BUN/CREAT BLD: 5 (ref 9–20)
CALCIUM SERPL-MCNC: 10.6 MG/DL (ref 8.6–10.4)
CHLORIDE BLD-SCNC: 99 MMOL/L (ref 98–107)
CO2: 26 MMOL/L (ref 20–31)
CREAT SERPL-MCNC: 3.57 MG/DL (ref 0.5–0.9)
GFR SERPL CREATININE-BSD FRML MDRD: 17 ML/MIN/1.73M2
GLUCOSE BLD-MCNC: 103 MG/DL (ref 70–99)
HCT VFR BLD CALC: 33.4 % (ref 36.3–47.1)
HEMOGLOBIN: 9.9 G/DL (ref 11.9–15.1)
MCH RBC QN AUTO: 28.4 PG (ref 25.2–33.5)
MCHC RBC AUTO-ENTMCNC: 29.6 G/DL (ref 28.4–34.8)
MCV RBC AUTO: 95.7 FL (ref 82.6–102.9)
NRBC AUTOMATED: 0 PER 100 WBC
PDW BLD-RTO: 14.6 % (ref 11.8–14.4)
PLATELET # BLD: 331 K/UL (ref 138–453)
PMV BLD AUTO: 9.7 FL (ref 8.1–13.5)
POTASSIUM SERPL-SCNC: 3.3 MMOL/L (ref 3.7–5.3)
RBC # BLD: 3.49 M/UL (ref 3.95–5.11)
SODIUM BLD-SCNC: 137 MMOL/L (ref 135–144)
WBC # BLD: 8.5 K/UL (ref 3.5–11.3)

## 2022-11-11 PROCEDURE — 36415 COLL VENOUS BLD VENIPUNCTURE: CPT

## 2022-11-11 PROCEDURE — P9603 ONE-WAY ALLOW PRORATED MILES: HCPCS

## 2022-11-11 PROCEDURE — 80048 BASIC METABOLIC PNL TOTAL CA: CPT

## 2022-11-11 PROCEDURE — 85027 COMPLETE CBC AUTOMATED: CPT

## 2022-11-18 ENCOUNTER — TELEPHONE (OUTPATIENT)
Dept: GASTROENTEROLOGY | Age: 32
End: 2022-11-18

## 2022-11-18 ENCOUNTER — HOSPITAL ENCOUNTER (OUTPATIENT)
Age: 32
Setting detail: SPECIMEN
Discharge: HOME | End: 2022-11-18

## 2022-11-18 LAB — POTASSIUM SERPL-SCNC: 3.6 MMOL/L (ref 3.7–5.3)

## 2022-11-18 PROCEDURE — 84132 ASSAY OF SERUM POTASSIUM: CPT

## 2022-11-18 PROCEDURE — P9603 ONE-WAY ALLOW PRORATED MILES: HCPCS

## 2022-11-18 PROCEDURE — 36415 COLL VENOUS BLD VENIPUNCTURE: CPT

## 2022-11-18 NOTE — TELEPHONE ENCOUNTER
EGD path report is faxed to Dr José Dunham, her current GI dr. Angella Morillo confirmation is received.

## 2022-11-21 ENCOUNTER — HOSPITAL ENCOUNTER (OUTPATIENT)
Age: 32
Setting detail: SPECIMEN
Discharge: HOME | End: 2022-11-21

## 2022-11-21 PROCEDURE — 87086 URINE CULTURE/COLONY COUNT: CPT

## 2022-11-21 PROCEDURE — 81001 URINALYSIS AUTO W/SCOPE: CPT

## 2022-11-21 PROCEDURE — 87077 CULTURE AEROBIC IDENTIFY: CPT

## 2022-11-21 PROCEDURE — 87186 SC STD MICRODIL/AGAR DIL: CPT

## 2022-11-22 LAB
BACTERIA: ABNORMAL
BILIRUBIN URINE: NEGATIVE
CASTS UA: ABNORMAL /LPF (ref 0–8)
COLOR: YELLOW
EPITHELIAL CELLS UA: ABNORMAL /HPF (ref 0–5)
GLUCOSE URINE: NEGATIVE
KETONES, URINE: ABNORMAL
LEUKOCYTE ESTERASE, URINE: ABNORMAL
NITRITE, URINE: NEGATIVE
PH UA: 6 (ref 5–8)
PROTEIN UA: ABNORMAL
RBC UA: ABNORMAL /HPF (ref 0–4)
SPECIFIC GRAVITY UA: 1.02 (ref 1–1.03)
TURBIDITY: ABNORMAL
URINE HGB: NEGATIVE
UROBILINOGEN, URINE: NORMAL
WBC UA: ABNORMAL /HPF (ref 0–5)

## 2022-11-23 LAB
CULTURE: ABNORMAL
Lab: ABNORMAL
SPECIMEN DESCRIPTION: ABNORMAL

## 2022-12-01 ENCOUNTER — OUTSIDE SERVICES (OUTPATIENT)
Dept: INFECTIOUS DISEASES | Age: 32
End: 2022-12-01

## 2022-12-01 VITALS
OXYGEN SATURATION: 97 % | TEMPERATURE: 97.5 F | HEART RATE: 94 BPM | SYSTOLIC BLOOD PRESSURE: 165 MMHG | DIASTOLIC BLOOD PRESSURE: 97 MMHG | RESPIRATION RATE: 17 BRPM

## 2022-12-01 DIAGNOSIS — N39.0 ACUTE UTI: ICD-10-CM

## 2022-12-01 DIAGNOSIS — Z16.21 VANCOMYCIN RESISTANT ENTEROCOCCUS INFECTION: Primary | ICD-10-CM

## 2022-12-01 DIAGNOSIS — A49.1 VANCOMYCIN RESISTANT ENTEROCOCCUS INFECTION: Primary | ICD-10-CM

## 2022-12-01 ASSESSMENT — ENCOUNTER SYMPTOMS
CONSTIPATION: 1
ABDOMINAL PAIN: 1

## 2022-12-08 ENCOUNTER — OUTSIDE SERVICES (OUTPATIENT)
Dept: INFECTIOUS DISEASES | Age: 32
End: 2022-12-08

## 2022-12-08 VITALS
OXYGEN SATURATION: 97 % | RESPIRATION RATE: 17 BRPM | HEART RATE: 85 BPM | SYSTOLIC BLOOD PRESSURE: 171 MMHG | TEMPERATURE: 97.1 F | DIASTOLIC BLOOD PRESSURE: 98 MMHG

## 2022-12-08 DIAGNOSIS — Z16.21 VANCOMYCIN RESISTANT ENTEROCOCCUS INFECTION: ICD-10-CM

## 2022-12-08 DIAGNOSIS — N39.0 ACUTE UTI: Primary | ICD-10-CM

## 2022-12-08 DIAGNOSIS — A49.1 VANCOMYCIN RESISTANT ENTEROCOCCUS INFECTION: ICD-10-CM

## 2022-12-08 ASSESSMENT — ENCOUNTER SYMPTOMS
ABDOMINAL PAIN: 0
CONSTIPATION: 1

## 2022-12-08 NOTE — PROGRESS NOTES
[facility] -   Infectious diseases evaluation  admission date 10/31/2022    reason for consultation:   Antibiotic Management    Impression :   Current:  VRE Faecalis UTI  ESRD on HD M-W-F  DM II    Other:    Discussion / summary of stay / plan of care   Patient has received a 5-day course of antibiotics. Has been adequately treated. Recommendations   Monitor off antibiotics. Supportive care. Discussed with patient, RN. 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 Denies any dysuria at this time. Does not remember why a urine sample was obtained. She is in a lot of pain now stating her stomach and bottom hurt because she is so constipated. States her last BM was 3 days ago. Denies any fever, chills, nausea, vomiting, diarrhea. Interval changes  12/8/2022  Feeling much better. LUE AVF. Denies any fever, chills, nausea, vomiting, diarrhea. Mcnair. Urine clear. C/o constipation.     Summary of relevant labs:  Labs:    Micro:  11/21/22 Urine CX-VRE Faecalis  Susceptibility    Vancomycin resistant enterococcus faecalis (1)    Antibiotic Interpretation Microscan Method Status    ampicillin Sensitive <=2 BACTERIAL SUSCEPTIBILITY PANEL JEAN CARLOS Final    ciprofloxacin Resistant >=8 BACTERIAL SUSCEPTIBILITY PANEL JEAN CARLOS Final    levofloxacin Resistant >=8 BACTERIAL SUSCEPTIBILITY PANEL JEAN CARLOS Final    linezolid Sensitive 2 BACTERIAL SUSCEPTIBILITY PANEL JEAN CARLOS Final    nitrofurantoin Sensitive <=16 BACTERIAL SUSCEPTIBILITY PANEL JEAN CARLOS Final    tetracycline Resistant >=16 BACTERIAL SUSCEPTIBILITY PANEL JEAN CARLOS Final vancomycin Resistant >=32 BACTERIAL SUSCEPTIBILITY PANEL JEAN CARLOS Final          Specimen Collected: 11/21/22 18:00 EST        Imaging:      I have personally reviewed the past medical history, past surgical history, medications, social history, and family history, and I haveupdated the database accordingly. Allergies:   Morphine and Januvia [sitagliptin]     Review of Systems:     Review of Systems   Gastrointestinal:  Positive for constipation. Negative for abdominal pain. Genitourinary:  Negative for dysuria and flank pain. All other systems reviewed and are negative. Physical Examination :       Physical Exam  Vitals and nursing note reviewed. Constitutional:       Appearance: She is not ill-appearing. Comments: Tearful. C/o constipation with associated abdominal pain. HENT:      Head: Normocephalic and atraumatic. Right Ear: External ear normal.      Left Ear: External ear normal.      Nose: Nose normal. No congestion. Mouth/Throat:      Mouth: Mucous membranes are moist.      Pharynx: Oropharynx is clear. Eyes:      Extraocular Movements: Extraocular movements intact. Conjunctiva/sclera: Conjunctivae normal.      Pupils: Pupils are equal, round, and reactive to light. Cardiovascular:      Rate and Rhythm: Normal rate and regular rhythm. Heart sounds: Normal heart sounds. No murmur heard. Pulmonary:      Effort: Pulmonary effort is normal.      Breath sounds: Normal breath sounds. No wheezing or rhonchi. Comments: RA  Abdominal:      General: Bowel sounds are normal. There is no distension. Palpations: Abdomen is soft. Tenderness: There is no abdominal tenderness. There is no right CVA tenderness or left CVA tenderness. Genitourinary:     Comments: Mcnair. Urine clear. Musculoskeletal:         General: Normal range of motion. Cervical back: Normal range of motion. No rigidity. Right lower leg: No edema. Left lower leg: No edema. Skin:     General: Skin is warm and dry. Capillary Refill: Capillary refill takes less than 2 seconds. Findings: No erythema. Neurological:      Mental Status: She is alert and oriented to person, place, and time.        Past Medical History:     Past Medical History:   Diagnosis Date    Asthma     Atherosclerosis of native arteries of extremities with rest pain, unspecified extremity ([facility] [address]     Depression 10/25/2022    Diabetes mellitus ([facility]     since 14 y/o, unsure if type 1 or 2    Headache, migraine     for 2 years    Hypertension     Lichen simplex chronicus     Noninflammatory disorder of vagina, unspecified     Patient's noncompliance with other medical treatment and regimen for other reason     Type 2 diabetes mellitus without complication ([facility] 38/88/4072       Past Surgical  History:     Past Surgical History:   Procedure Laterality Date    OVARY REMOVAL      July 2010    OVARY REMOVAL      UPPER GASTROINTESTINAL ENDOSCOPY N/A 10/28/2022    EGD BIOPSY performed by Nevaeh Corcoran MD at [address] [place] [address] ENDO       Medications:       Social History:     Social History     Socioeconomic History    Marital status: Single     Spouse name: Not on file    Number of children: Not on file    Years of education: Not on file    Highest education level: Not on file   Occupational History    Not on file   Tobacco Use    Smoking status: Never    Smokeless tobacco: Never   Vaping Use    Vaping Use: Never used   Substance and Sexual Activity    Alcohol use: No    Drug use: No    Sexual activity: Not on file   Other Topics Concern    Not on file   Social History Narrative    Not on file     Social Determinants of Health     Financial Resource Strain: Not on file   Food Insecurity: Not on file   Transportation Needs: Not on file   Physical Activity: Not on file   Stress: Not on file   Social Connections: Not on file   Intimate Partner Violence: Not on file   Housing Stability: Not on file       Family History:     Family History   Problem Relation Age of Onset    High Blood Pressure Mother     Diabetes Mother     Other Mother 27        Lung cancer    High Blood Pressure Father     Diabetes Father     Other Father 27        Prostate cancer      Medical Decision Making:   I have independently reviewed/ordered the following labs:    CBC with Differential: No results for input(s): WBC, HGB, HCT, PLT, SEGSPCT, BANDSPCT, LYMPHOPCT, MONOPCT, EOSPCT in the last 72 hours. BMP:No results for input(s): NA, K, CL, CO2, BUN, CREATININE, CA, MG in the last 72 hours. Hepatic Function Panel: No results for input(s): PROT, LABALBU, BILIDIR, IBILI, BILITOT, ALKPHOS, ALT, AST in the last 72 hours. No results for input(s): RPR in the last 72 hours. No results for input(s): HIV in the last 72 hours. No results for input(s): BC in the last 72 hours. Lab Results   Component Value Date/Time    CREATININE 3.57 11/11/2022 05:10 PM    GLUCOSE 103 11/11/2022 05:10 PM    GLUCOSE 492 05/07/2019 11:18 AM       Detailed results: Thank you for allowing us to participate in the care of this patient. Please call with questions. This note is created with the assistance of a speech recognition program.  While intending to generate adocument that actually reflects the content of the visit, the document can still have some errors including those of syntax and sound a like substitutions which may escape proof reading. It such instances, actual meaningcan be extrapolated by contextual diversion.     CUAUHTEMOC Chen - SAVANNAH  Office: [phone]  Perfect serve / office [phone]

## 2022-12-13 ENCOUNTER — OFFICE VISIT (OUTPATIENT)
Dept: UROLOGY | Age: 32
End: 2022-12-13
Payer: MEDICARE

## 2022-12-13 VITALS
WEIGHT: 169 LBS | SYSTOLIC BLOOD PRESSURE: 142 MMHG | HEART RATE: 88 BPM | BODY MASS INDEX: 28.85 KG/M2 | TEMPERATURE: 98.6 F | HEIGHT: 64 IN | DIASTOLIC BLOOD PRESSURE: 72 MMHG

## 2022-12-13 DIAGNOSIS — R33.9 URINARY RETENTION: Primary | ICD-10-CM

## 2022-12-13 PROCEDURE — 3074F SYST BP LT 130 MM HG: CPT | Performed by: UROLOGY

## 2022-12-13 PROCEDURE — G8484 FLU IMMUNIZE NO ADMIN: HCPCS | Performed by: UROLOGY

## 2022-12-13 PROCEDURE — 99203 OFFICE O/P NEW LOW 30 MIN: CPT | Performed by: UROLOGY

## 2022-12-13 PROCEDURE — 3078F DIAST BP <80 MM HG: CPT | Performed by: UROLOGY

## 2022-12-13 PROCEDURE — G8419 CALC BMI OUT NRM PARAM NOF/U: HCPCS | Performed by: UROLOGY

## 2022-12-13 PROCEDURE — 1036F TOBACCO NON-USER: CPT | Performed by: UROLOGY

## 2022-12-13 PROCEDURE — G8427 DOCREV CUR MEDS BY ELIG CLIN: HCPCS | Performed by: UROLOGY

## 2022-12-13 ASSESSMENT — ENCOUNTER SYMPTOMS
ABDOMINAL PAIN: 0
EYE PAIN: 0
CONSTIPATION: 0
COUGH: 0
DIARRHEA: 0
BACK PAIN: 0
EYE REDNESS: 0
SHORTNESS OF BREATH: 0
WHEEZING: 0
NAUSEA: 0
VOMITING: 0

## 2022-12-13 NOTE — PROGRESS NOTES
Review of Systems   Constitutional:  Negative for chills (pt is always cold), fatigue and fever. Eyes:  Negative for pain, redness and visual disturbance. Respiratory:  Negative for cough, shortness of breath and wheezing. Cardiovascular:  Negative for chest pain and leg swelling. Gastrointestinal:  Negative for abdominal pain, constipation, diarrhea, nausea and vomiting. Genitourinary:  Negative for difficulty urinating, dysuria, flank pain, frequency, hematuria and urgency. Musculoskeletal:  Negative for back pain, joint swelling and myalgias. Skin:  Negative for rash and wound. Neurological:  Negative for dizziness, tremors, weakness and numbness. Hematological:  Does not bruise/bleed easily.

## 2022-12-13 NOTE — PROGRESS NOTES
[address] [address]  Dept: [address] [facility] Office Note - New Patient    Patient:  Renetta Wilkinson  YOB: 1990  Date: 12/13/2022    The patient is a 28 y.o. female who presentstoday for evaluation of the following problems:   Chief Complaint   Patient presents with    Other     Retention    referred by No primary care provider on file. Jace Fuentes HPI  Here for retention. She cannot offer any history   She reports that it has not been removed. She is at [place] [address] currently. She is on dialysis. She makes some urine. She has a h/o opioid dependence. She denies any constipation. History obtained from reviewing recent admission in October. (Patient's old records have been requested, reviewed and summarized in today's note.)    Summary of old records: N/A    History: N/A    ProceduresToday: N/A    Urinalysis today:  No results found for this visit on 12/13/22. AUA Symptom Score (12/13/2022):                                Last BUN andcreatinine:  Lab Results   Component Value Date    BUN 17 11/11/2022     Lab Results   Component Value Date    CREATININE 3.57 (H) 11/11/2022       Additional Lab/Culture results: none    Reviewed during this Office Visit: none  (results were independently reviewed byphysician and radiology report verified)    PAST MEDICAL, FAMILY AND SOCIAL HISTORY:  Past Medical History:   Diagnosis Date    Asthma     Atherosclerosis of native arteries of extremities with rest pain, unspecified extremity ([address]     Depression 10/25/2022    Diabetes mellitus ([address]     since 14 y/o, unsure if type 1 or 2    Headache, migraine     for 2 years    Hypertension     Lichen simplex chronicus     Noninflammatory disorder of vagina, unspecified     Patient's noncompliance with other medical treatment and regimen for other reason     Type 2 diabetes mellitus without complication ([facility] [address] 87/31/8311     Past Surgical History:   Procedure Laterality Date    OVARY REMOVAL      July 2010    OVARY REMOVAL      UPPER GASTROINTESTINAL ENDOSCOPY N/A 10/28/2022    EGD BIOPSY performed by Pippa Mahoney MD at [facility]     Family History   Problem Relation Age of Onset    High Blood Pressure Mother     Diabetes Mother     Other Mother 27        Lung cancer    High Blood Pressure Father     Diabetes Father     Other Father 27        Prostate cancer     Outpatient Medications Marked as Taking for the 12/13/22 encounter (Office Visit) with Cleve Umanzor MD   Medication Sig Dispense Refill    mirtazapine (REMERON SOL-TAB) 15 MG disintegrating tablet Take 1 tablet by mouth nightly 30 tablet 3    ondansetron (ZOFRAN-ODT) 4 MG disintegrating tablet Take 1 tablet by mouth every 8 hours as needed for Nausea or Vomiting 30 tablet 0    acetaminophen (TYLENOL) 325 MG tablet Take 650 mg by mouth every 6 hours as needed for Pain or Fever (do not exceed 3 gm in 24 hours)      albuterol sulfate HFA (PROVENTIL;VENTOLIN;PROAIR) 108 (90 Base) MCG/ACT inhaler Inhale 2 puffs into the lungs every 6 hours as needed for Wheezing or Shortness of Breath      aspirin 81 MG EC tablet Take 81 mg by mouth daily      atorvastatin (LIPITOR) 10 MG tablet Take 10 mg by mouth at bedtime      calcium acetate (PHOSLO) 667 MG CAPS capsule Take 3 capsules by mouth 3 times daily (with meals)      carvedilol (COREG) 25 MG tablet Take 25 mg by mouth 2 times daily      vitamin D (CHOLECALCIFEROL) 125 MCG (5000 UT) CAPS capsule Take 5,000 Units by mouth daily      clopidogrel (PLAVIX) 75 MG tablet Take 75 mg by mouth daily      polyethylene glycol (GLYCOLAX) 17 GM/SCOOP powder Take 17 g by mouth daily as needed (constipation)      hydrALAZINE (APRESOLINE) 100 MG tablet Take 100 mg by mouth 3 times daily      midodrine (PROAMATINE) 10 MG tablet Take 30 mg by mouth daily Indications: Mon, Wed, Fri at dialysis NIFEdipine (ADALAT CC) 90 MG extended release tablet Take 90 mg by mouth daily      sennosides-docusate sodium (SENOKOT-S) 8.6-50 MG tablet Take 2 tablets by mouth at bedtime      B Complex-C-Folic Acid (TRIPHROCAPS) 1 MG CAPS Take 1 capsule by mouth daily      patiromer sorbitex calcium (VELTASSA) 8.4 g PACK packet Take 8.4 g by mouth Twice a Week Indications: twice per day on Thursdays and Sundays         Morphine and Januvia [sitagliptin]  Social History     Tobacco Use   Smoking Status Never   Smokeless Tobacco Never      (If patient a smoker, smoking cessation counseling offered)   Social History     Substance and Sexual Activity   Alcohol Use No       REVIEW OF SYSTEMS:  Review of Systems    Physical Exam:    This a 28 y.o. female      Vitals:    12/13/22 1345   BP: (!) 142/72   Pulse: 88   Temp: 98.6 °F (37 °C)     Body mass index is 29.01 kg/m². Physical Exam  Constitutional: Patient in no acute distress, ggod grooming, appropriately dressed  Neuro: Alert and oriented to person, place and time. Psych:Mood normal, affect normal  Skin: No rash noted  HEENT: Head: Normocephalic and atraumatic,Conjunctivae and EOM are normal,Nose- normal, Right/Left External Ear: normal, Mouth: Mucosa Moist  Neck: Supple  Lungs: Respiratory effort is normal  Cardiovascular: strong and regular, no lower leg edema  Abdomen: Soft, non-tender, non-distended with no CVA,    Lymphatics: No cervical palpable lymphadenopathy. Bladder non-tender and not distended. Musculoskeletal: Normal gait and station        Assessment and Plan      1. Urinary retention            Plan: Will remove barros tomorrow at [facility]. F/U as needed. Prescriptions Ordered:  No orders of the defined types were placed in this encounter. Orders Placed:  No orders of the defined types were placed in this encounter. Danni Palafox MD    Agree with the ROS entered by the MA.

## 2023-03-01 RX ORDER — MIRTAZAPINE 15 MG/1
TABLET, ORALLY DISINTEGRATING ORAL
Qty: 30 TABLET | Refills: 3 | OUTPATIENT
Start: 2023-03-01

## 2023-08-14 ENCOUNTER — APPOINTMENT (OUTPATIENT)
Dept: GENERAL RADIOLOGY | Age: 33
DRG: 073 | End: 2023-08-14
Payer: COMMERCIAL

## 2023-08-14 ENCOUNTER — HOSPITAL ENCOUNTER (INPATIENT)
Age: 33
LOS: 5 days | Discharge: HOME | DRG: 073 | End: 2023-08-19
Attending: EMERGENCY MEDICINE | Admitting: INTERNAL MEDICINE
Payer: COMMERCIAL

## 2023-08-14 ENCOUNTER — APPOINTMENT (OUTPATIENT)
Age: 33
DRG: 073 | End: 2023-08-14
Payer: COMMERCIAL

## 2023-08-14 DIAGNOSIS — I20.2 REFRACTORY ANGINA PECTORIS (HCC): Primary | ICD-10-CM

## 2023-08-14 DIAGNOSIS — R11.2 NAUSEA AND VOMITING, UNSPECIFIED VOMITING TYPE: ICD-10-CM

## 2023-08-14 DIAGNOSIS — R07.9 CHEST PAIN, UNSPECIFIED TYPE: ICD-10-CM

## 2023-08-14 PROBLEM — E10.42 DIABETIC POLYNEUROPATHY ASSOCIATED WITH TYPE 1 DIABETES MELLITUS (HCC): Status: ACTIVE | Noted: 2023-04-13

## 2023-08-14 PROBLEM — J45.20 MILD INTERMITTENT ASTHMA: Status: ACTIVE | Noted: 2018-11-06

## 2023-08-14 PROBLEM — D64.9 CHRONIC ANEMIA: Status: ACTIVE | Noted: 2021-08-02

## 2023-08-14 PROBLEM — F43.21 ADJUSTMENT DISORDER WITH DEPRESSED MOOD: Status: ACTIVE | Noted: 2019-12-06

## 2023-08-14 PROBLEM — E78.2 MIXED HYPERLIPIDEMIA: Status: ACTIVE | Noted: 2023-04-13

## 2023-08-14 PROBLEM — E11.65 HYPERGLYCEMIA DUE TO DIABETES MELLITUS (HCC): Status: ACTIVE | Noted: 2023-08-14

## 2023-08-14 LAB
ANION GAP SERPL CALCULATED.3IONS-SCNC: 13 MMOL/L (ref 9–17)
BASOPHILS # BLD: 0.04 K/UL (ref 0–0.2)
BASOPHILS NFR BLD: 1 % (ref 0–2)
BNP SERPL-MCNC: 3489 PG/ML
BUN SERPL-MCNC: 38 MG/DL (ref 6–20)
BUN/CREAT SERPL: 10 (ref 9–20)
CALCIUM SERPL-MCNC: 9.4 MG/DL (ref 8.6–10.4)
CHLORIDE SERPL-SCNC: 101 MMOL/L (ref 98–107)
CO2 SERPL-SCNC: 24 MMOL/L (ref 20–31)
CREAT SERPL-MCNC: 3.7 MG/DL (ref 0.5–0.9)
ECHO AO ROOT DIAM: 2.4 CM
ECHO AO ROOT INDEX: 1.26 CM/M2
ECHO AV PEAK GRADIENT: 11 MMHG
ECHO AV PEAK VELOCITY: 1.7 M/S
ECHO BSA: 1.86 M2
ECHO LA AREA 4C: 16.5 CM2
ECHO LA DIAMETER INDEX: 1.68 CM/M2
ECHO LA DIAMETER: 3.2 CM
ECHO LA MAJOR AXIS: 4.9 CM
ECHO LA TO AORTIC ROOT RATIO: 1.33
ECHO LA VOL 4C: 45 ML (ref 22–52)
ECHO LA VOLUME INDEX A4C: 24 ML/M2 (ref 16–34)
ECHO LV E' LATERAL VELOCITY: 8 CM/S
ECHO LV E' SEPTAL VELOCITY: 8 CM/S
ECHO LV FRACTIONAL SHORTENING: 36 % (ref 28–44)
ECHO LV INTERNAL DIMENSION DIASTOLE INDEX: 2.21 CM/M2
ECHO LV INTERNAL DIMENSION DIASTOLIC: 4.2 CM (ref 3.9–5.3)
ECHO LV INTERNAL DIMENSION SYSTOLIC INDEX: 1.42 CM/M2
ECHO LV INTERNAL DIMENSION SYSTOLIC: 2.7 CM
ECHO LV IVSD: 1.1 CM (ref 0.6–0.9)
ECHO LV MASS 2D: 157.1 G (ref 67–162)
ECHO LV MASS INDEX 2D: 82.7 G/M2 (ref 43–95)
ECHO LV POSTERIOR WALL DIASTOLIC: 1.1 CM (ref 0.6–0.9)
ECHO LV RELATIVE WALL THICKNESS RATIO: 0.52
ECHO MV A VELOCITY: 0.9 M/S
ECHO MV E DECELERATION TIME (DT): 85 MS
ECHO MV E VELOCITY: 0.76 M/S
ECHO MV E/A RATIO: 0.84
ECHO MV E/E' LATERAL: 9.5
ECHO MV E/E' RATIO (AVERAGED): 9.5
ECHO MV E/E' SEPTAL: 9.5
EKG ATRIAL RATE: 88 BPM
EKG ATRIAL RATE: 90 BPM
EKG P AXIS: 41 DEGREES
EKG P AXIS: 46 DEGREES
EKG P-R INTERVAL: 138 MS
EKG P-R INTERVAL: 142 MS
EKG Q-T INTERVAL: 402 MS
EKG Q-T INTERVAL: 420 MS
EKG QRS DURATION: 78 MS
EKG QRS DURATION: 80 MS
EKG QTC CALCULATION (BAZETT): 491 MS
EKG QTC CALCULATION (BAZETT): 508 MS
EKG R AXIS: 106 DEGREES
EKG R AXIS: 67 DEGREES
EKG T AXIS: 62 DEGREES
EKG T AXIS: 70 DEGREES
EKG VENTRICULAR RATE: 88 BPM
EKG VENTRICULAR RATE: 90 BPM
EOSINOPHIL # BLD: 0.22 K/UL (ref 0–0.44)
EOSINOPHILS RELATIVE PERCENT: 3 % (ref 1–4)
ERYTHROCYTE [DISTWIDTH] IN BLOOD BY AUTOMATED COUNT: 13.2 % (ref 11.8–14.4)
GFR SERPL CREATININE-BSD FRML MDRD: 16 ML/MIN/1.73M2
GLUCOSE BLD-MCNC: 210 MG/DL (ref 65–105)
GLUCOSE BLD-MCNC: 244 MG/DL (ref 65–105)
GLUCOSE SERPL-MCNC: 225 MG/DL (ref 70–99)
HCG SERPL QL: NEGATIVE
HCT VFR BLD AUTO: 36.9 % (ref 36.3–47.1)
HGB BLD-MCNC: 12 G/DL (ref 11.9–15.1)
IMM GRANULOCYTES # BLD AUTO: 0.02 K/UL (ref 0–0.3)
IMM GRANULOCYTES NFR BLD: 0 %
LYMPHOCYTES NFR BLD: 2.23 K/UL (ref 1.1–3.7)
LYMPHOCYTES RELATIVE PERCENT: 26 % (ref 24–43)
MAGNESIUM SERPL-MCNC: 2 MG/DL (ref 1.6–2.6)
MCH RBC QN AUTO: 29.6 PG (ref 25.2–33.5)
MCHC RBC AUTO-ENTMCNC: 32.5 G/DL (ref 28.4–34.8)
MCV RBC AUTO: 91.1 FL (ref 82.6–102.9)
MONOCYTES NFR BLD: 0.56 K/UL (ref 0.1–1.2)
MONOCYTES NFR BLD: 7 % (ref 3–12)
NEUTROPHILS NFR BLD: 63 % (ref 36–65)
NEUTS SEG NFR BLD: 5.42 K/UL (ref 1.5–8.1)
NRBC BLD-RTO: 0 PER 100 WBC
PLATELET # BLD AUTO: 240 K/UL (ref 138–453)
PMV BLD AUTO: 9.7 FL (ref 8.1–13.5)
POTASSIUM SERPL-SCNC: 4.3 MMOL/L (ref 3.7–5.3)
RBC # BLD AUTO: 4.05 M/UL (ref 3.95–5.11)
SODIUM SERPL-SCNC: 138 MMOL/L (ref 135–144)
TROPONIN I SERPL HS-MCNC: 63 NG/L (ref 0–14)
TROPONIN I SERPL HS-MCNC: 68 NG/L (ref 0–14)
WBC OTHER # BLD: 8.5 K/UL (ref 3.5–11.3)

## 2023-08-14 PROCEDURE — 84484 ASSAY OF TROPONIN QUANT: CPT

## 2023-08-14 PROCEDURE — 36415 COLL VENOUS BLD VENIPUNCTURE: CPT

## 2023-08-14 PROCEDURE — 83880 ASSAY OF NATRIURETIC PEPTIDE: CPT

## 2023-08-14 PROCEDURE — 93005 ELECTROCARDIOGRAM TRACING: CPT | Performed by: NURSE PRACTITIONER

## 2023-08-14 PROCEDURE — 71045 X-RAY EXAM CHEST 1 VIEW: CPT

## 2023-08-14 PROCEDURE — 99222 1ST HOSP IP/OBS MODERATE 55: CPT | Performed by: NURSE PRACTITIONER

## 2023-08-14 PROCEDURE — 2580000003 HC RX 258: Performed by: NURSE PRACTITIONER

## 2023-08-14 PROCEDURE — 85025 COMPLETE CBC W/AUTO DIFF WBC: CPT

## 2023-08-14 PROCEDURE — 6370000000 HC RX 637 (ALT 250 FOR IP): Performed by: EMERGENCY MEDICINE

## 2023-08-14 PROCEDURE — 93306 TTE W/DOPPLER COMPLETE: CPT

## 2023-08-14 PROCEDURE — 6360000002 HC RX W HCPCS: Performed by: NURSE PRACTITIONER

## 2023-08-14 PROCEDURE — 6370000000 HC RX 637 (ALT 250 FOR IP): Performed by: NURSE PRACTITIONER

## 2023-08-14 PROCEDURE — 1200000000 HC SEMI PRIVATE

## 2023-08-14 PROCEDURE — 83735 ASSAY OF MAGNESIUM: CPT

## 2023-08-14 PROCEDURE — 80048 BASIC METABOLIC PNL TOTAL CA: CPT

## 2023-08-14 PROCEDURE — 82947 ASSAY GLUCOSE BLOOD QUANT: CPT

## 2023-08-14 PROCEDURE — 99285 EMERGENCY DEPT VISIT HI MDM: CPT

## 2023-08-14 PROCEDURE — 84703 CHORIONIC GONADOTROPIN ASSAY: CPT

## 2023-08-14 PROCEDURE — 93005 ELECTROCARDIOGRAM TRACING: CPT | Performed by: EMERGENCY MEDICINE

## 2023-08-14 RX ORDER — SODIUM CHLORIDE 9 MG/ML
INJECTION, SOLUTION INTRAVENOUS PRN
Status: DISCONTINUED | OUTPATIENT
Start: 2023-08-14 | End: 2023-08-19 | Stop reason: HOSPADM

## 2023-08-14 RX ORDER — POLYETHYLENE GLYCOL 3350 17 G/17G
17 POWDER, FOR SOLUTION ORAL DAILY PRN
Status: DISCONTINUED | OUTPATIENT
Start: 2023-08-14 | End: 2023-08-19 | Stop reason: HOSPADM

## 2023-08-14 RX ORDER — METOPROLOL TARTRATE 5 MG/5ML
5 INJECTION INTRAVENOUS EVERY 5 MIN PRN
Status: ACTIVE | OUTPATIENT
Start: 2023-08-15 | End: 2023-08-15

## 2023-08-14 RX ORDER — ACETAMINOPHEN 650 MG/1
650 SUPPOSITORY RECTAL EVERY 6 HOURS PRN
Status: DISCONTINUED | OUTPATIENT
Start: 2023-08-14 | End: 2023-08-19 | Stop reason: HOSPADM

## 2023-08-14 RX ORDER — ONDANSETRON 2 MG/ML
4 INJECTION INTRAMUSCULAR; INTRAVENOUS EVERY 6 HOURS PRN
Status: DISCONTINUED | OUTPATIENT
Start: 2023-08-14 | End: 2023-08-19 | Stop reason: HOSPADM

## 2023-08-14 RX ORDER — OXYCODONE HYDROCHLORIDE 5 MG/1
10 TABLET ORAL EVERY 4 HOURS PRN
Status: DISCONTINUED | OUTPATIENT
Start: 2023-08-14 | End: 2023-08-17

## 2023-08-14 RX ORDER — ASPIRIN 81 MG/1
81 TABLET ORAL DAILY
Status: DISCONTINUED | OUTPATIENT
Start: 2023-08-14 | End: 2023-08-19 | Stop reason: HOSPADM

## 2023-08-14 RX ORDER — ATORVASTATIN CALCIUM 10 MG/1
10 TABLET, FILM COATED ORAL NIGHTLY
Status: DISCONTINUED | OUTPATIENT
Start: 2023-08-14 | End: 2023-08-15

## 2023-08-14 RX ORDER — CLONIDINE HYDROCHLORIDE 0.2 MG/1
0.2 TABLET ORAL 3 TIMES DAILY
COMMUNITY
Start: 2023-07-05

## 2023-08-14 RX ORDER — SODIUM CHLORIDE 0.9 % (FLUSH) 0.9 %
5-40 SYRINGE (ML) INJECTION EVERY 12 HOURS SCHEDULED
Status: DISCONTINUED | OUTPATIENT
Start: 2023-08-14 | End: 2023-08-19 | Stop reason: HOSPADM

## 2023-08-14 RX ORDER — NIFEDIPINE 90 MG/1
90 TABLET, EXTENDED RELEASE ORAL DAILY
Status: DISCONTINUED | OUTPATIENT
Start: 2023-08-14 | End: 2023-08-19 | Stop reason: HOSPADM

## 2023-08-14 RX ORDER — HYDROCODONE BITARTRATE AND ACETAMINOPHEN 5; 325 MG/1; MG/1
1 TABLET ORAL ONCE
Status: COMPLETED | OUTPATIENT
Start: 2023-08-14 | End: 2023-08-14

## 2023-08-14 RX ORDER — POTASSIUM CHLORIDE 7.45 MG/ML
10 INJECTION INTRAVENOUS PRN
Status: DISCONTINUED | OUTPATIENT
Start: 2023-08-14 | End: 2023-08-14 | Stop reason: CLARIF

## 2023-08-14 RX ORDER — INSULIN LISPRO 100 [IU]/ML
0-4 INJECTION, SOLUTION INTRAVENOUS; SUBCUTANEOUS NIGHTLY
Status: DISCONTINUED | OUTPATIENT
Start: 2023-08-14 | End: 2023-08-19 | Stop reason: HOSPADM

## 2023-08-14 RX ORDER — MIRTAZAPINE 15 MG/1
15 TABLET, ORALLY DISINTEGRATING ORAL NIGHTLY
Status: DISCONTINUED | OUTPATIENT
Start: 2023-08-14 | End: 2023-08-19 | Stop reason: HOSPADM

## 2023-08-14 RX ORDER — INSULIN LISPRO 100 [IU]/ML
0-16 INJECTION, SOLUTION INTRAVENOUS; SUBCUTANEOUS
Status: DISCONTINUED | OUTPATIENT
Start: 2023-08-14 | End: 2023-08-19 | Stop reason: HOSPADM

## 2023-08-14 RX ORDER — BUMETANIDE 2 MG/1
4 TABLET ORAL 2 TIMES DAILY
COMMUNITY
Start: 2023-07-28

## 2023-08-14 RX ORDER — MAGNESIUM SULFATE 1 G/100ML
1000 INJECTION INTRAVENOUS PRN
Status: DISCONTINUED | OUTPATIENT
Start: 2023-08-14 | End: 2023-08-14 | Stop reason: CLARIF

## 2023-08-14 RX ORDER — ATORVASTATIN CALCIUM 20 MG/1
20 TABLET, FILM COATED ORAL DAILY
COMMUNITY
Start: 2023-08-04

## 2023-08-14 RX ORDER — REGADENOSON 0.08 MG/ML
0.4 INJECTION, SOLUTION INTRAVENOUS
Status: COMPLETED | OUTPATIENT
Start: 2023-08-15 | End: 2023-08-15

## 2023-08-14 RX ORDER — ACETAMINOPHEN 325 MG/1
650 TABLET ORAL EVERY 6 HOURS PRN
Status: DISCONTINUED | OUTPATIENT
Start: 2023-08-14 | End: 2023-08-19 | Stop reason: HOSPADM

## 2023-08-14 RX ORDER — ALBUTEROL SULFATE 90 UG/1
2 AEROSOL, METERED RESPIRATORY (INHALATION) PRN
Status: ACTIVE | OUTPATIENT
Start: 2023-08-15 | End: 2023-08-15

## 2023-08-14 RX ORDER — POTASSIUM CHLORIDE 20 MEQ/1
40 TABLET, EXTENDED RELEASE ORAL PRN
Status: DISCONTINUED | OUTPATIENT
Start: 2023-08-14 | End: 2023-08-14 | Stop reason: CLARIF

## 2023-08-14 RX ORDER — ATROPINE SULFATE 0.1 MG/ML
0.5 INJECTION INTRAVENOUS EVERY 5 MIN PRN
Status: ACTIVE | OUTPATIENT
Start: 2023-08-15 | End: 2023-08-15

## 2023-08-14 RX ORDER — CARVEDILOL 25 MG/1
25 TABLET ORAL 2 TIMES DAILY
Status: DISCONTINUED | OUTPATIENT
Start: 2023-08-14 | End: 2023-08-19 | Stop reason: HOSPADM

## 2023-08-14 RX ORDER — NITROGLYCERIN 0.4 MG/1
0.4 TABLET SUBLINGUAL EVERY 5 MIN PRN
Status: ACTIVE | OUTPATIENT
Start: 2023-08-15 | End: 2023-08-15

## 2023-08-14 RX ORDER — CLOPIDOGREL BISULFATE 75 MG/1
75 TABLET ORAL DAILY
Status: DISCONTINUED | OUTPATIENT
Start: 2023-08-14 | End: 2023-08-19 | Stop reason: HOSPADM

## 2023-08-14 RX ORDER — ONDANSETRON 4 MG/1
4 TABLET, ORALLY DISINTEGRATING ORAL EVERY 8 HOURS PRN
Status: DISCONTINUED | OUTPATIENT
Start: 2023-08-14 | End: 2023-08-19 | Stop reason: HOSPADM

## 2023-08-14 RX ORDER — ALBUTEROL SULFATE 90 UG/1
2 AEROSOL, METERED RESPIRATORY (INHALATION) EVERY 6 HOURS PRN
Status: DISCONTINUED | OUTPATIENT
Start: 2023-08-14 | End: 2023-08-19 | Stop reason: HOSPADM

## 2023-08-14 RX ORDER — DEXTROSE MONOHYDRATE 100 MG/ML
INJECTION, SOLUTION INTRAVENOUS CONTINUOUS PRN
Status: DISCONTINUED | OUTPATIENT
Start: 2023-08-14 | End: 2023-08-19 | Stop reason: HOSPADM

## 2023-08-14 RX ORDER — PREDNISOLONE ACETATE 10 MG/ML
1 SUSPENSION/ DROPS OPHTHALMIC
COMMUNITY
Start: 2023-06-27

## 2023-08-14 RX ORDER — INSULIN LISPRO 100 [IU]/ML
15 INJECTION, SOLUTION INTRAVENOUS; SUBCUTANEOUS
Status: ON HOLD | COMMUNITY
End: 2023-08-19 | Stop reason: SDUPTHER

## 2023-08-14 RX ORDER — DIPHENHYDRAMINE HYDROCHLORIDE 50 MG/ML
12.5 INJECTION INTRAMUSCULAR; INTRAVENOUS EVERY 6 HOURS PRN
Status: DISCONTINUED | OUTPATIENT
Start: 2023-08-14 | End: 2023-08-19

## 2023-08-14 RX ORDER — SODIUM CHLORIDE 0.9 % (FLUSH) 0.9 %
10 SYRINGE (ML) INJECTION PRN
Status: DISCONTINUED | OUTPATIENT
Start: 2023-08-14 | End: 2023-08-19 | Stop reason: HOSPADM

## 2023-08-14 RX ORDER — MIDODRINE HYDROCHLORIDE 10 MG/1
30 TABLET ORAL DAILY
Status: DISCONTINUED | OUTPATIENT
Start: 2023-08-14 | End: 2023-08-17

## 2023-08-14 RX ORDER — SODIUM CHLORIDE 9 MG/ML
500 INJECTION, SOLUTION INTRAVENOUS CONTINUOUS PRN
Status: ACTIVE | OUTPATIENT
Start: 2023-08-15 | End: 2023-08-15

## 2023-08-14 RX ORDER — SODIUM CHLORIDE 0.9 % (FLUSH) 0.9 %
5-40 SYRINGE (ML) INJECTION PRN
Status: ACTIVE | OUTPATIENT
Start: 2023-08-15 | End: 2023-08-15

## 2023-08-14 RX ORDER — HYDRALAZINE HYDROCHLORIDE 50 MG/1
100 TABLET, FILM COATED ORAL 3 TIMES DAILY
Status: DISCONTINUED | OUTPATIENT
Start: 2023-08-14 | End: 2023-08-19 | Stop reason: HOSPADM

## 2023-08-14 RX ORDER — HEPARIN SODIUM 5000 [USP'U]/ML
5000 INJECTION, SOLUTION INTRAVENOUS; SUBCUTANEOUS EVERY 8 HOURS SCHEDULED
Status: DISCONTINUED | OUTPATIENT
Start: 2023-08-14 | End: 2023-08-19 | Stop reason: HOSPADM

## 2023-08-14 RX ORDER — OXYCODONE HYDROCHLORIDE 5 MG/1
5 TABLET ORAL EVERY 4 HOURS PRN
Status: DISCONTINUED | OUTPATIENT
Start: 2023-08-14 | End: 2023-08-17

## 2023-08-14 RX ORDER — AMINOPHYLLINE DIHYDRATE 25 MG/ML
50 INJECTION, SOLUTION INTRAVENOUS PRN
Status: ACTIVE | OUTPATIENT
Start: 2023-08-15 | End: 2023-08-15

## 2023-08-14 RX ADMIN — DIPHENHYDRAMINE HYDROCHLORIDE 12.5 MG: 50 INJECTION, SOLUTION INTRAMUSCULAR; INTRAVENOUS at 17:52

## 2023-08-14 RX ADMIN — OXYCODONE HYDROCHLORIDE 10 MG: 5 TABLET ORAL at 17:51

## 2023-08-14 RX ADMIN — SODIUM CHLORIDE, PRESERVATIVE FREE 10 ML: 5 INJECTION INTRAVENOUS at 21:36

## 2023-08-14 RX ADMIN — ATORVASTATIN CALCIUM 10 MG: 10 TABLET, FILM COATED ORAL at 21:36

## 2023-08-14 RX ADMIN — MIRTAZAPINE 15 MG: 15 TABLET, ORALLY DISINTEGRATING ORAL at 22:25

## 2023-08-14 RX ADMIN — NIFEDIPINE 90 MG: 90 TABLET, FILM COATED, EXTENDED RELEASE ORAL at 12:50

## 2023-08-14 RX ADMIN — CLOPIDOGREL BISULFATE 75 MG: 75 TABLET ORAL at 12:50

## 2023-08-14 RX ADMIN — ONDANSETRON 4 MG: 2 INJECTION INTRAMUSCULAR; INTRAVENOUS at 11:44

## 2023-08-14 RX ADMIN — INSULIN LISPRO 4 UNITS: 100 INJECTION, SOLUTION INTRAVENOUS; SUBCUTANEOUS at 17:20

## 2023-08-14 RX ADMIN — HYDRALAZINE HYDROCHLORIDE 100 MG: 50 TABLET, FILM COATED ORAL at 21:36

## 2023-08-14 RX ADMIN — HYDRALAZINE HYDROCHLORIDE 100 MG: 50 TABLET, FILM COATED ORAL at 12:50

## 2023-08-14 RX ADMIN — CARVEDILOL 25 MG: 25 TABLET, FILM COATED ORAL at 21:39

## 2023-08-14 RX ADMIN — HEPARIN SODIUM 5000 UNITS: 5000 INJECTION INTRAVENOUS; SUBCUTANEOUS at 14:02

## 2023-08-14 RX ADMIN — SODIUM CHLORIDE, PRESERVATIVE FREE 10 ML: 5 INJECTION INTRAVENOUS at 13:01

## 2023-08-14 RX ADMIN — ASPIRIN 81 MG: 81 TABLET, COATED ORAL at 12:51

## 2023-08-14 RX ADMIN — HYDROCODONE BITARTRATE AND ACETAMINOPHEN 1 TABLET: 5; 325 TABLET ORAL at 11:04

## 2023-08-14 RX ADMIN — LIDOCAINE HYDROCHLORIDE: 20 SOLUTION ORAL; TOPICAL at 13:13

## 2023-08-14 RX ADMIN — HEPARIN SODIUM 5000 UNITS: 5000 INJECTION INTRAVENOUS; SUBCUTANEOUS at 21:39

## 2023-08-14 ASSESSMENT — HEART SCORE: ECG: 0

## 2023-08-14 ASSESSMENT — ENCOUNTER SYMPTOMS
WHEEZING: 0
NAUSEA: 1
BACK PAIN: 0
SHORTNESS OF BREATH: 0
SINUS PRESSURE: 0
DIARRHEA: 0
ABDOMINAL PAIN: 0
CHEST TIGHTNESS: 0
EYE PAIN: 0
EYE DISCHARGE: 0
PHOTOPHOBIA: 0
CONSTIPATION: 0
FACIAL SWELLING: 0
COUGH: 1
SINUS PAIN: 0
ABDOMINAL DISTENTION: 1
ABDOMINAL DISTENTION: 0
VOMITING: 0

## 2023-08-14 ASSESSMENT — PAIN DESCRIPTION - LOCATION
LOCATION: CHEST
LOCATION: LEG

## 2023-08-14 ASSESSMENT — PAIN DESCRIPTION - DESCRIPTORS
DESCRIPTORS: SHARP
DESCRIPTORS: PRESSURE

## 2023-08-14 ASSESSMENT — PAIN SCALES - GENERAL
PAINLEVEL_OUTOF10: 9
PAINLEVEL_OUTOF10: 10
PAINLEVEL_OUTOF10: 0
PAINLEVEL_OUTOF10: 8

## 2023-08-14 ASSESSMENT — PAIN DESCRIPTION - ORIENTATION
ORIENTATION: RIGHT;LEFT
ORIENTATION: MID

## 2023-08-14 ASSESSMENT — PAIN - FUNCTIONAL ASSESSMENT
PAIN_FUNCTIONAL_ASSESSMENT: ACTIVITIES ARE NOT PREVENTED
PAIN_FUNCTIONAL_ASSESSMENT: 0-10

## 2023-08-14 NOTE — PROGRESS NOTES
endocrine office before then to follow-up. She reports that hypo- and hyper-glycemia feel similar and she is unable to distinguish between them without BG reading. Phosphate binder: Patient endorses Calcium Acetate use, but may only be using 1 cap TID or some other nonadherent regimen as it has not been filled in 10 months for #540/90-DS. She does report this is the agent she uses, and not Anat Mercedes, or sevelamer. PROVIDER ACTION REQUESTED  Medications that need to be addressed by a physician/nurse practitioner:    Medication Action Requested   Atorvastatin 10 mg   Patient takes 20 mg daily at home   Bumetanide Patient takes 4 mg BID at home   Clonidine Patient takes 0.2 mg TID at home   Calcium Acetate Please consider drawing Phosphorous levels and reordering if indicated. Please feel free to call me with any questions about this encounter. Thank you.     Diana Andrew, [facility]   Transitions of Care Pharmacy Service  Phone:  [phone]  Fax: [phone]      Electronically signed by Diana Andrew [facility] on 8/14/2023 at 3:32 PM       Medications Prior to Admission:   atorvastatin (LIPITOR) 20 MG tablet, Take 1 tablet by mouth daily  bumetanide (BUMEX) 2 MG tablet, Take 2 tablets by mouth 2 times daily  glucagon 1 MG injection, 1 mg as needed (for low blood sugar) Indications: Hypoglycemia  insulin detemir (LEVEMIR) 100 UNIT/ML injection pen, Inject 12 Units into the skin nightly  prednisoLONE acetate (PRED FORTE) 1 % ophthalmic suspension, Place 1 drop into the right eye Four times daily for 7 days, then 1 drop twice daily until gone  insulin lispro, 1 Unit Dial, (HUMALOG KWIKPEN) 100 UNIT/ML SOPN, Inject 15 Units into the skin 3 times daily (before meals)  cloNIDine (CATAPRES) 0.2 MG tablet, Take 1 tablet by mouth 3 times daily  mirtazapine (REMERON SOL-TAB) 15 MG disintegrating tablet, Take 1 tablet by mouth nightly  ondansetron (ZOFRAN-ODT) 4 MG disintegrating tablet, Take 1 tablet by mouth every 8 hours as needed for Nausea or Vomiting  acetaminophen (TYLENOL) 325 MG tablet, Take 650 mg by mouth every 6 hours as needed for Pain or Fever (do not exceed 3 gm in 24 hours)  albuterol sulfate HFA (PROVENTIL;VENTOLIN;PROAIR) 108 (90 Base) MCG/ACT inhaler, Inhale 2 puffs into the lungs every 6 hours as needed for Wheezing or Shortness of Breath  aspirin 81 MG EC tablet, Take 81 mg by mouth daily  calcium acetate (PHOSLO) 667 MG CAPS capsule, Take 3 capsules by mouth 3 times daily (with meals)  carvedilol (COREG) 25 MG tablet, Take 25 mg by mouth 2 times daily  Cholecalciferol 50 MCG (2000 UT) TABS, Take 2 tablets by mouth daily  clopidogrel (PLAVIX) 75 MG tablet, Take 75 mg by mouth daily  polyethylene glycol (GLYCOLAX) 17 GM/SCOOP powder, Take 17 g by mouth daily as needed (constipation)  hydrALAZINE (APRESOLINE) 100 MG tablet, Take 100 mg by mouth 3 times daily  midodrine (PROAMATINE) 10 MG tablet, Take 3 tablets by mouth three times a week Indications: Mon, Wed, Fri at dialysis  NIFEdipine (ADALAT CC) 90 MG extended release tablet, Take 90 mg by mouth daily  sennosides-docusate sodium (SENOKOT-S) 8.6-50 MG tablet, Take 2 tablets by mouth at bedtime  B Complex-C-Folic Acid (TRIPHROCAPS) 1 MG CAPS, Take 1 capsule by mouth daily

## 2023-08-14 NOTE — PROGRESS NOTES
The potassium/magnesium sliding scale has been automatically discontinued per [facility] approved policy because the patient has decreased renal function (CrCl<30 ml/min). The patient's current K/Mag levels are currently:    Recent Labs     08/14/23  0835   K 4.3   MG 2.0       Estimated Creatinine Clearance: 23 mL/min (A) (based on SCr of 3.7 mg/dL (H)). For patients with decreased renal function (below 30ml/min) needing potassium/magnesium supplementation, please order individual bolus doses with appropriate monitoring. Please contact the inpatient pharmacy at [phone] with any concerns. Thank you.     Anny Worthy, [facility]  8/14/2023  11:50 AM

## 2023-08-14 NOTE — H&P
[facility]  Office: [address], DO, Melo Woodard, DO, Cem Yates, DO, Maeve Carbajal Blood, DO, Rosalia Tamez MD, Arsenio Story MD, Nicolette Rosas MD, Romulo Quinonez MD,  Royce Schrader MD, Royal Wilkinson MD, Mary Brito, DO, Jg Cali MD,  Valarie Clay MD, López Collado MD, Ward Wise DO, Star Subramanian MD,  Rosio Rodriges DO, Wally Esqueda MD, Tammie Rodgers MD, Chu Redman MD, Eden Reilly MD,  Rome Morgan MD, Radha Arevalo MD, Baylee Simpson MD, Stanley Flores, DO, Yony Jeronimo MD,  Truong Bang MD, Maru Mcrae, CNP, Lauren Berg, CNP, Lesly Pichardo, CNP,  Fara Norwood, [facility], Blane Loomis, CNP, Jeimy Vora, CNP, Jennifer Tenorio, CNP, Dean Conner, CNP, Roselia Kaplan, CNP, Tito Landon PASADIQ, Rush Escobar, CNS, Chato Sullivan, CNP, Mono Florian, [facility]         [address] [facility]    HISTORY AND PHYSICAL EXAMINATION            Date:   8/14/2023  Patient name:  Jessica Medrano  Date of admission:  8/14/2023  8:10 AM  MRN:   [id number]  Account:  [de-identified]  YOB: 1990  PCP:    Erika Roy  Room:   2039/2039-01  Code Status:    Full Code    Chief Complaint:     Chief Complaint   Patient presents with    Chest Pain       History Obtained From:     patient    History of Present Illness:     Jessica Medrano is a 35 y.o. Non- / non  female who presents with Chest Pain   and is admitted to the hospital for the management of Chest pain. Patient reports to the emergency department from the dialysis center with an acute complaint of chest pain. She further reported allover body pain and worsening weakness as well as nausea. Patient is admitted for cardiac work-up. Patient was seen and evaluated on the floor where she is complaining of epigastric pain and burning in a vertical fashion. Further review of the patient reveals that her all of her pain is chronic and no worse than her baseline. She reports this chest pain came on suddenly and is rated at an 8/10. Lab work is reviewed and patient's troponins today are significantly better than her baseline. She reports she nearly completed her dialysis and metabolic derangements are also better than her baseline. Blood sugars are elevated and there is a clear knowledge deficit on the importance of managing her diabetes. Patient expressed understanding however noncompliance as documented well into her health record. Options for care discussed with the patient. Her symptoms seem to be more GI related and presentation is consistent with GERD. We will attempt a simple GI cocktail. If this alleviates her symptoms we will de-escalate and recommend outpatient work-up.   If GI cocktail is ineffective she will remain in the hospital for cardiac stress test.    Past Medical History:     Past Medical History:   Diagnosis Date    Asthma     Atherosclerosis of native arteries of extremities with rest pain, unspecified extremity ([address])     Depression 10/25/2022    Diabetes mellitus ([address])     since 12 y/o, unsure if type 1 or 2    Diabetic polyneuropathy associated with type 1 diabetes mellitus ([address]) 4/13/2023    Headache, migraine     for 2 years    Hypertension     Lichen simplex chronicus     Mixed hyperlipidemia 4/13/2023    Noninflammatory disorder of vagina, unspecified     Patient's noncompliance with other medical treatment and regimen for other reason     Type 2 diabetes mellitus without complication ([address]) 78/94/7325        Past Surgical History:     Past Surgical History:   Procedure Laterality Date    OVARY REMOVAL      July 2010    OVARY REMOVAL      UPPER GASTROINTESTINAL ENDOSCOPY N/A 10/28/2022    EGD BIOPSY performed by Mary Kay Dumont MD at [facility]        Medications Prior to Admission:     Prior to Admission medications    Medication Sig

## 2023-08-14 NOTE — PROGRESS NOTES
Patient admitted to room 2039 from ED. Hemodynamic monitoring attached, VSS. Oriented to room and plan of care. Patient c/o midsternal chest pain and nausea. Orders released. EKG done. 2LNC O2 applied. Medicated for nausea. Continue to monitor.

## 2023-08-15 ENCOUNTER — APPOINTMENT (OUTPATIENT)
Dept: NUCLEAR MEDICINE | Age: 33
DRG: 073 | End: 2023-08-15
Payer: COMMERCIAL

## 2023-08-15 ENCOUNTER — APPOINTMENT (OUTPATIENT)
Age: 33
DRG: 073 | End: 2023-08-15
Payer: COMMERCIAL

## 2023-08-15 LAB
ANION GAP SERPL CALCULATED.3IONS-SCNC: 11 MMOL/L (ref 9–17)
ANION GAP SERPL CALCULATED.3IONS-SCNC: 12 MMOL/L (ref 9–17)
BASOPHILS # BLD: 0.03 K/UL (ref 0–0.2)
BASOPHILS NFR BLD: 0 % (ref 0–2)
BUN SERPL-MCNC: 49 MG/DL (ref 6–20)
BUN SERPL-MCNC: 51 MG/DL (ref 6–20)
BUN/CREAT SERPL: 9 (ref 9–20)
BUN/CREAT SERPL: 9 (ref 9–20)
CALCIUM SERPL-MCNC: 8.9 MG/DL (ref 8.6–10.4)
CALCIUM SERPL-MCNC: 9.1 MG/DL (ref 8.6–10.4)
CHLORIDE SERPL-SCNC: 101 MMOL/L (ref 98–107)
CHLORIDE SERPL-SCNC: 103 MMOL/L (ref 98–107)
CO2 SERPL-SCNC: 22 MMOL/L (ref 20–31)
CO2 SERPL-SCNC: 24 MMOL/L (ref 20–31)
CREAT SERPL-MCNC: 5.4 MG/DL (ref 0.5–0.9)
CREAT SERPL-MCNC: 5.8 MG/DL (ref 0.5–0.9)
ECHO BSA: 1.96 M2
EOSINOPHIL # BLD: 0.14 K/UL (ref 0–0.44)
EOSINOPHILS RELATIVE PERCENT: 2 % (ref 1–4)
ERYTHROCYTE [DISTWIDTH] IN BLOOD BY AUTOMATED COUNT: 13.7 % (ref 11.8–14.4)
GFR SERPL CREATININE-BSD FRML MDRD: 10 ML/MIN/1.73M2
GFR SERPL CREATININE-BSD FRML MDRD: 9 ML/MIN/1.73M2
GLUCOSE BLD-MCNC: 114 MG/DL (ref 65–105)
GLUCOSE BLD-MCNC: 170 MG/DL (ref 65–105)
GLUCOSE SERPL-MCNC: 278 MG/DL (ref 70–99)
GLUCOSE SERPL-MCNC: 289 MG/DL (ref 70–99)
HCT VFR BLD AUTO: 35.9 % (ref 36.3–47.1)
HGB BLD-MCNC: 11.5 G/DL (ref 11.9–15.1)
IMM GRANULOCYTES # BLD AUTO: 0.03 K/UL (ref 0–0.3)
IMM GRANULOCYTES NFR BLD: 0 %
LYMPHOCYTES NFR BLD: 2.2 K/UL (ref 1.1–3.7)
LYMPHOCYTES RELATIVE PERCENT: 25 % (ref 24–43)
MCH RBC QN AUTO: 29.6 PG (ref 25.2–33.5)
MCHC RBC AUTO-ENTMCNC: 32 G/DL (ref 28.4–34.8)
MCV RBC AUTO: 92.5 FL (ref 82.6–102.9)
MONOCYTES NFR BLD: 0.53 K/UL (ref 0.1–1.2)
MONOCYTES NFR BLD: 6 % (ref 3–12)
NEUTROPHILS NFR BLD: 67 % (ref 36–65)
NEUTS SEG NFR BLD: 5.79 K/UL (ref 1.5–8.1)
NRBC BLD-RTO: 0 PER 100 WBC
PLATELET # BLD AUTO: 251 K/UL (ref 138–453)
PMV BLD AUTO: 10 FL (ref 8.1–13.5)
POTASSIUM SERPL-SCNC: 5.2 MMOL/L (ref 3.7–5.3)
POTASSIUM SERPL-SCNC: 5.6 MMOL/L (ref 3.7–5.3)
RBC # BLD AUTO: 3.88 M/UL (ref 3.95–5.11)
SODIUM SERPL-SCNC: 136 MMOL/L (ref 135–144)
SODIUM SERPL-SCNC: 137 MMOL/L (ref 135–144)
STRESS BASELINE DIAS BP: 59 MMHG
STRESS BASELINE HR: 93 BPM
STRESS BASELINE SYS BP: 124 MMHG
STRESS ESTIMATED WORKLOAD: 1 METS
STRESS O2 SAT PEAK: 98 %
STRESS O2 SAT REST: 98 %
STRESS PEAK DIAS BP: 59 MMHG
STRESS PEAK SYS BP: 124 MMHG
STRESS PERCENT HR ACHIEVED: 57 %
STRESS POST PEAK HR: 107 BPM
STRESS RATE PRESSURE PRODUCT: NORMAL BPM*MMHG
STRESS ST DEPRESSION: 0 MM
STRESS STAGE 1 BP: NORMAL MMHG
STRESS STAGE 1 DURATION: 1 MIN:SEC
STRESS STAGE 1 HR: 104 BPM
STRESS STAGE 2 DURATION: 1 MIN:SEC
STRESS STAGE 2 HR: 107 BPM
STRESS STAGE 3 BP: NORMAL MMHG
STRESS STAGE 3 DURATION: 1 MIN:SEC
STRESS STAGE 3 HR: 99 BPM
STRESS TARGET HR: 187 BPM
WBC OTHER # BLD: 8.7 K/UL (ref 3.5–11.3)

## 2023-08-15 PROCEDURE — 2580000003 HC RX 258: Performed by: NURSE PRACTITIONER

## 2023-08-15 PROCEDURE — 2700000000 HC OXYGEN THERAPY PER DAY

## 2023-08-15 PROCEDURE — 3430000000 HC RX DIAGNOSTIC RADIOPHARMACEUTICAL: Performed by: NURSE PRACTITIONER

## 2023-08-15 PROCEDURE — 99232 SBSQ HOSP IP/OBS MODERATE 35: CPT | Performed by: INTERNAL MEDICINE

## 2023-08-15 PROCEDURE — 6360000002 HC RX W HCPCS: Performed by: INTERNAL MEDICINE

## 2023-08-15 PROCEDURE — 97163 PT EVAL HIGH COMPLEX 45 MIN: CPT

## 2023-08-15 PROCEDURE — 1200000000 HC SEMI PRIVATE

## 2023-08-15 PROCEDURE — 6360000002 HC RX W HCPCS: Performed by: NURSE PRACTITIONER

## 2023-08-15 PROCEDURE — 2500000003 HC RX 250 WO HCPCS: Performed by: INTERNAL MEDICINE

## 2023-08-15 PROCEDURE — 97167 OT EVAL HIGH COMPLEX 60 MIN: CPT

## 2023-08-15 PROCEDURE — 6370000000 HC RX 637 (ALT 250 FOR IP): Performed by: INTERNAL MEDICINE

## 2023-08-15 PROCEDURE — 97535 SELF CARE MNGMENT TRAINING: CPT

## 2023-08-15 PROCEDURE — A9500 TC99M SESTAMIBI: HCPCS | Performed by: NURSE PRACTITIONER

## 2023-08-15 PROCEDURE — 6370000000 HC RX 637 (ALT 250 FOR IP): Performed by: NURSE PRACTITIONER

## 2023-08-15 PROCEDURE — 93005 ELECTROCARDIOGRAM TRACING: CPT | Performed by: INTERNAL MEDICINE

## 2023-08-15 PROCEDURE — 78452 HT MUSCLE IMAGE SPECT MULT: CPT

## 2023-08-15 PROCEDURE — 97530 THERAPEUTIC ACTIVITIES: CPT

## 2023-08-15 PROCEDURE — 85025 COMPLETE CBC W/AUTO DIFF WBC: CPT

## 2023-08-15 PROCEDURE — 80048 BASIC METABOLIC PNL TOTAL CA: CPT

## 2023-08-15 PROCEDURE — 36415 COLL VENOUS BLD VENIPUNCTURE: CPT

## 2023-08-15 PROCEDURE — 94761 N-INVAS EAR/PLS OXIMETRY MLT: CPT

## 2023-08-15 PROCEDURE — 93017 CV STRESS TEST TRACING ONLY: CPT

## 2023-08-15 PROCEDURE — 82947 ASSAY GLUCOSE BLOOD QUANT: CPT

## 2023-08-15 RX ORDER — INSULIN LISPRO 100 [IU]/ML
8 INJECTION, SOLUTION INTRAVENOUS; SUBCUTANEOUS
Status: DISCONTINUED | OUTPATIENT
Start: 2023-08-15 | End: 2023-08-15

## 2023-08-15 RX ORDER — SENNA AND DOCUSATE SODIUM 50; 8.6 MG/1; MG/1
2 TABLET, FILM COATED ORAL NIGHTLY
Status: DISCONTINUED | OUTPATIENT
Start: 2023-08-15 | End: 2023-08-19 | Stop reason: HOSPADM

## 2023-08-15 RX ORDER — TETRAKIS(2-METHOXYISOBUTYLISOCYANIDE)COPPER(I) TETRAFLUOROBORATE 1 MG/ML
40.4 INJECTION, POWDER, LYOPHILIZED, FOR SOLUTION INTRAVENOUS
Status: COMPLETED | OUTPATIENT
Start: 2023-08-15 | End: 2023-08-15

## 2023-08-15 RX ORDER — CALCIUM ACETATE 667 MG/1
3 CAPSULE ORAL
Status: DISCONTINUED | OUTPATIENT
Start: 2023-08-15 | End: 2023-08-19 | Stop reason: HOSPADM

## 2023-08-15 RX ORDER — INSULIN GLARGINE 100 [IU]/ML
15 INJECTION, SOLUTION SUBCUTANEOUS NIGHTLY
Status: DISCONTINUED | OUTPATIENT
Start: 2023-08-15 | End: 2023-08-19 | Stop reason: HOSPADM

## 2023-08-15 RX ORDER — VITAMIN B COMPLEX
4000 TABLET ORAL DAILY
Status: DISCONTINUED | OUTPATIENT
Start: 2023-08-15 | End: 2023-08-19 | Stop reason: HOSPADM

## 2023-08-15 RX ORDER — FOLIC ACID/VIT B COMPLEX AND C 0.8 MG
1 TABLET ORAL DAILY
Status: DISCONTINUED | OUTPATIENT
Start: 2023-08-15 | End: 2023-08-19 | Stop reason: HOSPADM

## 2023-08-15 RX ORDER — CLONIDINE HYDROCHLORIDE 0.1 MG/1
0.2 TABLET ORAL 3 TIMES DAILY
Status: DISCONTINUED | OUTPATIENT
Start: 2023-08-15 | End: 2023-08-15

## 2023-08-15 RX ORDER — METOCLOPRAMIDE HYDROCHLORIDE 5 MG/ML
5 INJECTION INTRAMUSCULAR; INTRAVENOUS EVERY 8 HOURS
Status: DISCONTINUED | OUTPATIENT
Start: 2023-08-15 | End: 2023-08-18

## 2023-08-15 RX ORDER — BUMETANIDE 1 MG/1
4 TABLET ORAL 2 TIMES DAILY
Status: DISCONTINUED | OUTPATIENT
Start: 2023-08-15 | End: 2023-08-19 | Stop reason: HOSPADM

## 2023-08-15 RX ORDER — ATORVASTATIN CALCIUM 20 MG/1
20 TABLET, FILM COATED ORAL DAILY
Status: DISCONTINUED | OUTPATIENT
Start: 2023-08-15 | End: 2023-08-19 | Stop reason: HOSPADM

## 2023-08-15 RX ORDER — TETRAKIS(2-METHOXYISOBUTYLISOCYANIDE)COPPER(I) TETRAFLUOROBORATE 1 MG/ML
18.2 INJECTION, POWDER, LYOPHILIZED, FOR SOLUTION INTRAVENOUS
Status: COMPLETED | OUTPATIENT
Start: 2023-08-15 | End: 2023-08-15

## 2023-08-15 RX ADMIN — INSULIN LISPRO 8 UNITS: 100 INJECTION, SOLUTION INTRAVENOUS; SUBCUTANEOUS at 09:36

## 2023-08-15 RX ADMIN — Medication 18.2 MILLICURIE: at 08:15

## 2023-08-15 RX ADMIN — CARVEDILOL 25 MG: 25 TABLET, FILM COATED ORAL at 09:33

## 2023-08-15 RX ADMIN — CALCIUM ACETATE 2001 MG: 667 CAPSULE ORAL at 13:04

## 2023-08-15 RX ADMIN — Medication 1 TABLET: at 13:04

## 2023-08-15 RX ADMIN — SODIUM CHLORIDE, PRESERVATIVE FREE 10 ML: 5 INJECTION INTRAVENOUS at 19:20

## 2023-08-15 RX ADMIN — HYDRALAZINE HYDROCHLORIDE 100 MG: 50 TABLET, FILM COATED ORAL at 20:19

## 2023-08-15 RX ADMIN — Medication 40.4 MILLICURIE: at 12:40

## 2023-08-15 RX ADMIN — HEPARIN SODIUM 5000 UNITS: 5000 INJECTION INTRAVENOUS; SUBCUTANEOUS at 20:20

## 2023-08-15 RX ADMIN — ATORVASTATIN CALCIUM 20 MG: 20 TABLET, FILM COATED ORAL at 13:12

## 2023-08-15 RX ADMIN — ONDANSETRON 4 MG: 2 INJECTION INTRAMUSCULAR; INTRAVENOUS at 17:32

## 2023-08-15 RX ADMIN — SODIUM CHLORIDE, PRESERVATIVE FREE 10 ML: 5 INJECTION INTRAVENOUS at 08:16

## 2023-08-15 RX ADMIN — METOCLOPRAMIDE HYDROCHLORIDE 5 MG: 5 INJECTION INTRAMUSCULAR; INTRAVENOUS at 19:19

## 2023-08-15 RX ADMIN — HEPARIN SODIUM 5000 UNITS: 5000 INJECTION INTRAVENOUS; SUBCUTANEOUS at 13:27

## 2023-08-15 RX ADMIN — SODIUM CHLORIDE, PRESERVATIVE FREE 10 ML: 5 INJECTION INTRAVENOUS at 09:37

## 2023-08-15 RX ADMIN — ASPIRIN 81 MG: 81 TABLET, COATED ORAL at 09:35

## 2023-08-15 RX ADMIN — INSULIN GLARGINE 15 UNITS: 100 INJECTION, SOLUTION SUBCUTANEOUS at 20:19

## 2023-08-15 RX ADMIN — Medication 4000 UNITS: at 13:04

## 2023-08-15 RX ADMIN — REGADENOSON 0.4 MG: 0.08 INJECTION, SOLUTION INTRAVENOUS at 08:16

## 2023-08-15 RX ADMIN — BUMETANIDE 4 MG: 1 TABLET ORAL at 13:03

## 2023-08-15 RX ADMIN — CLOPIDOGREL BISULFATE 75 MG: 75 TABLET ORAL at 09:35

## 2023-08-15 RX ADMIN — INSULIN LISPRO 8 UNITS: 100 INJECTION, SOLUTION INTRAVENOUS; SUBCUTANEOUS at 13:05

## 2023-08-15 RX ADMIN — HYDRALAZINE HYDROCHLORIDE 100 MG: 50 TABLET, FILM COATED ORAL at 13:04

## 2023-08-15 RX ADMIN — NIFEDIPINE 90 MG: 90 TABLET, FILM COATED, EXTENDED RELEASE ORAL at 09:35

## 2023-08-15 RX ADMIN — HYDRALAZINE HYDROCHLORIDE 100 MG: 50 TABLET, FILM COATED ORAL at 09:35

## 2023-08-15 RX ADMIN — CARVEDILOL 25 MG: 25 TABLET, FILM COATED ORAL at 20:18

## 2023-08-15 RX ADMIN — MIRTAZAPINE 15 MG: 15 TABLET, ORALLY DISINTEGRATING ORAL at 20:18

## 2023-08-15 RX ADMIN — HEPARIN SODIUM 5000 UNITS: 5000 INJECTION INTRAVENOUS; SUBCUTANEOUS at 05:32

## 2023-08-15 RX ADMIN — SENNOSIDES AND DOCUSATE SODIUM 2 TABLET: 50; 8.6 TABLET ORAL at 20:18

## 2023-08-15 RX ADMIN — BUMETANIDE 4 MG: 1 TABLET ORAL at 20:17

## 2023-08-15 RX ADMIN — MIDODRINE HYDROCHLORIDE 30 MG: 10 TABLET ORAL at 09:35

## 2023-08-15 NOTE — PROGRESS NOTES
Generally decreased, functional (question effort with MMT but able to support herself in standing (LEs))  Sensation: Impaired (neuropathy legs/ feet)                 Bed mobility  Supine to Sit: Stand by assistance  Sit to Supine: Stand by assistance  Transfers  Sit to Stand: Contact guard assistance (RW)  Stand to Sit: Contact guard assistance  Ambulation  Surface: Level tile  Device: Rolling Walker  Assistance: Minimal assistance  Quality of Gait: pt. amb. 5ft. fwd. and 5ft. backwards to bed; stated she needed to sit- did not say why. Stability of pt's LEs appeared questionable. Assisted pt. back to bed. Distance: 5ft fwd/ 5ft. backwards        Exercise Treatment: Pt. Instructed in UE/ LE AROM, all joints and all available planes of motion, to promote circulation and prevent stiffness. Pt. With good demo and understanding. OutComes Score                                                  AM-PAC Score  AM-PAC Inpatient Mobility Raw Score : 17 (08/15/23 1851)  AM-PAC Inpatient T-Scale Score : 42.13 (08/15/23 1851)  Mobility Inpatient CMS 0-100% Score: 50.57 (08/15/23 1851)  Mobility Inpatient CMS G-Code Modifier : CK (08/15/23 1851)          Tinneti Score       Goals  Short Term Goals  Time Frame for Short Term Goals: 12 visits:  Short Term Goal 1: Pt. to be indep with bed mob. Short Term Goal 2: Pt. to be SBA for sit to stand transfer with RW  Short Term Goal 3: Pt. to be CGA for transfer bed to w/c with RW  Short Term Goal 4: Pt. to be indep with W/C mobility  Short Term Goal 5: Pt. to amb. 20ft.  with RW, CGA  Patient Goals   Patient Goals : did not state       Education  Patient Education  Education Given To: Patient  Education Provided: Role of Therapy;Plan of Care  Education Provided Comments: walker safety; impt. of OOB  Education Method: Demonstration;Verbal  Barriers to Learning: Cognition  Education Outcome: Continued education needed      Therapy Time   Individual Concurrent Group Co-treatment Time In 1010         Time Out 1052         Minutes 42             Treatment time:  32min.        Ann Rogel, PT

## 2023-08-15 NOTE — PLAN OF CARE
family/caregiver on patient safety   Based on caregiver fall risk screen, instruct family/caregiver to ask for assistance with transferring infant if caregiver noted to have fall risk factors     Problem: ABCDS Injury Assessment  Goal: Absence of physical injury  8/15/2023 1118 by Harsh Gibbs RN  Outcome: Progressing  8/15/2023 0223 by Kat Guzman RN  Outcome: Progressing  Flowsheets (Taken 8/14/2023 2000)  Absence of Physical Injury: Implement safety measures based on patient assessment     Problem: Skin/Tissue Integrity  Goal: Absence of new skin breakdown  Description: 1. Monitor for areas of redness and/or skin breakdown  2. Assess vascular access sites hourly  3. Every 4-6 hours minimum:  Change oxygen saturation probe site  4. Every 4-6 hours:  If on nasal continuous positive airway pressure, respiratory therapy assess nares and determine need for appliance change or resting period.   8/15/2023 1118 by Harsh Gibbs RN  Outcome: Progressing  8/15/2023 0223 by Kat Guzman RN  Outcome: Progressing

## 2023-08-15 NOTE — PROGRESS NOTES
[facility]  Office: [address], DO, Celsa Dom, DO, Graciela Doles, DO, Willy Ha Blood, DO, Yuan Soto MD, Zoraida Mason MD, Miracle Whiting MD, Alison Downing MD,  Rosa Díaz MD, Mariah Kellogg MD, Anna Sheffield, DO, Fermín Amado MD,  Dolores Velez MD, Nelly Vidales MD, Nomi Nicole, DO, Rayne Laguna MD,  Davina Melgar DO, Paolo Landaverde MD, Heidy Pat MD, Marine Jarrett MD, Adela Ovalle MD,  Juliana Sosa MD, Ursula Francois MD, Hardeep Shea MD, Jorge Alberto Brenner, DO, Antonio Ruiz MD,  Daniel Merritt MD, Dolores Hui, CNP,  Bee Moreno, CNP, Halina Magdaleno, CNP, Hemalatha Cortez, CNP,  Francisco Javier Collins, [facility], Marcella Miles, CNP, Caleb Mccann, CNP, Phoenix Coats, CNP, Claudell Bowler, CNP, Deepak Johnson, CNP, Arlys Night, PA-C, Matt Wang, CNS, Mina Lugo, CNP, Velma Israel, [facility]    Progress Note    8/15/2023    11:08 AM    Name:   Brenton Acharya  MRN:     [id number]     Acct:      [de-identified]   Room:   2039/2039-01   Day:  1  Admit Date:  8/14/2023  8:10 AM    PCP:   Suzanna Bell  Code Status:  Full Code    Subjective:     C/C:   Chief Complaint   Patient presents with    Chest Pain     Interval History Status: not changed. Patient is resting in bed. She barely opens her eyes to speak to me. She denies any more pain. She is not SOB. She didn't eat lunch yet. She had her NM stress test this morning. She then turned over in bed and went back to sleep. Brief History:     Per the NP:  \"Kennedy Mckenna is a 35 y.o. Non- / non  female who presents with Chest Pain   and is admitted to the hospital for the management of Chest pain. Patient reports to the emergency department from the dialysis center with an acute complaint of chest pain.   She further reported allover body pain and worsening

## 2023-08-15 NOTE — PLAN OF CARE
Problem: Chronic Conditions and Co-morbidities  Goal: Patient's chronic conditions and co-morbidity symptoms are monitored and maintained or improved  Outcome: Progressing  Flowsheets (Taken 8/14/2023 2000)  Care Plan - Patient's Chronic Conditions and Co-Morbidity Symptoms are Monitored and Maintained or Improved:   Monitor and assess patient's chronic conditions and comorbid symptoms for stability, deterioration, or improvement   Collaborate with multidisciplinary team to address chronic and comorbid conditions and prevent exacerbation or deterioration   Update acute care plan with appropriate goals if chronic or comorbid symptoms are exacerbated and prevent overall improvement and discharge     Problem: Discharge Planning  Goal: Discharge to home or other facility with appropriate resources  Outcome: Progressing  Flowsheets (Taken 8/14/2023 2000)  Discharge to home or other facility with appropriate resources:   Identify barriers to discharge with patient and caregiver   Arrange for needed discharge resources and transportation as appropriate   Identify discharge learning needs (meds, wound care, etc)   Refer to discharge planning if patient needs post-hospital services based on physician order or complex needs related to functional status, cognitive ability or social support system     Problem: Pain  Goal: Verbalizes/displays adequate comfort level or baseline comfort level  Outcome: Progressing     Problem: Safety - Adult  Goal: Free from fall injury  Outcome: Progressing  Flowsheets (Taken 8/14/2023 2000)  Free From Fall Injury:   Instruct family/caregiver on patient safety   Based on caregiver fall risk screen, instruct family/caregiver to ask for assistance with transferring infant if caregiver noted to have fall risk factors     Problem: ABCDS Injury Assessment  Goal: Absence of physical injury  Outcome: Progressing  Flowsheets (Taken 8/14/2023 2000)  Absence of Physical Injury: Implement safety measures

## 2023-08-15 NOTE — FLOWSHEET NOTE
patient has been c/o nausea this evening and has been very fatigued all day. was able to work with therapy this afternoon and get up at bedside for a few minutes. she stated to me now she has mid-sternal chest pain 5/10 with EKG completed and /82 HR 90. A dinner tray was brought up for her but patient refuses to eat. I have not given her evening meds yet d/t patient's symptoms and spitting in the basin. her BS this evening was 114, did you still want me to give the phoslo and humalog?

## 2023-08-15 NOTE — CARE COORDINATION
Discharge planning    Patient Is lethargic. Wheel chair bound. Lives with boyfriend and son. Stress test done. Cardiology consulted. PT/OT to eval. HD patient. MWF. Non compliant. Will assess needs when patient AMS improves.

## 2023-08-15 NOTE — PROGRESS NOTES
Occupational Therapy  Facility/Department: [facility]  Occupational Therapy Initial Assessment    Name: Yecenia Tanner  : 1990  MRN: [id number]  Date of Service: 8/15/2023    Discharge Recommendations:  Patient would benefit from continued therapy after discharge   Due to recent hospitalization and medical condition, pt would benefit from additional intermittent skilled therapy at time of discharge. Please refer to the AM-PAC score for current functional status. RN reports patient is medically stable for therapy treatment this date. Chart reviewed prior to treatment and patient is agreeable for therapy. All lines intact and patient positioned comfortably at end of treatment. All patient needs addressed prior to ending therapy session. Patient Diagnosis(es): The primary encounter diagnosis was Refractory angina pectoris ([address]). A diagnosis of Chest pain, unspecified type was also pertinent to this visit. Past Medical History:  has a past medical history of Asthma, Atherosclerosis of native arteries of extremities with rest pain, unspecified extremity ([address]), Depression, Diabetes mellitus ([address]), Diabetic polyneuropathy associated with type 1 diabetes mellitus ([address]), Headache, migraine, Hypertension, Lichen simplex chronicus, Mixed hyperlipidemia, Noninflammatory disorder of vagina, unspecified, Patient's noncompliance with other medical treatment and regimen for other reason, and Type 2 diabetes mellitus without complication ([address]). Past Surgical History:  has a past surgical history that includes Ovary removal; Ovary removal; and Upper gastrointestinal endoscopy (N/A, 10/28/2022). Assessment   Performance deficits / Impairments: Decreased functional mobility ; Decreased ADL status; Decreased strength;Decreased safe awareness;Decreased cognition;Decreased endurance;Decreased balance;Decreased posture  Assessment: Skilled OT is indicated to increase overall safety awareness in

## 2023-08-15 NOTE — PROGRESS NOTES
[address]  PROGRESS NOTE    Room # 2039/2039-01   Name: [facility]              Reason for visit: Routine    I visited the patient. Admit Date & Time: 8/14/2023  8:10 AM    Assessment:  Rufus Dent is a 35 y.o. female. Upon entering the room patient was out of the room, possible tests. Intervention:   provided a ministry presence and brief prayer. Outcome:  Patient did not respond. Plan:  Chaplains will remain available to offer spiritual and emotional support as needed. Electronically signed by Chaplain Breanna, on 8/15/2023 at 9:26 AM.  [address]      08/15/23 5505   Encounter Summary   Encounter Overview/Reason  Attempted Encounter   Service Provided For: Patient not available   Referral/Consult From: [facility]   Last Encounter  08/15/23   Complexity of Encounter Low   Begin Time 0850   End Time  0851   Total Time Calculated 1 min   Assessment/Intervention/Outcome   Assessment Unable to assess   Intervention Prayer (assurance of)/[place];Sustaining Presence/Ministry of presence

## 2023-08-15 NOTE — PROGRESS NOTES
RN rounded on patient to see if she had been repositioning herself as per the turning orders (please see order). RN observed patient in the same position as a few hours before. RN informed patient of turning schedule and that if she will not be repositioning herself in bed, staff will offer and assist with repositioning. RN offered to turn patient at this time. Patient refused stating \"I do not want to be repositioned, I am comfortable where I am comfortable and I will move when I want to move. \" RN educated patient on the risks of refusing the turning schedule. Patient verbalized understanding of the education at this time.

## 2023-08-16 PROBLEM — E88.89 KETOSIS (HCC): Status: ACTIVE | Noted: 2023-08-16

## 2023-08-16 LAB
ALBUMIN SERPL-MCNC: 4.2 G/DL (ref 3.5–5.2)
ALP SERPL-CCNC: 68 U/L (ref 35–104)
ALT SERPL-CCNC: 13 U/L (ref 5–33)
ANION GAP SERPL CALCULATED.3IONS-SCNC: 18 MMOL/L (ref 9–17)
AST SERPL-CCNC: 10 U/L
B-OH-BUTYR SERPL-MCNC: 2.77 MMOL/L (ref 0.02–0.27)
BASOPHILS # BLD: 0.04 K/UL (ref 0–0.2)
BASOPHILS NFR BLD: 0 % (ref 0–2)
BILIRUB DIRECT SERPL-MCNC: 0.1 MG/DL
BILIRUB INDIRECT SERPL-MCNC: 0.1 MG/DL (ref 0–1)
BILIRUB SERPL-MCNC: 0.2 MG/DL (ref 0.3–1.2)
BUN SERPL-MCNC: 56 MG/DL (ref 6–20)
BUN/CREAT SERPL: 9 (ref 9–20)
CALCIUM SERPL-MCNC: 9.7 MG/DL (ref 8.6–10.4)
CHLORIDE SERPL-SCNC: 98 MMOL/L (ref 98–107)
CO2 SERPL-SCNC: 18 MMOL/L (ref 20–31)
CREAT SERPL-MCNC: 6.2 MG/DL (ref 0.5–0.9)
EKG ATRIAL RATE: 91 BPM
EKG P AXIS: 60 DEGREES
EKG P-R INTERVAL: 156 MS
EKG Q-T INTERVAL: 388 MS
EKG QRS DURATION: 88 MS
EKG QTC CALCULATION (BAZETT): 477 MS
EKG R AXIS: 79 DEGREES
EKG T AXIS: 58 DEGREES
EKG VENTRICULAR RATE: 91 BPM
EOSINOPHIL # BLD: <0.03 K/UL (ref 0–0.44)
EOSINOPHILS RELATIVE PERCENT: 0 % (ref 1–4)
ERYTHROCYTE [DISTWIDTH] IN BLOOD BY AUTOMATED COUNT: 13.8 % (ref 11.8–14.4)
GFR SERPL CREATININE-BSD FRML MDRD: 9 ML/MIN/1.73M2
GLUCOSE BLD-MCNC: 110 MG/DL (ref 65–105)
GLUCOSE BLD-MCNC: 201 MG/DL (ref 65–105)
GLUCOSE BLD-MCNC: 222 MG/DL (ref 65–105)
GLUCOSE SERPL-MCNC: 190 MG/DL (ref 70–99)
HCT VFR BLD AUTO: 42.1 % (ref 36.3–47.1)
HGB BLD-MCNC: 12.9 G/DL (ref 11.9–15.1)
IMM GRANULOCYTES # BLD AUTO: 0.05 K/UL (ref 0–0.3)
IMM GRANULOCYTES NFR BLD: 0 %
LACTATE BLDV-SCNC: 0.7 MMOL/L (ref 0.5–1.9)
LACTATE BLDV-SCNC: 0.8 MMOL/L (ref 0.5–1.9)
LIPASE SERPL-CCNC: 64 U/L (ref 13–60)
LYMPHOCYTES NFR BLD: 2.54 K/UL (ref 1.1–3.7)
LYMPHOCYTES RELATIVE PERCENT: 20 % (ref 24–43)
MCH RBC QN AUTO: 29.1 PG (ref 25.2–33.5)
MCHC RBC AUTO-ENTMCNC: 30.6 G/DL (ref 28.4–34.8)
MCV RBC AUTO: 94.8 FL (ref 82.6–102.9)
MONOCYTES NFR BLD: 0.26 K/UL (ref 0.1–1.2)
MONOCYTES NFR BLD: 2 % (ref 3–12)
NEUTROPHILS NFR BLD: 78 % (ref 36–65)
NEUTS SEG NFR BLD: 9.98 K/UL (ref 1.5–8.1)
NRBC BLD-RTO: 0 PER 100 WBC
PLATELET # BLD AUTO: 307 K/UL (ref 138–453)
PMV BLD AUTO: 9.6 FL (ref 8.1–13.5)
POTASSIUM SERPL-SCNC: 5.1 MMOL/L (ref 3.7–5.3)
PROT SERPL-MCNC: 7.7 G/DL (ref 6.4–8.3)
RBC # BLD AUTO: 4.44 M/UL (ref 3.95–5.11)
SODIUM SERPL-SCNC: 134 MMOL/L (ref 135–144)
WBC OTHER # BLD: 12.9 K/UL (ref 3.5–11.3)

## 2023-08-16 PROCEDURE — 6360000002 HC RX W HCPCS: Performed by: INTERNAL MEDICINE

## 2023-08-16 PROCEDURE — 6370000000 HC RX 637 (ALT 250 FOR IP): Performed by: NURSE PRACTITIONER

## 2023-08-16 PROCEDURE — C9113 INJ PANTOPRAZOLE SODIUM, VIA: HCPCS | Performed by: INTERNAL MEDICINE

## 2023-08-16 PROCEDURE — 2500000003 HC RX 250 WO HCPCS: Performed by: INTERNAL MEDICINE

## 2023-08-16 PROCEDURE — 83605 ASSAY OF LACTIC ACID: CPT

## 2023-08-16 PROCEDURE — 99232 SBSQ HOSP IP/OBS MODERATE 35: CPT | Performed by: INTERNAL MEDICINE

## 2023-08-16 PROCEDURE — 2580000003 HC RX 258: Performed by: NURSE PRACTITIONER

## 2023-08-16 PROCEDURE — 36415 COLL VENOUS BLD VENIPUNCTURE: CPT

## 2023-08-16 PROCEDURE — 2580000003 HC RX 258: Performed by: INTERNAL MEDICINE

## 2023-08-16 PROCEDURE — 94761 N-INVAS EAR/PLS OXIMETRY MLT: CPT

## 2023-08-16 PROCEDURE — 85025 COMPLETE CBC W/AUTO DIFF WBC: CPT

## 2023-08-16 PROCEDURE — 87077 CULTURE AEROBIC IDENTIFY: CPT

## 2023-08-16 PROCEDURE — 82947 ASSAY GLUCOSE BLOOD QUANT: CPT

## 2023-08-16 PROCEDURE — 80048 BASIC METABOLIC PNL TOTAL CA: CPT

## 2023-08-16 PROCEDURE — 87186 SC STD MICRODIL/AGAR DIL: CPT

## 2023-08-16 PROCEDURE — 83690 ASSAY OF LIPASE: CPT

## 2023-08-16 PROCEDURE — 5A1D70Z PERFORMANCE OF URINARY FILTRATION, INTERMITTENT, LESS THAN 6 HOURS PER DAY: ICD-10-PCS | Performed by: INTERNAL MEDICINE

## 2023-08-16 PROCEDURE — 82010 KETONE BODYS QUAN: CPT

## 2023-08-16 PROCEDURE — 86317 IMMUNOASSAY INFECTIOUS AGENT: CPT

## 2023-08-16 PROCEDURE — 80076 HEPATIC FUNCTION PANEL: CPT

## 2023-08-16 PROCEDURE — 6360000002 HC RX W HCPCS: Performed by: NURSE PRACTITIONER

## 2023-08-16 PROCEDURE — 90935 HEMODIALYSIS ONE EVALUATION: CPT

## 2023-08-16 PROCEDURE — A4216 STERILE WATER/SALINE, 10 ML: HCPCS | Performed by: INTERNAL MEDICINE

## 2023-08-16 PROCEDURE — 87340 HEPATITIS B SURFACE AG IA: CPT

## 2023-08-16 PROCEDURE — 6370000000 HC RX 637 (ALT 250 FOR IP): Performed by: INTERNAL MEDICINE

## 2023-08-16 PROCEDURE — 1200000000 HC SEMI PRIVATE

## 2023-08-16 PROCEDURE — 87086 URINE CULTURE/COLONY COUNT: CPT

## 2023-08-16 RX ORDER — DEXTROSE AND SODIUM CHLORIDE 5; .45 G/100ML; G/100ML
INJECTION, SOLUTION INTRAVENOUS CONTINUOUS
Status: ACTIVE | OUTPATIENT
Start: 2023-08-16 | End: 2023-08-16

## 2023-08-16 RX ORDER — LABETALOL HYDROCHLORIDE 5 MG/ML
20 INJECTION, SOLUTION INTRAVENOUS ONCE
Status: COMPLETED | OUTPATIENT
Start: 2023-08-16 | End: 2023-08-16

## 2023-08-16 RX ADMIN — CALCIUM ACETATE 2001 MG: 667 CAPSULE ORAL at 09:58

## 2023-08-16 RX ADMIN — INSULIN GLARGINE 15 UNITS: 100 INJECTION, SOLUTION SUBCUTANEOUS at 21:49

## 2023-08-16 RX ADMIN — HEPARIN SODIUM 5000 UNITS: 5000 INJECTION INTRAVENOUS; SUBCUTANEOUS at 21:49

## 2023-08-16 RX ADMIN — HEPARIN SODIUM 5000 UNITS: 5000 INJECTION INTRAVENOUS; SUBCUTANEOUS at 14:00

## 2023-08-16 RX ADMIN — HEPARIN SODIUM 5000 UNITS: 5000 INJECTION INTRAVENOUS; SUBCUTANEOUS at 05:25

## 2023-08-16 RX ADMIN — LABETALOL HYDROCHLORIDE 20 MG: 5 INJECTION, SOLUTION INTRAVENOUS at 21:51

## 2023-08-16 RX ADMIN — SODIUM CHLORIDE, PRESERVATIVE FREE 10 ML: 5 INJECTION INTRAVENOUS at 10:11

## 2023-08-16 RX ADMIN — SODIUM CHLORIDE, PRESERVATIVE FREE 10 ML: 5 INJECTION INTRAVENOUS at 19:35

## 2023-08-16 RX ADMIN — SODIUM CHLORIDE 40 MG: 9 INJECTION INTRAMUSCULAR; INTRAVENOUS; SUBCUTANEOUS at 13:03

## 2023-08-16 RX ADMIN — Medication 4000 UNITS: at 09:58

## 2023-08-16 RX ADMIN — ATORVASTATIN CALCIUM 20 MG: 20 TABLET, FILM COATED ORAL at 09:58

## 2023-08-16 RX ADMIN — BUMETANIDE 4 MG: 1 TABLET ORAL at 09:58

## 2023-08-16 RX ADMIN — Medication 1 TABLET: at 09:58

## 2023-08-16 RX ADMIN — ASPIRIN 81 MG: 81 TABLET, COATED ORAL at 09:58

## 2023-08-16 RX ADMIN — METOCLOPRAMIDE HYDROCHLORIDE 5 MG: 5 INJECTION INTRAMUSCULAR; INTRAVENOUS at 19:35

## 2023-08-16 RX ADMIN — INSULIN LISPRO 4 UNITS: 100 INJECTION, SOLUTION INTRAVENOUS; SUBCUTANEOUS at 09:56

## 2023-08-16 RX ADMIN — NIFEDIPINE 90 MG: 90 TABLET, FILM COATED, EXTENDED RELEASE ORAL at 09:57

## 2023-08-16 RX ADMIN — HYDRALAZINE HYDROCHLORIDE 100 MG: 50 TABLET, FILM COATED ORAL at 09:57

## 2023-08-16 RX ADMIN — METOCLOPRAMIDE HYDROCHLORIDE 5 MG: 5 INJECTION INTRAMUSCULAR; INTRAVENOUS at 10:10

## 2023-08-16 RX ADMIN — INSULIN LISPRO 4 UNITS: 100 INJECTION, SOLUTION INTRAVENOUS; SUBCUTANEOUS at 13:03

## 2023-08-16 RX ADMIN — ONDANSETRON 4 MG: 2 INJECTION INTRAMUSCULAR; INTRAVENOUS at 10:23

## 2023-08-16 RX ADMIN — METOCLOPRAMIDE HYDROCHLORIDE 5 MG: 5 INJECTION INTRAMUSCULAR; INTRAVENOUS at 02:49

## 2023-08-16 RX ADMIN — MIDODRINE HYDROCHLORIDE 30 MG: 10 TABLET ORAL at 09:57

## 2023-08-16 RX ADMIN — OXYCODONE HYDROCHLORIDE 10 MG: 5 TABLET ORAL at 10:19

## 2023-08-16 RX ADMIN — CLOPIDOGREL BISULFATE 75 MG: 75 TABLET ORAL at 09:58

## 2023-08-16 RX ADMIN — CARVEDILOL 25 MG: 25 TABLET, FILM COATED ORAL at 09:58

## 2023-08-16 RX ADMIN — DEXTROSE AND SODIUM CHLORIDE: 5; 450 INJECTION, SOLUTION INTRAVENOUS at 14:06

## 2023-08-16 ASSESSMENT — PAIN DESCRIPTION - LOCATION: LOCATION: ABDOMEN

## 2023-08-16 ASSESSMENT — PAIN SCALES - GENERAL: PAINLEVEL_OUTOF10: 9

## 2023-08-16 NOTE — PROGRESS NOTES
Pt has been very drowsy and lethargic this morning only nodding and moaning to voice and eye opening to voice, complains of Abdominal pain and nausea, pt took PO morning meds and received zofran and oxycodone. Pt then projectile vomited large quantity in bed about 45mins after zofran and PO meds given, some medications were in pt vomit. Vitals , /81, SPO2 96% RA. Temp. 99.1 oral. WBC have increased from 8.7 to 12.9.  Will have dialysis at 3pm.    Attending physician notified of pt status  Responded and stated to stop oxycodone and will come see pt bedside

## 2023-08-16 NOTE — CONSULTS
GASTROENTEROLOGY CONSULT       REASON FOR CONSULT:  intractable n/v    REQUESTING PHYSICIAN:  Guido Pryor MD    HISTORY OF PRESENT ILLNESS:    The patient is a 35 y.o. female who presents with chest pain, intractable n/v. Patient has been observed to be having significant vomiting, projectile. No hematemesis. No reported diarrhea, or melena or hematochezia. Patient appartently has chronic intermittently chronic n/v. Hx ESRD, diabetes H A1C not to bad, but blood sugars have been poorly controlled. Going for dialysis today at 3pm. She does point to her esophagus when asked where her pain is. Vitals have been stable. EGD 10/28/22  Findings:     Retropharyngeal area was grossly normal appearing     Esophagus: abnormal: Severe esophagitis with most likely Candida esophagitis biopsies were taken    Stomach:     Severe gastritis with erythema and edema throughout the entire stomach biopsies were taken     Retained liquid in the stomach indicating delay in emptying     Duodenum:     Descending: normal    Bulb: abnormal:   Mild duodenitis     The scope was removed and the patient tolerated the procedure well. Recommendations/Plan:   F/U Biopsies  Diflucan  PPI  Okay to discharge and follow as an outpatient     Electronically signed by Katelyn Cox MD  on 10/28/2022 at 1:38 PM     -- Diagnosis --     A. Stomach, biopsy:     -  Mild chronic gastritis. -  Negative for Helicobacter. B.  Esophagus, biopsy:     -  Marked acute neutrophilic esophagitis with ulceration.     -  Negative for fungal elements and viral inclusions.      MEDICAL HISTORY:   Past Medical History:   Diagnosis Date    Asthma     Atherosclerosis of native arteries of extremities with rest pain, unspecified extremity ([address])     Depression 10/25/2022    Diabetes mellitus ([address])     since 12 y/o, unsure if type 1 or 2    Diabetic polyneuropathy associated with type 1 diabetes mellitus ([address]) 4/13/2023    Headache, migraine     for 2 years    Hypertension     Lichen simplex chronicus     Mixed hyperlipidemia 4/13/2023    Noninflammatory disorder of vagina, unspecified     Patient's noncompliance with other medical treatment and regimen for other reason     Type 2 diabetes mellitus without complication ([address] [facility]) 62/85/6126       SURGICAL HISTORY:  Past Surgical History:   Procedure Laterality Date    OVARY REMOVAL      July 2010    OVARY REMOVAL      UPPER GASTROINTESTINAL ENDOSCOPY N/A 10/28/2022    EGD BIOPSY performed by Elliott Rinne, MD at [address] [place] [address]:  Prior to Admission medications    Medication Sig Start Date End Date Taking?  Authorizing Provider   atorvastatin (LIPITOR) 20 MG tablet Take 1 tablet by mouth daily 8/4/23  Yes Historical Provider, MD   bumetanide (BUMEX) 2 MG tablet Take 2 tablets by mouth 2 times daily 7/28/23  Yes Historical Provider, MD   glucagon 1 MG injection 1 mg as needed (for low blood sugar) Indications: Hypoglycemia 4/13/23  Yes Historical Provider, MD   insulin detemir (LEVEMIR) 100 UNIT/ML injection pen Inject 12 Units into the skin nightly 4/13/23  Yes Historical Provider, MD   prednisoLONE acetate (PRED FORTE) 1 % ophthalmic suspension Place 1 drop into the right eye Four times daily for 7 days, then 1 drop twice daily until gone 6/27/23  Yes Historical Provider, MD   insulin lispro, 1 Unit Dial, (HUMALOG KWIKPEN) 100 UNIT/ML SOPN Inject 15 Units into the skin 3 times daily (before meals)   Yes Historical Provider, MD   cloNIDine (CATAPRES) 0.2 MG tablet Take 1 tablet by mouth 3 times daily 7/5/23   Historical Provider, MD   mirtazapine (REMERON SOL-TAB) 15 MG disintegrating tablet Take 1 tablet by mouth nightly 10/31/22   Gabbi Estevez MD   ondansetron (ZOFRAN-ODT) 4 MG disintegrating tablet Take 1 tablet by mouth every 8 hours as needed for Nausea or Vomiting 10/21/22   Nick Odonnell MD   acetaminophen (TYLENOL) 325 MG tablet Take 650 mg by mouth every 6 hours as needed for Pain or Fever (do not

## 2023-08-16 NOTE — PROGRESS NOTES
Reason for Follow up: End-stage renal disease  Hypertension  Atypical chest pain  Nausea and vomiting  Type 1 diabetes  Spectated gastroparesis      HISTORY:    Patient was seen and examined discussed with RN patient had a large vomiting today she also had some loose stools. Denies any chest pain palpitation dysuria hematuria lab data reviewed she is due for dialysis today    Review Of Systems:   Constitutional: No fever, chills, lethargy, weakness or wt loss. Cardiac:  No chest pain, dyspnea, orthopnea or PND. Pulmonary:  No cough, phlegm or wheezing. Abdomen:  No abdominal pain, nausea, vomiting or diarrhea. :   No hematuria, pyuria, dysuria or flank pain. Extremities:  No swelling or joint pains. Review Of Systems:   Constitutional: No fever, chills, lethargy, weakness or wt loss. HEENT:  No headache, nasal discharge or sore throat. Cardiac:  No chest pain, dyspnea, orthopnea or PND. Pulmonary:  No cough, phlegm or wheezing. Abdomen:  No abdominal pain, nausea, vomiting or diarrhea. Neuro:  No gross focal weakness, numbness, abnormal movements or seizure like activity. Skin:   No rashes or itching. :   No hematuria, pyuria, dysuria or flank pain. Extremities:  No swelling or joint pains. Endocrine: No polyuria, polydypsia, or thyroid problems. Hematology:    No bleeding disorders, bruising or anemia. All other ROS is negative. Unable to obtain because he is intubated and sedated.      Scheduled Meds:   atorvastatin  20 mg Oral Daily    gabe-david  1 tablet Oral Daily    bumetanide  4 mg Oral BID    calcium acetate  3 capsule Oral TID     Vitamin D  4,000 Units Oral Daily    sennosides-docusate sodium  2 tablet Oral Nightly    insulin glargine  15 Units SubCUTAneous Nightly    metoclopramide  5 mg IntraVENous q8h    aspirin  81 mg Oral Daily    carvedilol  25 mg Oral BID    clopidogrel  75 mg Oral Daily    hydrALAZINE  100 mg Oral TID    midodrine  30 mg Oral Daily    mirtazapine  15 mg 08/16/2023    MCV 94.8 08/16/2023     08/16/2023     BMP:    Lab Results   Component Value Date     (L) 08/16/2023    K 5.1 08/16/2023    CL 98 08/16/2023    CO2 18 (L) 08/16/2023    BUN 56 (H) 08/16/2023    CREATININE 6.2 (HH) 08/16/2023    GLUCOSE 190 (H) 08/16/2023     CMP:   Lab Results   Component Value Date     (L) 08/16/2023    K 5.1 08/16/2023    CL 98 08/16/2023    CO2 18 (L) 08/16/2023    BUN 56 (H) 08/16/2023    CREATININE 6.2 (HH) 08/16/2023    GLUCOSE 190 (H) 08/16/2023    CALCIUM 9.7 08/16/2023    PROT 7.0 11/08/2022    LABALBU 3.3 (L) 11/08/2022    BILITOT <0.1 (L) 11/08/2022    ALKPHOS 104 11/08/2022    AST 38 (H) 11/08/2022    ALT 32 11/08/2022      Hepatic:   Lab Results   Component Value Date    AST 38 (H) 11/08/2022    ALT 32 11/08/2022    BILITOT <0.1 (L) 11/08/2022    ALKPHOS 104 11/08/2022     BNP: No results found for: BNP  Lipids:   Lab Results   Component Value Date    CHOL 216 (H) 12/06/2015    HDL 37 (L) 12/06/2015     INR:   Lab Results   Component Value Date    INR 2.1 10/10/2022     PTH: No results found for: PTH  Phosphorus:    Lab Results   Component Value Date    PHOS 4.5 10/21/2022     Ionized Calcium: No results found for: IONCA  Magnesium:   Lab Results   Component Value Date    MG 2.0 08/14/2023     Albumin:   Lab Results   Component Value Date    LABALBU 3.3 (L) 11/08/2022     Last 3 CK, CKMB, Troponin: @LABRCNT(CKTOTAL:3,CKMB:3,TROPONINI:3)       URINE:)No results found for: Christobal Gain    Radiology:   Reviewed. Assessment:  End-stage renal disease  Type 1 diabetes  Gastroparesis  Hyperkalemia  Atypical chest pain      Plan: Dialysis today orders reviewed with hemodialysis RN    Avoid hypotension, nephrotoxic drugs, Lovenox, Fleets enema and IV contrast exposure. Follow up labs ordered for AM. Please call on call nephrologist if iv contrast is needed    Please do not hesitate to call with questions. We will follow with you.       Electronically signed by

## 2023-08-16 NOTE — PROGRESS NOTES
Occupational [facility]  Occupational Therapy Not Seen    DATE: 2023    NAME: Zo Kidd  MRN: [id number]   : 1990    Patient not seen this date for Occupational Therapy due to:      [x] Cancel by RN or physician due to: RN reported pt is lethargic and drowsy, only nodding and moaning to voice. Patient is not appropriate for skilled OT services at this time. [] Hemodialysis    [] Critical Lab Value Level     [] Blood transfusion in progress    [] Acute or unstable cardiovascular status   _MAP < 55 or more than >115  _HR < 40 or > 130    [] Acute or unstable pulmonary status   -FiO2 > 60%   _RR < 5 or >40    _O2 sats < 85%    [] Strict Bedrest    [] Off Unit for surgery or procedure    [] Off Unit for testing       [] Pending imaging to R/O fracture    [] Refusal by Patient      [] Other      [] OT being discontinued at this time. Patient independent. No further needs. [] OT being discontinued at this time as the patient has been transferred to hospice care. No further needs.     RON Nagy/JOSEFINA

## 2023-08-16 NOTE — CONSULTS
[facility]  Inpatient Cardiology Consult             Date:   8/16/2023  Patient name: Anam Mullins  Date of admission:  8/14/2023  8:10 AM  MRN:   [id number]  YOB: 1990      Reason for consultation:  Chest pain    CHIEF COMPLAINT:  Nausea/ vomiting     History Obtained From:  Patient and medical record    HISTORY OF PRESENT ILLNESS:    The patient is a 35 y.o woman with h/o ESRD, HTN, HLP and Type I DM with h/o Candida esophagitis, admitted 8/14 from the ED after developing recurrent nausea, vomiting and epigastric pain during HD. She had some radiation of pain to the substernal area. EKG shows no acute changes, with serial troponins stable at 63 and 68 ng/L. Lexiscan stress test yesterday showed no ischemia or infarction, with LVEF 46% and normal segmental LV wall motion. Past Medical History:   has a past medical history of Asthma, Atherosclerosis of native arteries of extremities with rest pain, unspecified extremity ([address]), Depression, Diabetes mellitus ([address]), Diabetic polyneuropathy associated with type 1 diabetes mellitus ([address]), Headache, migraine, Hypertension, Lichen simplex chronicus, Mixed hyperlipidemia, Noninflammatory disorder of vagina, unspecified, Patient's noncompliance with other medical treatment and regimen for other reason, and Type 2 diabetes mellitus without complication ([address]). Past Surgical History:   has a past surgical history that includes Ovary removal; Ovary removal; and Upper gastrointestinal endoscopy (N/A, 10/28/2022). Home Medications:    Prior to Admission medications    Medication Sig Start Date End Date Taking?  Authorizing Provider   atorvastatin (LIPITOR) 20 MG tablet Take 1 tablet by mouth daily 8/4/23  Yes Historical Provider, MD   bumetanide (BUMEX) 2 MG tablet Take 2 tablets by mouth 2 times daily 7/28/23  Yes Historical Provider, MD   glucagon 1 MG injection 1 mg as needed (for low blood sugar) Indications: lipase    Generalized abdominal pain    Depression    Constipation    Severe malnutrition (HCC)    Chronic anemia    Diabetic polyneuropathy associated with type 1 diabetes mellitus ([address])    Essential hypertension, benign    Mixed hyperlipidemia    Mild intermittent asthma    Adjustment disorder with depressed mood    Hyperglycemia due to diabetes mellitus (HCC)    Ketosis (HCC)       RECOMMENDATIONS:  Continue ASA, Plavix and atorvastatin  Continue carvedilol and hydralazine  Continue midodrine. GI evaluation. Discussed with patient and nursing.     Electronically signed by Jeremías Ha MD on 8/16/2023 at 3:00 PM.  [facility] cardiology Consultant

## 2023-08-16 NOTE — PROGRESS NOTES
HEMODIALYSIS POST TREATMENT NOTE    Treatment time ordered: 3.5h    Actual treatment time: 3.5h    UltraFiltration Goal: 1l  UltraFiltration Removed: 1l      Pre Treatment weight: 79.9kh  Post Treatment weight: 78.8kg  Estimated Dry Weight: waiting on records    Access used:     Central Venous Catheter:          Tunneled or Non-tunneled: na           Site: na          Access Flow: na      Internal Access:       AV Fistula or AV Graft: avf         Site: left arm       Access Flow: good       Sign and symptoms of infection: no       If YES: na    Medications or blood products given: no    Chronic outpatient schedule: mwf    Chronic outpatient unit: [facility] renal [facility] on brandie, per aunt    Summary of response to treatment: siddhartha well    Explain if orders NOT met, was physician notified:gama      ACES flowsheet faxed to patient unit/ placed in patient chart: yes    Post assessment completed: yes    Report given to: presley boles rn      * Intra-treatment documented Safety Checks include the followin) Access and face visible at all times. 2) All connections and blood lines are secure with no kinks. 3) NVL alarm engaged. 4) Hemosafe device applied (if applicable). 5) No collapse of Arterial or Venous blood chambers. 6) All blood lines / pump segments in the air detectors.

## 2023-08-16 NOTE — PLAN OF CARE
Problem: Chronic Conditions and Co-morbidities  Goal: Patient's chronic conditions and co-morbidity symptoms are monitored and maintained or improved  Outcome: [id number] [facility] - Patient's Chronic Conditions and Co-Morbidity Symptoms are Monitored and Maintained or Improved:   Monitor and assess patient's chronic conditions and comorbid symptoms for stability, deterioration, or improvement   Collaborate with multidisciplinary team to address chronic and comorbid conditions and prevent exacerbation or deterioration   Update acute care plan with appropriate goals if chronic or comorbid symptoms are exacerbated and prevent overall improvement and discharge     Problem: Discharge Planning  Goal: Discharge to home or other facility with appropriate resources  Outcome: Progressing  Flowsheets   Discharge to home or other facility with appropriate resources:   Identify barriers to discharge with patient and caregiver   Arrange for needed discharge resources and transportation as appropriate   Identify discharge learning needs (meds, wound care, etc)       Problem: Metabolic/Fluid and Electrolytes - Adult  Goal: Hemodynamic stability and optimal renal function maintained  Outcome: Progressing  Flowsheets   Hemodynamic stability and optimal renal function maintained:   Monitor labs and assess for signs and symptoms of volume excess or deficit   Monitor intake, output and patient weight   Monitor response to interventions for patient's volume status, including labs, urine output, blood pressure (other measures as available)   Encourage oral intake as appropriate   Instruct patient on fluid and nutrition restrictions as appropriate  Goal: Glucose maintained within prescribed range  Outcome: Progressing  Flowsheets   Glucose maintained within prescribed range:   Monitor blood glucose as ordered   Assess for signs and symptoms of hyperglycemia and hypoglycemia   Administer ordered medications to maintain

## 2023-08-16 NOTE — PROGRESS NOTES
HEMODIALYSIS PRE-TREATMENT NOTE    Patient Identifiers prior to treatment: name  mrn    Isolation Required: vre                      Isolation Type: contact       (please document if patient is being managed as a PUI/COVID-19 patient)        Hepatitis status:                           Date Drawn                             Result  Hepatitis B Surface Antigen 23     neg                     Hepatitis B Surface Antibody na na        Hepatitis B Core Antibody na na          How was Hepatitis Status verified: yes     Was a copy of the labs you documented provided to facility for the patient's chart: yes    Hemodialysis orders verified: yes    Access Within normal limits ( I.e. s/s of infection,...): yes     Pre-Assessment completed: yes    Pre-dialysis report received from: clarita perez rn                      Time: 1500

## 2023-08-16 NOTE — PLAN OF CARE
Problem: Chronic Conditions and Co-morbidities  Goal: Patient's chronic conditions and co-morbidity symptoms are monitored and maintained or improved  Outcome: Progressing     Problem: Discharge Planning  Goal: Discharge to home or other facility with appropriate resources  Outcome: Progressing     Problem: Pain  Goal: Verbalizes/displays adequate comfort level or baseline comfort level  Outcome: Progressing     Problem: Safety - Adult  Goal: Free from fall injury  Outcome: Progressing     Problem: ABCDS Injury Assessment  Goal: Absence of physical injury  Outcome: Progressing     Problem: Skin/Tissue Integrity  Goal: Absence of new skin breakdown  Description: 1. Monitor for areas of redness and/or skin breakdown  2. Assess vascular access sites hourly  3. Every 4-6 hours minimum:  Change oxygen saturation probe site  4. Every 4-6 hours:  If on nasal continuous positive airway pressure, respiratory therapy assess nares and determine need for appliance change or resting period.   Outcome: Progressing     Problem: Cardiovascular - Adult  Goal: Maintains optimal cardiac output and hemodynamic stability  Outcome: Progressing  Goal: Absence of cardiac dysrhythmias or at baseline  Outcome: Progressing     Problem: Metabolic/Fluid and Electrolytes - Adult  Goal: Hemodynamic stability and optimal renal function maintained  Outcome: Progressing  Goal: Glucose maintained within prescribed range  Outcome: Progressing

## 2023-08-17 ENCOUNTER — ANESTHESIA EVENT (OUTPATIENT)
Dept: OPERATING ROOM | Age: 33
End: 2023-08-17
Payer: COMMERCIAL

## 2023-08-17 ENCOUNTER — ANESTHESIA (OUTPATIENT)
Dept: OPERATING ROOM | Age: 33
End: 2023-08-17
Payer: COMMERCIAL

## 2023-08-17 LAB
ANION GAP SERPL CALCULATED.3IONS-SCNC: 17 MMOL/L (ref 9–17)
BACTERIA URNS QL MICRO: ABNORMAL
BILIRUB UR QL STRIP: NEGATIVE
BUN SERPL-MCNC: 24 MG/DL (ref 6–20)
BUN/CREAT SERPL: 6 (ref 9–20)
CALCIUM SERPL-MCNC: 9.7 MG/DL (ref 8.6–10.4)
CHLORIDE SERPL-SCNC: 99 MMOL/L (ref 98–107)
CLARITY UR: ABNORMAL
CO2 SERPL-SCNC: 20 MMOL/L (ref 20–31)
COLOR UR: YELLOW
CREAT SERPL-MCNC: 4.1 MG/DL (ref 0.5–0.9)
EPI CELLS #/AREA URNS HPF: ABNORMAL /HPF (ref 0–5)
ERYTHROCYTE [DISTWIDTH] IN BLOOD BY AUTOMATED COUNT: 13.7 % (ref 11.8–14.4)
GFR SERPL CREATININE-BSD FRML MDRD: 14 ML/MIN/1.73M2
GLUCOSE BLD-MCNC: 128 MG/DL (ref 65–105)
GLUCOSE BLD-MCNC: 136 MG/DL (ref 65–105)
GLUCOSE BLD-MCNC: 162 MG/DL (ref 65–105)
GLUCOSE BLD-MCNC: 171 MG/DL (ref 65–105)
GLUCOSE BLD-MCNC: 188 MG/DL (ref 65–105)
GLUCOSE SERPL-MCNC: 123 MG/DL (ref 70–99)
GLUCOSE UR STRIP-MCNC: NEGATIVE MG/DL
HBV SURFACE AB SERPL IA-ACNC: 288.7 MIU/ML
HBV SURFACE AG SERPL QL IA: NONREACTIVE
HCG UR QL: NEGATIVE
HCT VFR BLD AUTO: 42 % (ref 36.3–47.1)
HGB BLD-MCNC: 13.2 G/DL (ref 11.9–15.1)
HGB UR QL STRIP.AUTO: ABNORMAL
KETONES UR STRIP-MCNC: ABNORMAL MG/DL
LEUKOCYTE ESTERASE UR QL STRIP: ABNORMAL
MCH RBC QN AUTO: 30 PG (ref 25.2–33.5)
MCHC RBC AUTO-ENTMCNC: 31.4 G/DL (ref 28.4–34.8)
MCV RBC AUTO: 95.5 FL (ref 82.6–102.9)
NITRITE UR QL STRIP: NEGATIVE
NRBC BLD-RTO: 0 PER 100 WBC
PH UR STRIP: 8 [PH] (ref 5–8)
PLATELET # BLD AUTO: 315 K/UL (ref 138–453)
PMV BLD AUTO: 9.8 FL (ref 8.1–13.5)
POTASSIUM SERPL-SCNC: 4.5 MMOL/L (ref 3.7–5.3)
PROT UR STRIP-MCNC: ABNORMAL MG/DL
RBC # BLD AUTO: 4.4 M/UL (ref 3.95–5.11)
RBC #/AREA URNS HPF: ABNORMAL /HPF (ref 0–2)
SODIUM SERPL-SCNC: 136 MMOL/L (ref 135–144)
SP GR UR STRIP: 1.01 (ref 1–1.03)
UROBILINOGEN UR STRIP-ACNC: NORMAL EU/DL (ref 0–1)
WBC #/AREA URNS HPF: ABNORMAL /HPF (ref 0–5)
WBC OTHER # BLD: 12.2 K/UL (ref 3.5–11.3)

## 2023-08-17 PROCEDURE — 6360000002 HC RX W HCPCS: Performed by: INTERNAL MEDICINE

## 2023-08-17 PROCEDURE — 7100000000 HC PACU RECOVERY - FIRST 15 MIN: Performed by: INTERNAL MEDICINE

## 2023-08-17 PROCEDURE — 2580000003 HC RX 258: Performed by: INTERNAL MEDICINE

## 2023-08-17 PROCEDURE — 3700000000 HC ANESTHESIA ATTENDED CARE: Performed by: INTERNAL MEDICINE

## 2023-08-17 PROCEDURE — C9113 INJ PANTOPRAZOLE SODIUM, VIA: HCPCS | Performed by: INTERNAL MEDICINE

## 2023-08-17 PROCEDURE — 0DB68ZX EXCISION OF STOMACH, VIA NATURAL OR ARTIFICIAL OPENING ENDOSCOPIC, DIAGNOSTIC: ICD-10-PCS | Performed by: INTERNAL MEDICINE

## 2023-08-17 PROCEDURE — 6370000000 HC RX 637 (ALT 250 FOR IP): Performed by: INTERNAL MEDICINE

## 2023-08-17 PROCEDURE — 80048 BASIC METABOLIC PNL TOTAL CA: CPT

## 2023-08-17 PROCEDURE — 7100000001 HC PACU RECOVERY - ADDTL 15 MIN: Performed by: INTERNAL MEDICINE

## 2023-08-17 PROCEDURE — 2500000003 HC RX 250 WO HCPCS: Performed by: NURSE ANESTHETIST, CERTIFIED REGISTERED

## 2023-08-17 PROCEDURE — 6360000002 HC RX W HCPCS

## 2023-08-17 PROCEDURE — 81025 URINE PREGNANCY TEST: CPT

## 2023-08-17 PROCEDURE — 2580000003 HC RX 258: Performed by: NURSE ANESTHETIST, CERTIFIED REGISTERED

## 2023-08-17 PROCEDURE — 36415 COLL VENOUS BLD VENIPUNCTURE: CPT

## 2023-08-17 PROCEDURE — 2000000000 HC ICU R&B

## 2023-08-17 PROCEDURE — 85027 COMPLETE CBC AUTOMATED: CPT

## 2023-08-17 PROCEDURE — 6360000002 HC RX W HCPCS: Performed by: NURSE PRACTITIONER

## 2023-08-17 PROCEDURE — 6360000002 HC RX W HCPCS: Performed by: NURSE ANESTHETIST, CERTIFIED REGISTERED

## 2023-08-17 PROCEDURE — 88342 IMHCHEM/IMCYTCHM 1ST ANTB: CPT

## 2023-08-17 PROCEDURE — 2060000000 HC ICU INTERMEDIATE R&B

## 2023-08-17 PROCEDURE — 97535 SELF CARE MNGMENT TRAINING: CPT

## 2023-08-17 PROCEDURE — 88305 TISSUE EXAM BY PATHOLOGIST: CPT

## 2023-08-17 PROCEDURE — 82947 ASSAY GLUCOSE BLOOD QUANT: CPT

## 2023-08-17 PROCEDURE — 3609012400 HC EGD TRANSORAL BIOPSY SINGLE/MULTIPLE: Performed by: INTERNAL MEDICINE

## 2023-08-17 PROCEDURE — 81001 URINALYSIS AUTO W/SCOPE: CPT

## 2023-08-17 PROCEDURE — 99232 SBSQ HOSP IP/OBS MODERATE 35: CPT | Performed by: INTERNAL MEDICINE

## 2023-08-17 PROCEDURE — A4216 STERILE WATER/SALINE, 10 ML: HCPCS | Performed by: INTERNAL MEDICINE

## 2023-08-17 PROCEDURE — 2709999900 HC NON-CHARGEABLE SUPPLY: Performed by: INTERNAL MEDICINE

## 2023-08-17 RX ORDER — SODIUM CHLORIDE 9 MG/ML
INJECTION, SOLUTION INTRAVENOUS CONTINUOUS PRN
Status: DISCONTINUED | OUTPATIENT
Start: 2023-08-17 | End: 2023-08-17 | Stop reason: SDUPTHER

## 2023-08-17 RX ORDER — PROCHLORPERAZINE EDISYLATE 5 MG/ML
10 INJECTION INTRAMUSCULAR; INTRAVENOUS EVERY 6 HOURS PRN
Status: DISCONTINUED | OUTPATIENT
Start: 2023-08-17 | End: 2023-08-18

## 2023-08-17 RX ORDER — ACETAMINOPHEN 650 MG/1
650 SUPPOSITORY RECTAL EVERY 6 HOURS PRN
Status: DISCONTINUED | OUTPATIENT
Start: 2023-08-17 | End: 2023-08-17 | Stop reason: SDUPTHER

## 2023-08-17 RX ORDER — LIDOCAINE HYDROCHLORIDE 10 MG/ML
INJECTION, SOLUTION EPIDURAL; INFILTRATION; INTRACAUDAL; PERINEURAL PRN
Status: DISCONTINUED | OUTPATIENT
Start: 2023-08-17 | End: 2023-08-17 | Stop reason: SDUPTHER

## 2023-08-17 RX ORDER — PROPOFOL 10 MG/ML
INJECTION, EMULSION INTRAVENOUS PRN
Status: DISCONTINUED | OUTPATIENT
Start: 2023-08-17 | End: 2023-08-17 | Stop reason: SDUPTHER

## 2023-08-17 RX ORDER — HYDRALAZINE HYDROCHLORIDE 20 MG/ML
10 INJECTION INTRAMUSCULAR; INTRAVENOUS EVERY 6 HOURS PRN
Status: DISCONTINUED | OUTPATIENT
Start: 2023-08-17 | End: 2023-08-19 | Stop reason: HOSPADM

## 2023-08-17 RX ORDER — PROMETHAZINE HYDROCHLORIDE 25 MG/ML
12.5 INJECTION, SOLUTION INTRAMUSCULAR; INTRAVENOUS EVERY 6 HOURS PRN
Status: DISCONTINUED | OUTPATIENT
Start: 2023-08-17 | End: 2023-08-17

## 2023-08-17 RX ORDER — HYDRALAZINE HYDROCHLORIDE 20 MG/ML
10 INJECTION INTRAMUSCULAR; INTRAVENOUS ONCE
Status: COMPLETED | OUTPATIENT
Start: 2023-08-17 | End: 2023-08-17

## 2023-08-17 RX ORDER — HYDRALAZINE HYDROCHLORIDE 20 MG/ML
INJECTION INTRAMUSCULAR; INTRAVENOUS
Status: COMPLETED
Start: 2023-08-17 | End: 2023-08-17

## 2023-08-17 RX ORDER — ONDANSETRON 2 MG/ML
INJECTION INTRAMUSCULAR; INTRAVENOUS PRN
Status: DISCONTINUED | OUTPATIENT
Start: 2023-08-17 | End: 2023-08-17 | Stop reason: SDUPTHER

## 2023-08-17 RX ORDER — OXYCODONE HYDROCHLORIDE 5 MG/1
5 TABLET ORAL EVERY 6 HOURS PRN
Status: DISCONTINUED | OUTPATIENT
Start: 2023-08-17 | End: 2023-08-19 | Stop reason: HOSPADM

## 2023-08-17 RX ADMIN — HYDRALAZINE HYDROCHLORIDE 10 MG: 20 INJECTION INTRAMUSCULAR; INTRAVENOUS at 15:23

## 2023-08-17 RX ADMIN — HEPARIN SODIUM 5000 UNITS: 5000 INJECTION INTRAVENOUS; SUBCUTANEOUS at 21:35

## 2023-08-17 RX ADMIN — PROPOFOL 100 MG: 10 INJECTION, EMULSION INTRAVENOUS at 14:19

## 2023-08-17 RX ADMIN — ONDANSETRON 8 MG: 2 INJECTION INTRAMUSCULAR; INTRAVENOUS at 14:14

## 2023-08-17 RX ADMIN — SODIUM CHLORIDE, PRESERVATIVE FREE 10 ML: 5 INJECTION INTRAVENOUS at 21:36

## 2023-08-17 RX ADMIN — SODIUM CHLORIDE: 9 INJECTION, SOLUTION INTRAVENOUS at 14:10

## 2023-08-17 RX ADMIN — PROPOFOL 50 MG: 10 INJECTION, EMULSION INTRAVENOUS at 14:18

## 2023-08-17 RX ADMIN — LIDOCAINE HYDROCHLORIDE 50 MG: 10 INJECTION, SOLUTION EPIDURAL; INFILTRATION; INTRACAUDAL; PERINEURAL at 14:18

## 2023-08-17 RX ADMIN — CEFTRIAXONE SODIUM 1000 MG: 1 INJECTION, POWDER, FOR SOLUTION INTRAMUSCULAR; INTRAVENOUS at 12:59

## 2023-08-17 RX ADMIN — MIRTAZAPINE 15 MG: 15 TABLET, ORALLY DISINTEGRATING ORAL at 21:35

## 2023-08-17 RX ADMIN — CARVEDILOL 25 MG: 25 TABLET, FILM COATED ORAL at 21:35

## 2023-08-17 RX ADMIN — METOCLOPRAMIDE HYDROCHLORIDE 5 MG: 5 INJECTION INTRAMUSCULAR; INTRAVENOUS at 17:29

## 2023-08-17 RX ADMIN — SODIUM CHLORIDE 40 MG: 9 INJECTION INTRAMUSCULAR; INTRAVENOUS; SUBCUTANEOUS at 12:53

## 2023-08-17 RX ADMIN — ONDANSETRON 4 MG: 2 INJECTION INTRAMUSCULAR; INTRAVENOUS at 03:07

## 2023-08-17 RX ADMIN — SENNOSIDES AND DOCUSATE SODIUM 2 TABLET: 50; 8.6 TABLET ORAL at 21:35

## 2023-08-17 RX ADMIN — HYDRALAZINE HYDROCHLORIDE 10 MG: 20 INJECTION, SOLUTION INTRAMUSCULAR; INTRAVENOUS at 17:35

## 2023-08-17 RX ADMIN — INSULIN GLARGINE 15 UNITS: 100 INJECTION, SOLUTION SUBCUTANEOUS at 21:35

## 2023-08-17 RX ADMIN — MIRTAZAPINE 15 MG: 15 TABLET, ORALLY DISINTEGRATING ORAL at 22:15

## 2023-08-17 RX ADMIN — HYDRALAZINE HYDROCHLORIDE 100 MG: 50 TABLET, FILM COATED ORAL at 21:35

## 2023-08-17 ASSESSMENT — ENCOUNTER SYMPTOMS: SHORTNESS OF BREATH: 0

## 2023-08-17 NOTE — CARE COORDINATION
Case Management Assessment  Initial Evaluation    Date/Time of Evaluation: 8/17/2023 2:25 PM  Assessment Completed by: Mayela Laurent RN    If patient is discharged prior to next notation, then this note serves as note for discharge by case management. Patient Name: Damien Lmabert                   YOB: 1990  Diagnosis: Chest pain [R07.9]  Chest pain, unspecified type [R07.9]  Refractory angina pectoris ([address]) [I20.2]                   Date / Time: 8/14/2023  8:10 AM    Patient Admission Status: Inpatient   Readmission Risk (Low < 19, Mod (19-27), High > 27): Readmission Risk Score: 15.5    Current PCP: Kit Joyce  PCP verified by CM? Yes    Chart Reviewed: Yes      History Provided by: Patient  Patient Orientation: Alert and Oriented, Person, Place, Situation, Self    Patient Cognition: Alert    Hospitalization in the last 30 days (Readmission):  No    If yes, Readmission Assessment in  Navigator will be completed. Advance Directives:      Code Status: Full Code   Patient's Primary Decision Maker is: Legal Next of Kin      Discharge Planning:    Patient lives with: Spouse/Significant Other, Children Type of Home: House  Primary Care Giver: Self  Patient Support Systems include: Spouse/Significant Other   Current Financial resources: None  Current community resources: None  Current services prior to admission: Durable Medical Equipment            Current DME: Shower Chair, Wheelchair, Walker            Type of Home Care services:  PT, OT, Skilled Therapy, Aide Services    ADLS  Prior functional level: Assistance with the following:  Current functional level: Assistance with the following:, Shopping, Mobility, Cooking, Housework    PT AM-PAC: 17 /24  OT AM-PAC: 19 /24    Family can provide assistance at DC: No  Would you like Case Management to discuss the discharge plan with any other family members/significant others, and if so, who?  No  Plans to Return to Present Housing:

## 2023-08-17 NOTE — ANESTHESIA POSTPROCEDURE EVALUATION
Department of Anesthesiology  Postprocedure Note    Patient: Kei Marmolejo  MRN: [id number]  YOB: 1990  Date of evaluation: 8/17/2023      Procedure Summary     Date: 08/17/23 Room / Location: [place] [id number] / [facility] - INPATIENT    Anesthesia Start: 1414 Anesthesia Stop: 1430    Procedure: EGD BIOPSY (Mouth) Diagnosis:       Nausea and vomiting, unspecified vomiting type      (Nausea and vomiting, unspecified vomiting type [R11.2])    Surgeons: Frank Reyes MD Responsible Provider: Jeimy Stubbs MD    Anesthesia Type: General ASA Status: 4          Anesthesia Type: General    Tarsha Phase I: Tarsha Score: 8    Tarsha Phase II:        Anesthesia Post Evaluation    Complications: no

## 2023-08-17 NOTE — PROGRESS NOTES
Physical Therapy  DATE: 2023    NAME: Zo Kidd  MRN: [id number]   : 1990    Patient not seen this date for Physical Therapy due to:      [] Cancel by RN or physician due to:    [] Hemodialysis    [] Critical Lab Value Level     [] Blood transfusion in progress    [] Acute or unstable cardiovascular status   _MAP < 55 or more than >115  _HR < 40 or > 130    [] Acute or unstable pulmonary status   -FiO2 > 60%   _RR < 5 or >40    _O2 sats < 85%    [] Strict Bedrest    [] Off Unit for surgery or procedure    [x] Off Unit for testing  EGD      [] Pending imaging to R/O fracture    [] Refusal by Patient      [] Other      [] PT being discontinued at this time. Patient independent. No further needs. [] PT being discontinued at this time as the patient has been transferred to hospice care. No further needs.       Sara Mccallum, PT

## 2023-08-17 NOTE — DISCHARGE INSTR - COC
Continuity of Care Form    Patient Name: Marimar Claros   :  1990  MRN:  [id number]    Admit date:  2023  Discharge date:  ***    Code Status Order: Full Code   Advance Directives:     Admitting Physician:  Raine Mcgill MD  PCP: Charles Torres    Discharging Nurse: [facility] Unit/Room#: [facility] OR Pool/NONE  Discharging Unit Phone Number: ***    Emergency Contact:   Extended Emergency Contact Information  Primary Emergency Contact: Sage Chowdhury Phone: [phone]  Relation: Parent  Secondary Emergency Contact: [id number]. Phone: [phone]  Relation: Brother/Sister    Past Surgical History:  Past Surgical History:   Procedure Laterality Date    OVARY REMOVAL      2010    OVARY REMOVAL      UPPER GASTROINTESTINAL ENDOSCOPY N/A 10/28/2022    EGD BIOPSY performed by Kacy Gonzalez MD at [facility] AND [facility] ENDO       Immunization History: There is no immunization history on file for this patient.     Active Problems:  Patient Active Problem List   Diagnosis Code    Threatened  O20.0    Ovarian cyst N83.209    Hx of  section Z98.891    Asthma J45.909    Diabetes ([address]) E11.9    Uncontrolled diabetes mellitus [id number]    Vitamin D deficiency E55.9    Chronic fatigue R53.82    Medically noncompliant Z91.199    Type 2 diabetes mellitus without complication ([address]) H87.2    Dizziness R42    Type 1 diabetes mellitus without complication (HCC) V57.3    HTN, goal below 140/90 I10    Intractable nausea and vomiting R11.2    Microalbuminuria R80.9    Pain of right lower extremity M79.604    Pain in both lower extremities M79.604, M79.605    Lethargy R53.83    Diabetic ketoacidosis associated with type 1 diabetes mellitus (HCC) E10.10    Chest pain R07.9    Diabetic ketoacidosis without coma associated with type 1 diabetes mellitus (HCC) E10.10    Pancreatitis, unspecified pancreatitis type K85.90    Acute urinary retention R33.8    Gastroenteritis K52.9    Elevated Labs or Other Treatments After Discharge: ***    Physician Certification: I certify the above information and transfer of Caryn Rivers  is necessary for the continuing treatment of the diagnosis listed and that she requires {Admit to Appropriate Level of Care:15642} for {GREATER/LESS:643516883} 30 days.      Update Admission H&P: [facility]27[facility]}    PHYSICIAN SIGNATURE:  {Esignature:[id number]}

## 2023-08-17 NOTE — PROGRESS NOTES
Occupational Therapy  Facility/Department: [facility]  Daily Treatment Note  NAME: Jenny Amaral  : 1990  MRN: [id number]    Date of Service: 2023    Discharge Recommendations:  Patient would benefit from continued therapy after discharge     Patient Diagnosis(es): The primary encounter diagnosis was Refractory angina pectoris ([address]). A diagnosis of Chest pain, unspecified type was also pertinent to this visit. Assessment    Assessment: Patient is limited with dizziness, nausea, and lethargy. Patient sat EOB for bathing tasks, however before pt able to complete full bathing session, pt became dizzy and laid down into bed. Attempted to take BP 2x, but pt continued to move and unable to get a BP reading until the 3rd attmept which read: 157/67 MAP (88) and RN aware. Activity Tolerance: Treatment limited secondary to medical complications (Nausea and dizziness)  Discharge Recommendations: Patient would benefit from continued therapy after discharge      Plan   Occupational Therapy Plan  Times Per Week: 4-5x/week  Current Treatment Recommendations: Strengthening;Balance training;Functional mobility training;Self-Care / ADL; Safety education & training; Endurance training;Patient/Caregiver education & training;Equipment evaluation, education, & procurement;Positioning;Cognitive/Perceptual training     Restrictions   Restrictions/Precautions  Restrictions/Precautions: Fall Risk, General Precautions  Position Activity Restriction  Other position/activity restrictions: 2L O2, Rt. UE IV    Subjective   Subjective  Subjective: \"Yes. \"  Orientation  Overall Orientation Status: Within Functional Limits  Cognition  Overall Cognitive Status: Exceptions  Arousal/Alertness: Appropriate responses to stimuli  Following Commands:  Follows multistep commands with repitition  Attention Span: Attends with cues to redirect  Memory: Decreased short term memory  Safety Judgement: Decreased awareness of need for To: Patient  Education Provided: Role of Therapy;Plan of Care;Home Exercise Program;Precautions; ADL Adaptive Strategies;Transfer Training;Energy Conservation; Fall Prevention Strategies; Equipment  Education Provided Comments: use of call light, benefits of OOB activity  Education Method: Demonstration;Verbal  Education Outcome: Continued education needed    Goals  Short Term Goals  Time Frame for Short Term Goals: by discharge, pt will  Short Term Goal 1: demo and verb good understanding of ed provided on fall prevention techs, EC/WS techs, equip needs, B UE HEP, and d/c recommendations  Short Term Goal 2: demo SBA with ADL transfers with approp AD/DME and good safety  Short Term Goal 3: demo SBA with toileting routine with DME as needed and good safety  Short Term Goal 4: demo SBA with UB ADLs and CGA with LB ADLs with AE/DME as needed and good safety/pacing  Short Term Goal 5: demo SBA/CGA with short distance functional mob (to simulate distance would walk at home) with RW and min cues for safety  Patient Goals   Patient goals : not stated       Therapy Time   Individual Concurrent Group Co-treatment   Time In 0854         Time Out 0921         Minutes 27          Upon writer exit, call light within reach, pt retired to bed. All lines intact and patient positioned comfortably. All patient needs addressed prior to ending therapy session. Chart reviewed prior to treatment and patient is agreeable for therapy. RN reports patient is medically stable for therapy treatment this date.      RON Barker/JOSEFINA

## 2023-08-17 NOTE — FLOWSHEET NOTE
pharmacy called, the compazine that was just ordered has some sort of interaction and cannot be given if reglan has been given. reglan can only be given p2vtccd. is there any way we can d/c the reglan and just give the compazine prn?  Tera Biggs has not worked for patient all day either.

## 2023-08-17 NOTE — PROGRESS NOTES
[facility]  Office: [phone]  Humberto Tapia, DO, Kade Leblanc, DO, Deyanira Crisostomo, DO, Maonj Gill, DO, Diamante Doe MD, Kathia Howard MD, Ana Jones MD, Adriana Brown MD,  Evelia Barrrea MD, Jackie Murphy MD, Shannon Mcnair DO, Noemí Cast MD,  Kirt Altman MD, Sariah Harrington MD, Luzma Hicks DO, Jorge Chaudhari MD,  Kimberley Rodriguez DO, Sandra Pradhan MD, Miguel Ortega MD, Cady Maxwell MD, Natividad Davison MD,  Jamari Maria MD, Ester Roblero MD, Bubba Schwab, MD, Faustino Carnes DO, Sudhir Luna MD,  Sophy Menchaca MD, Anna Gayle, CNP,  Pilar Cota, CNP, Marissa Goode, CNP, Grace Savage, SAVANNAH,  Vince Nash, FINESSE, Ren Mora, CNP, Brayden Hayward, CNP, Sheba Cano, CNP, Silver Stringer, CNP, Oneyda Banuelos, CNP, Christina Wood PA-C, Wellington Alba, CNS, Fernie Hernandez, CNP, Evan Garcia, [address]    Progress Note    8/17/2023    8:24 AM    Name:   Juanito Plaza  MRN:     [id number]     Acct:      [de-identified]   Room:   2039/2039-01  IP Day:  3  Admit Date:  8/14/2023  8:10 AM    PCP:   Rafia Grigsby  Code Status:  Full Code    Subjective:     C/C:   Chief Complaint   Patient presents with    Chest Pain     Interval History Status: not changed. Patient is resting in bed. She is more awake today. She is nauseated and c/o epigastric abdominal pain. She has chills, axillary temp 99.3, she states \" I just don't feel well. \"  She is NPO waiting for an EGD today. Brief History:     Per the NP:  \"Kennedy Nguyen is a 35 y.o. Non- / non  female who presents with Chest Pain   and is admitted to the hospital for the management of Chest pain. Patient reports to the emergency department from the dialysis center with an acute complaint of chest pain.   She further reported allover body pain and worsening weakness as well as

## 2023-08-17 NOTE — ANESTHESIA PRE PROCEDURE
Department of Anesthesiology  Preprocedure Note       Name:  Veronique Kade   Age:  35 y.o.  :  1990                                          MRN:  [id number]         Date:  2023      Surgeon: Jennifer Garcia):  Suki Rogel MD    Procedure: Procedure(s):  EGD ESOPHAGOGASTRODUODENOSCOPY    Medications prior to admission:   Prior to Admission medications    Medication Sig Start Date End Date Taking?  Authorizing Provider   atorvastatin (LIPITOR) 20 MG tablet Take 1 tablet by mouth daily 23  Yes Historical Provider, MD   bumetanide (BUMEX) 2 MG tablet Take 2 tablets by mouth 2 times daily 23  Yes Historical Provider, MD   glucagon 1 MG injection 1 mg as needed (for low blood sugar) Indications: Hypoglycemia 23  Yes Historical Provider, MD   insulin detemir (LEVEMIR) 100 UNIT/ML injection pen Inject 12 Units into the skin nightly 23  Yes Historical Provider, MD   prednisoLONE acetate (PRED FORTE) 1 % ophthalmic suspension Place 1 drop into the right eye Four times daily for 7 days, then 1 drop twice daily until gone 23  Yes Historical Provider, MD   insulin lispro, 1 Unit Dial, (HUMALOG KWIKPEN) 100 UNIT/ML SOPN Inject 15 Units into the skin 3 times daily (before meals)   Yes Historical Provider, MD   cloNIDine (CATAPRES) 0.2 MG tablet Take 1 tablet by mouth 3 times daily 23   Historical Provider, MD   mirtazapine (REMERON SOL-TAB) 15 MG disintegrating tablet Take 1 tablet by mouth nightly 10/31/22   Colin Villa MD   ondansetron (ZOFRAN-ODT) 4 MG disintegrating tablet Take 1 tablet by mouth every 8 hours as needed for Nausea or Vomiting 10/21/22   Genoveva Nielsen MD   acetaminophen (TYLENOL) 325 MG tablet Take 650 mg by mouth every 6 hours as needed for Pain or Fever (do not exceed 3 gm in 24 hours)    Historical Provider, MD   albuterol sulfate HFA (PROVENTIL;VENTOLIN;PROAIR) 108 (90 Base) MCG/ACT inhaler Inhale 2 puffs into the lungs every 6 hours as needed for

## 2023-08-17 NOTE — PLAN OF CARE
Problem: Chronic Conditions and Co-morbidities  Goal: Patient's chronic conditions and co-morbidity symptoms are monitored and maintained or improved  8/17/2023 0106 by Tere Mueller RN  Outcome: Progressing  8/16/2023 1817 by Ana Gandhi RN  Outcome: Progressing     Problem: Discharge Planning  Goal: Discharge to home or other facility with appropriate resources  8/17/2023 0106 by Tere Mueller RN  Outcome: Progressing  8/16/2023 1817 by Ana Gandhi RN  Outcome: Progressing     Problem: Pain  Goal: Verbalizes/displays adequate comfort level or baseline comfort level  8/17/2023 0106 by Tere Mueller RN  Outcome: Progressing  8/16/2023 1817 by Ana Gandhi RN  Outcome: Progressing     Problem: Safety - Adult  Goal: Free from fall injury  8/17/2023 0106 by Tere Mueller RN  Outcome: Progressing  8/16/2023 1817 by Ana Gandhi RN  Outcome: Progressing     Problem: ABCDS Injury Assessment  Goal: Absence of physical injury  8/17/2023 0106 by Tere Mueller RN  Outcome: Progressing  8/16/2023 1817 by Ana Gandhi RN  Outcome: Progressing     Problem: Skin/Tissue Integrity  Goal: Absence of new skin breakdown  Description: 1. Monitor for areas of redness and/or skin breakdown  2. Assess vascular access sites hourly  3. Every 4-6 hours minimum:  Change oxygen saturation probe site  4. Every 4-6 hours:  If on nasal continuous positive airway pressure, respiratory therapy assess nares and determine need for appliance change or resting period.   8/17/2023 0106 by Tere Mueller RN  Outcome: Progressing  8/16/2023 1817 by Ana Gandhi RN  Outcome: Progressing     Problem: Cardiovascular - Adult  Goal: Maintains optimal cardiac output and hemodynamic stability  8/17/2023 0106 by Tere Mueller RN  Outcome: Progressing  8/16/2023 1817 by Ana Gandhi RN  Outcome: Progressing  Goal: Absence of cardiac dysrhythmias or at baseline  8/17/2023 0106 by Tere Mueller RN  Outcome:

## 2023-08-17 NOTE — OP NOTE
EGD NOTE      Patient:   Surjit Stovall    :    1990    Facility:   [facility]  Referring/PCP: Jacki Samaniego    Procedure:   Esophagogastroduodenoscopy --diagnostic  Date:     2023   Endoscopist:  Mason Saini D.O. Assistant:                  None    Preoperative Diagnosis:     Epigastric pain, nausea and vomiting  History of esophagitis    Postoperative Diagnosis:    Gastric erosions  Retained gastric fluid    Anesthesia:  MAC    Complications: None    Description of Procedure:  Informed consent was obtained after explanation of the procedure including indications, description of the procedure,  benefits and possible risks and complications of the procedure, and alternatives. Questions were answered. The patient's history was reviewed and a directed physical examination was performed prior to the procedure. Patient was monitored throughout the procedure with pulse oximetry and periodic assessment of vital signs. Patient was sedated as noted above. With the patient in the left lateral decubitus position, the Olympus videoendoscope was placed in the patient's mouth and under direct visualization passed into the esophagus. Visualization of the esophagus, stomach, and duodenum was performed during both introduction and withdrawal of the endoscope and retroflexed view of the proximal stomach was obtained. The scope was passed to the 2nd portion of the duodenum. The patient tolerated the procedure well and was taken to the recovery area in good condition. EBL: none  Specimen:  gastric  Implants: None      Findings[de-identified]   Esophagus: normal.   Stomach: Severe diffuse gastritis with extensive erosive changes throughout stomach. Biopsied. Moderate amount of retained bile (suctioned). Duodenum: normal    Recommendations:   -Await pathology. -PPI twice a day. Change to oral once able to take by mouth reliably.  -Blood sugar control. Suspect gastroparesis.   Currently, on Reglan. -Diet as able. -Repeat EGD in 6-8 weeks (hospital setting) to assess for healing.       Electronically signed by Vinh Garduno MD, D.O. on 8/17/2023 at 2:09 PM

## 2023-08-17 NOTE — FLOWSHEET NOTE
08/16/23 1924   Vitals   Pulse 97   BP (!) 143/51   MAP (Calculated) 82   MAP (mmHg) 77   Oxygen Therapy   SpO2 99 %         Patient back from dialysis at 1924. Dialysis RN reported that pt tolerated well, but did remain lethargic during treatment. Upon arrival back to the unit, patient continues to be lethargic only moaning and grunting to Rns questions. She did follow commands by opening eyes when asked and giving arm for IV medications. Once medications passed, RN told pt she could go back to sleep and she rolled over and covered herself with blanket. Call light within reach and patient instructed to call out for any needs.

## 2023-08-17 NOTE — PLAN OF CARE
Problem: Chronic Conditions and Co-morbidities  Goal: Patient's chronic conditions and co-morbidity symptoms are monitored and maintained or improved  8/17/2023 1123 by Davey Zamorano RN  Outcome: Progressing  8/17/2023 0106 by Tj Mcduffie RN  Outcome: Progressing     Problem: Discharge Planning  Goal: Discharge to home or other facility with appropriate resources  8/17/2023 1123 by Davey Zamorano RN  Outcome: Progressing  8/17/2023 0106 by Tj Mcduffie RN  Outcome: Progressing     Problem: Pain  Goal: Verbalizes/displays adequate comfort level or baseline comfort level  8/17/2023 1123 by Davey Zamorano RN  Outcome: Progressing  8/17/2023 0106 by Tj Mcduffie RN  Outcome: Progressing     Problem: Safety - Adult  Goal: Free from fall injury  8/17/2023 1123 by Davey Zamorano RN  Outcome: Progressing  8/17/2023 0106 by Tj Mcduffie RN  Outcome: Progressing     Problem: ABCDS Injury Assessment  Goal: Absence of physical injury  8/17/2023 1123 by Davey Zamorano RN  Outcome: Progressing  8/17/2023 0106 by Tj Mcduffie RN  Outcome: Progressing     Problem: Skin/Tissue Integrity  Goal: Absence of new skin breakdown  Description: 1. Monitor for areas of redness and/or skin breakdown  2. Assess vascular access sites hourly  3. Every 4-6 hours minimum:  Change oxygen saturation probe site  4. Every 4-6 hours:  If on nasal continuous positive airway pressure, respiratory therapy assess nares and determine need for appliance change or resting period.   8/17/2023 1123 by Davey Zamorano RN  Outcome: Progressing  8/17/2023 0106 by Tj Mcduffie RN  Outcome: Progressing     Problem: Cardiovascular - Adult  Goal: Maintains optimal cardiac output and hemodynamic stability  8/17/2023 1123 by Davey Zamorano RN  Outcome: Progressing  8/17/2023 0106 by Tj Mcduffie RN  Outcome: Progressing  Goal: Absence of cardiac dysrhythmias or at baseline  8/17/2023 1123 by Davey Zamorano RN  Outcome: Progressing  8/17/2023 0106 by Vaughn Dinero RN  Outcome: Progressing     Problem: Metabolic/Fluid and Electrolytes - Adult  Goal: Hemodynamic stability and optimal renal function maintained  8/17/2023 1123 by Mikayla Jung RN  Outcome: Progressing  8/17/2023 0106 by Vaughn Dinero RN  Outcome: Progressing  Goal: Glucose maintained within prescribed range  8/17/2023 1123 by Mikayla Jung RN  Outcome: Progressing  8/17/2023 0106 by Vaughn Dinero RN  Outcome: Progressing     Problem: Coping  Goal: Pt/Family able to verbalize concerns and demonstrate effective coping strategies  Description: INTERVENTIONS:  1. Assist patient/family to identify coping skills, available support systems and cultural and spiritual values  2. Provide emotional support, including active listening and acknowledgement of concerns of patient and caregivers  3. Reduce environmental stimuli, as able  4. Instruct patient/family in relaxation techniques, as appropriate  5.  Assess for spiritual pain/suffering and initiate Spiritual Care, Psychosocial Clinical Specialist consults as needed  Outcome: Progressing

## 2023-08-17 NOTE — FLOWSHEET NOTE
would you like me to hold patient's medications d/t her having EGD done today?   patient is currently NPO and VS all WNL

## 2023-08-18 LAB
ANION GAP SERPL CALCULATED.3IONS-SCNC: 14 MMOL/L (ref 9–17)
BASOPHILS # BLD: 0.04 K/UL (ref 0–0.2)
BASOPHILS NFR BLD: 1 % (ref 0–2)
BUN SERPL-MCNC: 38 MG/DL (ref 6–20)
BUN/CREAT SERPL: 7 (ref 9–20)
CALCIUM SERPL-MCNC: 9.4 MG/DL (ref 8.6–10.4)
CHLORIDE SERPL-SCNC: 103 MMOL/L (ref 98–107)
CO2 SERPL-SCNC: 24 MMOL/L (ref 20–31)
CREAT SERPL-MCNC: 5.6 MG/DL (ref 0.5–0.9)
EOSINOPHIL # BLD: 0.26 K/UL (ref 0–0.44)
EOSINOPHILS RELATIVE PERCENT: 3 % (ref 1–4)
ERYTHROCYTE [DISTWIDTH] IN BLOOD BY AUTOMATED COUNT: 13.6 % (ref 11.8–14.4)
GFR SERPL CREATININE-BSD FRML MDRD: 10 ML/MIN/1.73M2
GLUCOSE BLD-MCNC: 103 MG/DL (ref 65–105)
GLUCOSE BLD-MCNC: 104 MG/DL (ref 65–105)
GLUCOSE BLD-MCNC: 181 MG/DL (ref 65–105)
GLUCOSE BLD-MCNC: 87 MG/DL (ref 65–105)
GLUCOSE BLD-MCNC: 87 MG/DL (ref 65–105)
GLUCOSE SERPL-MCNC: 80 MG/DL (ref 70–99)
HCT VFR BLD AUTO: 38.7 % (ref 36.3–47.1)
HGB BLD-MCNC: 12.3 G/DL (ref 11.9–15.1)
IMM GRANULOCYTES # BLD AUTO: 0.03 K/UL (ref 0–0.3)
IMM GRANULOCYTES NFR BLD: 0 %
LYMPHOCYTES NFR BLD: 3.73 K/UL (ref 1.1–3.7)
LYMPHOCYTES RELATIVE PERCENT: 43 % (ref 24–43)
MCH RBC QN AUTO: 29.6 PG (ref 25.2–33.5)
MCHC RBC AUTO-ENTMCNC: 31.8 G/DL (ref 28.4–34.8)
MCV RBC AUTO: 93.3 FL (ref 82.6–102.9)
MONOCYTES NFR BLD: 0.79 K/UL (ref 0.1–1.2)
MONOCYTES NFR BLD: 9 % (ref 3–12)
NEUTROPHILS NFR BLD: 44 % (ref 36–65)
NEUTS SEG NFR BLD: 3.86 K/UL (ref 1.5–8.1)
NRBC BLD-RTO: 0 PER 100 WBC
PHOSPHATE SERPL-MCNC: 5 MG/DL (ref 2.6–4.5)
PLATELET # BLD AUTO: 275 K/UL (ref 138–453)
PMV BLD AUTO: 9.5 FL (ref 8.1–13.5)
POTASSIUM SERPL-SCNC: 4.1 MMOL/L (ref 3.7–5.3)
RBC # BLD AUTO: 4.15 M/UL (ref 3.95–5.11)
SODIUM SERPL-SCNC: 141 MMOL/L (ref 135–144)
WBC OTHER # BLD: 8.7 K/UL (ref 3.5–11.3)

## 2023-08-18 PROCEDURE — 6360000002 HC RX W HCPCS: Performed by: INTERNAL MEDICINE

## 2023-08-18 PROCEDURE — 2060000000 HC ICU INTERMEDIATE R&B

## 2023-08-18 PROCEDURE — 6370000000 HC RX 637 (ALT 250 FOR IP): Performed by: INTERNAL MEDICINE

## 2023-08-18 PROCEDURE — 85025 COMPLETE CBC W/AUTO DIFF WBC: CPT

## 2023-08-18 PROCEDURE — 36415 COLL VENOUS BLD VENIPUNCTURE: CPT

## 2023-08-18 PROCEDURE — 2580000003 HC RX 258: Performed by: INTERNAL MEDICINE

## 2023-08-18 PROCEDURE — A4216 STERILE WATER/SALINE, 10 ML: HCPCS | Performed by: INTERNAL MEDICINE

## 2023-08-18 PROCEDURE — 80048 BASIC METABOLIC PNL TOTAL CA: CPT

## 2023-08-18 PROCEDURE — 82947 ASSAY GLUCOSE BLOOD QUANT: CPT

## 2023-08-18 PROCEDURE — 84100 ASSAY OF PHOSPHORUS: CPT

## 2023-08-18 PROCEDURE — 99232 SBSQ HOSP IP/OBS MODERATE 35: CPT | Performed by: INTERNAL MEDICINE

## 2023-08-18 PROCEDURE — C9113 INJ PANTOPRAZOLE SODIUM, VIA: HCPCS | Performed by: INTERNAL MEDICINE

## 2023-08-18 RX ORDER — ALBUMIN (HUMAN) 12.5 G/50ML
25 SOLUTION INTRAVENOUS EVERY 30 MIN PRN
Status: DISCONTINUED | OUTPATIENT
Start: 2023-08-18 | End: 2023-08-19 | Stop reason: HOSPADM

## 2023-08-18 RX ORDER — METOCLOPRAMIDE HYDROCHLORIDE 5 MG/ML
5 INJECTION INTRAMUSCULAR; INTRAVENOUS EVERY 6 HOURS
Status: DISCONTINUED | OUTPATIENT
Start: 2023-08-18 | End: 2023-08-19 | Stop reason: HOSPADM

## 2023-08-18 RX ADMIN — ONDANSETRON 4 MG: 2 INJECTION INTRAMUSCULAR; INTRAVENOUS at 20:40

## 2023-08-18 RX ADMIN — SODIUM CHLORIDE: 9 INJECTION, SOLUTION INTRAVENOUS at 15:54

## 2023-08-18 RX ADMIN — MIRTAZAPINE 15 MG: 15 TABLET, ORALLY DISINTEGRATING ORAL at 23:07

## 2023-08-18 RX ADMIN — Medication 4000 UNITS: at 15:35

## 2023-08-18 RX ADMIN — HEPARIN SODIUM 5000 UNITS: 5000 INJECTION INTRAVENOUS; SUBCUTANEOUS at 21:20

## 2023-08-18 RX ADMIN — CLOPIDOGREL BISULFATE 75 MG: 75 TABLET ORAL at 15:35

## 2023-08-18 RX ADMIN — SENNOSIDES AND DOCUSATE SODIUM 2 TABLET: 50; 8.6 TABLET ORAL at 22:28

## 2023-08-18 RX ADMIN — SODIUM CHLORIDE 40 MG: 9 INJECTION INTRAMUSCULAR; INTRAVENOUS; SUBCUTANEOUS at 02:34

## 2023-08-18 RX ADMIN — ONDANSETRON 4 MG: 2 INJECTION INTRAMUSCULAR; INTRAVENOUS at 12:06

## 2023-08-18 RX ADMIN — METOCLOPRAMIDE HYDROCHLORIDE 5 MG: 5 INJECTION INTRAMUSCULAR; INTRAVENOUS at 21:16

## 2023-08-18 RX ADMIN — HYDRALAZINE HYDROCHLORIDE 100 MG: 50 TABLET, FILM COATED ORAL at 21:21

## 2023-08-18 RX ADMIN — ASPIRIN 81 MG: 81 TABLET, COATED ORAL at 15:35

## 2023-08-18 RX ADMIN — HEPARIN SODIUM 5000 UNITS: 5000 INJECTION INTRAVENOUS; SUBCUTANEOUS at 06:15

## 2023-08-18 RX ADMIN — CEFTRIAXONE SODIUM 1000 MG: 1 INJECTION, POWDER, FOR SOLUTION INTRAMUSCULAR; INTRAVENOUS at 15:58

## 2023-08-18 RX ADMIN — METOCLOPRAMIDE HYDROCHLORIDE 5 MG: 5 INJECTION INTRAMUSCULAR; INTRAVENOUS at 02:34

## 2023-08-18 RX ADMIN — SODIUM CHLORIDE 40 MG: 9 INJECTION INTRAMUSCULAR; INTRAVENOUS; SUBCUTANEOUS at 15:36

## 2023-08-18 RX ADMIN — HYDRALAZINE HYDROCHLORIDE 100 MG: 50 TABLET, FILM COATED ORAL at 15:35

## 2023-08-18 RX ADMIN — METOCLOPRAMIDE HYDROCHLORIDE 5 MG: 5 INJECTION INTRAMUSCULAR; INTRAVENOUS at 11:00

## 2023-08-18 RX ADMIN — SODIUM CHLORIDE, PRESERVATIVE FREE 10 ML: 5 INJECTION INTRAVENOUS at 15:36

## 2023-08-18 RX ADMIN — OXYCODONE HYDROCHLORIDE 5 MG: 5 TABLET ORAL at 23:07

## 2023-08-18 RX ADMIN — CARVEDILOL 25 MG: 25 TABLET, FILM COATED ORAL at 22:28

## 2023-08-18 RX ADMIN — METOCLOPRAMIDE HYDROCHLORIDE 5 MG: 5 INJECTION INTRAMUSCULAR; INTRAVENOUS at 17:28

## 2023-08-18 RX ADMIN — INSULIN GLARGINE 15 UNITS: 100 INJECTION, SOLUTION SUBCUTANEOUS at 21:15

## 2023-08-18 RX ADMIN — HEPARIN SODIUM 5000 UNITS: 5000 INJECTION INTRAVENOUS; SUBCUTANEOUS at 15:35

## 2023-08-18 RX ADMIN — Medication 1 TABLET: at 15:35

## 2023-08-18 RX ADMIN — ATORVASTATIN CALCIUM 20 MG: 20 TABLET, FILM COATED ORAL at 15:36

## 2023-08-18 ASSESSMENT — PAIN DESCRIPTION - DESCRIPTORS
DESCRIPTORS: ACHING
DESCRIPTORS: ACHING

## 2023-08-18 ASSESSMENT — PAIN SCALES - GENERAL
PAINLEVEL_OUTOF10: 8
PAINLEVEL_OUTOF10: 7

## 2023-08-18 ASSESSMENT — PAIN DESCRIPTION - LOCATION
LOCATION: ABDOMEN
LOCATION: ABDOMEN

## 2023-08-18 ASSESSMENT — PAIN DESCRIPTION - ORIENTATION
ORIENTATION: MID
ORIENTATION: MID

## 2023-08-18 ASSESSMENT — PAIN - FUNCTIONAL ASSESSMENT: PAIN_FUNCTIONAL_ASSESSMENT: ACTIVITIES ARE NOT PREVENTED

## 2023-08-18 NOTE — PROGRESS NOTES
Physical Therapy  DATE: 2023    NAME: Carolina Sue  MRN: [id number]   : 1990    Patient not seen this date for Physical Therapy due to:      [] Cancel by RN or physician due to:    [x] Hemodialysis     [] Critical Lab Value Level     [] Blood transfusion in progress    [] Acute or unstable cardiovascular status   _MAP < 55 or more than >115  _HR < 40 or > 130    [] Acute or unstable pulmonary status   -FiO2 > 60%   _RR < 5 or >40    _O2 sats < 85%    [] Strict Bedrest    [] Off Unit for surgery or procedure    [] Off Unit for testing       [] Pending imaging to R/O fracture    [] Refusal by Patient      [] Other      [] PT being discontinued at this time. Patient independent. No further needs. [] PT being discontinued at this time as the patient has been transferred to hospice care. No further needs.       JADEN SEVILLA, PTA

## 2023-08-18 NOTE — PROGRESS NOTES
[facility]  Office: [phone]  Cynthia Pineda, DO, Victoria Ramesh, DO, John Galvan, DO, Savanna Gill, DO, Lucila Andrew MD, Gwen Veras MD, Sixto Howell MD, Asya Sue MD,  Myesha Beckford MD, Kalpana Ruiz MD, Camryn Osuna DO, Mojgan Llamas MD,  Luc Bauman MD, Gregorio Rucker MD, Carmen Jaramillo DO, Le Cherry MD,  Cate Beverly DO, José Manuel Suarez MD, Lizbet Rothman MD, Jude Regan MD, Joshua Weir MD,  Nidia Jones MD, Herlinda Thornton MD, Bhakti Ramon MD, Rabon Kocher, DO, Aaliyah Gay MD,  Fahad Chapman MD, Brenda Sigala, CNP,  Adan Sousa, CNP, Taylor Bee, CNP, Diallo Bravo, CNP,  Braden Joy, [facility], Heidy Palencia, CNP, Bruno Quezada, CNP, Pedro Colunga, CNP, Abigail Lomeli, CNP, Zoë Luna, CNP, Yayo Palacios PA-C, Yuri Gonzalez, CNS, Tarik Robles, CNP, Saira Barbour, [address]    Progress Note    8/18/2023    8:40 AM    Name:   Zo Kidd  MRN:     [id number]     Acct:      [de-identified]   Room:   2039/2039-01   Day:  4  Admit Date:  8/14/2023  8:10 AM    PCP:   Thelma Estevez  Code Status:  Full Code    Subjective:     C/C:   Chief Complaint   Patient presents with    Chest Pain     Interval History Status: not changed. Patient is seen in HD  She is more awake today. She is nauseated and c/o epigastric abdominal pain. She is s/p EGD and was found to have gastric erosions and retained gastric fluid, which is suspicious for gastroparesis. Brief History:     Per the NP:  \"Kennedy Borrego is a 35 y.o. Non- / non  female who presents with Chest Pain   and is admitted to the hospital for the management of Chest pain. Patient reports to the emergency department from the dialysis center with an acute complaint of chest pain.   She further reported allover body pain and worsening weakness as

## 2023-08-18 NOTE — PROGRESS NOTES
Reason for Follow up:   ESRD/fluid electrolyte management    Subjective:     Seen pre HD. No specific complaints. The patient does get cramps with more aggressive ultrafiltration. She is on room air without worsening shortness of breath. Currently denies any abdominal discomfort.     Scheduled Meds:   cefTRIAXone (ROCEPHIN) IV  1,000 mg IntraVENous Q24H    pantoprazole (PROTONIX) 40 mg injection  40 mg IntraVENous Q12H    atorvastatin  20 mg Oral Daily    gabe-david  1 tablet Oral Daily    [Held by provider] bumetanide  4 mg Oral BID    calcium acetate  3 capsule Oral TID     Vitamin D  4,000 Units Oral Daily    sennosides-docusate sodium  2 tablet Oral Nightly    insulin glargine  15 Units SubCUTAneous Nightly    metoclopramide  5 mg IntraVENous q8h    aspirin  81 mg Oral Daily    carvedilol  25 mg Oral BID    clopidogrel  75 mg Oral Daily    hydrALAZINE  100 mg Oral TID    mirtazapine  15 mg Oral Nightly    NIFEdipine  90 mg Oral Daily    sodium chloride flush  5-40 mL IntraVENous 2 times per day    heparin (porcine)  5,000 Units SubCUTAneous 3 times per day    insulin lispro  0-16 Units SubCUTAneous TID     insulin lispro  0-4 Units SubCUTAneous Nightly     Continuous Infusions:   sodium chloride      dextrose       PRN Meds:albumin human 25%, oxyCODONE **OR** [DISCONTINUED] oxyCODONE, hydrALAZINE, prochlorperazine, perflutren lipid microspheres, albuterol sulfate HFA, sodium chloride flush, sodium chloride, ondansetron **OR** ondansetron, polyethylene glycol, acetaminophen **OR** acetaminophen, glucose, dextrose bolus **OR** dextrose bolus, glucagon (rDNA), dextrose, diphenhydrAMINE    Allergies   Allergen Reactions    Morphine Shortness Of Breath and Other (See Comments)     Chest pain    Januvia [Sitagliptin] Nausea And Vomiting       Physical Exam:    Vitals:    08/18/23 0956 08/18/23 1000 08/18/23 1030 08/18/23 1100   BP: (!) 172/93 (!) 178/88 (!) 186/94 (!) 168/79   Pulse: 94 94 94 90   Resp:

## 2023-08-18 NOTE — PLAN OF CARE
INTERVENTIONS:  1. Assist patient/family to identify coping skills, available support systems and cultural and spiritual values  2. Provide emotional support, including active listening and acknowledgement of concerns of patient and caregivers  3. Reduce environmental stimuli, as able  4. Instruct patient/family in relaxation techniques, as appropriate  5.  Assess for spiritual pain/suffering and initiate Spiritual Care, Psychosocial Clinical Specialist consults as needed  Outcome: Progressing

## 2023-08-18 NOTE — DIALYSIS
HEMODIALYSIS POST TREATMENT NOTE    Treatment time ordered: 3.5 hr    Actual treatment time: 3.5 hr    UltraFiltration Goal: 2 L  UltraFiltration Removed: 2 L      Pre Treatment weight: 79 kg  Post Treatment weight: 77 kg  Estimated Dry Weight: TBD    Access used:     Central Venous Catheter:          Tunneled or Non-tunneled: NA           Site: NA          Access Flow: NA      Internal Access:       AV Fistula or AV Graft: AVF         Site: Left Upper Arm       Access Flow: good       Sign and symptoms of infection: No       If YES: NA    Medications or blood products given: No    Chronic outpatient schedule: MWF    Chronic outpatient unit:  [facility]    Summary of response to treatment: completed 3.5 hr HD TX and removed 2 Liters of fluid as ordered. pt tolerated HD TX well. removed X2 (15g Needles), applied sterile gauze with light pressure for less than 10 min. pt returned to her room with left upper access arm dressing clean, dry and intact. report given to primary nurse, MARIELOS Rodriguez. record completed and placed on pt's chart. Explain if orders NOT met, was physician notified:JENA CAMPBELL flowsheet faxed to patient unit/ placed in patient chart: Yes    Post assessment completed: Yes    Report given to: Marty Aguayo RN      * Intra-treatment documented Safety Checks include the followin) Access and face visible at all times. 2) All connections and blood lines are secure with no kinks. 3) NVL alarm engaged. 4) Hemosafe device applied (if applicable). 5) No collapse of Arterial or Venous blood chambers. 6) All blood lines / pump segments in the air detectors.

## 2023-08-18 NOTE — PROGRESS NOTES
Call received from dialysis RN to assess patient at bedside and check blood sugar. Pt diaphoretic and states she feels nauseated. Blood sugar is 103.  Patient given IV reglan and IV zofran per orders

## 2023-08-18 NOTE — PLAN OF CARE
Problem: Chronic Conditions and Co-morbidities  Goal: Patient's chronic conditions and co-morbidity symptoms are monitored and maintained or improved  Outcome: Progressing     Problem: Discharge Planning  Goal: Discharge to home or other facility with appropriate resources  Outcome: Progressing     Problem: Pain  Goal: Verbalizes/displays adequate comfort level or baseline comfort level  Outcome: Progressing     Problem: Safety - Adult  Goal: Free from fall injury  Outcome: Progressing     Problem: ABCDS Injury Assessment  Goal: Absence of physical injury  Outcome: Progressing     Problem: Skin/Tissue Integrity  Goal: Absence of new skin breakdown  Description: 1. Monitor for areas of redness and/or skin breakdown  2. Assess vascular access sites hourly  3. Every 4-6 hours minimum:  Change oxygen saturation probe site  4. Every 4-6 hours:  If on nasal continuous positive airway pressure, respiratory therapy assess nares and determine need for appliance change or resting period. Outcome: Progressing     Problem: Cardiovascular - Adult  Goal: Maintains optimal cardiac output and hemodynamic stability  Outcome: Progressing  Goal: Absence of cardiac dysrhythmias or at baseline  Outcome: Progressing     Problem: Metabolic/Fluid and Electrolytes - Adult  Goal: Hemodynamic stability and optimal renal function maintained  Outcome: Progressing  Goal: Glucose maintained within prescribed range  Outcome: Progressing     Problem: Coping  Goal: Pt/Family able to verbalize concerns and demonstrate effective coping strategies  Description: INTERVENTIONS:  1. Assist patient/family to identify coping skills, available support systems and cultural and spiritual values  2. Provide emotional support, including active listening and acknowledgement of concerns of patient and caregivers  3. Reduce environmental stimuli, as able  4. Instruct patient/family in relaxation techniques, as appropriate  5.  Assess for spiritual pain/suffering and initiate Spiritual Care, Psychosocial Clinical Specialist consults as needed  Outcome: Progressing

## 2023-08-19 VITALS
RESPIRATION RATE: 12 BRPM | DIASTOLIC BLOOD PRESSURE: 64 MMHG | TEMPERATURE: 98.2 F | BODY MASS INDEX: 29.96 KG/M2 | OXYGEN SATURATION: 98 % | WEIGHT: 175.49 LBS | HEIGHT: 64 IN | SYSTOLIC BLOOD PRESSURE: 156 MMHG | HEART RATE: 85 BPM

## 2023-08-19 PROBLEM — K31.84 DIABETIC GASTROPARESIS ASSOCIATED WITH TYPE 1 DIABETES MELLITUS (HCC): Status: ACTIVE | Noted: 2023-08-19

## 2023-08-19 PROBLEM — N39.0 URINARY TRACT INFECTION DUE TO KLEBSIELLA SPECIES: Status: ACTIVE | Noted: 2023-08-19

## 2023-08-19 PROBLEM — B96.89 URINARY TRACT INFECTION DUE TO KLEBSIELLA SPECIES: Status: ACTIVE | Noted: 2023-08-19

## 2023-08-19 PROBLEM — E10.43 DIABETIC GASTROPARESIS ASSOCIATED WITH TYPE 1 DIABETES MELLITUS (HCC): Status: ACTIVE | Noted: 2023-08-19

## 2023-08-19 LAB
GLUCOSE BLD-MCNC: 78 MG/DL (ref 65–105)
GLUCOSE BLD-MCNC: 92 MG/DL (ref 65–105)
MICROORGANISM SPEC CULT: ABNORMAL
SPECIMEN DESCRIPTION: ABNORMAL

## 2023-08-19 PROCEDURE — 6360000002 HC RX W HCPCS: Performed by: INTERNAL MEDICINE

## 2023-08-19 PROCEDURE — A4216 STERILE WATER/SALINE, 10 ML: HCPCS | Performed by: INTERNAL MEDICINE

## 2023-08-19 PROCEDURE — 6370000000 HC RX 637 (ALT 250 FOR IP): Performed by: INTERNAL MEDICINE

## 2023-08-19 PROCEDURE — 2580000003 HC RX 258: Performed by: INTERNAL MEDICINE

## 2023-08-19 PROCEDURE — C9113 INJ PANTOPRAZOLE SODIUM, VIA: HCPCS | Performed by: INTERNAL MEDICINE

## 2023-08-19 PROCEDURE — 6360000002 HC RX W HCPCS: Performed by: NURSE PRACTITIONER

## 2023-08-19 PROCEDURE — 2500000003 HC RX 250 WO HCPCS: Performed by: INTERNAL MEDICINE

## 2023-08-19 PROCEDURE — 99239 HOSP IP/OBS DSCHRG MGMT >30: CPT | Performed by: INTERNAL MEDICINE

## 2023-08-19 PROCEDURE — 82947 ASSAY GLUCOSE BLOOD QUANT: CPT

## 2023-08-19 PROCEDURE — 94761 N-INVAS EAR/PLS OXIMETRY MLT: CPT

## 2023-08-19 RX ORDER — METOCLOPRAMIDE 5 MG/1
5 TABLET ORAL 4 TIMES DAILY
Qty: 120 TABLET | Refills: 1 | Status: SHIPPED | OUTPATIENT
Start: 2023-08-19

## 2023-08-19 RX ORDER — PANTOPRAZOLE SODIUM 40 MG/1
40 TABLET, DELAYED RELEASE ORAL
Qty: 60 TABLET | Refills: 1 | Status: SHIPPED | OUTPATIENT
Start: 2023-08-19

## 2023-08-19 RX ORDER — DIPHENHYDRAMINE HYDROCHLORIDE 50 MG/ML
12.5 INJECTION INTRAMUSCULAR; INTRAVENOUS EVERY 6 HOURS PRN
Status: DISCONTINUED | OUTPATIENT
Start: 2023-08-19 | End: 2023-08-19 | Stop reason: HOSPADM

## 2023-08-19 RX ORDER — LISINOPRIL 5 MG/1
5 TABLET ORAL DAILY
Status: DISCONTINUED | OUTPATIENT
Start: 2023-08-19 | End: 2023-08-19 | Stop reason: HOSPADM

## 2023-08-19 RX ORDER — CEFUROXIME AXETIL 250 MG/1
250 TABLET ORAL DAILY
Qty: 5 TABLET | Refills: 0 | Status: SHIPPED | OUTPATIENT
Start: 2023-08-19 | End: 2023-08-24

## 2023-08-19 RX ORDER — LISINOPRIL 5 MG/1
5 TABLET ORAL DAILY
Qty: 30 TABLET | Refills: 1 | Status: SHIPPED | OUTPATIENT
Start: 2023-08-20

## 2023-08-19 RX ORDER — ONDANSETRON 4 MG/1
4 TABLET, ORALLY DISINTEGRATING ORAL EVERY 8 HOURS PRN
Qty: 21 TABLET | Refills: 0 | Status: SHIPPED | OUTPATIENT
Start: 2023-08-19 | End: 2023-08-26

## 2023-08-19 RX ORDER — INSULIN LISPRO 100 [IU]/ML
10 INJECTION, SOLUTION INTRAVENOUS; SUBCUTANEOUS
COMMUNITY
Start: 2023-08-19

## 2023-08-19 RX ADMIN — CLOPIDOGREL BISULFATE 75 MG: 75 TABLET ORAL at 08:14

## 2023-08-19 RX ADMIN — SODIUM CHLORIDE 40 MG: 9 INJECTION INTRAMUSCULAR; INTRAVENOUS; SUBCUTANEOUS at 11:43

## 2023-08-19 RX ADMIN — CALCIUM ACETATE 2001 MG: 667 CAPSULE ORAL at 08:14

## 2023-08-19 RX ADMIN — LISINOPRIL 5 MG: 5 TABLET ORAL at 08:24

## 2023-08-19 RX ADMIN — ATORVASTATIN CALCIUM 20 MG: 20 TABLET, FILM COATED ORAL at 08:14

## 2023-08-19 RX ADMIN — SODIUM CHLORIDE, PRESERVATIVE FREE 10 ML: 5 INJECTION INTRAVENOUS at 08:26

## 2023-08-19 RX ADMIN — HEPARIN SODIUM 5000 UNITS: 5000 INJECTION INTRAVENOUS; SUBCUTANEOUS at 05:35

## 2023-08-19 RX ADMIN — ASPIRIN 81 MG: 81 TABLET, COATED ORAL at 08:15

## 2023-08-19 RX ADMIN — ONDANSETRON 4 MG: 2 INJECTION INTRAMUSCULAR; INTRAVENOUS at 03:36

## 2023-08-19 RX ADMIN — DIPHENHYDRAMINE HYDROCHLORIDE 12.5 MG: 50 INJECTION, SOLUTION INTRAMUSCULAR; INTRAVENOUS at 03:47

## 2023-08-19 RX ADMIN — METOCLOPRAMIDE HYDROCHLORIDE 5 MG: 5 INJECTION INTRAMUSCULAR; INTRAVENOUS at 11:37

## 2023-08-19 RX ADMIN — ONDANSETRON 4 MG: 2 INJECTION INTRAMUSCULAR; INTRAVENOUS at 09:37

## 2023-08-19 RX ADMIN — METOCLOPRAMIDE HYDROCHLORIDE 5 MG: 5 INJECTION INTRAMUSCULAR; INTRAVENOUS at 03:36

## 2023-08-19 RX ADMIN — SODIUM CHLORIDE 40 MG: 9 INJECTION INTRAMUSCULAR; INTRAVENOUS; SUBCUTANEOUS at 00:08

## 2023-08-19 RX ADMIN — CARVEDILOL 25 MG: 25 TABLET, FILM COATED ORAL at 08:14

## 2023-08-19 RX ADMIN — Medication 4000 UNITS: at 08:14

## 2023-08-19 RX ADMIN — ACETAMINOPHEN 650 MG: 325 TABLET ORAL at 09:37

## 2023-08-19 RX ADMIN — Medication 1 TABLET: at 08:14

## 2023-08-19 RX ADMIN — CEFTRIAXONE SODIUM 1000 MG: 1 INJECTION, POWDER, FOR SOLUTION INTRAMUSCULAR; INTRAVENOUS at 11:41

## 2023-08-19 RX ADMIN — HYDRALAZINE HYDROCHLORIDE 100 MG: 50 TABLET, FILM COATED ORAL at 08:11

## 2023-08-19 RX ADMIN — HEPARIN SODIUM 5000 UNITS: 5000 INJECTION INTRAVENOUS; SUBCUTANEOUS at 14:35

## 2023-08-19 RX ADMIN — NIFEDIPINE 90 MG: 90 TABLET, FILM COATED, EXTENDED RELEASE ORAL at 08:14

## 2023-08-19 ASSESSMENT — PAIN DESCRIPTION - LOCATION
LOCATION: ABDOMEN
LOCATION: HEAD

## 2023-08-19 ASSESSMENT — PAIN DESCRIPTION - DESCRIPTORS: DESCRIPTORS: ACHING

## 2023-08-19 ASSESSMENT — PAIN SCALES - GENERAL
PAINLEVEL_OUTOF10: 7
PAINLEVEL_OUTOF10: 7
PAINLEVEL_OUTOF10: 6

## 2023-08-19 ASSESSMENT — PAIN DESCRIPTION - ORIENTATION: ORIENTATION: MID

## 2023-08-19 ASSESSMENT — PAIN - FUNCTIONAL ASSESSMENT: PAIN_FUNCTIONAL_ASSESSMENT: ACTIVITIES ARE NOT PREVENTED

## 2023-08-19 NOTE — PLAN OF CARE
Problem: Chronic Conditions and Co-morbidities  Goal: Patient's chronic conditions and co-morbidity symptoms are monitored and maintained or improved  8/19/2023 0619 by Elvis Woodard RN  Outcome: Progressing  Flowsheets (Taken 8/18/2023 2000)  Care Plan - Patient's Chronic Conditions and Co-Morbidity Symptoms are Monitored and Maintained or Improved:   Monitor and assess patient's chronic conditions and comorbid symptoms for stability, deterioration, or improvement   Collaborate with multidisciplinary team to address chronic and comorbid conditions and prevent exacerbation or deterioration  8/18/2023 1808 by Steven Piper RN  Outcome: Progressing     Problem: Discharge Planning  Goal: Discharge to home or other facility with appropriate resources  8/19/2023 0619 by Elvis Woodard RN  Outcome: Progressing  Flowsheets (Taken 8/18/2023 2000)  Discharge to home or other facility with appropriate resources:   Identify barriers to discharge with patient and caregiver   Arrange for needed discharge resources and transportation as appropriate   Identify discharge learning needs (meds, wound care, etc)  8/18/2023 1808 by Steven Piper RN  Outcome: Progressing     Problem: Pain  Goal: Verbalizes/displays adequate comfort level or baseline comfort level  8/19/2023 0619 by Elvis Woodard RN  Outcome: Progressing  Flowsheets  Taken 8/19/2023 0400  Verbalizes/displays adequate comfort level or baseline comfort level:   Encourage patient to monitor pain and request assistance   Assess pain using appropriate pain scale   Administer analgesics based on type and severity of pain and evaluate response  Taken 8/19/2023 0000  Verbalizes/displays adequate comfort level or baseline comfort level:   Encourage patient to monitor pain and request assistance   Administer analgesics based on type and severity of pain and evaluate response  Taken 8/18/2023 2000  Verbalizes/displays adequate comfort level or baseline comfort level:   Encourage

## 2023-08-19 NOTE — DISCHARGE SUMMARY
MEDICINE  IP CONSULT TO NEPHROLOGY  IP CONSULT TO CARDIOLOGY  IP CONSULT TO GI      The patient was seen and examined on day of discharge and this discharge summary is in conjunction with any daily progress note from day of discharge.     Discharge plan:     Disposition: Home    Physician Follow Up:     PCP in 1 week    GI in 2-4 weeks- Dr. Santo Phoenix    Requiring Further Evaluation/Follow Up POST HOSPITALIZATION/Incidental Findings: Pathology with GI    Diet: diabetic diet and renal diet    Activity: As tolerated      Discharge Medications:      Medication List        START taking these medications      cefUROXime 250 MG tablet  Commonly known as: CEFTIN  Take 1 tablet by mouth daily for 5 days     lisinopril 5 MG tablet  Commonly known as: PRINIVIL;ZESTRIL  Take 1 tablet by mouth daily  Start taking on: August 20, 2023     metoclopramide 5 MG tablet  Commonly known as: Reglan  Take 1 tablet by mouth 4 times daily     pantoprazole 40 MG tablet  Commonly known as: PROTONIX  Take 1 tablet by mouth 2 times daily (before meals)            CHANGE how you take these medications      HumaLOG KwikPen 100 UNIT/ML Sopn  Generic drug: insulin lispro (1 Unit Dial)  What changed: how much to take            CONTINUE taking these medications      acetaminophen 325 MG tablet  Commonly known as: TYLENOL     albuterol sulfate  (90 Base) MCG/ACT inhaler  Commonly known as: PROVENTIL;VENTOLIN;PROAIR     aspirin 81 MG EC tablet     atorvastatin 20 MG tablet  Commonly known as: LIPITOR     bumetanide 2 MG tablet  Commonly known as: BUMEX     calcium acetate 667 MG Caps capsule  Commonly known as: PHOSLO     carvedilol 25 MG tablet  Commonly known as: COREG     Cholecalciferol 50 MCG (2000 UT) Tabs     cloNIDine 0.2 MG tablet  Commonly known as: CATAPRES     clopidogrel 75 MG tablet  Commonly known as: PLAVIX     glucagon 1 MG injection     hydrALAZINE 100 MG tablet  Commonly known as: APRESOLINE     insulin detemir 100 UNIT/ML

## 2023-08-19 NOTE — PLAN OF CARE
Problem: Chronic Conditions and Co-morbidities  Goal: Patient's chronic conditions and co-morbidity symptoms are monitored and maintained or improved  8/19/2023 0955 by Isaias Melendez RN  Outcome: Progressing  8/19/2023 0619 by Maryana Andrews RN  Outcome: Inder De Santiago (Taken 8/18/2023 2000)  Care Plan - Patient's Chronic Conditions and Co-Morbidity Symptoms are Monitored and Maintained or Improved:   Monitor and assess patient's chronic conditions and comorbid symptoms for stability, deterioration, or improvement   Collaborate with multidisciplinary team to address chronic and comorbid conditions and prevent exacerbation or deterioration     Problem: Discharge Planning  Goal: Discharge to home or other facility with appropriate resources  8/19/2023 0955 by Isaias Melendez RN  Outcome: Progressing  8/19/2023 0619 by Maryana Andrews RN  Outcome: Progressing  Flowsheets (Taken 8/18/2023 2000)  Discharge to home or other facility with appropriate resources:   Identify barriers to discharge with patient and caregiver   Arrange for needed discharge resources and transportation as appropriate   Identify discharge learning needs (meds, wound care, etc)     Problem: Pain  Goal: Verbalizes/displays adequate comfort level or baseline comfort level  8/19/2023 0955 by Isaias Melendez RN  Outcome: Progressing  8/19/2023 0619 by Maryana Andrews RN  Outcome: Progressing  Flowsheets  Taken 8/19/2023 0400  Verbalizes/displays adequate comfort level or baseline comfort level:   Encourage patient to monitor pain and request assistance   Assess pain using appropriate pain scale   Administer analgesics based on type and severity of pain and evaluate response  Taken 8/19/2023 0000  Verbalizes/displays adequate comfort level or baseline comfort level:   Encourage patient to monitor pain and request assistance   Administer analgesics based on type and severity of pain and evaluate response  Taken 8/18/2023 2000  Verbalizes/displays adequate Progressing  8/19/2023 0619 by Florecita Gabriel RN  Outcome: Progressing  Flowsheets (Taken 8/18/2023 2000)  Absence of cardiac dysrhythmias or at baseline:   Monitor cardiac rate and rhythm   Assess for signs of decreased cardiac output     Problem: Metabolic/Fluid and Electrolytes - Adult  Goal: Hemodynamic stability and optimal renal function maintained  8/19/2023 0955 by Jagjit Fernandez RN  Outcome: Progressing  8/19/2023 0619 by Florecita Gabriel RN  Outcome: Progressing  Flowsheets (Taken 8/18/2023 2000)  Hemodynamic stability and optimal renal function maintained:   Monitor labs and assess for signs and symptoms of volume excess or deficit   Monitor intake, output and patient weight   Monitor response to interventions for patient's volume status, including labs, urine output, blood pressure (other measures as available)  Goal: Glucose maintained within prescribed range  8/19/2023 0955 by Jagjit Fernandez RN  Outcome: Progressing  8/19/2023 0619 by Florecita Gabriel RN  Outcome: Progressing  Flowsheets (Taken 8/18/2023 2000)  Glucose maintained within prescribed range:   Monitor blood glucose as ordered   Assess for signs and symptoms of hyperglycemia and hypoglycemia     Problem: Coping  Goal: Pt/Family able to verbalize concerns and demonstrate effective coping strategies  Description: INTERVENTIONS:  1. Assist patient/family to identify coping skills, available support systems and cultural and spiritual values  2. Provide emotional support, including active listening and acknowledgement of concerns of patient and caregivers  3. Reduce environmental stimuli, as able  4. Instruct patient/family in relaxation techniques, as appropriate  5.  Assess for spiritual pain/suffering and initiate Spiritual Care, Psychosocial Clinical Specialist consults as needed  8/19/2023 0955 by Jagjit Fernandez RN  Outcome: Progressing  8/19/2023 0619 by Florecita Gabriel RN  Outcome: Progressing  Flowsheets (Taken 8/18/2023 2000)  Patient/family able to

## 2023-08-19 NOTE — FLOWSHEET NOTE
08/19/23 0330   Treatment Team Notification   Reason for Communication Evaluate   Name of Team Member Notified jeremiahBarbara Parikh   Treatment Team Role Advanced Practice Nurse   Method of Communication Secure Message     0330: Pt having trouble sleeping, writer reached out to help with sleep. Use benadryl to help with sleep.

## 2023-08-19 NOTE — PROGRESS NOTES
[facility]  Office: [phone]  Iesha Kwon, DO, Celsa Perez, DO, Graciela Mackenzie, DO, Willy Gill, DO, Yuan Soto MD, Zoraida Mason MD, Miracle Whiting MD, Alison Downing MD,  Rosa Díaz MD, Mariah Kellogg MD, Anna Sheffield DO, Fermín Amado MD,  Dolores Velez MD, Nelly Vidales MD, Nomi Nicole DO, Rayne Laguna MD,  Davina Melgar DO, Paolo Landaverde MD, Heidy Pat MD, Marine Jarrett MD, Adela Ovalle MD,  Juliana Sosa MD, Ursula Francois MD, Hardeep Shea MD, Jorge Alberto Brenner DO, Antonio Ruiz MD,  Daniel Merritt MD, Dolores Hui, CNP,  Bee Moreno, CNP, Halina Magdaleno, CNP, Hemalatha Cortez, CNP,  Francisco Javier Collins, [facility], Marcella Miles, CNP, Caleb Mccann, CNP, Phoenix Coats, CNP, Claudell Bowler, CNP, Deepak Johnson, CNP, Jasvir Cancino, PAMayeC, Matt Wang, CNS, Mina Lugo, CNP, Velma Israel, [facility]    Progress Note    8/19/2023    8:08 AM    Name:   Brenton Acharya  MRN:     [id number]     Acct:      [de-identified]   Room:   2039/2039-01   Day:  5  Admit Date:  8/14/2023  8:10 AM    PCP:   Suzanna Bell  Code Status:  Full Code    Subjective:     C/C:   Chief Complaint   Patient presents with    Chest Pain     Interval History Status: not changed. Patient is seen in her room. She is more awake today. She is less nauseated and ate peaches this morning. She is s/p EGD and was found to have gastric erosions and retained gastric fluid, which is suspicious for gastroparesis. Her lunch is at bedside. She asked to go home. Brief History:     Per the NP:  \"Kennedy Mckenna is a 35 y.o. Non- / non  female who presents with Chest Pain   and is admitted to the hospital for the management of Chest pain. Patient reports to the emergency department from the dialysis center with an acute complaint of chest pain.   She further 55-60%, abnormal diastolic function  DM type I- con't Lantus 15 units qhs,  con't SSI, last HbA1C: 7 in March 2023, blood glucose stable  ESRD on long-term  dialysis with known history noncompliance-continue hemodialysis per nephrology recommendations  Hypertension-BP stable on home medications  Mild intermittent asthma without acute exacerbation-continue albuterol MDI as needed  Intractable N/V due to gastroparesis, found to have gastritis- change IV Reglan 5mg q 6hrs to po , Zofran prn, con't PPI po BID, advance diet as able  Metabolic acidosis  - resolved  UTI- change IV Rocephin , to po Ceftin 250mg daily x 5 more days  GI/DVT prophylaxis    DC home today if she can eat her lunch without vomiting. F/u with PCP and GI in 1 week.     Jef Wall MD  8/19/2023  8:08 AM

## 2023-08-20 ENCOUNTER — APPOINTMENT (OUTPATIENT)
Dept: GENERAL RADIOLOGY | Age: 33
DRG: 391 | End: 2023-08-20
Payer: COMMERCIAL

## 2023-08-20 ENCOUNTER — HOSPITAL ENCOUNTER (INPATIENT)
Age: 33
LOS: 1 days | Discharge: HOME | DRG: 391 | End: 2023-08-21
Attending: EMERGENCY MEDICINE | Admitting: INTERNAL MEDICINE
Payer: COMMERCIAL

## 2023-08-20 DIAGNOSIS — I21.4 NSTEMI (NON-ST ELEVATED MYOCARDIAL INFARCTION) (HCC): ICD-10-CM

## 2023-08-20 DIAGNOSIS — R07.9 CHEST PAIN, UNSPECIFIED TYPE: Primary | ICD-10-CM

## 2023-08-20 PROBLEM — Z99.2 ESRD ON DIALYSIS (HCC): Status: ACTIVE | Noted: 2023-08-20

## 2023-08-20 PROBLEM — N18.6 ESRD ON DIALYSIS (HCC): Status: ACTIVE | Noted: 2023-08-20

## 2023-08-20 LAB
ANION GAP SERPL CALCULATED.3IONS-SCNC: 18 MMOL/L (ref 9–17)
ANTI-XA UNFRAC HEPARIN: <0.1 IU/L
BASOPHILS # BLD: 0.09 K/UL (ref 0–0.2)
BASOPHILS NFR BLD: 1 % (ref 0–2)
BNP SERPL-MCNC: 1122 PG/ML
BUN SERPL-MCNC: 39 MG/DL (ref 6–20)
CALCIUM SERPL-MCNC: 9.3 MG/DL (ref 8.6–10.4)
CHLORIDE SERPL-SCNC: 96 MMOL/L (ref 98–107)
CHP ED QC CHECK: NORMAL
CO2 SERPL-SCNC: 20 MMOL/L (ref 20–31)
CREAT SERPL-MCNC: 6.7 MG/DL (ref 0.5–0.9)
EOSINOPHIL # BLD: 0.45 K/UL (ref 0–0.44)
EOSINOPHILS RELATIVE PERCENT: 5 % (ref 1–4)
ERYTHROCYTE [DISTWIDTH] IN BLOOD BY AUTOMATED COUNT: 13 % (ref 11.8–14.4)
ERYTHROCYTE [DISTWIDTH] IN BLOOD BY AUTOMATED COUNT: 13.1 % (ref 11.8–14.4)
ERYTHROCYTE [DISTWIDTH] IN BLOOD BY AUTOMATED COUNT: 13.1 % (ref 11.8–14.4)
GFR SERPL CREATININE-BSD FRML MDRD: 8 ML/MIN/1.73M2
GLUCOSE BLD-MCNC: 138 MG/DL (ref 65–105)
GLUCOSE BLD-MCNC: 209 MG/DL
GLUCOSE BLD-MCNC: 209 MG/DL (ref 65–105)
GLUCOSE BLD-MCNC: 226 MG/DL (ref 65–105)
GLUCOSE SERPL-MCNC: 184 MG/DL (ref 70–99)
HCT VFR BLD AUTO: 36.2 % (ref 36.3–47.1)
HCT VFR BLD AUTO: 39.8 % (ref 36.3–47.1)
HCT VFR BLD AUTO: 47.3 % (ref 36.3–47.1)
HGB BLD-MCNC: 11.2 G/DL (ref 11.9–15.1)
HGB BLD-MCNC: 12.9 G/DL (ref 11.9–15.1)
HGB BLD-MCNC: 13.6 G/DL (ref 11.9–15.1)
IMM GRANULOCYTES # BLD AUTO: 0.03 K/UL (ref 0–0.3)
IMM GRANULOCYTES NFR BLD: 0 %
INR PPP: 1
LYMPHOCYTES NFR BLD: 2.52 K/UL (ref 1.1–3.7)
LYMPHOCYTES RELATIVE PERCENT: 29 % (ref 24–43)
MAGNESIUM SERPL-MCNC: 2.5 MG/DL (ref 1.6–2.6)
MCH RBC QN AUTO: 29.6 PG (ref 25.2–33.5)
MCH RBC QN AUTO: 29.7 PG (ref 25.2–33.5)
MCH RBC QN AUTO: 30 PG (ref 25.2–33.5)
MCHC RBC AUTO-ENTMCNC: 28.8 G/DL (ref 28.4–34.8)
MCHC RBC AUTO-ENTMCNC: 30.9 G/DL (ref 28.4–34.8)
MCHC RBC AUTO-ENTMCNC: 32.4 G/DL (ref 28.4–34.8)
MCV RBC AUTO: 103.3 FL (ref 82.6–102.9)
MCV RBC AUTO: 92.6 FL (ref 82.6–102.9)
MCV RBC AUTO: 95.5 FL (ref 82.6–102.9)
MONOCYTES NFR BLD: 0.89 K/UL (ref 0.1–1.2)
MONOCYTES NFR BLD: 10 % (ref 3–12)
NEUTROPHILS NFR BLD: 55 % (ref 36–65)
NEUTS SEG NFR BLD: 4.72 K/UL (ref 1.5–8.1)
NRBC BLD-RTO: 0 PER 100 WBC
PARTIAL THROMBOPLASTIN TIME: 24.1 SEC (ref 23–36.5)
PLATELET # BLD AUTO: 266 K/UL (ref 138–453)
PLATELET # BLD AUTO: 274 K/UL (ref 138–453)
PLATELET # BLD AUTO: 297 K/UL (ref 138–453)
PMV BLD AUTO: 9.5 FL (ref 8.1–13.5)
PMV BLD AUTO: 9.8 FL (ref 8.1–13.5)
PMV BLD AUTO: 9.9 FL (ref 8.1–13.5)
POTASSIUM SERPL-SCNC: 4.5 MMOL/L (ref 3.7–5.3)
POTASSIUM SERPL-SCNC: 4.7 MMOL/L (ref 3.7–5.3)
PROTHROMBIN TIME: 12.6 SEC (ref 11.7–14.9)
RBC # BLD AUTO: 3.79 M/UL (ref 3.95–5.11)
RBC # BLD AUTO: 4.3 M/UL (ref 3.95–5.11)
RBC # BLD AUTO: 4.58 M/UL (ref 3.95–5.11)
RBC # BLD: ABNORMAL 10*6/UL
REASON FOR REJECTION: NORMAL
SODIUM SERPL-SCNC: 134 MMOL/L (ref 135–144)
SPECIMEN SOURCE: NORMAL
TROPONIN I SERPL HS-MCNC: 145 NG/L (ref 0–14)
TROPONIN I SERPL HS-MCNC: 162 NG/L (ref 0–14)
TROPONIN I SERPL HS-MCNC: 169 NG/L (ref 0–14)
WBC OTHER # BLD: 8.3 K/UL (ref 3.5–11.3)
WBC OTHER # BLD: 8.7 K/UL (ref 3.5–11.3)
WBC OTHER # BLD: 8.9 K/UL (ref 3.5–11.3)
ZZ NTE CLEAN UP: ORDERED TEST: NORMAL

## 2023-08-20 PROCEDURE — 6370000000 HC RX 637 (ALT 250 FOR IP): Performed by: STUDENT IN AN ORGANIZED HEALTH CARE EDUCATION/TRAINING PROGRAM

## 2023-08-20 PROCEDURE — 6370000000 HC RX 637 (ALT 250 FOR IP): Performed by: INTERNAL MEDICINE

## 2023-08-20 PROCEDURE — 96374 THER/PROPH/DIAG INJ IV PUSH: CPT

## 2023-08-20 PROCEDURE — 6370000000 HC RX 637 (ALT 250 FOR IP): Performed by: NURSE PRACTITIONER

## 2023-08-20 PROCEDURE — 71045 X-RAY EXAM CHEST 1 VIEW: CPT

## 2023-08-20 PROCEDURE — 6360000002 HC RX W HCPCS: Performed by: STUDENT IN AN ORGANIZED HEALTH CARE EDUCATION/TRAINING PROGRAM

## 2023-08-20 PROCEDURE — 36415 COLL VENOUS BLD VENIPUNCTURE: CPT

## 2023-08-20 PROCEDURE — 2580000003 HC RX 258: Performed by: NURSE PRACTITIONER

## 2023-08-20 PROCEDURE — 2500000003 HC RX 250 WO HCPCS: Performed by: NURSE PRACTITIONER

## 2023-08-20 PROCEDURE — 83735 ASSAY OF MAGNESIUM: CPT

## 2023-08-20 PROCEDURE — 82947 ASSAY GLUCOSE BLOOD QUANT: CPT

## 2023-08-20 PROCEDURE — 2580000003 HC RX 258: Performed by: INTERNAL MEDICINE

## 2023-08-20 PROCEDURE — 85520 HEPARIN ASSAY: CPT

## 2023-08-20 PROCEDURE — A4216 STERILE WATER/SALINE, 10 ML: HCPCS | Performed by: INTERNAL MEDICINE

## 2023-08-20 PROCEDURE — 2500000003 HC RX 250 WO HCPCS: Performed by: STUDENT IN AN ORGANIZED HEALTH CARE EDUCATION/TRAINING PROGRAM

## 2023-08-20 PROCEDURE — 84484 ASSAY OF TROPONIN QUANT: CPT

## 2023-08-20 PROCEDURE — 85025 COMPLETE CBC W/AUTO DIFF WBC: CPT

## 2023-08-20 PROCEDURE — 96375 TX/PRO/DX INJ NEW DRUG ADDON: CPT

## 2023-08-20 PROCEDURE — 99285 EMERGENCY DEPT VISIT HI MDM: CPT

## 2023-08-20 PROCEDURE — 99223 1ST HOSP IP/OBS HIGH 75: CPT | Performed by: INTERNAL MEDICINE

## 2023-08-20 PROCEDURE — 93005 ELECTROCARDIOGRAM TRACING: CPT | Performed by: NURSE PRACTITIONER

## 2023-08-20 PROCEDURE — 6360000002 HC RX W HCPCS: Performed by: INTERNAL MEDICINE

## 2023-08-20 PROCEDURE — 1200000000 HC SEMI PRIVATE

## 2023-08-20 PROCEDURE — 85027 COMPLETE CBC AUTOMATED: CPT

## 2023-08-20 PROCEDURE — 85610 PROTHROMBIN TIME: CPT

## 2023-08-20 PROCEDURE — 83880 ASSAY OF NATRIURETIC PEPTIDE: CPT

## 2023-08-20 PROCEDURE — 84132 ASSAY OF SERUM POTASSIUM: CPT

## 2023-08-20 PROCEDURE — C9113 INJ PANTOPRAZOLE SODIUM, VIA: HCPCS | Performed by: INTERNAL MEDICINE

## 2023-08-20 PROCEDURE — 85730 THROMBOPLASTIN TIME PARTIAL: CPT

## 2023-08-20 PROCEDURE — 80048 BASIC METABOLIC PNL TOTAL CA: CPT

## 2023-08-20 RX ORDER — POLYETHYLENE GLYCOL 3350 17 G/17G
17 POWDER, FOR SOLUTION ORAL DAILY PRN
Status: DISCONTINUED | OUTPATIENT
Start: 2023-08-20 | End: 2023-08-21 | Stop reason: HOSPADM

## 2023-08-20 RX ORDER — POTASSIUM CHLORIDE 7.45 MG/ML
10 INJECTION INTRAVENOUS PRN
Status: DISCONTINUED | OUTPATIENT
Start: 2023-08-20 | End: 2023-08-20

## 2023-08-20 RX ORDER — ONDANSETRON 2 MG/ML
4 INJECTION INTRAMUSCULAR; INTRAVENOUS EVERY 6 HOURS PRN
Status: DISCONTINUED | OUTPATIENT
Start: 2023-08-20 | End: 2023-08-21 | Stop reason: HOSPADM

## 2023-08-20 RX ORDER — VITAMIN B COMPLEX
4000 TABLET ORAL DAILY
Status: DISCONTINUED | OUTPATIENT
Start: 2023-08-20 | End: 2023-08-21 | Stop reason: HOSPADM

## 2023-08-20 RX ORDER — HEPARIN SODIUM 1000 [USP'U]/ML
30 INJECTION, SOLUTION INTRAVENOUS; SUBCUTANEOUS PRN
Status: DISCONTINUED | OUTPATIENT
Start: 2023-08-20 | End: 2023-08-20

## 2023-08-20 RX ORDER — MIRTAZAPINE 15 MG/1
15 TABLET, ORALLY DISINTEGRATING ORAL NIGHTLY
Status: DISCONTINUED | OUTPATIENT
Start: 2023-08-20 | End: 2023-08-21 | Stop reason: HOSPADM

## 2023-08-20 RX ORDER — SUCRALFATE 1 G/1
1 TABLET ORAL
Status: DISCONTINUED | OUTPATIENT
Start: 2023-08-20 | End: 2023-08-21 | Stop reason: HOSPADM

## 2023-08-20 RX ORDER — NITROGLYCERIN 0.4 MG/1
0.4 TABLET SUBLINGUAL EVERY 5 MIN PRN
Status: DISCONTINUED | OUTPATIENT
Start: 2023-08-20 | End: 2023-08-21 | Stop reason: HOSPADM

## 2023-08-20 RX ORDER — CLOPIDOGREL BISULFATE 75 MG/1
75 TABLET ORAL DAILY
Status: DISCONTINUED | OUTPATIENT
Start: 2023-08-20 | End: 2023-08-21 | Stop reason: HOSPADM

## 2023-08-20 RX ORDER — NIFEDIPINE 90 MG/1
90 TABLET, EXTENDED RELEASE ORAL DAILY
Status: DISCONTINUED | OUTPATIENT
Start: 2023-08-20 | End: 2023-08-21 | Stop reason: HOSPADM

## 2023-08-20 RX ORDER — SODIUM CHLORIDE 0.9 % (FLUSH) 0.9 %
10 SYRINGE (ML) INJECTION PRN
Status: DISCONTINUED | OUTPATIENT
Start: 2023-08-20 | End: 2023-08-21 | Stop reason: HOSPADM

## 2023-08-20 RX ORDER — MIDODRINE HYDROCHLORIDE 5 MG/1
10 TABLET ORAL
Status: DISCONTINUED | OUTPATIENT
Start: 2023-08-21 | End: 2023-08-21 | Stop reason: HOSPADM

## 2023-08-20 RX ORDER — NEPHROCAP 1 MG
1 CAP ORAL DAILY
Status: DISCONTINUED | OUTPATIENT
Start: 2023-08-20 | End: 2023-08-21 | Stop reason: HOSPADM

## 2023-08-20 RX ORDER — HYDRALAZINE HYDROCHLORIDE 50 MG/1
100 TABLET, FILM COATED ORAL 3 TIMES DAILY
Status: DISCONTINUED | OUTPATIENT
Start: 2023-08-20 | End: 2023-08-21 | Stop reason: HOSPADM

## 2023-08-20 RX ORDER — ONDANSETRON 4 MG/1
4 TABLET, ORALLY DISINTEGRATING ORAL EVERY 8 HOURS PRN
Status: DISCONTINUED | OUTPATIENT
Start: 2023-08-20 | End: 2023-08-21 | Stop reason: HOSPADM

## 2023-08-20 RX ORDER — LISINOPRIL 5 MG/1
5 TABLET ORAL DAILY
Status: DISCONTINUED | OUTPATIENT
Start: 2023-08-20 | End: 2023-08-21 | Stop reason: HOSPADM

## 2023-08-20 RX ORDER — FAMOTIDINE 10 MG/ML
20 INJECTION, SOLUTION INTRAVENOUS
Status: COMPLETED | OUTPATIENT
Start: 2023-08-20 | End: 2023-08-20

## 2023-08-20 RX ORDER — HEPARIN SODIUM 5000 [USP'U]/ML
5000 INJECTION, SOLUTION INTRAVENOUS; SUBCUTANEOUS EVERY 8 HOURS SCHEDULED
Status: DISCONTINUED | OUTPATIENT
Start: 2023-08-20 | End: 2023-08-21 | Stop reason: HOSPADM

## 2023-08-20 RX ORDER — ASPIRIN 81 MG/1
81 TABLET, CHEWABLE ORAL DAILY
Status: DISCONTINUED | OUTPATIENT
Start: 2023-08-21 | End: 2023-08-21 | Stop reason: HOSPADM

## 2023-08-20 RX ORDER — ASPIRIN 81 MG/1
324 TABLET, CHEWABLE ORAL ONCE
Status: COMPLETED | OUTPATIENT
Start: 2023-08-20 | End: 2023-08-20

## 2023-08-20 RX ORDER — MIDODRINE HYDROCHLORIDE 5 MG/1
30 TABLET ORAL
Status: DISCONTINUED | OUTPATIENT
Start: 2023-08-21 | End: 2023-08-20

## 2023-08-20 RX ORDER — ACETAMINOPHEN 325 MG/1
650 TABLET ORAL EVERY 6 HOURS PRN
Status: DISCONTINUED | OUTPATIENT
Start: 2023-08-20 | End: 2023-08-21 | Stop reason: HOSPADM

## 2023-08-20 RX ORDER — INSULIN GLARGINE 100 [IU]/ML
12 INJECTION, SOLUTION SUBCUTANEOUS NIGHTLY
Status: DISCONTINUED | OUTPATIENT
Start: 2023-08-20 | End: 2023-08-21 | Stop reason: HOSPADM

## 2023-08-20 RX ORDER — BUMETANIDE 1 MG/1
4 TABLET ORAL 2 TIMES DAILY
Status: DISCONTINUED | OUTPATIENT
Start: 2023-08-20 | End: 2023-08-21 | Stop reason: HOSPADM

## 2023-08-20 RX ORDER — ACETAMINOPHEN 650 MG/1
650 SUPPOSITORY RECTAL EVERY 6 HOURS PRN
Status: DISCONTINUED | OUTPATIENT
Start: 2023-08-20 | End: 2023-08-21 | Stop reason: HOSPADM

## 2023-08-20 RX ORDER — SENNA AND DOCUSATE SODIUM 50; 8.6 MG/1; MG/1
2 TABLET, FILM COATED ORAL NIGHTLY
Status: DISCONTINUED | OUTPATIENT
Start: 2023-08-20 | End: 2023-08-21 | Stop reason: HOSPADM

## 2023-08-20 RX ORDER — B COMPLEX, C NO.20/FOLIC ACID 1 MG
1 CAPSULE ORAL DAILY
Status: DISCONTINUED | OUTPATIENT
Start: 2023-08-20 | End: 2023-08-20 | Stop reason: CLARIF

## 2023-08-20 RX ORDER — DEXTROSE MONOHYDRATE 100 MG/ML
INJECTION, SOLUTION INTRAVENOUS CONTINUOUS PRN
Status: DISCONTINUED | OUTPATIENT
Start: 2023-08-20 | End: 2023-08-21 | Stop reason: HOSPADM

## 2023-08-20 RX ORDER — ATORVASTATIN CALCIUM 20 MG/1
20 TABLET, FILM COATED ORAL EVERY EVENING
Status: DISCONTINUED | OUTPATIENT
Start: 2023-08-20 | End: 2023-08-21 | Stop reason: HOSPADM

## 2023-08-20 RX ORDER — CLONIDINE HYDROCHLORIDE 0.2 MG/1
0.2 TABLET ORAL 3 TIMES DAILY
Status: DISCONTINUED | OUTPATIENT
Start: 2023-08-20 | End: 2023-08-21 | Stop reason: HOSPADM

## 2023-08-20 RX ORDER — INSULIN LISPRO 100 [IU]/ML
0-8 INJECTION, SOLUTION INTRAVENOUS; SUBCUTANEOUS
Status: DISCONTINUED | OUTPATIENT
Start: 2023-08-20 | End: 2023-08-21 | Stop reason: HOSPADM

## 2023-08-20 RX ORDER — MAGNESIUM SULFATE 1 G/100ML
1000 INJECTION INTRAVENOUS PRN
Status: DISCONTINUED | OUTPATIENT
Start: 2023-08-20 | End: 2023-08-20

## 2023-08-20 RX ORDER — HEPARIN SODIUM 10000 [USP'U]/100ML
5-30 INJECTION, SOLUTION INTRAVENOUS CONTINUOUS
Status: DISCONTINUED | OUTPATIENT
Start: 2023-08-20 | End: 2023-08-20

## 2023-08-20 RX ORDER — CARVEDILOL 25 MG/1
25 TABLET ORAL 2 TIMES DAILY
Status: DISCONTINUED | OUTPATIENT
Start: 2023-08-20 | End: 2023-08-21 | Stop reason: HOSPADM

## 2023-08-20 RX ORDER — HEPARIN SODIUM 1000 [USP'U]/ML
2000 INJECTION, SOLUTION INTRAVENOUS; SUBCUTANEOUS PRN
Status: DISCONTINUED | OUTPATIENT
Start: 2023-08-20 | End: 2023-08-20

## 2023-08-20 RX ORDER — METOCLOPRAMIDE 5 MG/1
5 TABLET ORAL 4 TIMES DAILY
Status: DISCONTINUED | OUTPATIENT
Start: 2023-08-20 | End: 2023-08-21 | Stop reason: HOSPADM

## 2023-08-20 RX ORDER — CALCIUM ACETATE 667 MG/1
3 CAPSULE ORAL
Status: DISCONTINUED | OUTPATIENT
Start: 2023-08-20 | End: 2023-08-21 | Stop reason: HOSPADM

## 2023-08-20 RX ORDER — HEPARIN SODIUM 1000 [USP'U]/ML
4000 INJECTION, SOLUTION INTRAVENOUS; SUBCUTANEOUS ONCE
Status: COMPLETED | OUTPATIENT
Start: 2023-08-20 | End: 2023-08-20

## 2023-08-20 RX ORDER — HEPARIN SODIUM 1000 [USP'U]/ML
60 INJECTION, SOLUTION INTRAVENOUS; SUBCUTANEOUS ONCE
Status: DISCONTINUED | OUTPATIENT
Start: 2023-08-20 | End: 2023-08-20

## 2023-08-20 RX ORDER — HEPARIN SODIUM 1000 [USP'U]/ML
60 INJECTION, SOLUTION INTRAVENOUS; SUBCUTANEOUS PRN
Status: DISCONTINUED | OUTPATIENT
Start: 2023-08-20 | End: 2023-08-20

## 2023-08-20 RX ORDER — SODIUM CHLORIDE 0.9 % (FLUSH) 0.9 %
5-40 SYRINGE (ML) INJECTION EVERY 12 HOURS SCHEDULED
Status: DISCONTINUED | OUTPATIENT
Start: 2023-08-20 | End: 2023-08-21 | Stop reason: HOSPADM

## 2023-08-20 RX ORDER — POTASSIUM CHLORIDE 20 MEQ/1
40 TABLET, EXTENDED RELEASE ORAL PRN
Status: DISCONTINUED | OUTPATIENT
Start: 2023-08-20 | End: 2023-08-20

## 2023-08-20 RX ORDER — INSULIN LISPRO 100 [IU]/ML
0-4 INJECTION, SOLUTION INTRAVENOUS; SUBCUTANEOUS NIGHTLY
Status: DISCONTINUED | OUTPATIENT
Start: 2023-08-20 | End: 2023-08-21 | Stop reason: HOSPADM

## 2023-08-20 RX ORDER — HEPARIN SODIUM 1000 [USP'U]/ML
4000 INJECTION, SOLUTION INTRAVENOUS; SUBCUTANEOUS PRN
Status: DISCONTINUED | OUTPATIENT
Start: 2023-08-20 | End: 2023-08-20

## 2023-08-20 RX ORDER — SODIUM CHLORIDE 9 MG/ML
INJECTION, SOLUTION INTRAVENOUS PRN
Status: DISCONTINUED | OUTPATIENT
Start: 2023-08-20 | End: 2023-08-21 | Stop reason: HOSPADM

## 2023-08-20 RX ORDER — PANTOPRAZOLE SODIUM 40 MG/1
40 TABLET, DELAYED RELEASE ORAL
Status: DISCONTINUED | OUTPATIENT
Start: 2023-08-20 | End: 2023-08-21 | Stop reason: HOSPADM

## 2023-08-20 RX ADMIN — METOCLOPRAMIDE 5 MG: 5 TABLET ORAL at 11:08

## 2023-08-20 RX ADMIN — SUCRALFATE 1 G: 1 TABLET ORAL at 21:37

## 2023-08-20 RX ADMIN — CALCIUM ACETATE 2001 MG: 667 CAPSULE ORAL at 11:08

## 2023-08-20 RX ADMIN — HEPARIN SODIUM 5000 UNITS: 5000 INJECTION INTRAVENOUS; SUBCUTANEOUS at 21:37

## 2023-08-20 RX ADMIN — METOCLOPRAMIDE 5 MG: 5 TABLET ORAL at 16:49

## 2023-08-20 RX ADMIN — SODIUM CHLORIDE, PRESERVATIVE FREE 10 ML: 5 INJECTION INTRAVENOUS at 21:38

## 2023-08-20 RX ADMIN — HEPARIN SODIUM 5000 UNITS: 5000 INJECTION INTRAVENOUS; SUBCUTANEOUS at 13:57

## 2023-08-20 RX ADMIN — CALCIUM ACETATE 2001 MG: 667 CAPSULE ORAL at 16:49

## 2023-08-20 RX ADMIN — BUMETANIDE 4 MG: 1 TABLET ORAL at 21:37

## 2023-08-20 RX ADMIN — NEPHROCAP 1 MG: 1 CAP ORAL at 11:08

## 2023-08-20 RX ADMIN — SUCRALFATE 1 G: 1 TABLET ORAL at 16:50

## 2023-08-20 RX ADMIN — SODIUM CHLORIDE, PRESERVATIVE FREE 40 MG: 5 INJECTION INTRAVENOUS at 07:43

## 2023-08-20 RX ADMIN — HEPARIN SODIUM 12 UNITS/KG/HR: 10000 INJECTION, SOLUTION INTRAVENOUS at 06:08

## 2023-08-20 RX ADMIN — SODIUM CHLORIDE, PRESERVATIVE FREE 10 ML: 5 INJECTION INTRAVENOUS at 09:50

## 2023-08-20 RX ADMIN — ASPIRIN 81 MG 324 MG: 81 TABLET ORAL at 04:15

## 2023-08-20 RX ADMIN — INSULIN LISPRO 2 UNITS: 100 INJECTION, SOLUTION INTRAVENOUS; SUBCUTANEOUS at 09:47

## 2023-08-20 RX ADMIN — ATORVASTATIN CALCIUM 20 MG: 20 TABLET, FILM COATED ORAL at 21:37

## 2023-08-20 RX ADMIN — DOCUSATE SODIUM 50 MG AND SENNOSIDES 8.6 MG 2 TABLET: 8.6; 5 TABLET, FILM COATED ORAL at 21:37

## 2023-08-20 RX ADMIN — CARVEDILOL 25 MG: 25 TABLET, FILM COATED ORAL at 21:37

## 2023-08-20 RX ADMIN — HEPARIN SODIUM 4000 UNITS: 1000 INJECTION INTRAVENOUS; SUBCUTANEOUS at 06:05

## 2023-08-20 RX ADMIN — METOCLOPRAMIDE 5 MG: 5 TABLET ORAL at 21:37

## 2023-08-20 RX ADMIN — FAMOTIDINE 20 MG: 10 INJECTION, SOLUTION INTRAVENOUS at 05:21

## 2023-08-20 RX ADMIN — INSULIN GLARGINE 12 UNITS: 100 INJECTION, SOLUTION SUBCUTANEOUS at 21:37

## 2023-08-20 ASSESSMENT — ENCOUNTER SYMPTOMS
CONSTIPATION: 0
ABDOMINAL DISTENTION: 0
VOMITING: 0
ABDOMINAL PAIN: 0
COUGH: 0
BACK PAIN: 0
CHEST TIGHTNESS: 0
DIARRHEA: 0
NAUSEA: 1
SORE THROAT: 0
TROUBLE SWALLOWING: 0
SHORTNESS OF BREATH: 1

## 2023-08-20 ASSESSMENT — PAIN SCALES - GENERAL: PAINLEVEL_OUTOF10: 5

## 2023-08-20 ASSESSMENT — PAIN DESCRIPTION - DESCRIPTORS: DESCRIPTORS: ACHING

## 2023-08-20 ASSESSMENT — PAIN DESCRIPTION - FREQUENCY: FREQUENCY: CONTINUOUS

## 2023-08-20 ASSESSMENT — PAIN - FUNCTIONAL ASSESSMENT: PAIN_FUNCTIONAL_ASSESSMENT: ACTIVITIES ARE NOT PREVENTED

## 2023-08-20 ASSESSMENT — PAIN DESCRIPTION - LOCATION: LOCATION: LEG

## 2023-08-20 ASSESSMENT — PAIN DESCRIPTION - PAIN TYPE: TYPE: CHRONIC PAIN

## 2023-08-20 NOTE — ED NOTES
Writer attempted to medicated patient but pharmacy is still verifying medications and have them locked. [address] about verifying pt's home medications.        Ann Ku RN  08/20/23 1016

## 2023-08-20 NOTE — ED NOTES
Writer notified pharmacy to adjust calcium acetate times due to med admin time being off.       Piyush Marie RN  08/20/23 4644

## 2023-08-20 NOTE — ED NOTES
Writer notified pharmacy to please verify and send medications for patient. Writer also notified the HUB about bed placement change from stepdown to medsurg.       Isaias Putnam RN  08/20/23 2909

## 2023-08-20 NOTE — ED NOTES
Cardiologist at bedside to evaluate patient. Per cardiologist, we are to discontinue heparin infusion. Writer d/c heparin infusion at this time. Pt denies any chest pain at this time.      Alyssa Martin RN  08/20/23 8574

## 2023-08-20 NOTE — ED PROVIDER NOTES
[address] [place] [address] ED  Emergency Department Encounter  Emergency Medicine Resident     Pt Katalina Hull  MRN: [id number]  [facility] 1990  Date of evaluation: 8/20/23  PCP:  Darin Castellanos  Note Started: 8:23 AM EDT      CHIEF COMPLAINT       Chief Complaint   Patient presents with    Chest Pain     Seen at [facility] d/[facility] 3hrs ago. Cam back same s/s       HISTORY OF PRESENT ILLNESS  (Location/Symptom, Timing/Onset, Context/Setting, Quality, Duration, Modifying Factors, Severity.)      Kennedy Fuentes is a 35 y.o. female who Lyndol Harries due to chest pain. Patient was just discharged after being evaluated for chest pain with a negative stress test.  Patient is ESRD, had her regularly scheduled dialysis yesterday. She states that the pain just became increasingly worse, she had shortness of breath and some nausea. Denies any vomiting, abdominal pain or syncope. PAST MEDICAL / SURGICAL / SOCIAL / FAMILY HISTORY      has a past medical history of Asthma, Atherosclerosis of native arteries of extremities with rest pain, unspecified extremity ([address]), Depression, Diabetes mellitus ([address]), Diabetic polyneuropathy associated with type 1 diabetes mellitus ([address]), Headache, migraine, Hypertension, Lichen simplex chronicus, Mixed hyperlipidemia, Noninflammatory disorder of vagina, unspecified, Patient's noncompliance with other medical treatment and regimen for other reason, and Type 2 diabetes mellitus without complication ([address]). has a past surgical history that includes Ovary removal; Ovary removal; Upper gastrointestinal endoscopy (N/A, 10/28/2022); and Upper gastrointestinal endoscopy (N/A, 8/17/2023).       Social History     Socioeconomic History    Marital status: Single     Spouse name: Not on file    Number of children: Not on file    Years of education: Not on file    Highest education level: Not on file   Occupational History    Not on file   Tobacco Use    Smoking

## 2023-08-20 NOTE — ED NOTES
ED to inpatient nurses report    Chief Complaint   Patient presents with    Chest Pain     Seen at [facility] d/c 3hrs ago. Cam back same s/s      Present to ED from home  LOC: alert and orientated to name, place, date  Vital signs   Vitals:    08/20/23 0859 08/20/23 0900 08/20/23 1000 08/20/23 1037   BP:  (!) 108/55 102/61 (!) 101/55   Pulse: 88 91 85 86   Resp: 17 26 16 17   Temp:       TempSrc:       SpO2:          Oxygen Baseline RA    Current needs required none   LDAs:   Peripheral IV 08/20/23 Right Antecubital (Active)       Peripheral IV 08/20/23 Right; Anterior Forearm (Active)     Mobility: Independent  Pending ED orders: home medications   Present condition: stable  Code Status: [unfilled]   Consults:  []  Hospitalist  Completed  [] yes [] no  [x]  Medicine  Completed  [x] yes [] No  [x]  Cardiology  Completed  [x] yes [] No  []  GI   Completed  [] yes [] No  []  Neurology  Completed  [] yes [] No  []  Nephrology Completed  [] yes [] No  []  Vascular  Completed  [] yes [] No   []  Surgery  Completed  [] yes [] No   []  Urology  Completed  [] yes [] No   []  Plastics  Completed  [] yes [] No   []  ENT  Completed  [] yes [] No   [x]  Other n/a  Completed  [] yes [] No  Pertinent event(s) see blank notes  Pertinent event(s) cardiology and medicine attending did see patient and stated that we can discontinue the heparin infusion due to patients troponin's being at baseline. I am awaiting mediciations patient can have to come through pharmacy tube. Pts BP is 101/55 there I will not being given anti-hypertensive medications.    Electronically signed by Roberth Mccormick RN on 8/20/2023 at 10:43 AM       Roberth Mccormick RN  08/20/23 2466

## 2023-08-20 NOTE — ED NOTES
Pt rr even and nonlabored, nad. Pt laying on cot with cardiac monitor on, call light in reach, eyes closed. Will continue to monitor.      Mckinley Duran RN  08/20/23 6800

## 2023-08-20 NOTE — ED NOTES
Lab called for new green/yellow tube. New sample obtained, labeled, and sent.      Yusra Sullivan RN  08/20/23 0644

## 2023-08-20 NOTE — ED NOTES
Dr. Maninder Ramos notified that cardiology discontinued the heparin infusion. Bed status is now changed to med/surg.         Isaias Putnam RN  08/20/23 0422

## 2023-08-20 NOTE — ED NOTES
Clear diet ordered by ED Huc. Medicine at bedside to evaluate patient.       Lilian Meyer RN  08/20/23 3861

## 2023-08-20 NOTE — ED TRIAGE NOTES
Pt arrives to ED by EMS for CP. Pt reports CP as midsternal, radiates to back, accompanied with nausea, started 5 hrs ago. Pt was seen at [facility] 3 h PTA, states they did not help her pain. Pt denies vomiting/SOB. Pt MWF dialysis, no missed appointments, fistula to left arm. Pt A&Ox4, rr even and nonlabored, nad.

## 2023-08-20 NOTE — H&P
daily  Continue Reglan for gastroparesis  Continue other home medicines as before  Waiting for cardiology consult, likely to be discharged if cleared by cardiology, she needs to follow-up with her GI specialist as outpatient    Consultations:   Charly Chaney TO HOSPITALIST  IP CONSULT TO CARDIOLOGY     Patient is admitted as inpatient status because of co-morbidities listed above, severity of signs and symptoms as outlined, requirement for current medical therapies and most importantly because of direct risk to patient if care not provided in a hospital setting. Expected length of stay > 48 hours.     Juan Pa MD  8/20/2023  11:14 AM    Copy sent to Dr. Neno Garcia

## 2023-08-21 VITALS
HEIGHT: 64 IN | WEIGHT: 175.71 LBS | BODY MASS INDEX: 30 KG/M2 | SYSTOLIC BLOOD PRESSURE: 147 MMHG | TEMPERATURE: 97.8 F | OXYGEN SATURATION: 99 % | HEART RATE: 80 BPM | DIASTOLIC BLOOD PRESSURE: 78 MMHG | RESPIRATION RATE: 18 BRPM

## 2023-08-21 LAB
ANION GAP SERPL CALCULATED.3IONS-SCNC: 17 MMOL/L (ref 9–17)
BUN SERPL-MCNC: 55 MG/DL (ref 6–20)
CALCIUM SERPL-MCNC: 8.9 MG/DL (ref 8.6–10.4)
CHLORIDE SERPL-SCNC: 97 MMOL/L (ref 98–107)
CHOLEST SERPL-MCNC: 114 MG/DL
CHOLESTEROL/HDL RATIO: 3.2
CO2 SERPL-SCNC: 19 MMOL/L (ref 20–31)
CREAT SERPL-MCNC: 7.4 MG/DL (ref 0.5–0.9)
ERYTHROCYTE [DISTWIDTH] IN BLOOD BY AUTOMATED COUNT: 13 % (ref 11.8–14.4)
GFR SERPL CREATININE-BSD FRML MDRD: 7 ML/MIN/1.73M2
GLUCOSE BLD-MCNC: 88 MG/DL (ref 65–105)
GLUCOSE BLD-MCNC: 97 MG/DL (ref 65–105)
GLUCOSE SERPL-MCNC: 124 MG/DL (ref 70–99)
HCT VFR BLD AUTO: 38.4 % (ref 36.3–47.1)
HDLC SERPL-MCNC: 36 MG/DL
HGB BLD-MCNC: 12.5 G/DL (ref 11.9–15.1)
LDLC SERPL CALC-MCNC: 39 MG/DL (ref 0–130)
MAGNESIUM SERPL-MCNC: 2.3 MG/DL (ref 1.6–2.6)
MCH RBC QN AUTO: 29.5 PG (ref 25.2–33.5)
MCHC RBC AUTO-ENTMCNC: 32.6 G/DL (ref 28.4–34.8)
MCV RBC AUTO: 90.6 FL (ref 82.6–102.9)
NRBC BLD-RTO: 0 PER 100 WBC
PLATELET # BLD AUTO: 263 K/UL (ref 138–453)
PMV BLD AUTO: 9.7 FL (ref 8.1–13.5)
POTASSIUM SERPL-SCNC: 3.9 MMOL/L (ref 3.7–5.3)
RBC # BLD AUTO: 4.24 M/UL (ref 3.95–5.11)
SODIUM SERPL-SCNC: 133 MMOL/L (ref 135–144)
TRIGL SERPL-MCNC: 197 MG/DL
WBC OTHER # BLD: 8.7 K/UL (ref 3.5–11.3)

## 2023-08-21 PROCEDURE — 2500000003 HC RX 250 WO HCPCS: Performed by: NURSE PRACTITIONER

## 2023-08-21 PROCEDURE — 36415 COLL VENOUS BLD VENIPUNCTURE: CPT

## 2023-08-21 PROCEDURE — 90935 HEMODIALYSIS ONE EVALUATION: CPT

## 2023-08-21 PROCEDURE — 6370000000 HC RX 637 (ALT 250 FOR IP): Performed by: NURSE PRACTITIONER

## 2023-08-21 PROCEDURE — 99239 HOSP IP/OBS DSCHRG MGMT >30: CPT | Performed by: INTERNAL MEDICINE

## 2023-08-21 PROCEDURE — 83735 ASSAY OF MAGNESIUM: CPT

## 2023-08-21 PROCEDURE — 99221 1ST HOSP IP/OBS SF/LOW 40: CPT | Performed by: INTERNAL MEDICINE

## 2023-08-21 PROCEDURE — 80048 BASIC METABOLIC PNL TOTAL CA: CPT

## 2023-08-21 PROCEDURE — C9113 INJ PANTOPRAZOLE SODIUM, VIA: HCPCS | Performed by: INTERNAL MEDICINE

## 2023-08-21 PROCEDURE — 6360000002 HC RX W HCPCS: Performed by: INTERNAL MEDICINE

## 2023-08-21 PROCEDURE — 85027 COMPLETE CBC AUTOMATED: CPT

## 2023-08-21 PROCEDURE — 80061 LIPID PANEL: CPT

## 2023-08-21 PROCEDURE — 2580000003 HC RX 258: Performed by: INTERNAL MEDICINE

## 2023-08-21 PROCEDURE — 5A1D70Z PERFORMANCE OF URINARY FILTRATION, INTERMITTENT, LESS THAN 6 HOURS PER DAY: ICD-10-PCS | Performed by: STUDENT IN AN ORGANIZED HEALTH CARE EDUCATION/TRAINING PROGRAM

## 2023-08-21 PROCEDURE — 2580000003 HC RX 258: Performed by: NURSE PRACTITIONER

## 2023-08-21 PROCEDURE — 82947 ASSAY GLUCOSE BLOOD QUANT: CPT

## 2023-08-21 PROCEDURE — 6370000000 HC RX 637 (ALT 250 FOR IP): Performed by: INTERNAL MEDICINE

## 2023-08-21 RX ORDER — SUCRALFATE 1 G/1
1 TABLET ORAL
Qty: 60 TABLET | Refills: 0 | Status: SHIPPED | OUTPATIENT
Start: 2023-08-21 | End: 2023-09-05

## 2023-08-21 RX ADMIN — CARVEDILOL 25 MG: 25 TABLET, FILM COATED ORAL at 08:51

## 2023-08-21 RX ADMIN — INSULIN LISPRO 2 UNITS: 100 INJECTION, SOLUTION INTRAVENOUS; SUBCUTANEOUS at 08:54

## 2023-08-21 RX ADMIN — METOCLOPRAMIDE 5 MG: 5 TABLET ORAL at 14:20

## 2023-08-21 RX ADMIN — Medication 4000 UNITS: at 09:10

## 2023-08-21 RX ADMIN — SODIUM CHLORIDE, PRESERVATIVE FREE 40 MG: 5 INJECTION INTRAVENOUS at 08:52

## 2023-08-21 RX ADMIN — SUCRALFATE 1 G: 1 TABLET ORAL at 14:08

## 2023-08-21 RX ADMIN — SUCRALFATE 1 G: 1 TABLET ORAL at 05:45

## 2023-08-21 RX ADMIN — CLONIDINE HYDROCHLORIDE 0.2 MG: 0.2 TABLET ORAL at 08:52

## 2023-08-21 RX ADMIN — ASPIRIN 81 MG: 81 TABLET, CHEWABLE ORAL at 08:51

## 2023-08-21 RX ADMIN — HEPARIN SODIUM 5000 UNITS: 5000 INJECTION INTRAVENOUS; SUBCUTANEOUS at 05:45

## 2023-08-21 RX ADMIN — METOCLOPRAMIDE 5 MG: 5 TABLET ORAL at 08:51

## 2023-08-21 RX ADMIN — BUMETANIDE 4 MG: 1 TABLET ORAL at 08:51

## 2023-08-21 RX ADMIN — HEPARIN SODIUM 5000 UNITS: 5000 INJECTION INTRAVENOUS; SUBCUTANEOUS at 14:16

## 2023-08-21 RX ADMIN — NEPHROCAP 1 MG: 1 CAP ORAL at 09:13

## 2023-08-21 RX ADMIN — SODIUM CHLORIDE, PRESERVATIVE FREE 10 ML: 5 INJECTION INTRAVENOUS at 09:14

## 2023-08-21 RX ADMIN — CLOPIDOGREL BISULFATE 75 MG: 75 TABLET, FILM COATED ORAL at 08:52

## 2023-08-21 ASSESSMENT — PAIN SCALES - GENERAL
PAINLEVEL_OUTOF10: 3
PAINLEVEL_OUTOF10: 6
PAINLEVEL_OUTOF10: 0

## 2023-08-21 ASSESSMENT — PAIN DESCRIPTION - PAIN TYPE: TYPE: CHRONIC PAIN

## 2023-08-21 ASSESSMENT — PAIN DESCRIPTION - FREQUENCY: FREQUENCY: CONTINUOUS

## 2023-08-21 ASSESSMENT — PAIN DESCRIPTION - DESCRIPTORS: DESCRIPTORS: ACHING;SORE

## 2023-08-21 ASSESSMENT — PAIN DESCRIPTION - ORIENTATION: ORIENTATION: LOWER

## 2023-08-21 ASSESSMENT — PAIN DESCRIPTION - ONSET: ONSET: ON-GOING

## 2023-08-21 ASSESSMENT — PAIN DESCRIPTION - LOCATION: LOCATION: BACK

## 2023-08-21 NOTE — CONSULTS
[facility]   Consult / H&P               Today's Date: 8/20/2023  Patient Name: Winfired Sierra  Date of admission: 8/20/2023  1:36 AM  Patient's age: 35 y.o., 1990  Admission Dx: NSTEMI (non-ST elevated myocardial infarction) [facility]) [I21.4]    Reason for Consult:  Cardiac evaluation    Requesting Physician: Luda Caruso MD    CHIEF COMPLAINT:  Chest pain     History Obtained From:  patient (limited as the patient was drowsy), electronic medical record    HISTORY OF PRESENT ILLNESS:      The patient is a 35 y.o.  female who is admitted to the hospital for chest pain. The patient has PMH of insulin dependant type I DM, ESRD, on HD, HTN,HLD, CVA, severe gastritis on recent EGD pending biopsy results who was admitted to the hospital with chest pain that started while the patient was in dialysis on Monday. It has been intermittent since then, non radiating, left sided with no significant associated symptoms. EKG revealed no acute ST-changes. Troponin 169-164, chronically elevated from ESRD. Cardiology was consulted for further recommendations. The patient was recently admitted with similar symptoms and underwent stress test and ECHO that were unremarkable. Past Medical History:   has a past medical history of Asthma, Atherosclerosis of native arteries of extremities with rest pain, unspecified extremity ([address]), Depression, Diabetes mellitus ([address]), Diabetic polyneuropathy associated with type 1 diabetes mellitus ([address]), ESRD on dialysis ([address]), Headache, migraine, Hypertension, Lichen simplex chronicus, Mixed hyperlipidemia, Noninflammatory disorder of vagina, unspecified, Patient's noncompliance with other medical treatment and regimen for other reason, and Type 2 diabetes mellitus without complication ([address]).     Past Surgical History:   has a past surgical history that includes Ovary removal; Ovary removal; Upper gastrointestinal endoscopy (N/A, 10/28/2022);
other medical treatment and regimen for other reason     Type 2 diabetes mellitus without complication ([address] 41/10/7642         PAST SURGICAL HISTORY         Procedure Laterality Date    OVARY REMOVAL      July 2010    OVARY REMOVAL      UPPER GASTROINTESTINAL ENDOSCOPY N/A 10/28/2022    EGD BIOPSY performed by Elliott Rinne, MD at [address] [facility] 8/17/2023    EGD BIOPSY performed by Savanna Piper MD at [address]:                Medications Prior to Admission: insulin lispro, 1 Unit Dial, (HUMALOG KWIKPEN) 100 UNIT/ML SOPN, Inject 10 Units into the skin 3 times daily (before meals)  ondansetron (ZOFRAN-ODT) 4 MG disintegrating tablet, Take 1 tablet by mouth every 8 hours as needed for Nausea or Vomiting  lisinopril (PRINIVIL;ZESTRIL) 5 MG tablet, Take 1 tablet by mouth daily  cefUROXime (CEFTIN) 250 MG tablet, Take 1 tablet by mouth daily for 5 days  pantoprazole (PROTONIX) 40 MG tablet, Take 1 tablet by mouth 2 times daily (before meals)  metoclopramide (REGLAN) 5 MG tablet, Take 1 tablet by mouth 4 times daily  atorvastatin (LIPITOR) 20 MG tablet, Take 1 tablet by mouth daily  bumetanide (BUMEX) 2 MG tablet, Take 2 tablets by mouth 2 times daily  cloNIDine (CATAPRES) 0.2 MG tablet, Take 1 tablet by mouth 3 times daily  glucagon 1 MG injection, 1 mg as needed (for low blood sugar) Indications: Hypoglycemia  insulin detemir (LEVEMIR) 100 UNIT/ML injection pen, Inject 24 Units into the skin nightly  prednisoLONE acetate (PRED FORTE) 1 % ophthalmic suspension, Place 1 drop into the right eye Four times daily for 7 days, then 1 drop twice daily until gone  mirtazapine (REMERON SOL-TAB) 15 MG disintegrating tablet, Take 1 tablet by mouth nightly  acetaminophen (TYLENOL) 325 MG tablet, Take 650 mg by mouth every 6 hours as needed for Pain or Fever (do not exceed 3 gm in 24 hours)  albuterol sulfate HFA (PROVENTIL;VENTOLIN;PROAIR) 108 (90 Base) MCG/ACT

## 2023-08-21 NOTE — DISCHARGE INSTR - COC
08/20/2023    Intake/Output Summary (Last 24 hours) at 8/21/2023 0846  Last data filed at 8/20/2023 1813  Gross per 24 hour   Intake 640 ml   Output --   Net 640 ml     I/O last 3 completed shifts: In: 0 [P.O.:640]  Out: -     Safety Concerns:     None    Impairments/Disabilities:      None and Vision    Nutrition Therapy:  Current Nutrition Therapy:   - Oral Diet:  General    Routes of Feeding: Oral  Liquids: No Restrictions  Daily Fluid Restriction: no  Last Modified Barium Swallow with Video (Video Swallowing Test): not done    Treatments at the Time of Hospital Discharge:   Respiratory Treatments: n/a  Oxygen Therapy:  is not on home oxygen therapy. Ventilator:    - No ventilator support    Rehab Therapies: n/a  Weight Bearing Status/Restrictions: No weight bearing restrictions  Other Medical Equipment (for information only, NOT a DME order):  walker  Other Treatments: PT/OT,     Patient's personal belongings (please select all that are sent with patient):  Sulma ARCEO SIGNATURE:  Electronically signed by Libertad Braun RN on 8/21/23 at 6:54 PM EDT    CASE MANAGEMENT/SOCIAL WORK SECTION    Inpatient Status Date: ***    Readmission Risk Assessment Score:  Readmission Risk              Risk of Unplanned Readmission:  32           Discharging to Facility/ Agency   Name: [facility]  Phone: [phone]    / signature: Electronically signed by Kym Bustamante RN on 8/21/23 at 9:13 AM EDT    PHYSICIAN SECTION    Prognosis: Fair    Condition at Discharge: Stable    Rehab Potential (if transferring to Rehab): Good    Recommended Labs or Other Treatments After Discharge: Continue hemodialysis scheduled    Physician Certification: I certify the above information and transfer of Caryn Rivers  is necessary for the continuing treatment of the diagnosis listed and that she requires Home Care for greater 30 days.      Update Admission H&P: No change in H&P    PHYSICIAN SIGNATURE:

## 2023-08-21 NOTE — PLAN OF CARE
Problem: Pain  Goal: Verbalizes/displays adequate comfort level or baseline comfort level  8/21/2023 1429 by Liam Ferguson RN  Outcome: Progressing  8/21/2023 0245 by Marco A Suero RN  Outcome: Progressing     Problem: Discharge Planning  Goal: Discharge to home or other facility with appropriate resources  8/21/2023 1429 by Liam Ferguson RN  Outcome: Progressing  8/21/2023 0245 by Marco A Suero RN  Outcome: Progressing

## 2023-08-21 NOTE — CARE COORDINATION
Case Management Assessment  Initial Evaluation    Date/Time of Evaluation: 8/21/2023 8:15 AM  Assessment Completed by: Renea Yepez RN    If patient is discharged prior to next notation, then this note serves as note for discharge by case management. Patient Name: Rufus Dent                   YOB: 1990  Diagnosis: NSTEMI (non-ST elevated myocardial infarction) [facility]) [I21.4]  Chest pain, unspecified type [R07.9]                   Date / Time: 8/20/2023  1:36 AM    Patient Admission Status: Inpatient   Readmission Risk (Low < 19, Mod (19-27), High > 27): Readmission Risk Score: 23.4    Current PCP: Ysabel Dunham  PCP verified by CM? (P) Yes    Chart Reviewed: Yes      History Provided by: (P) Patient  Patient Orientation: (P) Alert and Oriented    Patient Cognition: (P) Alert    Hospitalization in the last 30 days (Readmission):  yes  If yes, Readmission Assessment in  Navigator will be completed. Advance Directives:      Code Status: Full Code   Patient's Primary Decision Maker is: (P) Legal Next of Kin      Discharge Planning:    Patient lives with: (P) Spouse/Significant Other, Children Type of Home: (P) House  Primary Care Giver: (P) Self  Patient Support Systems include: (P) Spouse/Significant Other, Children, Family Members   Current Financial resources:    Current community resources:    Current services prior to admission: (P) Durable Medical Equipment, Other (Comment) (hemodialysis)            Current DME: (P) Walker, Wheelchair (rollator walker)            Type of Home Care services:  (P) OT, PT    ADLS  Prior functional level: (P) Other (see comment) (pt is wheelchair bound, but can walk short distances.  is able to cook, clean, get groceries for self, etc.)  Current functional level: (P) Other (see comment) (pt states she is at baseline functional level)    PT AM-PAC:   /24  OT AM-PAC:   /24    Family can provide assistance at DC: (P) Yes  Would you like Case

## 2023-08-21 NOTE — PLAN OF CARE
Problem: Discharge Planning  Goal: Discharge to home or other facility with appropriate resources  8/21/2023 1845 by Darnell Salinas RN  Outcome: Adequate for Discharge  8/21/2023 1429 by Darnell Salinas RN  Outcome: Progressing     Problem: Pain  Goal: Verbalizes/displays adequate comfort level or baseline comfort level  8/21/2023 1845 by Darnell Salinas RN  Outcome: Adequate for Discharge  8/21/2023 1429 by Darnell Salinas RN  Outcome: Progressing

## 2023-08-21 NOTE — CARE COORDINATION
08/21/23 1729   Readmission Assessment   Number of Days since last admission? 1-7 days  (not readmit, transfer from [facility])   Previous Disposition Other (comment)  (not readmit, transfer from [facility])   Who is being Interviewed   (not readmit, transfer from [facility])   What was the patient's/caregiver's perception as to why they think they needed to return back to the hospital? Other (Comment)  (not readmit, transfer from [facility])   Did you visit your Primary Care Physician after you left the hospital, before you returned this time? No  (not readmit, transfer from [facility])   Why weren't you able to visit your PCP? Other (Comment)  (not readmit, transfer from [facility])   Did you see a specialist, such as Cardiac, Pulmonary, Orthopedic Physician, etc. after you left the hospital? Other (Comment)  (not readmit, transfer from [facility])   Who advised the patient to return to the hospital? Other (Comment)  (not readmit, transfer from [facility])   Does the patient report anything that got in the way of taking their medications? No  (not readmit, transfer from [facility])   In our efforts to provide the best possible care to you and others like you, can you think of anything that we could have done to help you after you left the hospital the first time, so that you might not have needed to return so soon?  Other (Comment)  (not readmit, transfer from [facility])

## 2023-08-21 NOTE — DISCHARGE SUMMARY
[facility]  Office: [phone]  Clinton Keller DO, Yann Morales DO, Hetal Jimenez DO, Kaylyn Gill, DO, Vega Arango MD, Maria Del Carmen Klein MD, Marleni Perez MD, Ebbie Meigs, MD,  Fabienne Small MD, Buffy Daly MD, Reza Bustillo DO, Amelie Moncada MD,  Brandan Gipson MD, Robert Foley MD, Cezar Adrian DO, Soheila Conde MD,  Christel Mixon DO, Verner Crandall, MD, Livia Contreras MD, Patsy Pascual MD, Chaitanya Braden MD,  Manuel Go MD, Yana Rudolph MD, Dorcas Montoya MD, Corina Real DO, Renato Cazares MD,  August Blanca MD, Stephenie Schulz, CNP,  Veena Lima, CNP, Papo Armendariz, CNP, Scar Harrison, CNP,  Kimberlee Mann, [facility], Mykel Staples, CNP, Annie Webb, CNP, Nina Levy, CNP, Betty Morris, CNP, Robi Reeves, CNP, Paulette Leavitt PA-C, David Berry, SYLVESTER, Fara Angulo, CNP, Paula Anderson, [facility]    Discharge Summary     Patient ID: Rosemarie Mullins  :  1990   MRN: [id number]     ACCOUNT:  [de-identified]   Patient's PCP: Aury Ignacio Date: 2023   Discharge Date: 2023     Length of Stay: 1  Code Status:  Full Code  Admitting Physician: No admitting provider for patient encounter. Discharge Physician: Betito Charles MD     Active Discharge Diagnoses:     Hospital Problem Lists:  Principal Problem:    NSTEMI (non-ST elevated myocardial infarction) (HCC)-chronic  Active Problems:    Chest pain    Medically noncompliant    Hyperglycemia due to diabetes mellitus ([address])    Diabetic gastroparesis associated with type 1 diabetes mellitus ([address])    ESRD on dialysis [facility])  Resolved Problems:    * No resolved hospital problems. *      Admission Condition:  poor     Discharged Condition: stable    Hospital Stay:   Admitting history; Rosemarie Mullins is a 35 y.o.  Non- / non  female who presents with

## 2023-08-22 LAB — SURGICAL PATHOLOGY REPORT: NORMAL

## 2023-08-23 LAB
EKG ATRIAL RATE: 88 BPM
EKG ATRIAL RATE: 89 BPM
EKG P AXIS: 50 DEGREES
EKG P AXIS: 57 DEGREES
EKG P-R INTERVAL: 132 MS
EKG P-R INTERVAL: 142 MS
EKG Q-T INTERVAL: 402 MS
EKG Q-T INTERVAL: 410 MS
EKG QRS DURATION: 84 MS
EKG QRS DURATION: 86 MS
EKG QTC CALCULATION (BAZETT): 489 MS
EKG QTC CALCULATION (BAZETT): 496 MS
EKG R AXIS: 87 DEGREES
EKG R AXIS: 98 DEGREES
EKG T AXIS: 65 DEGREES
EKG T AXIS: 70 DEGREES
EKG VENTRICULAR RATE: 88 BPM
EKG VENTRICULAR RATE: 89 BPM

## 2023-08-23 PROCEDURE — 93010 ELECTROCARDIOGRAM REPORT: CPT | Performed by: INTERNAL MEDICINE

## 2023-09-26 ENCOUNTER — HOSPITAL ENCOUNTER (OUTPATIENT)
Age: 33
Setting detail: OUTPATIENT SURGERY
Discharge: HOME | End: 2023-09-26
Attending: INTERNAL MEDICINE | Admitting: INTERNAL MEDICINE
Payer: COMMERCIAL

## 2023-09-26 ENCOUNTER — ANESTHESIA EVENT (OUTPATIENT)
Dept: OPERATING ROOM | Age: 33
End: 2023-09-26
Payer: COMMERCIAL

## 2023-09-26 ENCOUNTER — ANESTHESIA (OUTPATIENT)
Dept: OPERATING ROOM | Age: 33
End: 2023-09-26
Payer: COMMERCIAL

## 2023-09-26 VITALS
SYSTOLIC BLOOD PRESSURE: 172 MMHG | WEIGHT: 180.7 LBS | DIASTOLIC BLOOD PRESSURE: 79 MMHG | TEMPERATURE: 96.8 F | BODY MASS INDEX: 30.85 KG/M2 | HEIGHT: 64 IN | OXYGEN SATURATION: 100 % | RESPIRATION RATE: 27 BRPM | HEART RATE: 93 BPM

## 2023-09-26 DIAGNOSIS — R10.13 ABDOMINAL PAIN, EPIGASTRIC: ICD-10-CM

## 2023-09-26 DIAGNOSIS — K25.9 GASTRIC EROSION, UNSPECIFIED CHRONICITY: ICD-10-CM

## 2023-09-26 LAB
GLUCOSE BLD-MCNC: 305 MG/DL (ref 65–105)
GLUCOSE BLD-MCNC: 319 MG/DL (ref 65–105)
HCG SERPL QL: NEGATIVE
POTASSIUM SERPL-SCNC: 4.5 MMOL/L (ref 3.7–5.3)
REASON FOR REJECTION: NORMAL
SPECIMEN SOURCE: NORMAL
ZZ NTE CLEAN UP: ORDERED TEST: NORMAL

## 2023-09-26 PROCEDURE — 7100000011 HC PHASE II RECOVERY - ADDTL 15 MIN: Performed by: INTERNAL MEDICINE

## 2023-09-26 PROCEDURE — 7100000010 HC PHASE II RECOVERY - FIRST 15 MIN: Performed by: INTERNAL MEDICINE

## 2023-09-26 PROCEDURE — 84132 ASSAY OF SERUM POTASSIUM: CPT

## 2023-09-26 PROCEDURE — 2500000003 HC RX 250 WO HCPCS: Performed by: ANESTHESIOLOGY

## 2023-09-26 PROCEDURE — 88312 SPECIAL STAINS GROUP 1: CPT

## 2023-09-26 PROCEDURE — A4216 STERILE WATER/SALINE, 10 ML: HCPCS | Performed by: ANESTHESIOLOGY

## 2023-09-26 PROCEDURE — 2709999900 HC NON-CHARGEABLE SUPPLY: Performed by: INTERNAL MEDICINE

## 2023-09-26 PROCEDURE — 88305 TISSUE EXAM BY PATHOLOGIST: CPT

## 2023-09-26 PROCEDURE — 6360000002 HC RX W HCPCS: Performed by: NURSE ANESTHETIST, CERTIFIED REGISTERED

## 2023-09-26 PROCEDURE — 3609012400 HC EGD TRANSORAL BIOPSY SINGLE/MULTIPLE: Performed by: INTERNAL MEDICINE

## 2023-09-26 PROCEDURE — 84703 CHORIONIC GONADOTROPIN ASSAY: CPT

## 2023-09-26 PROCEDURE — 3700000000 HC ANESTHESIA ATTENDED CARE: Performed by: INTERNAL MEDICINE

## 2023-09-26 PROCEDURE — 36415 COLL VENOUS BLD VENIPUNCTURE: CPT

## 2023-09-26 PROCEDURE — 2580000003 HC RX 258: Performed by: ANESTHESIOLOGY

## 2023-09-26 PROCEDURE — 82947 ASSAY GLUCOSE BLOOD QUANT: CPT

## 2023-09-26 PROCEDURE — 6360000002 HC RX W HCPCS: Performed by: ANESTHESIOLOGY

## 2023-09-26 PROCEDURE — 2500000003 HC RX 250 WO HCPCS: Performed by: NURSE ANESTHETIST, CERTIFIED REGISTERED

## 2023-09-26 RX ORDER — LIDOCAINE HYDROCHLORIDE 20 MG/ML
INJECTION, SOLUTION EPIDURAL; INFILTRATION; INTRACAUDAL; PERINEURAL PRN
Status: DISCONTINUED | OUTPATIENT
Start: 2023-09-26 | End: 2023-09-26 | Stop reason: SDUPTHER

## 2023-09-26 RX ORDER — SODIUM CHLORIDE 9 MG/ML
INJECTION, SOLUTION INTRAVENOUS CONTINUOUS
Status: DISCONTINUED | OUTPATIENT
Start: 2023-09-26 | End: 2023-09-26 | Stop reason: HOSPADM

## 2023-09-26 RX ORDER — PROPOFOL 10 MG/ML
INJECTION, EMULSION INTRAVENOUS PRN
Status: DISCONTINUED | OUTPATIENT
Start: 2023-09-26 | End: 2023-09-26 | Stop reason: SDUPTHER

## 2023-09-26 RX ORDER — ONDANSETRON 4 MG/1
4 TABLET, FILM COATED ORAL EVERY 8 HOURS PRN
COMMUNITY

## 2023-09-26 RX ORDER — LIDOCAINE HYDROCHLORIDE 10 MG/ML
1 INJECTION, SOLUTION EPIDURAL; INFILTRATION; INTRACAUDAL; PERINEURAL
Status: DISCONTINUED | OUTPATIENT
Start: 2023-09-27 | End: 2023-09-26 | Stop reason: HOSPADM

## 2023-09-26 RX ORDER — SODIUM CHLORIDE, SODIUM LACTATE, POTASSIUM CHLORIDE, CALCIUM CHLORIDE 600; 310; 30; 20 MG/100ML; MG/100ML; MG/100ML; MG/100ML
INJECTION, SOLUTION INTRAVENOUS CONTINUOUS
Status: DISCONTINUED | OUTPATIENT
Start: 2023-09-26 | End: 2023-09-26 | Stop reason: HOSPADM

## 2023-09-26 RX ORDER — METOCLOPRAMIDE HYDROCHLORIDE 5 MG/ML
10 INJECTION INTRAMUSCULAR; INTRAVENOUS ONCE
Status: COMPLETED | OUTPATIENT
Start: 2023-09-26 | End: 2023-09-26

## 2023-09-26 RX ADMIN — FAMOTIDINE 20 MG: 10 INJECTION, SOLUTION INTRAVENOUS at 11:40

## 2023-09-26 RX ADMIN — LIDOCAINE HYDROCHLORIDE 60 MG: 20 INJECTION, SOLUTION EPIDURAL; INFILTRATION; INTRACAUDAL; PERINEURAL at 13:03

## 2023-09-26 RX ADMIN — PROPOFOL 100 MG: 10 INJECTION, EMULSION INTRAVENOUS at 13:05

## 2023-09-26 RX ADMIN — PROPOFOL 100 MG: 10 INJECTION, EMULSION INTRAVENOUS at 13:10

## 2023-09-26 RX ADMIN — METOCLOPRAMIDE HYDROCHLORIDE 10 MG: 5 INJECTION INTRAMUSCULAR; INTRAVENOUS at 11:46

## 2023-09-26 ASSESSMENT — PAIN - FUNCTIONAL ASSESSMENT: PAIN_FUNCTIONAL_ASSESSMENT: 0-10

## 2023-09-26 NOTE — ANESTHESIA POSTPROCEDURE EVALUATION
Department of Anesthesiology  Postprocedure Note    Patient: Rosemarie Mullins  MRN: [id number]  YOB: 1990  Date of evaluation: 9/26/2023      Procedure Summary     Date: 09/26/23 Room / Location: [place] [address] / [facility] - INPATIENT    Anesthesia Start: 1303 Anesthesia Stop: 8234    Procedure: EGD BIOPSY Diagnosis:       Gastric erosion, unspecified chronicity      Abdominal pain, epigastric      (Gastric erosion, unspecified chronicity [K25.9])      (Abdominal pain, epigastric [R10.13])    Surgeons: Ramu Mandujano MD Responsible Provider: [facility]     Anesthesia Type: MAC, general ASA Status: 4          Anesthesia Type: No value filed.     Tarsha Phase I:      Tarsha Phase II: Tarsha Score: 10      Anesthesia Post Evaluation    Patient location during evaluation: PACU  Patient participation: complete - patient participated  Level of consciousness: awake and alert  Airway patency: patent  Nausea & Vomiting: no nausea and no vomiting  Complications: no  Cardiovascular status: hemodynamically stable  Respiratory status: acceptable  Hydration status: stable  Pain management: adequate

## 2023-09-26 NOTE — PROGRESS NOTES
Patient is here for outpatient EGD, she admits that she is unaware of what medications she is on at home. She states that she is out of some medications at home but is not sure what ones. Author YAMILET Cohen has called the pts local pharmacy, [address] [facility] for recent list of medications that were filled and this was cross referenced with the most recent  dialysis notes. The medication list was updated to the best of my ability. I also called our inpatient pharmacy to possibly have our medication reconciliation pharmacist to look into this patient as well as recommended to the patient to have home care services at home to help with medication set up and management. The patient states that she already has services from [facility]. RN notified Eric Orellana at [facility] /[phone] and made her aware that the pt needs assistance organizing her medications at home. Eric Orellana looks into their records and finds that the patient is non compliant with services and education and has been discharged from them from a nursing aspect. Writer also lets Dr. Monika Kruger know that the patients blood sugar  is 319 and that her potassium is 4.5.

## 2023-09-26 NOTE — DISCHARGE INSTRUCTIONS
[facility]    POST-ENDOSCOPY INSTRUCTIONS    1. ACTIVITY   No driving, operating machinery, or making important decisions for 24 hours. Resume normal activity after 24 hours. You may return to work after 24 hours. 2. DIET        Resume your usual diet unless specified below. 3. MEDICATIONS    Resume your usual medications. Protonix 40 mg twice a day as prescribed. Do not consume alcohol, tranquilizers, or sleeping medications for 24 hour unless advised by your physician. 4. PHYSICIAN FOLLOW-UP / INSTRUCTIONS    Please call the office/clinic in 10 days for biopsy results:    GI office:  [phone]          Follow up with your family physician as planned. 6. NORMAL CHANGES YOU MAY EXPERIENCE AFTER ENDOSCOPY:         EGD/ERCP     COLONOSCOPY     Sore throat after EGD/ERCP  Passing of gas for several hours. A bloated feeling and belching from  Some mild abdominal cramping. air in stomach               You may feel fatigued for the next 24-48    hours due to the prep and sedation    7. CALL YOUR PHYSICIAN IF YOU EXPERIENCE ANY OF THE FOLLOWING      A. Passing blood rectally or vomiting blood (color may be red or black)      B. Severe abdominal pain or tenderness (that is not relieved by passing air)      C.   Fever, chills, or excessive sweating      D.  Persistent nausea or vomiting      E.  Redness or swelling at the IV site

## 2023-09-26 NOTE — H&P
History and Physical Service   [address] [place] [address]     HISTORY AND PHYSICAL EXAMINATION            Date of Evaluation: 9/26/2023  Patient name:  Chrissy Menon  MRN:   [id number]  YOB: 1990  PCP:    Princess Woods    History Obtained From:     Patient, medical records    History of Present Illness: This is Chrissy Menon a 35 y.o. female who presents today for a EGD ESOPHAGOGASTRODUODENOSCOPY by Glendy Saeed MD for Gastric erosion, unspecified chronicity; Abdominal pain, epigastric. Patient complains of current nausea- medication ordered per anesthesia. She denies bloody tarry stools, diarrhea, vomiting, abdominal pain or unintentional weight loss. No previous colonoscopy. Has had previous EGD. No FH colon cancer or polyps. Denies fever, chills, shortness of breath, cough, congestion, wheezing, chest pain, open sores or wounds. Significant health history including type 1 diabetes, end-stage renal disease on dialysis, migraines, hypertension, hyperlipidemia,  depression, coronary artery disease, CVA September 2022, asthma. Was evaluated in the emergency room at the end of August for chest pain and had a stress test which was negative for ischemia. She then underwent an EGD to further evaluate pain and was found to have gastric erosions and gastric fluid retention, suspicious for gastroparesis. Patient is a poor historian and has had memory issues since her stroke in 2019. Patient follows with endocrinology Dr. Putnam, neurology Dr. Vlad Cabral, and was evaluated recently by [facility] during admission for chest pain. Hx of diabetes, POC - anesthesia aware. Last aspirin 09/24/2023. Patient unsure when she last took Plavix or any other medications- Dr. Celina Moya aware. Patient states that [address] [place] [address] comes out to her home for physical therapy. She denies nursing services at this time.  RN called [address] [place] [address] to discuss further needs such reports no history of alcohol use. Drug Use:  reports no history of drug use. Family History:     Family History   Problem Relation Age of Onset    High Blood Pressure Mother     Diabetes Mother     Other Mother 27        Lung cancer    High Blood Pressure Father     Diabetes Father     Other Father 27        Prostate cancer       Review of Systems:     Positive and Negative as described in HPI. CONSTITUTIONAL: negative for fevers, chills, sweats, fatigue, weight loss  HEENT:  negative for vision, hearing changes, runny nose, throat pain  RESPIRATORY: Asthma. SOB with exertion. negative for  cough, congestion, wheezing. CARDIOVASCULAR: HTN. HLD. Recent chest pain- cardiac workup negative. negative for palpitations. GASTROINTESTINAL: See HPI. GENITOURINARY: end stage renal disease- on hemodialysis M,W,F. Oliguric. negative for difficulty of urination, burning with urination, frequency   INTEGUMENT: Lichen simplex chronicus. Hx gangrenous toes negative for rash, skin lesions, easy bruising   HEMATOLOGIC/LYMPHATIC: BLE intermittent edema. ALLERGIC/IMMUNOLOGIC:  negative for urticaria , itching  ENDOCRINE: Diabetes. negative increase in drinking, increase in urination, hot or cold intolerance  MUSCULOSKELETAL: Generalized joint pains. Weakness. NEUROLOGICAL: Headaches. Neuropathy. L sided residual deficits and slurred speech following CVA . BEHAVIOR/PSYCH: Depression. negative for anxiety    Physical Exam:   /85   Pulse 86   Resp 20   SpO2 98%   No LMP recorded.   No results for input(s): \"POCGLU\" in the last 72 hours. General Appearance:  Ill-appearing, in no acute distress  Mental status: Poor historian s/p CVA . Alert to self and place. Unsure of procedure or current date. Head:  normocephalic, atraumatic.   Eye: no icterus, redness, pupils equal and reactive, extraocular eye movements intact, conjunctiva clear  Ear: normal external ear, no discharge, hearing intact  Nose:  no

## 2023-09-26 NOTE — OP NOTE
EGD NOTE      Patient:   Will Quiles    :    1990    Facility:   [facility]  Referring/PCP: Sydney Myers    Procedure:   Esophagogastroduodenoscopy --diagnostic  Date:     2023   Endoscopist:  Bobby Luo D.O. Assistant:                  None    Preoperative Diagnosis:     Severe erosive gastritis  history  Esophagitis history    Postoperative Diagnosis:    Gastritis  Retained food    Anesthesia:  MAC    Complications: None    Description of Procedure:  Informed consent was obtained after explanation of the procedure including indications, description of the procedure,  benefits and possible risks and complications of the procedure, and alternatives. Questions were answered. The patient's history was reviewed and a directed physical examination was performed prior to the procedure. Patient was monitored throughout the procedure with pulse oximetry and periodic assessment of vital signs. Patient was sedated as noted above. With the patient in the left lateral decubitus position, the Olympus videoendoscope was placed in the patient's mouth and under direct visualization passed into the esophagus. Visualization of the esophagus, stomach, and duodenum was performed during both introduction and withdrawal of the endoscope and retroflexed view of the proximal stomach was obtained. The scope was passed to the 2nd portion of the duodenum. The patient tolerated the procedure well and was taken to the recovery area in good condition. EBL: none  Specimen:  gastric and esophageal  Implants: None      Findings[de-identified]   Esophagus: Adherent mucous and food debris. Biopsied to assess for candida. Stomach: Diffuse moderate gastropathy, biopsied. Small amount of retained undigested food particles in body. Duodenum: normal    Recommendations:   -Await pathology.  -Ensure adherence to her Protonix BID. She hasn't been taking.    -Blood sugar control. -F/U with PCP.       Electronically

## 2023-09-29 LAB — SURGICAL PATHOLOGY REPORT: NORMAL

## 2023-11-24 ENCOUNTER — APPOINTMENT (OUTPATIENT)
Dept: GENERAL RADIOLOGY | Age: 33
End: 2023-11-24
Payer: COMMERCIAL

## 2023-11-24 ENCOUNTER — HOSPITAL ENCOUNTER (INPATIENT)
Age: 33
LOS: 6 days | Discharge: NURSING, REHAB OR LONG-TERM CARE | End: 2023-11-30
Attending: EMERGENCY MEDICINE | Admitting: HOSPITALIST
Payer: COMMERCIAL

## 2023-11-24 DIAGNOSIS — I25.9 CHEST PAIN DUE TO MYOCARDIAL ISCHEMIA, UNSPECIFIED ISCHEMIC CHEST PAIN TYPE: Primary | ICD-10-CM

## 2023-11-24 DIAGNOSIS — K85.90 ACUTE PANCREATITIS, UNSPECIFIED COMPLICATION STATUS, UNSPECIFIED PANCREATITIS TYPE: ICD-10-CM

## 2023-11-24 DIAGNOSIS — E87.70 HYPERVOLEMIA, UNSPECIFIED HYPERVOLEMIA TYPE: ICD-10-CM

## 2023-11-24 PROBLEM — R79.89 TROPONIN LEVEL ELEVATED: Status: ACTIVE | Noted: 2023-11-24

## 2023-11-24 PROBLEM — K29.30 SUPERFICIAL GASTRITIS WITHOUT HEMORRHAGE: Status: ACTIVE | Noted: 2023-11-24

## 2023-11-24 PROBLEM — Z91.158 NON-COMPLIANCE WITH RENAL DIALYSIS: Status: ACTIVE | Noted: 2023-11-24

## 2023-11-24 PROBLEM — E10.65 UNCONTROLLED TYPE 1 DIABETES MELLITUS WITH HYPERGLYCEMIA (HCC): Status: ACTIVE | Noted: 2023-11-24

## 2023-11-24 LAB
ALBUMIN SERPL-MCNC: 3.9 G/DL (ref 3.5–5.2)
ALBUMIN/GLOB SERPL: 1.1 {RATIO} (ref 1–2.5)
ALP SERPL-CCNC: 104 U/L (ref 35–104)
ALT SERPL-CCNC: 20 U/L (ref 5–33)
ANION GAP SERPL CALCULATED.3IONS-SCNC: 16 MMOL/L (ref 9–17)
AST SERPL-CCNC: 25 U/L
BASOPHILS # BLD: 0.05 K/UL (ref 0–0.2)
BASOPHILS NFR BLD: 0 % (ref 0–2)
BILIRUB SERPL-MCNC: 0.3 MG/DL (ref 0.3–1.2)
BNP SERPL-MCNC: 4625 PG/ML
BUN SERPL-MCNC: 34 MG/DL (ref 6–20)
CALCIUM SERPL-MCNC: 9.8 MG/DL (ref 8.6–10.4)
CHLORIDE SERPL-SCNC: 95 MMOL/L (ref 98–107)
CO2 SERPL-SCNC: 20 MMOL/L (ref 20–31)
CREAT SERPL-MCNC: 4.9 MG/DL (ref 0.5–0.9)
EOSINOPHIL # BLD: 0.13 K/UL (ref 0–0.44)
EOSINOPHILS RELATIVE PERCENT: 1 % (ref 1–4)
ERYTHROCYTE [DISTWIDTH] IN BLOOD BY AUTOMATED COUNT: 13 % (ref 11.8–14.4)
EST. AVERAGE GLUCOSE BLD GHB EST-MCNC: 372 MG/DL
GFR SERPL CREATININE-BSD FRML MDRD: 11 ML/MIN/1.73M2
GLUCOSE BLD-MCNC: 192 MG/DL (ref 65–105)
GLUCOSE BLD-MCNC: 212 MG/DL (ref 65–105)
GLUCOSE BLD-MCNC: 241 MG/DL (ref 65–105)
GLUCOSE BLD-MCNC: 360 MG/DL (ref 65–105)
GLUCOSE BLD-MCNC: 415 MG/DL (ref 65–105)
GLUCOSE BLD-MCNC: 419 MG/DL (ref 65–105)
GLUCOSE BLD-MCNC: 426 MG/DL
GLUCOSE BLD-MCNC: 426 MG/DL (ref 65–105)
GLUCOSE SERPL-MCNC: 454 MG/DL (ref 70–99)
HBA1C MFR BLD: 14.6 % (ref 4–6)
HCG SERPL QL: NEGATIVE
HCT VFR BLD AUTO: 34.4 % (ref 36.3–47.1)
HGB BLD-MCNC: 11.3 G/DL (ref 11.9–15.1)
IMM GRANULOCYTES # BLD AUTO: 0.06 K/UL (ref 0–0.3)
IMM GRANULOCYTES NFR BLD: 0 %
LACTIC ACID, WHOLE BLOOD: 1.8 MMOL/L (ref 0.7–2.1)
LACTIC ACID, WHOLE BLOOD: 2.1 MMOL/L (ref 0.7–2.1)
LIPASE SERPL-CCNC: 512 U/L (ref 13–60)
LYMPHOCYTES NFR BLD: 2.83 K/UL (ref 1.1–3.7)
LYMPHOCYTES RELATIVE PERCENT: 19 % (ref 24–43)
MCH RBC QN AUTO: 28.5 PG (ref 25.2–33.5)
MCHC RBC AUTO-ENTMCNC: 32.8 G/DL (ref 28.4–34.8)
MCV RBC AUTO: 86.9 FL (ref 82.6–102.9)
MONOCYTES NFR BLD: 0.85 K/UL (ref 0.1–1.2)
MONOCYTES NFR BLD: 6 % (ref 3–12)
NEUTROPHILS NFR BLD: 74 % (ref 36–65)
NEUTS SEG NFR BLD: 11.37 K/UL (ref 1.5–8.1)
NRBC BLD-RTO: 0 PER 100 WBC
PLATELET # BLD AUTO: 303 K/UL (ref 138–453)
PMV BLD AUTO: 10.4 FL (ref 8.1–13.5)
POTASSIUM SERPL-SCNC: 4.2 MMOL/L (ref 3.7–5.3)
PROCALCITONIN SERPL-MCNC: 0.18 NG/ML
PROT SERPL-MCNC: 7.6 G/DL (ref 6.4–8.3)
RBC # BLD AUTO: 3.96 M/UL (ref 3.95–5.11)
SODIUM SERPL-SCNC: 131 MMOL/L (ref 135–144)
TRIGL SERPL-MCNC: 111 MG/DL
TROPONIN I SERPL HS-MCNC: 70 NG/L (ref 0–14)
TROPONIN I SERPL HS-MCNC: 77 NG/L (ref 0–14)
WBC OTHER # BLD: 15.3 K/UL (ref 3.5–11.3)

## 2023-11-24 PROCEDURE — 6360000002 HC RX W HCPCS: Performed by: INTERNAL MEDICINE

## 2023-11-24 PROCEDURE — 99285 EMERGENCY DEPT VISIT HI MDM: CPT

## 2023-11-24 PROCEDURE — 6370000000 HC RX 637 (ALT 250 FOR IP): Performed by: PEDIATRICS

## 2023-11-24 PROCEDURE — 82947 ASSAY GLUCOSE BLOOD QUANT: CPT

## 2023-11-24 PROCEDURE — 96372 THER/PROPH/DIAG INJ SC/IM: CPT

## 2023-11-24 PROCEDURE — 6370000000 HC RX 637 (ALT 250 FOR IP): Performed by: INTERNAL MEDICINE

## 2023-11-24 PROCEDURE — 84484 ASSAY OF TROPONIN QUANT: CPT

## 2023-11-24 PROCEDURE — 71046 X-RAY EXAM CHEST 2 VIEWS: CPT

## 2023-11-24 PROCEDURE — 96374 THER/PROPH/DIAG INJ IV PUSH: CPT

## 2023-11-24 PROCEDURE — 5A1D70Z PERFORMANCE OF URINARY FILTRATION, INTERMITTENT, LESS THAN 6 HOURS PER DAY: ICD-10-PCS | Performed by: EMERGENCY MEDICINE

## 2023-11-24 PROCEDURE — 2580000003 HC RX 258: Performed by: PEDIATRICS

## 2023-11-24 PROCEDURE — 6360000002 HC RX W HCPCS: Performed by: NURSE PRACTITIONER

## 2023-11-24 PROCEDURE — 83605 ASSAY OF LACTIC ACID: CPT

## 2023-11-24 PROCEDURE — 2060000000 HC ICU INTERMEDIATE R&B

## 2023-11-24 PROCEDURE — 84703 CHORIONIC GONADOTROPIN ASSAY: CPT

## 2023-11-24 PROCEDURE — 83036 HEMOGLOBIN GLYCOSYLATED A1C: CPT

## 2023-11-24 PROCEDURE — 90935 HEMODIALYSIS ONE EVALUATION: CPT

## 2023-11-24 PROCEDURE — 94761 N-INVAS EAR/PLS OXIMETRY MLT: CPT

## 2023-11-24 PROCEDURE — 99223 1ST HOSP IP/OBS HIGH 75: CPT | Performed by: INTERNAL MEDICINE

## 2023-11-24 PROCEDURE — 2500000003 HC RX 250 WO HCPCS: Performed by: PEDIATRICS

## 2023-11-24 PROCEDURE — 96375 TX/PRO/DX INJ NEW DRUG ADDON: CPT

## 2023-11-24 PROCEDURE — 85025 COMPLETE CBC W/AUTO DIFF WBC: CPT

## 2023-11-24 PROCEDURE — 80053 COMPREHEN METABOLIC PANEL: CPT

## 2023-11-24 PROCEDURE — 84478 ASSAY OF TRIGLYCERIDES: CPT

## 2023-11-24 PROCEDURE — 6360000002 HC RX W HCPCS: Performed by: PEDIATRICS

## 2023-11-24 PROCEDURE — 2580000003 HC RX 258: Performed by: INTERNAL MEDICINE

## 2023-11-24 PROCEDURE — 84145 PROCALCITONIN (PCT): CPT

## 2023-11-24 PROCEDURE — 99222 1ST HOSP IP/OBS MODERATE 55: CPT | Performed by: INTERNAL MEDICINE

## 2023-11-24 PROCEDURE — 83690 ASSAY OF LIPASE: CPT

## 2023-11-24 PROCEDURE — 83880 ASSAY OF NATRIURETIC PEPTIDE: CPT

## 2023-11-24 PROCEDURE — 36415 COLL VENOUS BLD VENIPUNCTURE: CPT

## 2023-11-24 PROCEDURE — 2580000003 HC RX 258: Performed by: NURSE PRACTITIONER

## 2023-11-24 PROCEDURE — 93005 ELECTROCARDIOGRAM TRACING: CPT | Performed by: PEDIATRICS

## 2023-11-24 RX ORDER — CALCIUM ACETATE 667 MG/1
3 CAPSULE ORAL
Status: DISCONTINUED | OUTPATIENT
Start: 2023-11-24 | End: 2023-11-30 | Stop reason: HOSPADM

## 2023-11-24 RX ORDER — ONDANSETRON 2 MG/ML
4 INJECTION INTRAMUSCULAR; INTRAVENOUS ONCE
Status: COMPLETED | OUTPATIENT
Start: 2023-11-24 | End: 2023-11-24

## 2023-11-24 RX ORDER — CLONIDINE HYDROCHLORIDE 0.2 MG/1
0.2 TABLET ORAL 3 TIMES DAILY
Status: DISCONTINUED | OUTPATIENT
Start: 2023-11-24 | End: 2023-11-30 | Stop reason: HOSPADM

## 2023-11-24 RX ORDER — ONDANSETRON 4 MG/1
4 TABLET, ORALLY DISINTEGRATING ORAL EVERY 8 HOURS PRN
Status: DISCONTINUED | OUTPATIENT
Start: 2023-11-24 | End: 2023-11-30 | Stop reason: HOSPADM

## 2023-11-24 RX ORDER — HEPARIN SODIUM 5000 [USP'U]/ML
5000 INJECTION, SOLUTION INTRAVENOUS; SUBCUTANEOUS EVERY 8 HOURS SCHEDULED
Status: DISCONTINUED | OUTPATIENT
Start: 2023-11-24 | End: 2023-11-30 | Stop reason: HOSPADM

## 2023-11-24 RX ORDER — NITROGLYCERIN 20 MG/100ML
5-200 INJECTION INTRAVENOUS CONTINUOUS
Status: DISCONTINUED | OUTPATIENT
Start: 2023-11-24 | End: 2023-11-28

## 2023-11-24 RX ORDER — BUMETANIDE 1 MG/1
4 TABLET ORAL 2 TIMES DAILY
Status: DISCONTINUED | OUTPATIENT
Start: 2023-11-24 | End: 2023-11-30 | Stop reason: HOSPADM

## 2023-11-24 RX ORDER — CLOPIDOGREL BISULFATE 75 MG/1
75 TABLET ORAL DAILY
Status: DISCONTINUED | OUTPATIENT
Start: 2023-11-24 | End: 2023-11-30 | Stop reason: HOSPADM

## 2023-11-24 RX ORDER — INSULIN LISPRO 100 [IU]/ML
0-4 INJECTION, SOLUTION INTRAVENOUS; SUBCUTANEOUS NIGHTLY
Status: DISCONTINUED | OUTPATIENT
Start: 2023-11-24 | End: 2023-11-29

## 2023-11-24 RX ORDER — SODIUM CHLORIDE 0.9 % (FLUSH) 0.9 %
10 SYRINGE (ML) INJECTION PRN
Status: DISCONTINUED | OUTPATIENT
Start: 2023-11-24 | End: 2023-11-30 | Stop reason: HOSPADM

## 2023-11-24 RX ORDER — POLYETHYLENE GLYCOL 3350 17 G/17G
17 POWDER, FOR SOLUTION ORAL DAILY PRN
Status: DISCONTINUED | OUTPATIENT
Start: 2023-11-24 | End: 2023-11-30 | Stop reason: HOSPADM

## 2023-11-24 RX ORDER — SODIUM CHLORIDE 9 MG/ML
INJECTION, SOLUTION INTRAVENOUS PRN
Status: DISCONTINUED | OUTPATIENT
Start: 2023-11-24 | End: 2023-11-30 | Stop reason: HOSPADM

## 2023-11-24 RX ORDER — NITROGLYCERIN 0.4 MG/1
0.4 TABLET SUBLINGUAL EVERY 5 MIN PRN
Status: DISCONTINUED | OUTPATIENT
Start: 2023-11-24 | End: 2023-11-30 | Stop reason: HOSPADM

## 2023-11-24 RX ORDER — METOPROLOL TARTRATE 1 MG/ML
2.5 INJECTION, SOLUTION INTRAVENOUS EVERY 6 HOURS PRN
Status: DISCONTINUED | OUTPATIENT
Start: 2023-11-24 | End: 2023-11-28

## 2023-11-24 RX ORDER — HYDRALAZINE HYDROCHLORIDE 50 MG/1
100 TABLET, FILM COATED ORAL 3 TIMES DAILY
Status: DISCONTINUED | OUTPATIENT
Start: 2023-11-24 | End: 2023-11-28

## 2023-11-24 RX ORDER — LORAZEPAM 2 MG/ML
1 INJECTION INTRAMUSCULAR ONCE
Status: COMPLETED | OUTPATIENT
Start: 2023-11-24 | End: 2023-11-24

## 2023-11-24 RX ORDER — SODIUM CHLORIDE 0.9 % (FLUSH) 0.9 %
5-40 SYRINGE (ML) INJECTION EVERY 12 HOURS SCHEDULED
Status: DISCONTINUED | OUTPATIENT
Start: 2023-11-24 | End: 2023-11-30 | Stop reason: HOSPADM

## 2023-11-24 RX ORDER — INSULIN GLARGINE 100 [IU]/ML
10 INJECTION, SOLUTION SUBCUTANEOUS 2 TIMES DAILY
Status: DISCONTINUED | OUTPATIENT
Start: 2023-11-24 | End: 2023-11-29

## 2023-11-24 RX ORDER — MIRTAZAPINE 15 MG/1
7.5 TABLET, ORALLY DISINTEGRATING ORAL NIGHTLY
Status: DISCONTINUED | OUTPATIENT
Start: 2023-11-24 | End: 2023-11-30 | Stop reason: HOSPADM

## 2023-11-24 RX ORDER — SENNA AND DOCUSATE SODIUM 50; 8.6 MG/1; MG/1
2 TABLET, FILM COATED ORAL NIGHTLY
Status: DISCONTINUED | OUTPATIENT
Start: 2023-11-24 | End: 2023-11-30 | Stop reason: HOSPADM

## 2023-11-24 RX ORDER — ACETAMINOPHEN 325 MG/1
650 TABLET ORAL EVERY 6 HOURS PRN
Status: DISCONTINUED | OUTPATIENT
Start: 2023-11-24 | End: 2023-11-30 | Stop reason: HOSPADM

## 2023-11-24 RX ORDER — MIDODRINE HYDROCHLORIDE 5 MG/1
30 TABLET ORAL
Status: DISCONTINUED | OUTPATIENT
Start: 2023-11-24 | End: 2023-11-30 | Stop reason: HOSPADM

## 2023-11-24 RX ORDER — ACETAMINOPHEN 650 MG/1
650 SUPPOSITORY RECTAL EVERY 6 HOURS PRN
Status: DISCONTINUED | OUTPATIENT
Start: 2023-11-24 | End: 2023-11-30 | Stop reason: HOSPADM

## 2023-11-24 RX ORDER — PREDNISOLONE ACETATE 10 MG/ML
1 SUSPENSION/ DROPS OPHTHALMIC
Status: DISCONTINUED | OUTPATIENT
Start: 2023-11-24 | End: 2023-11-30 | Stop reason: HOSPADM

## 2023-11-24 RX ORDER — FENTANYL CITRATE 50 UG/ML
50 INJECTION, SOLUTION INTRAMUSCULAR; INTRAVENOUS ONCE
Status: COMPLETED | OUTPATIENT
Start: 2023-11-24 | End: 2023-11-24

## 2023-11-24 RX ORDER — ASPIRIN 81 MG/1
81 TABLET ORAL DAILY
Status: DISCONTINUED | OUTPATIENT
Start: 2023-11-24 | End: 2023-11-30 | Stop reason: HOSPADM

## 2023-11-24 RX ORDER — ONDANSETRON 2 MG/ML
4 INJECTION INTRAMUSCULAR; INTRAVENOUS EVERY 6 HOURS PRN
Status: DISCONTINUED | OUTPATIENT
Start: 2023-11-24 | End: 2023-11-30 | Stop reason: HOSPADM

## 2023-11-24 RX ORDER — METOCLOPRAMIDE 5 MG/1
5 TABLET ORAL 4 TIMES DAILY
Status: DISCONTINUED | OUTPATIENT
Start: 2023-11-24 | End: 2023-11-30 | Stop reason: HOSPADM

## 2023-11-24 RX ORDER — FAMOTIDINE 10 MG/ML
20 INJECTION, SOLUTION INTRAVENOUS
Status: COMPLETED | OUTPATIENT
Start: 2023-11-24 | End: 2023-11-24

## 2023-11-24 RX ORDER — FENTANYL CITRATE 50 UG/ML
25 INJECTION, SOLUTION INTRAMUSCULAR; INTRAVENOUS ONCE
Status: COMPLETED | OUTPATIENT
Start: 2023-11-24 | End: 2023-11-24

## 2023-11-24 RX ORDER — INSULIN LISPRO 100 [IU]/ML
0-8 INJECTION, SOLUTION INTRAVENOUS; SUBCUTANEOUS
Status: DISCONTINUED | OUTPATIENT
Start: 2023-11-24 | End: 2023-11-29

## 2023-11-24 RX ORDER — CARVEDILOL 25 MG/1
25 TABLET ORAL 2 TIMES DAILY
Status: DISCONTINUED | OUTPATIENT
Start: 2023-11-24 | End: 2023-11-28

## 2023-11-24 RX ORDER — HYDRALAZINE HYDROCHLORIDE 20 MG/ML
10 INJECTION INTRAMUSCULAR; INTRAVENOUS EVERY 6 HOURS PRN
Status: DISCONTINUED | OUTPATIENT
Start: 2023-11-24 | End: 2023-11-24

## 2023-11-24 RX ORDER — LISINOPRIL 5 MG/1
5 TABLET ORAL DAILY
Status: DISCONTINUED | OUTPATIENT
Start: 2023-11-24 | End: 2023-11-30 | Stop reason: HOSPADM

## 2023-11-24 RX ORDER — NIFEDIPINE 90 MG/1
90 TABLET, EXTENDED RELEASE ORAL DAILY
Status: DISCONTINUED | OUTPATIENT
Start: 2023-11-24 | End: 2023-11-28

## 2023-11-24 RX ORDER — DEXTROSE MONOHYDRATE 100 MG/ML
INJECTION, SOLUTION INTRAVENOUS CONTINUOUS PRN
Status: DISCONTINUED | OUTPATIENT
Start: 2023-11-24 | End: 2023-11-30 | Stop reason: HOSPADM

## 2023-11-24 RX ORDER — MIRTAZAPINE 15 MG/1
15 TABLET, ORALLY DISINTEGRATING ORAL NIGHTLY
Status: DISCONTINUED | OUTPATIENT
Start: 2023-11-24 | End: 2023-11-24

## 2023-11-24 RX ADMIN — INSULIN HUMAN 10 UNITS: 100 INJECTION, SOLUTION PARENTERAL at 05:17

## 2023-11-24 RX ADMIN — FENTANYL CITRATE 25 MCG: 50 INJECTION, SOLUTION INTRAMUSCULAR; INTRAVENOUS at 19:41

## 2023-11-24 RX ADMIN — NITROGLYCERIN 25 MCG/MIN: 20 INJECTION INTRAVENOUS at 20:22

## 2023-11-24 RX ADMIN — ONDANSETRON 4 MG: 2 INJECTION INTRAMUSCULAR; INTRAVENOUS at 04:42

## 2023-11-24 RX ADMIN — FENTANYL CITRATE 50 MCG: 50 INJECTION, SOLUTION INTRAMUSCULAR; INTRAVENOUS at 06:47

## 2023-11-24 RX ADMIN — INSULIN GLARGINE 10 UNITS: 100 INJECTION, SOLUTION SUBCUTANEOUS at 20:39

## 2023-11-24 RX ADMIN — MIRTAZAPINE 7.5 MG: 15 TABLET, ORALLY DISINTEGRATING ORAL at 20:42

## 2023-11-24 RX ADMIN — ONDANSETRON 4 MG: 2 INJECTION INTRAMUSCULAR; INTRAVENOUS at 08:20

## 2023-11-24 RX ADMIN — NITROGLYCERIN 5 MCG/MIN: 20 INJECTION INTRAVENOUS at 04:41

## 2023-11-24 RX ADMIN — CEFTRIAXONE SODIUM 1000 MG: 1 INJECTION, POWDER, FOR SOLUTION INTRAMUSCULAR; INTRAVENOUS at 06:33

## 2023-11-24 RX ADMIN — FAMOTIDINE 20 MG: 10 INJECTION, SOLUTION INTRAVENOUS at 04:36

## 2023-11-24 RX ADMIN — ONDANSETRON 4 MG: 2 INJECTION INTRAMUSCULAR; INTRAVENOUS at 14:41

## 2023-11-24 RX ADMIN — HEPARIN SODIUM 5000 UNITS: 5000 INJECTION INTRAVENOUS; SUBCUTANEOUS at 20:39

## 2023-11-24 RX ADMIN — SODIUM CHLORIDE 7.1 UNITS/HR: 9 INJECTION, SOLUTION INTRAVENOUS at 10:07

## 2023-11-24 RX ADMIN — METOCLOPRAMIDE 5 MG: 5 TABLET ORAL at 20:43

## 2023-11-24 RX ADMIN — BUMETANIDE 4 MG: 1 TABLET ORAL at 20:41

## 2023-11-24 RX ADMIN — SODIUM CHLORIDE, PRESERVATIVE FREE 10 ML: 5 INJECTION INTRAVENOUS at 19:42

## 2023-11-24 RX ADMIN — LORAZEPAM 1 MG: 2 INJECTION INTRAMUSCULAR; INTRAVENOUS at 06:41

## 2023-11-24 ASSESSMENT — ENCOUNTER SYMPTOMS
BLOOD IN STOOL: 0
ABDOMINAL PAIN: 1
ABDOMINAL DISTENTION: 0
WHEEZING: 0
SHORTNESS OF BREATH: 0
VOMITING: 1
NAUSEA: 1
PHOTOPHOBIA: 0
COUGH: 0

## 2023-11-24 ASSESSMENT — PAIN DESCRIPTION - ORIENTATION
ORIENTATION: MID
ORIENTATION: ANTERIOR

## 2023-11-24 ASSESSMENT — PAIN DESCRIPTION - FREQUENCY: FREQUENCY: CONTINUOUS

## 2023-11-24 ASSESSMENT — PAIN DESCRIPTION - ONSET: ONSET: AWAKENED FROM SLEEP

## 2023-11-24 ASSESSMENT — PAIN DESCRIPTION - PAIN TYPE: TYPE: ACUTE PAIN

## 2023-11-24 ASSESSMENT — PAIN DESCRIPTION - DESCRIPTORS
DESCRIPTORS: ACHING;HEAVINESS;DISCOMFORT
DESCRIPTORS: DISCOMFORT;HEAVINESS;ACHING

## 2023-11-24 ASSESSMENT — PAIN SCALES - GENERAL
PAINLEVEL_OUTOF10: 8

## 2023-11-24 ASSESSMENT — LIFESTYLE VARIABLES
HOW MANY STANDARD DRINKS CONTAINING ALCOHOL DO YOU HAVE ON A TYPICAL DAY: PATIENT DOES NOT DRINK
HOW OFTEN DO YOU HAVE A DRINK CONTAINING ALCOHOL: NEVER

## 2023-11-24 ASSESSMENT — PAIN DESCRIPTION - LOCATION
LOCATION: CHEST
LOCATION: CHEST;ABDOMEN

## 2023-11-24 NOTE — ED NOTES
Pt reported nausea without emesis  Pt updated on plans of care and no concerns voiced     Philomena Gilford, RN  11/24/23 0307

## 2023-11-24 NOTE — ED NOTES
ED to inpatient nurses report      Chief Complaint:  Chief Complaint   Patient presents with    Chest Pain    Nausea     Present to ED from: home brought by ems    MOA:     LOC: alert and orientated to name, place, date  Mobility: Independent  Oxygen Baseline: na    Current needs required: 3 liters   Pending ED orders: needs urine  Present condition: stable     Why did the patient come to the ED? Chest pain   What is the plan? Dialysis, pain control and when off nitro   Any procedures or intervention occur? no  Any safety concerns? ? Fall risk and elope risk     Mental Status:  Level of Consciousness: Alert (0)    Psych Assessment:   Psychosocial  Psychosocial (WDL): (S) Within Defined Limits  Patient Behaviors: Anxious, Tearful  Vital signs   Vitals:    11/24/23 1030 11/24/23 1100 11/24/23 1130 11/24/23 1200   BP: (!) 158/75 137/83 (!) 155/71 (!) 162/79   Pulse: (!) 109 (!) 107 (!) 110    Resp:  24     Temp:       TempSrc:       SpO2:  95%     Weight:            Vitals:  Patient Vitals for the past 24 hrs:   BP Temp Temp src Pulse Resp SpO2 Weight   11/24/23 1200 (!) 162/79 -- -- -- -- -- --   11/24/23 1130 (!) 155/71 -- -- (!) 110 -- -- --   11/24/23 1100 137/83 -- -- (!) 107 24 95 % --   11/24/23 1030 (!) 158/75 -- -- (!) 109 -- -- --   11/24/23 0953 (!) 141/77 -- -- (!) 102 -- -- --   11/24/23 0952 (!) 141/77 -- -- -- -- -- --   11/24/23 0930 (!) 141/77 -- -- (!) 102 20 96 % --   11/24/23 0900 (!) 156/93 -- -- (!) 110 23 96 % --   11/24/23 0856 -- -- -- -- -- -- 88.5 kg (195 lb)   11/24/23 0718 (!) 169/76 -- -- 96 -- -- --   11/24/23 0700 (!) 177/80 -- -- (!) 108 25 91 % --   11/24/23 0655 -- -- -- (!) 106 (!) 32 91 % --   11/24/23 0650 -- -- -- (!) 111 28 90 % --   11/24/23 0648 -- -- -- (!) 112 20 98 % --   11/24/23 0647 -- -- -- -- 21 -- --   11/24/23 0600 (!) 189/92 -- -- (!) 104 18 97 % --   11/24/23 0549 (!) 156/131 -- -- (!) 111 29 96 % --   11/24/23 0545 (!) 156/131 -- -- (!) 110 22 95 % --   11/24/23 since 12 y/o, unsure if type 1 or 2    Diabetic polyneuropathy associated with type 1 diabetes mellitus ([facility]) 04/13/2023    ESRD on dialysis [facility]) 08/20/2023    Headache, migraine     for 2 years    Heart attack [facility])     Hemodialysis patient ([address] [place] [address])     Hypertension     Lichen simplex chronicus     Mixed hyperlipidemia 04/13/2023    Noninflammatory disorder of vagina, unspecified     Patient's noncompliance with other medical treatment and regimen for other reason     Type 2 diabetes mellitus without complication ([address] [place] [address]) 84/75/5483       Labs:  Labs Reviewed   CBC WITH AUTO DIFFERENTIAL - Abnormal; Notable for the following components:       Result Value    WBC 15.3 (*)     Hemoglobin 11.3 (*)     Hematocrit 34.4 (*)     Neutrophils % 74 (*)     Lymphocytes % 19 (*)     Neutrophils Absolute 11.37 (*)     All other components within normal limits   COMPREHENSIVE METABOLIC PANEL - Abnormal; Notable for the following components:    Sodium 131 (*)     Chloride 95 (*)     Glucose 454 (*)     BUN 34 (*)     Creatinine 4.9 (*)     Est, Glom Filt Rate 11 (*)     All other components within normal limits   TROPONIN - Abnormal; Notable for the following components:    Troponin, High Sensitivity 77 (*)     All other components within normal limits   TROPONIN - Abnormal; Notable for the following components:    Troponin, High Sensitivity 70 (*)     All other components within normal limits   BRAIN NATRIURETIC PEPTIDE - Abnormal; Notable for the following components:    Pro-BNP 4,625 (*)     All other components within normal limits   LIPASE - Abnormal; Notable for the following components:    Lipase 512 (*)     All other components within normal limits   PROCALCITONIN - Abnormal; Notable for the following components:    Procalcitonin 0.18 (*)     All other components within normal limits   HEMOGLOBIN A1C - Abnormal; Notable for the following components:    Hemoglobin A1C 14.6 (*)     All other components within normal limits

## 2023-11-24 NOTE — ED NOTES
[phone] Maggie Irmarosanna step mother please call first  Ellen  [phone] MARIO WALSH GMJJAVIER ADAME RN  11/24/23 9046

## 2023-11-24 NOTE — H&P
Meds:  Prior to Admission medications    Medication Sig Start Date End Date Taking?  Authorizing Provider   ondansetron (ZOFRAN) 4 MG tablet Take 1 tablet by mouth every 8 hours as needed for Nausea or Vomiting    Moise Byrnes MD   sucralfate (CARAFATE) 1 GM tablet Take 1 tablet by mouth 4 times daily (before meals and nightly) for 15 days 8/21/23 9/5/23  Hiren Baron MD   insulin lispro, 1 Unit Dial, ([facility]) 100 UNIT/ML SOPN Inject 5 Units into the skin 3 times daily (before meals) 8/19/23   Joshua Giang MD   lisinopril (PRINIVIL;ZESTRIL) 5 MG tablet Take 1 tablet by mouth daily  Patient not taking: Reported on 9/26/2023 8/20/23   Bobbi Mccracken MD   pantoprazole (PROTONIX) 40 MG tablet Take 1 tablet by mouth 2 times daily (before meals)  Patient not taking: Reported on 9/26/2023 8/19/23   Bobbi Mccracken MD   metoclopramide (REGLAN) 5 MG tablet Take 1 tablet by mouth 4 times daily 8/19/23   Bobbi Mccracken MD   atorvastatin (LIPITOR) 20 MG tablet Take 1 tablet by mouth daily 8/4/23   Moise Byrnes MD   bumetanide (BUMEX) 2 MG tablet Take 2 tablets by mouth 2 times daily 7/28/23   Moise Byrnes MD   cloNIDine (CATAPRES) 0.2 MG tablet Take 1 tablet by mouth 3 times daily 7/5/23   Moise Byrnes MD   glucagon 1 MG injection 1 mg as needed (for low blood sugar) Indications: Hypoglycemia 4/13/23   Moise Byrnes MD   insulin detemir (LEVEMIR) 100 UNIT/ML injection pen Inject 10 Units into the skin nightly 4/13/23   Moise Byrnes MD   prednisoLONE acetate (PRED FORTE) 1 % ophthalmic suspension Place 1 drop into the right eye Four times daily for 7 days, then 1 drop twice daily until gone 6/27/23   Moise Byrnes MD   mirtazapine (REMERON SOL-TAB) 15 MG disintegrating tablet Take 1 tablet by mouth nightly 10/31/22   Ousmane Levy MD   acetaminophen (TYLENOL) 325 MG tablet Take 2 tablets by mouth every 6 hours as needed for Pain or Fever (do not exceed 3 gm in 24 hours)    Moise Byrnes MD   albuterol sulfate HFA (PROVENTIL;VENTOLIN;PROAIR) 108 (90 Base) MCG/ACT inhaler Inhale 2 puffs into the lungs every 6 hours as needed for Wheezing or Shortness of Breath  Patient not taking: Reported on 9/26/2023    Moise Byrnes MD   aspirin 81 MG EC tablet Take 1 tablet by mouth daily    Moise Byrnes MD   calcium acetate (PHOSLO) 667 MG CAPS capsule Take 3 capsules by mouth 3 times daily (with meals)    Moise Byrnes MD   carvedilol (COREG) 25 MG tablet Take 1 tablet by mouth 2 times daily    Moise Byrnes MD   Cholecalciferol 50 MCG (2000 UT) TABS Take 2 tablets by mouth daily    Moise Byrnes MD   clopidogrel (PLAVIX) 75 MG tablet Take 1 tablet by mouth daily    Moise Byrnes MD   polyethylene glycol (GLYCOLAX) 17 GM/SCOOP powder Take 17 g by mouth daily as needed (constipation)    Moise Byrnes MD   hydrALAZINE (APRESOLINE) 100 MG tablet Take 1 tablet by mouth 3 times daily    Moise Byrnes MD   midodrine (PROAMATINE) 10 MG tablet Take 3 tablets by mouth three times a week Indications: Mon, Wed, Fri at dialysis    Moise Byrnes MD   NIFEdipine (ADALAT CC) 90 MG extended release tablet Take 1 tablet by mouth daily    Moise Byrnes MD   sennosides-docusate sodium (SENOKOT-S) 8.6-50 MG tablet Take 2 tablets by mouth at bedtime    Moise Byrnes MD   B Complex-C-Folic Acid (TRIPHROCAPS) 1 MG CAPS Take 1 capsule by mouth daily    Moise Byrnes MD         Allergies:   Morphine and Januvia [sitagliptin]    Social Hx:  Tobacco:    reports that she has never smoked. She has never used smokeless tobacco.  Alcohol:      reports no history of alcohol use. Drug Use:  reports no history of drug use. Family Hx;   Family History   Problem Relation Age of Onset    High Blood Pressure Mother     Diabetes Mother     Other Mother 27        Lung cancer    High Blood Pressure

## 2023-11-24 NOTE — ED NOTES
Writer held insulin gtt awaiting clarification from Dr Gerardo Sky pharmacist due to blood sugar of [place], [address], [place]  11/24/23 1411

## 2023-11-24 NOTE — PROGRESS NOTES
Received patient for care from hemodialysis. Patient in stable condition. Oriented patient to room and situation.  Nitro gtt infusing per order

## 2023-11-24 NOTE — ED NOTES
Pt placed on continuous cardiac monitoring, BP, Pulse ox. EKG obtained. IV established and labs drawn.    The following labs were labeled with appropriate pt sticker and tubed to lab:     [x] Blue     [x] Lavender   [] on ice  [x] Green/yellow  [x] Green/black [] on ice  [] Deetta Mail  [] on ice  [] Yellow  [] Red  [] Pink  [] Type/ Screen  [] ABG  [] VBG    [] COVID-19 swab    [] Rapid  [] PCR  [] Flu swab  [] Peds Viral Panel     [] Urine Sample  [] Fecal Sample  [] Pelvic Cultures  [] Blood Cultures  [] X 2  [] STREP Cultures      Jacki Escobedo RN  11/24/23 7635

## 2023-11-24 NOTE — PROGRESS NOTES
Dialysis Time Out  To be done by RN and tech or 2 RNs  Staff Names Bria Gardner RN an [facility] RN    [x]  Identity of the patient using 2 patient identifiers  [x]  Consent for treatment  [x]  Equipment-proper machine and dialyzer  [x]  B-Hep B status  [x]  Orders- to include bath, blood flow, dialyzer, time and fluid removal  [x]  Access-Correct site and in working order  [x]  Time for patient to ask questions.

## 2023-11-24 NOTE — ED NOTES
Patient came to ER via EMS with c/o chest pain and nausea started an hour ago. EMS gave her Aspirin and Nitroglycerine at home. Patient states that her chest pain has PS of 8/10. Patient states that she has history of stroke last year. Resident and Attending Physician examined the patient. Hooked to full cardiac monitor. Collected blood specimen. Medication started. Will continue plan of care.      Rayne Bolton RN  11/24/23 5888

## 2023-11-24 NOTE — ED NOTES
Perfect Serve sent to Dr Ximena Friedman to clarify diet order/PO medication order  Awaiting clarification     Lula Pineda RN  11/24/23 0212

## 2023-11-24 NOTE — PROGRESS NOTES
Dialysis Post Treatment Note  Vitals:    11/24/23 1510   BP: 128/76   Pulse: (!) 107   Resp: 16   Temp:    SpO2:      Pre-Weight = 85.2  Post-weight = Weight - Scale: 83.1 kg (183 lb 3.2 oz)  Total Liters Processed = Blood Volume Processed (Liters): 60.07 L  Rinseback Volume (mL) =  300  Net Removal (mL) = 2100   Type of access used= L AVF  Length of treatment=180    Tx stopped 30 minutes early , venous chamber clotting. Pt crying to come off HD. Insulin gtt stopped/ d/c'ed mid tx.  Pt received zofran for nausea

## 2023-11-24 NOTE — ED NOTES
Report to [facility] RN  Awaiting transport to dialysis  Pt resting on cot with eye closed and even RR noted  No active emesis noted but pt reports \"feeling nauseated when awoke for PO meds\"     Saúl Ashley RN  11/24/23 1038

## 2023-11-24 NOTE — ED NOTES
Per dialysis HTN medications to be held -treatment   Perfect Serve sent to Dr Filemon Burleson with update  Transport on way per dailysis     Abdulkadir Tom, RN  11/24/23 [address], [address], RN  11/24/23 2076

## 2023-11-24 NOTE — ED NOTES
Pt observed resting on cot with eyes closed and even RR noted  Awaiting room to transfer to       Zachary Ayon RN  11/24/23 9737

## 2023-11-25 LAB
AMORPH SED URNS QL MICRO: ABNORMAL
ANION GAP SERPL CALCULATED.3IONS-SCNC: 12 MMOL/L (ref 9–17)
BACTERIA URNS QL MICRO: ABNORMAL
BASOPHILS # BLD: 0.06 K/UL (ref 0–0.2)
BASOPHILS NFR BLD: 1 % (ref 0–2)
BILIRUB UR QL STRIP: NEGATIVE
BNP SERPL-MCNC: 5578 PG/ML
BUN SERPL-MCNC: 23 MG/DL (ref 6–20)
CALCIUM SERPL-MCNC: 9.3 MG/DL (ref 8.6–10.4)
CHLORIDE SERPL-SCNC: 92 MMOL/L (ref 98–107)
CK SERPL-CCNC: 153 U/L (ref 26–192)
CLARITY UR: ABNORMAL
CO2 SERPL-SCNC: 29 MMOL/L (ref 20–31)
COLOR UR: YELLOW
CREAT SERPL-MCNC: 5.1 MG/DL (ref 0.5–0.9)
EKG ATRIAL RATE: 103 BPM
EKG ATRIAL RATE: 109 BPM
EKG P AXIS: -9 DEGREES
EKG P AXIS: 55 DEGREES
EKG P-R INTERVAL: 132 MS
EKG P-R INTERVAL: 146 MS
EKG Q-T INTERVAL: 366 MS
EKG Q-T INTERVAL: 378 MS
EKG QRS DURATION: 74 MS
EKG QRS DURATION: 76 MS
EKG QTC CALCULATION (BAZETT): 492 MS
EKG QTC CALCULATION (BAZETT): 495 MS
EKG R AXIS: 42 DEGREES
EKG R AXIS: 85 DEGREES
EKG T AXIS: 45 DEGREES
EKG T AXIS: 56 DEGREES
EKG VENTRICULAR RATE: 103 BPM
EKG VENTRICULAR RATE: 109 BPM
EOSINOPHIL # BLD: 0.06 K/UL (ref 0–0.44)
EOSINOPHILS RELATIVE PERCENT: 1 % (ref 1–4)
EPI CELLS #/AREA URNS HPF: ABNORMAL /HPF (ref 0–5)
ERYTHROCYTE [DISTWIDTH] IN BLOOD BY AUTOMATED COUNT: 13.4 % (ref 11.8–14.4)
GFR SERPL CREATININE-BSD FRML MDRD: 11 ML/MIN/1.73M2
GLUCOSE BLD-MCNC: 108 MG/DL (ref 65–105)
GLUCOSE BLD-MCNC: 147 MG/DL (ref 65–105)
GLUCOSE BLD-MCNC: 181 MG/DL (ref 65–105)
GLUCOSE BLD-MCNC: 255 MG/DL (ref 65–105)
GLUCOSE BLD-MCNC: 266 MG/DL (ref 65–105)
GLUCOSE SERPL-MCNC: 270 MG/DL (ref 70–99)
GLUCOSE UR STRIP-MCNC: ABNORMAL MG/DL
HCT VFR BLD AUTO: 37.7 % (ref 36.3–47.1)
HGB BLD-MCNC: 12.5 G/DL (ref 11.9–15.1)
HGB UR QL STRIP.AUTO: ABNORMAL
IMM GRANULOCYTES # BLD AUTO: 0.04 K/UL (ref 0–0.3)
IMM GRANULOCYTES NFR BLD: 0 %
INR PPP: 1.1
KETONES UR STRIP-MCNC: NEGATIVE MG/DL
LACTIC ACID, WHOLE BLOOD: 2.1 MMOL/L (ref 0.7–2.1)
LEUKOCYTE ESTERASE UR QL STRIP: ABNORMAL
LIPASE SERPL-CCNC: 31 U/L (ref 13–60)
LYMPHOCYTES NFR BLD: 2.38 K/UL (ref 1.1–3.7)
LYMPHOCYTES RELATIVE PERCENT: 19 % (ref 24–43)
MAGNESIUM SERPL-MCNC: 1.9 MG/DL (ref 1.6–2.6)
MCH RBC QN AUTO: 28.9 PG (ref 25.2–33.5)
MCHC RBC AUTO-ENTMCNC: 33.2 G/DL (ref 28.4–34.8)
MCV RBC AUTO: 87.1 FL (ref 82.6–102.9)
MONOCYTES NFR BLD: 0.91 K/UL (ref 0.1–1.2)
MONOCYTES NFR BLD: 7 % (ref 3–12)
NEUTROPHILS NFR BLD: 72 % (ref 36–65)
NEUTS SEG NFR BLD: 9.23 K/UL (ref 1.5–8.1)
NITRITE UR QL STRIP: NEGATIVE
NRBC BLD-RTO: 0 PER 100 WBC
PARTIAL THROMBOPLASTIN TIME: 25.1 SEC (ref 23–36.5)
PH UR STRIP: 5.5 [PH] (ref 5–8)
PHOSPHATE SERPL-MCNC: 3.2 MG/DL (ref 2.6–4.5)
PLATELET # BLD AUTO: 299 K/UL (ref 138–453)
PMV BLD AUTO: 10.1 FL (ref 8.1–13.5)
POTASSIUM SERPL-SCNC: 4.4 MMOL/L (ref 3.7–5.3)
PROT UR STRIP-MCNC: ABNORMAL MG/DL
PROTHROMBIN TIME: 14.1 SEC (ref 11.7–14.9)
RBC # BLD AUTO: 4.33 M/UL (ref 3.95–5.11)
RBC #/AREA URNS HPF: ABNORMAL /HPF (ref 0–2)
SODIUM SERPL-SCNC: 133 MMOL/L (ref 135–144)
SP GR UR STRIP: 1.02 (ref 1–1.03)
TROPONIN I SERPL HS-MCNC: 100 NG/L (ref 0–14)
TSH SERPL DL<=0.05 MIU/L-ACNC: 0.83 UIU/ML (ref 0.3–5)
UROBILINOGEN UR STRIP-ACNC: NORMAL EU/DL (ref 0–1)
WBC #/AREA URNS HPF: ABNORMAL /HPF (ref 0–5)
WBC OTHER # BLD: 12.7 K/UL (ref 3.5–11.3)

## 2023-11-25 PROCEDURE — 84100 ASSAY OF PHOSPHORUS: CPT

## 2023-11-25 PROCEDURE — 6370000000 HC RX 637 (ALT 250 FOR IP): Performed by: HOSPITALIST

## 2023-11-25 PROCEDURE — 99233 SBSQ HOSP IP/OBS HIGH 50: CPT | Performed by: HOSPITALIST

## 2023-11-25 PROCEDURE — 83735 ASSAY OF MAGNESIUM: CPT

## 2023-11-25 PROCEDURE — 80048 BASIC METABOLIC PNL TOTAL CA: CPT

## 2023-11-25 PROCEDURE — 85730 THROMBOPLASTIN TIME PARTIAL: CPT

## 2023-11-25 PROCEDURE — 2500000003 HC RX 250 WO HCPCS: Performed by: INTERNAL MEDICINE

## 2023-11-25 PROCEDURE — 84484 ASSAY OF TROPONIN QUANT: CPT

## 2023-11-25 PROCEDURE — 87086 URINE CULTURE/COLONY COUNT: CPT

## 2023-11-25 PROCEDURE — 85025 COMPLETE CBC W/AUTO DIFF WBC: CPT

## 2023-11-25 PROCEDURE — 2580000003 HC RX 258: Performed by: NURSE PRACTITIONER

## 2023-11-25 PROCEDURE — 93010 ELECTROCARDIOGRAM REPORT: CPT | Performed by: INTERNAL MEDICINE

## 2023-11-25 PROCEDURE — 99232 SBSQ HOSP IP/OBS MODERATE 35: CPT | Performed by: INTERNAL MEDICINE

## 2023-11-25 PROCEDURE — 2060000000 HC ICU INTERMEDIATE R&B

## 2023-11-25 PROCEDURE — 6360000002 HC RX W HCPCS: Performed by: NURSE PRACTITIONER

## 2023-11-25 PROCEDURE — 6370000000 HC RX 637 (ALT 250 FOR IP): Performed by: INTERNAL MEDICINE

## 2023-11-25 PROCEDURE — 83690 ASSAY OF LIPASE: CPT

## 2023-11-25 PROCEDURE — 93005 ELECTROCARDIOGRAM TRACING: CPT | Performed by: INTERNAL MEDICINE

## 2023-11-25 PROCEDURE — 83880 ASSAY OF NATRIURETIC PEPTIDE: CPT

## 2023-11-25 PROCEDURE — 81001 URINALYSIS AUTO W/SCOPE: CPT

## 2023-11-25 PROCEDURE — 36415 COLL VENOUS BLD VENIPUNCTURE: CPT

## 2023-11-25 PROCEDURE — 83605 ASSAY OF LACTIC ACID: CPT

## 2023-11-25 PROCEDURE — 82947 ASSAY GLUCOSE BLOOD QUANT: CPT

## 2023-11-25 PROCEDURE — 82550 ASSAY OF CK (CPK): CPT

## 2023-11-25 PROCEDURE — 85610 PROTHROMBIN TIME: CPT

## 2023-11-25 PROCEDURE — 84443 ASSAY THYROID STIM HORMONE: CPT

## 2023-11-25 RX ORDER — INSULIN LISPRO 100 [IU]/ML
5 INJECTION, SOLUTION INTRAVENOUS; SUBCUTANEOUS
Status: DISCONTINUED | OUTPATIENT
Start: 2023-11-25 | End: 2023-11-30 | Stop reason: HOSPADM

## 2023-11-25 RX ADMIN — METOCLOPRAMIDE 5 MG: 5 TABLET ORAL at 08:32

## 2023-11-25 RX ADMIN — HEPARIN SODIUM 5000 UNITS: 5000 INJECTION INTRAVENOUS; SUBCUTANEOUS at 21:16

## 2023-11-25 RX ADMIN — INSULIN LISPRO 5 UNITS: 100 INJECTION, SOLUTION INTRAVENOUS; SUBCUTANEOUS at 17:19

## 2023-11-25 RX ADMIN — MIRTAZAPINE 7.5 MG: 15 TABLET, ORALLY DISINTEGRATING ORAL at 21:49

## 2023-11-25 RX ADMIN — PREDNISOLONE ACETATE 1 DROP: 10 SUSPENSION/ DROPS OPHTHALMIC at 17:19

## 2023-11-25 RX ADMIN — METOCLOPRAMIDE 5 MG: 5 TABLET ORAL at 21:16

## 2023-11-25 RX ADMIN — CLOPIDOGREL BISULFATE 75 MG: 75 TABLET, FILM COATED ORAL at 08:32

## 2023-11-25 RX ADMIN — INSULIN LISPRO 4 UNITS: 100 INJECTION, SOLUTION INTRAVENOUS; SUBCUTANEOUS at 12:38

## 2023-11-25 RX ADMIN — Medication 81 MG: at 08:34

## 2023-11-25 RX ADMIN — PREDNISOLONE ACETATE 1 DROP: 10 SUSPENSION/ DROPS OPHTHALMIC at 14:57

## 2023-11-25 RX ADMIN — SODIUM CHLORIDE, PRESERVATIVE FREE 20 ML: 5 INJECTION INTRAVENOUS at 08:34

## 2023-11-25 RX ADMIN — HEPARIN SODIUM 5000 UNITS: 5000 INJECTION INTRAVENOUS; SUBCUTANEOUS at 14:58

## 2023-11-25 RX ADMIN — METOCLOPRAMIDE 5 MG: 5 TABLET ORAL at 17:19

## 2023-11-25 RX ADMIN — INSULIN GLARGINE 10 UNITS: 100 INJECTION, SOLUTION SUBCUTANEOUS at 08:23

## 2023-11-25 RX ADMIN — HEPARIN SODIUM 5000 UNITS: 5000 INJECTION INTRAVENOUS; SUBCUTANEOUS at 06:10

## 2023-11-25 RX ADMIN — BUMETANIDE 4 MG: 1 TABLET ORAL at 08:32

## 2023-11-25 RX ADMIN — INSULIN GLARGINE 10 UNITS: 100 INJECTION, SOLUTION SUBCUTANEOUS at 21:16

## 2023-11-25 RX ADMIN — BUMETANIDE 4 MG: 1 TABLET ORAL at 21:17

## 2023-11-25 RX ADMIN — PREDNISOLONE ACETATE 1 DROP: 10 SUSPENSION/ DROPS OPHTHALMIC at 21:16

## 2023-11-25 RX ADMIN — INSULIN LISPRO 4 UNITS: 100 INJECTION, SOLUTION INTRAVENOUS; SUBCUTANEOUS at 08:23

## 2023-11-25 RX ADMIN — PREDNISOLONE ACETATE 1 DROP: 10 SUSPENSION/ DROPS OPHTHALMIC at 08:32

## 2023-11-25 RX ADMIN — SODIUM CHLORIDE, PRESERVATIVE FREE 10 ML: 5 INJECTION INTRAVENOUS at 20:25

## 2023-11-25 RX ADMIN — PREDNISOLONE ACETATE 1 DROP: 10 SUSPENSION/ DROPS OPHTHALMIC at 06:39

## 2023-11-25 RX ADMIN — METOCLOPRAMIDE 5 MG: 5 TABLET ORAL at 12:38

## 2023-11-25 RX ADMIN — NIFEDIPINE 90 MG: 90 TABLET, FILM COATED, EXTENDED RELEASE ORAL at 08:32

## 2023-11-25 RX ADMIN — CARVEDILOL 25 MG: 25 TABLET, FILM COATED ORAL at 21:17

## 2023-11-25 ASSESSMENT — PAIN SCALES - GENERAL: PAINLEVEL_OUTOF10: 0

## 2023-11-25 ASSESSMENT — ENCOUNTER SYMPTOMS
NAUSEA: 1
VOMITING: 1
RESPIRATORY NEGATIVE: 1
ALLERGIC/IMMUNOLOGIC NEGATIVE: 1
EYES NEGATIVE: 1

## 2023-11-25 NOTE — PROGRESS NOTES
[facility]  Office: [address], DO, Gilberto Ramsey, DO, Bhupendra Gallegos, DO, Marylen Dames Blood, DO, Mesfin Blas MD, Lulu Cruz MD, Yessi Bustamante MD, Emily Ambrose MD,  Eunice Osler, MD, Mynor Singer MD, Mikki Veronica, DO, Modesta Lucas MD,  Vince Demarco MD, Travis Baltazar MD, Toshia Rosales DO, Tarah Salazar MD, Nolan Bar MD, Jigar Caldwell DO, Placido Hernández MD, Sheela Tinoco MD, Ester Miguel MD, Meenu Infante MD,  Jeff Morris DO, Caryn Fisher MD,  Paloma Dumont, CNP,  Natasha Reddy, CNP, Tosha Gardner, CNP, Santhosh Mendoza, CNP,  Tim Melendez, [facility], Jamaal Butts, CNP, Bruno Ortiz, CNP, Rocio Lechuga, CNP, Devon Philip, CNP, René Lucas, CNP, Cory Rivas PA-C, Phoebe Abebe, SYLVESTER, Regina Waters, CNP, Unruly Hewitt, CNP         [address]    Progress Note    11/25/2023    1:47 PM    Name:   Gabbi Khan  MRN:     [id number]     [address]:      [de-identified]   Room:   [facility] Day:  1  Admit Date:  11/24/2023  4:28 AM    PCP:   Negro Coronel  Code Status:  Full Code    Subjective:     C/C:   Chief Complaint   Patient presents with    Chest Pain    Nausea     Interval History Status: improved. Patient was admitted and started on dialysis. Today his symptoms have resolved. It appears patient is noncompliant with medication. Her HbA1c is 14.7. Likely discharge in a.m.     Brief History:     Gabbi Khan is a 35 y.o.  female who presents with   Chest pain  Nausea and vomiting     Patient was admitted thru ER with:  Gabbi Khan is a 35 y.o. female who presents with patient presents with chest pain she called out to EMS doing substernal chest pain that awoken her from sleep she is not having diaphoresis denies back pain endorses epigastric abdominal pain also has nausea denies radiation of the chest pain --  111  --   --   --   --   --   --   --   --    POCGLU  --    < >  --    < > 241* 212* 192* 181* 266*  --  255*    < > = values in this interval not displayed. ABG:  Lab Results   Component Value Date/Time    PH 7.39 11/02/2011 03:50 AM    PCO2 45 11/02/2011 03:50 AM    HCO3 27.4 11/02/2011 03:50 AM    FIO2 NOT REPORTED 05/22/2016 08:43 AM     Lab Results   Component Value Date/Time    SPECIAL  11/21/2022 06:00 PM     Lab Results   Component Value Date/Time    CULTURE KLEBSIELLA PNEUMONIAE >100,000 CFU/ML (A) 08/16/2023 12:15 PM       Radiology:  XR CHEST (2 VW)    Result Date: 11/24/2023  Normal examination. Physical Examination:        Physical Exam  Vitals reviewed. Constitutional:       Appearance: Normal appearance. HENT:      Head: Normocephalic and atraumatic. Mouth/Throat:      Mouth: Mucous membranes are moist.   Eyes:      Extraocular Movements: Extraocular movements intact. Pupils: Pupils are equal, round, and reactive to light. Cardiovascular:      Rate and Rhythm: Normal rate and regular rhythm. Pulses: Normal pulses. Heart sounds: Normal heart sounds. Pulmonary:      Effort: Pulmonary effort is normal.      Breath sounds: Normal breath sounds. Abdominal:      Palpations: Abdomen is soft. Tenderness: There is no abdominal tenderness. Musculoskeletal:         General: No swelling. Normal range of motion. Cervical back: Normal range of motion and neck supple. Skin:     General: Skin is warm and dry. Neurological:      General: No focal deficit present. Mental Status: She is alert and oriented to person, place, and time.          Assessment:        Hospital Problems             Last Modified POA    * (Principal) Fluid overload 11/24/2023 Yes    Asthma 11/24/2023 Yes    Acute pancreatitis 11/24/2023 Yes    Uncontrolled hypertension 11/24/2023 Yes    Diabetic polyneuropathy associated with type 1 diabetes mellitus ([address]) 11/24/2023 Yes

## 2023-11-25 NOTE — CARE COORDINATION
Case Management Assessment  Initial Evaluation    Date/Time of Evaluation: 11/25/2023 11:31 AM  Assessment Completed by: Mara Martinez RN    If patient is discharged prior to next notation, then this note serves as note for discharge by case management. Patient Name: Bar Pablo                   YOB: 1990  Diagnosis: Fluid overload [E87.70]  Hypervolemia, unspecified hypervolemia type [E87.70]  Acute pancreatitis, unspecified complication status, unspecified pancreatitis type [K85.90]  Chest pain due to myocardial ischemia, unspecified ischemic chest pain type [I25.9]                   Date / Time: 11/24/2023  4:28 AM    Patient Admission Status: Inpatient   Readmission Risk (Low < 19, Mod (19-27), High > 27): Readmission Risk Score: 18.6    Current PCP: Estelita Pearson  PCP verified by CM? Yes    Chart Reviewed: Yes      History Provided by: Patient  Patient Orientation: Alert and Oriented, Person, Place, Situation    Patient Cognition: Alert    Hospitalization in the last 30 days (Readmission):  No    If yes, Readmission Assessment in CM Navigator will be completed. Advance Directives:      Code Status: Full Code   Patient's Primary Decision Maker is: Legal Next of Kin      Discharge Planning:    Patient lives with: Spouse/Significant Other, Children Type of Home: Apartment  Primary Care Giver: Self  Patient Support Systems include: Spouse/Significant Other, Family Members   Current Financial resources: Medicaid, Medicare  Current community resources:    Current services prior to admission: Durable Medical Equipment            Current DME: Wheelchair            Type of Home Care services:  None    ADLS  Prior functional level: Independent in ADLs/IADLs  Current functional level: Independent in ADLs/IADLs    PT AM-PAC:   /24  OT AM-PAC:   /24    Family can provide assistance at DC:  Yes  Would you like Case Management to discuss the discharge plan with any other family

## 2023-11-25 NOTE — PROGRESS NOTES
Contact attending for secure message regarding patient's bp of 107/57 and whether to give patient bp medications this morning. Waiting for response. MD said to hold bp meds. Also notified md patient is refusing phoslo this morning.

## 2023-11-26 ENCOUNTER — APPOINTMENT (OUTPATIENT)
Dept: GENERAL RADIOLOGY | Age: 33
End: 2023-11-26
Payer: COMMERCIAL

## 2023-11-26 LAB
ANION GAP SERPL CALCULATED.3IONS-SCNC: 15 MMOL/L (ref 9–17)
BASOPHILS # BLD: 0.06 K/UL (ref 0–0.2)
BASOPHILS NFR BLD: 1 % (ref 0–2)
BUN SERPL-MCNC: 45 MG/DL (ref 6–20)
CALCIUM SERPL-MCNC: 9 MG/DL (ref 8.6–10.4)
CHLORIDE SERPL-SCNC: 91 MMOL/L (ref 98–107)
CO2 SERPL-SCNC: 26 MMOL/L (ref 20–31)
CREAT SERPL-MCNC: 7.6 MG/DL (ref 0.5–0.9)
EOSINOPHIL # BLD: 0.13 K/UL (ref 0–0.44)
EOSINOPHILS RELATIVE PERCENT: 1 % (ref 1–4)
ERYTHROCYTE [DISTWIDTH] IN BLOOD BY AUTOMATED COUNT: 13.2 % (ref 11.8–14.4)
GFR SERPL CREATININE-BSD FRML MDRD: 7 ML/MIN/1.73M2
GLUCOSE BLD-MCNC: 157 MG/DL (ref 65–105)
GLUCOSE BLD-MCNC: 164 MG/DL (ref 65–105)
GLUCOSE BLD-MCNC: 170 MG/DL (ref 65–105)
GLUCOSE BLD-MCNC: 89 MG/DL (ref 65–105)
GLUCOSE SERPL-MCNC: 184 MG/DL (ref 70–99)
HCT VFR BLD AUTO: 36 % (ref 36.3–47.1)
HGB BLD-MCNC: 11.1 G/DL (ref 11.9–15.1)
IMM GRANULOCYTES # BLD AUTO: 0.04 K/UL (ref 0–0.3)
IMM GRANULOCYTES NFR BLD: 0 %
LYMPHOCYTES NFR BLD: 4.04 K/UL (ref 1.1–3.7)
LYMPHOCYTES RELATIVE PERCENT: 32 % (ref 24–43)
MCH RBC QN AUTO: 28.6 PG (ref 25.2–33.5)
MCHC RBC AUTO-ENTMCNC: 30.8 G/DL (ref 28.4–34.8)
MCV RBC AUTO: 92.8 FL (ref 82.6–102.9)
MICROORGANISM SPEC CULT: NORMAL
MONOCYTES NFR BLD: 1.07 K/UL (ref 0.1–1.2)
MONOCYTES NFR BLD: 8 % (ref 3–12)
NEUTROPHILS NFR BLD: 58 % (ref 36–65)
NEUTS SEG NFR BLD: 7.41 K/UL (ref 1.5–8.1)
NRBC BLD-RTO: 0 PER 100 WBC
PLATELET # BLD AUTO: 283 K/UL (ref 138–453)
PMV BLD AUTO: 10.4 FL (ref 8.1–13.5)
POTASSIUM SERPL-SCNC: 4.4 MMOL/L (ref 3.7–5.3)
RBC # BLD AUTO: 3.88 M/UL (ref 3.95–5.11)
SODIUM SERPL-SCNC: 132 MMOL/L (ref 135–144)
SPECIMEN DESCRIPTION: NORMAL
WBC OTHER # BLD: 12.8 K/UL (ref 3.5–11.3)

## 2023-11-26 PROCEDURE — 85025 COMPLETE CBC W/AUTO DIFF WBC: CPT

## 2023-11-26 PROCEDURE — 6360000002 HC RX W HCPCS: Performed by: HOSPITALIST

## 2023-11-26 PROCEDURE — 82947 ASSAY GLUCOSE BLOOD QUANT: CPT

## 2023-11-26 PROCEDURE — 2060000000 HC ICU INTERMEDIATE R&B

## 2023-11-26 PROCEDURE — 99231 SBSQ HOSP IP/OBS SF/LOW 25: CPT | Performed by: INTERNAL MEDICINE

## 2023-11-26 PROCEDURE — 87040 BLOOD CULTURE FOR BACTERIA: CPT

## 2023-11-26 PROCEDURE — 6370000000 HC RX 637 (ALT 250 FOR IP): Performed by: NURSE PRACTITIONER

## 2023-11-26 PROCEDURE — 6370000000 HC RX 637 (ALT 250 FOR IP): Performed by: INTERNAL MEDICINE

## 2023-11-26 PROCEDURE — 2580000003 HC RX 258: Performed by: NURSE PRACTITIONER

## 2023-11-26 PROCEDURE — 6360000002 HC RX W HCPCS: Performed by: NURSE PRACTITIONER

## 2023-11-26 PROCEDURE — 6370000000 HC RX 637 (ALT 250 FOR IP): Performed by: HOSPITALIST

## 2023-11-26 PROCEDURE — 73620 X-RAY EXAM OF FOOT: CPT

## 2023-11-26 PROCEDURE — 36415 COLL VENOUS BLD VENIPUNCTURE: CPT

## 2023-11-26 PROCEDURE — 99232 SBSQ HOSP IP/OBS MODERATE 35: CPT | Performed by: HOSPITALIST

## 2023-11-26 PROCEDURE — 80048 BASIC METABOLIC PNL TOTAL CA: CPT

## 2023-11-26 RX ORDER — MIDODRINE HYDROCHLORIDE 5 MG/1
5 TABLET ORAL
Status: DISCONTINUED | OUTPATIENT
Start: 2023-11-26 | End: 2023-11-30 | Stop reason: HOSPADM

## 2023-11-26 RX ADMIN — PREDNISOLONE ACETATE 1 DROP: 10 SUSPENSION/ DROPS OPHTHALMIC at 21:10

## 2023-11-26 RX ADMIN — Medication 1000 MG: at 16:22

## 2023-11-26 RX ADMIN — INSULIN LISPRO 5 UNITS: 100 INJECTION, SOLUTION INTRAVENOUS; SUBCUTANEOUS at 13:57

## 2023-11-26 RX ADMIN — HEPARIN SODIUM 5000 UNITS: 5000 INJECTION INTRAVENOUS; SUBCUTANEOUS at 13:57

## 2023-11-26 RX ADMIN — PREDNISOLONE ACETATE 1 DROP: 10 SUSPENSION/ DROPS OPHTHALMIC at 13:59

## 2023-11-26 RX ADMIN — HEPARIN SODIUM 5000 UNITS: 5000 INJECTION INTRAVENOUS; SUBCUTANEOUS at 20:45

## 2023-11-26 RX ADMIN — PREDNISOLONE ACETATE 1 DROP: 10 SUSPENSION/ DROPS OPHTHALMIC at 17:19

## 2023-11-26 RX ADMIN — HEPARIN SODIUM 5000 UNITS: 5000 INJECTION INTRAVENOUS; SUBCUTANEOUS at 06:04

## 2023-11-26 RX ADMIN — METOCLOPRAMIDE 5 MG: 5 TABLET ORAL at 20:45

## 2023-11-26 RX ADMIN — MIDODRINE HYDROCHLORIDE 5 MG: 5 TABLET ORAL at 13:58

## 2023-11-26 RX ADMIN — PREDNISOLONE ACETATE 1 DROP: 10 SUSPENSION/ DROPS OPHTHALMIC at 06:04

## 2023-11-26 RX ADMIN — CLOPIDOGREL BISULFATE 75 MG: 75 TABLET, FILM COATED ORAL at 11:06

## 2023-11-26 RX ADMIN — BUMETANIDE 4 MG: 1 TABLET ORAL at 11:03

## 2023-11-26 RX ADMIN — METOCLOPRAMIDE 5 MG: 5 TABLET ORAL at 17:19

## 2023-11-26 RX ADMIN — MIRTAZAPINE 7.5 MG: 15 TABLET, ORALLY DISINTEGRATING ORAL at 20:44

## 2023-11-26 RX ADMIN — METOCLOPRAMIDE 5 MG: 5 TABLET ORAL at 11:04

## 2023-11-26 RX ADMIN — MIDODRINE HYDROCHLORIDE 5 MG: 5 TABLET ORAL at 17:19

## 2023-11-26 RX ADMIN — NIFEDIPINE 90 MG: 90 TABLET, FILM COATED, EXTENDED RELEASE ORAL at 11:08

## 2023-11-26 RX ADMIN — ACETAMINOPHEN 650 MG: 325 TABLET ORAL at 16:38

## 2023-11-26 RX ADMIN — INSULIN GLARGINE 10 UNITS: 100 INJECTION, SOLUTION SUBCUTANEOUS at 20:46

## 2023-11-26 RX ADMIN — INSULIN LISPRO 5 UNITS: 100 INJECTION, SOLUTION INTRAVENOUS; SUBCUTANEOUS at 11:04

## 2023-11-26 RX ADMIN — SODIUM CHLORIDE, PRESERVATIVE FREE 10 ML: 5 INJECTION INTRAVENOUS at 11:08

## 2023-11-26 RX ADMIN — Medication 81 MG: at 11:01

## 2023-11-26 RX ADMIN — PREDNISOLONE ACETATE 1 DROP: 10 SUSPENSION/ DROPS OPHTHALMIC at 11:07

## 2023-11-26 RX ADMIN — INSULIN GLARGINE 10 UNITS: 100 INJECTION, SOLUTION SUBCUTANEOUS at 11:05

## 2023-11-26 RX ADMIN — METOCLOPRAMIDE 5 MG: 5 TABLET ORAL at 13:58

## 2023-11-26 RX ADMIN — SODIUM CHLORIDE, PRESERVATIVE FREE 10 ML: 5 INJECTION INTRAVENOUS at 20:45

## 2023-11-26 RX ADMIN — BUMETANIDE 4 MG: 1 TABLET ORAL at 20:45

## 2023-11-26 ASSESSMENT — ENCOUNTER SYMPTOMS
RESPIRATORY NEGATIVE: 1
EYES NEGATIVE: 1
VOMITING: 1
NAUSEA: 1
ALLERGIC/IMMUNOLOGIC NEGATIVE: 1

## 2023-11-26 ASSESSMENT — PAIN DESCRIPTION - LOCATION: LOCATION: FOOT

## 2023-11-26 ASSESSMENT — PAIN DESCRIPTION - ORIENTATION: ORIENTATION: RIGHT

## 2023-11-26 ASSESSMENT — PAIN SCALES - GENERAL
PAINLEVEL_OUTOF10: 0
PAINLEVEL_OUTOF10: 0
PAINLEVEL_OUTOF10: 5
PAINLEVEL_OUTOF10: 0

## 2023-11-26 ASSESSMENT — PAIN DESCRIPTION - DESCRIPTORS: DESCRIPTORS: ACHING

## 2023-11-26 NOTE — PROGRESS NOTES
[facility]  Office: [address], DO, Mehran Elizondo, DO, James Laughter, DO, Ky Adam Blood, DO, Cydney Suh MD, Neal Lopez MD, Lucía Cosme MD, Odessa Yee MD,  Justo Lai MD, Gisela Del Valle MD, Teresa Bernard, DO, Kirstin Pina MD,  Jennifer Gonzales MD, Sonja Parks MD, Shruti Cano DO, Scar Hercules MD, Sean Lozano MD, Mario Bartholomew, DO, Leona Eisenmenger, MD, Valerie Godinez MD, Miriam Avalos MD, Cheryle Spillers, MD,  Idania Barnett, DO, Marlein Pablo MD,  Spring Aldridge, CNP,  Gonzales Bunch, CNP, Remberto Pineda, CNP, Jennifer Devine, CNP,  Armida Birch, [facility], Lera Sandhoff, CNP, Danica Lund, CNP, Adam Nuñez, CNP, Umm Whitlock, CNP, Calista Amador, CNP, Carmelo Duarte PA-C, Mert Resendiz, [facility], Linette Verde, CNP, Odin Thorpe, CNP         [address]     Progress Note    11/26/2023    3:04 PM    Name:   Richa Hubbard  MRN:     [id number]     [address]:      [de-identified]   Room:   [address] [place], [place] [address] Day:  2  Admit Date:  11/24/2023  4:28 AM    PCP:   Lanie Fulton  Code Status:  Full Code    Subjective:     C/C:   Chief Complaint   Patient presents with    Chest Pain    Nausea     Interval History Status: improved. Patient was admitted and started on dialysis. Today his symptoms have resolved. It appears patient is noncompliant with medication. Her HbA1c is 14.7. Likely discharge in a.m.     Brief History:     Richa Hubbard is a 35 y.o.  female who presents with   Chest pain  Nausea and vomiting     Patient was admitted thru ER with:  Richa Hubbard is a 35 y.o. female who presents with patient presents with chest pain she called out to EMS doing substernal chest pain that awoken her from sleep she is not having diaphoresis denies back pain endorses epigastric abdominal pain also has nausea denies radiation of the chest pain podiatry and will consult  - Check cultures    10. DM 2, HTN, HLD, ESRD on HD, Asthma, Depression, Migraines, Lichen simplex  - All other conditions appear to be stable  - Restart home meds    11.  DVT and GI prophylaxis        Oliver Cortes MD  11/26/2023  3:04 PM

## 2023-11-26 NOTE — PROGRESS NOTES
92.8   MCH 28.5 28.9 28.6   MCHC 32.8 33.2 30.8   RDW 13.0 13.4 13.2    299 283   MPV 10.4 10.1 10.4      BMP:   Recent Labs     11/24/23  0455 11/25/23  0958 11/26/23  0710   * 133* 132*   K 4.2 4.4 4.4   CL 95* 92* 91*   CO2 20 29 26   BUN 34* 23* 45*   CREATININE 4.9* 5.1* 7.6*   GLUCOSE 454* 270* 184*   CALCIUM 9.8 9.3 9.0        Phosphorus:    Recent Labs     11/25/23  1514   PHOS 3.2     Magnesium:   Recent Labs     11/25/23  1514   MG 1.9     Albumin:   Recent Labs     11/24/23  0455   LABALBU 3.9     Assessment:  1. ESRD, on Monday and Wednesday and Friday schedule at [facility] under Dr. Tiffanie Garcia  2. Uncontrolled hypertension, now hypotensive. 3.  Chest pain with elevated troponin, improved on nitroglycerin drip remains off.  4.  Anemia of end-stage renal disease  5. Poorly controlled diabetes mellitus with hemoglobin A1c over 14 with significant hyperglycemia today  6. No fluid overload with the patient's weight today the same as her postdialysis weight last treatment 79.7 kg with dry weight 80 kg  7. Leukocytosis with culture 6 ordered and patient received 1 dose of ceftriaxone. Chest x-ray within normal limits. Did have some chills prior to dialysis. 8. Elevated lipase      Plan:  1. Dialysis Monday as per schedule. 2. Antihypertensives held, give Midodrine 5mg TID, hold if SBP greater than 110  3. Monitor hemodynamics  4. BMP in am.   5.  Following. Please do not hesitate to call with questions. Electronically signed by DON Rojas on 11/26/2023 at 9:53 AM   [facility]  Attending Physician Statement  I have discussed the care of [facility], including pertinent history and exam findings,  with the CNP. I have reviewed the key elements of all parts of the encounter with the CNP. I agree with the assessment, plan and orders as documented.     Nba Gamez MD MD, MRCLUIS Ferrer, FACLUIS   11/26/2023 8:15 PM    Nephrology [address] [facility]

## 2023-11-27 ENCOUNTER — APPOINTMENT (OUTPATIENT)
Dept: DIALYSIS | Age: 33
End: 2023-11-27
Payer: COMMERCIAL

## 2023-11-27 PROBLEM — D63.8 ANEMIA OF CHRONIC DISEASE: Status: ACTIVE | Noted: 2023-11-27

## 2023-11-27 LAB
ANION GAP SERPL CALCULATED.3IONS-SCNC: 14 MMOL/L (ref 9–17)
BASOPHILS # BLD: 0.05 K/UL (ref 0–0.2)
BASOPHILS NFR BLD: 0 % (ref 0–2)
BUN SERPL-MCNC: 58 MG/DL (ref 6–20)
CALCIUM SERPL-MCNC: 9.3 MG/DL (ref 8.6–10.4)
CHLORIDE SERPL-SCNC: 93 MMOL/L (ref 98–107)
CO2 SERPL-SCNC: 26 MMOL/L (ref 20–31)
CREAT SERPL-MCNC: 9 MG/DL (ref 0.5–0.9)
CRP SERPL HS-MCNC: 51.9 MG/L (ref 0–5)
EOSINOPHIL # BLD: 0.16 K/UL (ref 0–0.44)
EOSINOPHILS RELATIVE PERCENT: 1 % (ref 1–4)
ERYTHROCYTE [DISTWIDTH] IN BLOOD BY AUTOMATED COUNT: 12.9 % (ref 11.8–14.4)
ERYTHROCYTE [SEDIMENTATION RATE] IN BLOOD BY PHOTOMETRIC METHOD: 81 MM/HR (ref 0–20)
GFR SERPL CREATININE-BSD FRML MDRD: 5 ML/MIN/1.73M2
GLUCOSE BLD-MCNC: 101 MG/DL (ref 65–105)
GLUCOSE BLD-MCNC: 141 MG/DL (ref 65–105)
GLUCOSE BLD-MCNC: 173 MG/DL (ref 65–105)
GLUCOSE BLD-MCNC: 187 MG/DL (ref 65–105)
GLUCOSE BLD-MCNC: 210 MG/DL (ref 65–105)
GLUCOSE SERPL-MCNC: 188 MG/DL (ref 70–99)
HCT VFR BLD AUTO: 35.2 % (ref 36.3–47.1)
HGB BLD-MCNC: 11 G/DL (ref 11.9–15.1)
IMM GRANULOCYTES # BLD AUTO: 0.05 K/UL (ref 0–0.3)
IMM GRANULOCYTES NFR BLD: 0 %
LYMPHOCYTES NFR BLD: 3 K/UL (ref 1.1–3.7)
LYMPHOCYTES RELATIVE PERCENT: 25 % (ref 24–43)
MCH RBC QN AUTO: 28.4 PG (ref 25.2–33.5)
MCHC RBC AUTO-ENTMCNC: 31.3 G/DL (ref 28.4–34.8)
MCV RBC AUTO: 91 FL (ref 82.6–102.9)
MONOCYTES NFR BLD: 0.98 K/UL (ref 0.1–1.2)
MONOCYTES NFR BLD: 8 % (ref 3–12)
NEUTROPHILS NFR BLD: 66 % (ref 36–65)
NEUTS SEG NFR BLD: 7.58 K/UL (ref 1.5–8.1)
NRBC BLD-RTO: 0 PER 100 WBC
PLATELET # BLD AUTO: 315 K/UL (ref 138–453)
PMV BLD AUTO: 9.8 FL (ref 8.1–13.5)
POTASSIUM SERPL-SCNC: 5 MMOL/L (ref 3.7–5.3)
RBC # BLD AUTO: 3.87 M/UL (ref 3.95–5.11)
SODIUM SERPL-SCNC: 133 MMOL/L (ref 135–144)
WBC OTHER # BLD: 11.8 K/UL (ref 3.5–11.3)

## 2023-11-27 PROCEDURE — 90935 HEMODIALYSIS ONE EVALUATION: CPT | Performed by: INTERNAL MEDICINE

## 2023-11-27 PROCEDURE — 36415 COLL VENOUS BLD VENIPUNCTURE: CPT

## 2023-11-27 PROCEDURE — 85652 RBC SED RATE AUTOMATED: CPT

## 2023-11-27 PROCEDURE — 6370000000 HC RX 637 (ALT 250 FOR IP): Performed by: NURSE PRACTITIONER

## 2023-11-27 PROCEDURE — 6370000000 HC RX 637 (ALT 250 FOR IP): Performed by: INTERNAL MEDICINE

## 2023-11-27 PROCEDURE — 83036 HEMOGLOBIN GLYCOSYLATED A1C: CPT

## 2023-11-27 PROCEDURE — 86140 C-REACTIVE PROTEIN: CPT

## 2023-11-27 PROCEDURE — 6370000000 HC RX 637 (ALT 250 FOR IP): Performed by: HOSPITALIST

## 2023-11-27 PROCEDURE — 97530 THERAPEUTIC ACTIVITIES: CPT

## 2023-11-27 PROCEDURE — 97161 PT EVAL LOW COMPLEX 20 MIN: CPT

## 2023-11-27 PROCEDURE — 85025 COMPLETE CBC W/AUTO DIFF WBC: CPT

## 2023-11-27 PROCEDURE — 80048 BASIC METABOLIC PNL TOTAL CA: CPT

## 2023-11-27 PROCEDURE — 2060000000 HC ICU INTERMEDIATE R&B

## 2023-11-27 PROCEDURE — 94761 N-INVAS EAR/PLS OXIMETRY MLT: CPT

## 2023-11-27 PROCEDURE — 99223 1ST HOSP IP/OBS HIGH 75: CPT | Performed by: PODIATRIST

## 2023-11-27 PROCEDURE — 2580000003 HC RX 258: Performed by: NURSE PRACTITIONER

## 2023-11-27 PROCEDURE — 6360000002 HC RX W HCPCS: Performed by: HOSPITALIST

## 2023-11-27 PROCEDURE — 6360000002 HC RX W HCPCS: Performed by: NURSE PRACTITIONER

## 2023-11-27 PROCEDURE — 90935 HEMODIALYSIS ONE EVALUATION: CPT

## 2023-11-27 PROCEDURE — 99232 SBSQ HOSP IP/OBS MODERATE 35: CPT | Performed by: FAMILY MEDICINE

## 2023-11-27 PROCEDURE — 2500000003 HC RX 250 WO HCPCS: Performed by: INTERNAL MEDICINE

## 2023-11-27 RX ADMIN — INSULIN LISPRO 5 UNITS: 100 INJECTION, SOLUTION INTRAVENOUS; SUBCUTANEOUS at 17:09

## 2023-11-27 RX ADMIN — METOCLOPRAMIDE 5 MG: 5 TABLET ORAL at 17:09

## 2023-11-27 RX ADMIN — INSULIN GLARGINE 10 UNITS: 100 INJECTION, SOLUTION SUBCUTANEOUS at 08:22

## 2023-11-27 RX ADMIN — SODIUM CHLORIDE, PRESERVATIVE FREE 10 ML: 5 INJECTION INTRAVENOUS at 20:06

## 2023-11-27 RX ADMIN — METOCLOPRAMIDE 5 MG: 5 TABLET ORAL at 20:06

## 2023-11-27 RX ADMIN — CALCIUM ACETATE 2001 MG: 667 CAPSULE ORAL at 08:16

## 2023-11-27 RX ADMIN — MIDODRINE HYDROCHLORIDE 5 MG: 5 TABLET ORAL at 17:08

## 2023-11-27 RX ADMIN — CARVEDILOL 25 MG: 25 TABLET, FILM COATED ORAL at 08:16

## 2023-11-27 RX ADMIN — METOCLOPRAMIDE 5 MG: 5 TABLET ORAL at 08:15

## 2023-11-27 RX ADMIN — PREDNISOLONE ACETATE 1 DROP: 10 SUSPENSION/ DROPS OPHTHALMIC at 06:38

## 2023-11-27 RX ADMIN — CLONIDINE HYDROCHLORIDE 0.2 MG: 0.2 TABLET ORAL at 14:12

## 2023-11-27 RX ADMIN — BUMETANIDE 4 MG: 1 TABLET ORAL at 08:17

## 2023-11-27 RX ADMIN — HEPARIN SODIUM 5000 UNITS: 5000 INJECTION INTRAVENOUS; SUBCUTANEOUS at 20:03

## 2023-11-27 RX ADMIN — PREDNISOLONE ACETATE 1 DROP: 10 SUSPENSION/ DROPS OPHTHALMIC at 08:28

## 2023-11-27 RX ADMIN — MIDODRINE HYDROCHLORIDE 30 MG: 5 TABLET ORAL at 08:18

## 2023-11-27 RX ADMIN — METOCLOPRAMIDE 5 MG: 5 TABLET ORAL at 14:09

## 2023-11-27 RX ADMIN — Medication 81 MG: at 08:16

## 2023-11-27 RX ADMIN — BUMETANIDE 4 MG: 1 TABLET ORAL at 20:04

## 2023-11-27 RX ADMIN — MIDODRINE HYDROCHLORIDE 5 MG: 5 TABLET ORAL at 14:09

## 2023-11-27 RX ADMIN — MIRTAZAPINE 7.5 MG: 15 TABLET, ORALLY DISINTEGRATING ORAL at 20:05

## 2023-11-27 RX ADMIN — LISINOPRIL 5 MG: 5 TABLET ORAL at 08:16

## 2023-11-27 RX ADMIN — SODIUM CHLORIDE, PRESERVATIVE FREE 10 ML: 5 INJECTION INTRAVENOUS at 08:28

## 2023-11-27 RX ADMIN — HEPARIN SODIUM 5000 UNITS: 5000 INJECTION INTRAVENOUS; SUBCUTANEOUS at 06:38

## 2023-11-27 RX ADMIN — PREDNISOLONE ACETATE 1 DROP: 10 SUSPENSION/ DROPS OPHTHALMIC at 14:18

## 2023-11-27 RX ADMIN — CLOPIDOGREL BISULFATE 75 MG: 75 TABLET, FILM COATED ORAL at 08:16

## 2023-11-27 RX ADMIN — INSULIN LISPRO 5 UNITS: 100 INJECTION, SOLUTION INTRAVENOUS; SUBCUTANEOUS at 08:21

## 2023-11-27 RX ADMIN — INSULIN LISPRO 5 UNITS: 100 INJECTION, SOLUTION INTRAVENOUS; SUBCUTANEOUS at 14:13

## 2023-11-27 RX ADMIN — CLONIDINE HYDROCHLORIDE 0.2 MG: 0.2 TABLET ORAL at 08:16

## 2023-11-27 RX ADMIN — CARVEDILOL 25 MG: 25 TABLET, FILM COATED ORAL at 20:05

## 2023-11-27 RX ADMIN — PREDNISOLONE ACETATE 1 DROP: 10 SUSPENSION/ DROPS OPHTHALMIC at 20:07

## 2023-11-27 RX ADMIN — INSULIN LISPRO 2 UNITS: 100 INJECTION, SOLUTION INTRAVENOUS; SUBCUTANEOUS at 14:12

## 2023-11-27 RX ADMIN — Medication 1000 MG: at 15:20

## 2023-11-27 RX ADMIN — HEPARIN SODIUM 5000 UNITS: 5000 INJECTION INTRAVENOUS; SUBCUTANEOUS at 14:09

## 2023-11-27 RX ADMIN — PREDNISOLONE ACETATE 1 DROP: 10 SUSPENSION/ DROPS OPHTHALMIC at 17:14

## 2023-11-27 ASSESSMENT — ENCOUNTER SYMPTOMS
VOMITING: 0
ABDOMINAL PAIN: 0
WHEEZING: 0
DIARRHEA: 0
GASTROINTESTINAL NEGATIVE: 1
NAUSEA: 0
CHEST TIGHTNESS: 0
COUGH: 0
CONSTIPATION: 0
BLOOD IN STOOL: 0
ALLERGIC/IMMUNOLOGIC NEGATIVE: 1
COLOR CHANGE: 1
RHINORRHEA: 0
RESPIRATORY NEGATIVE: 1
EYES NEGATIVE: 1
SHORTNESS OF BREATH: 0

## 2023-11-27 ASSESSMENT — PAIN SCALES - GENERAL: PAINLEVEL_OUTOF10: 0

## 2023-11-27 NOTE — PROGRESS NOTES
[facility]  Office: [address], DO, Melo Woodard, DO, Cem Yates, DO, Maeve Carbajal Blood, DO, Rosalia Tamez MD, Arsenio Story MD, Nicolette Rosas MD, Romulo Quinonez MD,  Royce Schrader MD, Royal Wilkinson MD, Jg Cali MD,  Valarie Clay MD, López Collado MD, Ward Wise, DO, Star Subramanian MD,  Rosio Rodriges DO, Wally Esqueda MD, Tammie Rodgers MD, Chu Redman MD, Eden Reilly MD,  Rome Morgan MD, Radha Arevalo MD, Baylee Simpson MD, Joi Tyson MD, Marissa Sandoval MD, Stanley Flores, DO, Mary Brito, DO, Yony Jeronimo MD,  Truong Bang MD, Rafael Molina, [facility],  Jasiel Branch, CNP, Lesly Pichardo, CNP,  Fara Norwood, [facility], Blane Loomis, [facility], Jeimy Vora, CNP, Jennifer Tenorio, CNP, Dean Conner, [facility], [facility], [facility], Boubacar Reyes, [facility], Lauren Berg, CNP, Rush Escobar, CNS, Chato Sullivan, [facility], Mono Florian, Adriana Fuentese      Daily Progress Note     Admit Date: 11/24/2023  Bed/Room No.  5222/1881-03  Admitting Physician : Nicolette Rosas MD  Code Status :Full Code  Hospital Day:  LOS: 3 days   Chief Complaint:     Chief Complaint   Patient presents with    Chest Pain    Nausea     Principal Problem:    Fluid overload  Active Problems:    Asthma    Acute pancreatitis    Uncontrolled hypertension    Diabetic polyneuropathy associated with type 1 diabetes mellitus [facility])    Diabetic gastroparesis associated with type 1 diabetes mellitus ([facility])    ESRD on dialysis [facility])    Troponin level elevated    Uncontrolled type 1 diabetes mellitus with hyperglycemia ([address])    Superficial gastritis without hemorrhage    Non-compliance with renal dialysis  Resolved Problems:    * No resolved hospital problems. *    Subjective : Interval History/Significant events :  11/27/23    Patient seen in dialysis lab. Patient tolerated hemodialysis. She is breathing on room air.   Patient has no new Clinical Course : stable  Assessment and Plan  :        ESRD on hemodialysis MWF schedule -underwent hemodialysis today. Uncontrolled hypertension -blood pressure was low this morning and hydralazine and nifedipine was held. Patient underwent dialysis today and usually gets midodrine with dialysis due to hypotension. Midodrine as needed. Poorly controlled type 2 diabetes mellitus A1c > 14. Follow-up with primary care physician for titration of medication. Patient noncompliant with medication. Patient has been having fluctuating blood pressure , she came in with hypertensive urgency and now blood pressure in 00X systolic. Suspect noncompliance with diet and medication. Monitor overnight and will adjust medications accordingly. Likely discharge tomorrow morning. Continue to monitor vitals , Intake / output ,  Cell count , HGB , Kidney function, oxygenation  as indicated . Plan and updates discussed with patient ,  answers  explained to satisfaction.    Plan discussed with Staff Inell Violeta RN     (Please note that portions of this note were completed with a voice recognition program. Efforts were made to edit the dictations but occasionally words are mis-transcribed.)      Lucinda Wolff MD  11/27/2023

## 2023-11-27 NOTE — PROGRESS NOTES
Dialysis Post Treatment Note  Vitals:    11/27/23 1301   BP: 108/67   Pulse: 81   Resp:    Temp:    SpO2:      Pre-Weight = 76kg  Post-weight = Weight - Scale: 74 kg (163 lb 2.3 oz)  Total Liters Processed = Blood Volume Processed (Liters): 79.4 L  Rinseback Volume (mL) = Rinseback Volume (ml): 300 ml  Net Removal (mL) =  2500mL  Patient's dry weight= 80kg  Type of access used= left fistula   Length of treatment=3.5 hours    Pt tolerated treatment and fluid removal without any complaints. 2.5L of fluid was removed during treatment. Pt floor was called and report was given to pt's RN Sindi Waite. Pt returned to room per transport via stretcher with belongings.

## 2023-11-27 NOTE — PROGRESS NOTES
Physical Therapy  [place] [facility]  Physical Therapy Initial Assessment    Name: Juanito Plaza  : 1990  MRN: [id number]  Date of Service: 2023  Chief Complaint   Patient presents with    Chest Pain    Nausea       Discharge Recommendations:  Patient would benefit from continued therapy after discharge          Patient Diagnosis(es): The primary encounter diagnosis was Chest pain due to myocardial ischemia, unspecified ischemic chest pain type. Diagnoses of Hypervolemia, unspecified hypervolemia type and Acute pancreatitis, unspecified complication status, unspecified pancreatitis type were also pertinent to this visit. Past Medical History:  has a past medical history of Asthma, Atherosclerosis of native arteries of extremities with rest pain, unspecified extremity ([address]), Cerebral artery occlusion with cerebral infarction ([address]), Depression, Diabetes mellitus ([address]), Diabetic polyneuropathy associated with type 1 diabetes mellitus ([address]), ESRD on dialysis ([address]), Headache, migraine, Heart attack ([address]), Hemodialysis patient ([address]), Hypertension, Lichen simplex chronicus, Mixed hyperlipidemia, Noninflammatory disorder of vagina, unspecified, Patient's noncompliance with other medical treatment and regimen for other reason, and Type 2 diabetes mellitus without complication ([address]). Past Surgical History:  has a past surgical history that includes Ovary removal; Ovary removal; Upper gastrointestinal endoscopy (N/A, 10/28/2022); Upper gastrointestinal endoscopy (N/A, 2023); AV fistula creation (Left); Cholecystectomy; and Upper gastrointestinal endoscopy (N/A, 2023). Assessment   Body Structures, Functions, Activity Limitations Requiring Skilled Therapeutic Intervention: Decreased functional mobility ; Decreased safe awareness;Decreased cognition;Decreased coordination;Decreased balance;Decreased strength;Decreased vision/visual deficit; Decreased ROM  Assessment: inattention and lack of safety awareness. With therapist in front of her, blocking her knees, she was able to stand fully upright and stay standing for approx 30 seconds, mod A. Balance  Sitting - Static: Poor;+  Sitting - Dynamic: +;Poor  Standing - Static: Poor  Standing - Dynamic: Poor  Comments: Delayed righting reactions, poor safety awareness. AM-PAC Score  AM-PAC Inpatient Mobility Raw Score : 9 (11/27/23 1502)  AM-PAC Inpatient T-Scale Score : 30.55 (11/27/23 1502)  Mobility Inpatient CMS 0-100% Score: 81.38 (11/27/23 1502)  Mobility Inpatient CMS G-Code Modifier : CM (11/27/23 1502)        Goals  Short Term Goals  Time Frame for Short Term Goals: 14 visits  Short Term Goal 1: Supine to/from sit with SBA. Short Term Goal 2: Sit to/from stand with min A. Short Term Goal 3: Ambulate along edge of bed min A 5'.        Education  Patient Education  Education Given To: Patient  Education Provided: Role of Therapy;Plan of Care;Transfer Training  Education Method: Demonstration;Verbal  Education Outcome: Unable to verbalize;Continued education needed      Therapy Time   Individual Concurrent Group Co-treatment   Time In 8357         Time Out 1458         Minutes 27         Timed Code Treatment Minutes: [address], PT

## 2023-11-27 NOTE — DISCHARGE INSTRUCTIONS
Podiatry:  Podiatry to continue to monitor as outpatient. Please keep dressings clean, dry, and intact until follow up  Please remain nonweightbearing to the operated extremity  Watch for signs of systemic infection: fever, chills, nausea, vomiting, diarrhea, etc. If any of these symptoms arise, please contact Dr. Davide Cuello office or go to the ED. Watch for signs of local infection: increased redness, increased swelling, increased drainage, malodor, etc. If any of these symptoms arise, please contact Dr. Davide Cuello office or go to ED.   Please follow-up with Dr. Lela Stauffer or local podiatrist in their office in 1 week

## 2023-11-27 NOTE — PROGRESS NOTES
Consult noted. Per the EHR the patient follows with Kane Malcolm. Last appointment 11/16/2023. She was instructed to utilize her diabetic shoes, apply Betadine and a Band-Aid daily to the right hallux wound. Imaging results noted. Possible OM. Podiatry has been consulted.       ET Nurse will defer wound care to the physician services

## 2023-11-27 NOTE — PROGRESS NOTES
Dialysis Time Out  To be done by RN and tech or 2 RNs  Staff Names Bina ARCEO and Rebekah Pantoja RN    [x]  Identity of the patient using 2 patient identifiers  [x]  Consent for treatment  [x]  Equipment-proper machine and dialyzer  [x]  B-Hep B status  [x]  Orders- to include bath, blood flow, dialyzer, time and fluid removal  [x]  Access-Correct site and in working order  [x]  Time for patient to ask questions.

## 2023-11-27 NOTE — PROGRESS NOTES
Occupational [address]  Occupational Therapy Not Seen Note    DATE: 2023    NAME: Fabby Alves  MRN: [id number]   : 1990      Patient not seen this date for Occupational Therapy due to:    Hemodialysis:     Next Scheduled Treatment: 2023    Electronically signed by GROVER Deutsch/CASIE on 2023 at 2:07 PM

## 2023-11-28 PROBLEM — L97.514 ULCER OF TOE OF RIGHT FOOT, WITH NECROSIS OF BONE (HCC): Status: ACTIVE | Noted: 2023-11-28

## 2023-11-28 PROBLEM — L97.509 TYPE 1 DIABETES MELLITUS WITH FOOT ULCER (HCC): Status: ACTIVE | Noted: 2023-11-28

## 2023-11-28 PROBLEM — E10.621 TYPE 1 DIABETES MELLITUS WITH FOOT ULCER (HCC): Status: ACTIVE | Noted: 2023-11-28

## 2023-11-28 PROBLEM — K85.90 ACUTE PANCREATITIS: Status: RESOLVED | Noted: 2022-10-19 | Resolved: 2023-11-28

## 2023-11-28 LAB
ANION GAP SERPL CALCULATED.3IONS-SCNC: 20 MMOL/L (ref 9–17)
BASOPHILS # BLD: 0.07 K/UL (ref 0–0.2)
BASOPHILS NFR BLD: 1 % (ref 0–2)
BUN SERPL-MCNC: 35 MG/DL (ref 6–20)
CALCIUM SERPL-MCNC: 9.2 MG/DL (ref 8.6–10.4)
CHLORIDE SERPL-SCNC: 91 MMOL/L (ref 98–107)
CK SERPL-CCNC: 47 U/L (ref 26–192)
CO2 SERPL-SCNC: 26 MMOL/L (ref 20–31)
CREAT SERPL-MCNC: 6.4 MG/DL (ref 0.5–0.9)
CRP SERPL HS-MCNC: 51.6 MG/L (ref 0–5)
EOSINOPHIL # BLD: 0.19 K/UL (ref 0–0.44)
EOSINOPHILS RELATIVE PERCENT: 1 % (ref 1–4)
ERYTHROCYTE [DISTWIDTH] IN BLOOD BY AUTOMATED COUNT: 13 % (ref 11.8–14.4)
ERYTHROCYTE [SEDIMENTATION RATE] IN BLOOD BY PHOTOMETRIC METHOD: 96 MM/HR (ref 0–20)
EST. AVERAGE GLUCOSE BLD GHB EST-MCNC: 372 MG/DL
GFR SERPL CREATININE-BSD FRML MDRD: 8 ML/MIN/1.73M2
GLUCOSE BLD-MCNC: 138 MG/DL (ref 65–105)
GLUCOSE BLD-MCNC: 203 MG/DL (ref 65–105)
GLUCOSE BLD-MCNC: 208 MG/DL (ref 65–105)
GLUCOSE BLD-MCNC: 219 MG/DL (ref 65–105)
GLUCOSE SERPL-MCNC: 106 MG/DL (ref 70–99)
HBA1C MFR BLD: 14.6 % (ref 4–6)
HBV SURFACE AB SERPL IA-ACNC: 188.6 MIU/ML
HCT VFR BLD AUTO: 38.7 % (ref 36.3–47.1)
HGB BLD-MCNC: 11.9 G/DL (ref 11.9–15.1)
IMM GRANULOCYTES # BLD AUTO: 0.06 K/UL (ref 0–0.3)
IMM GRANULOCYTES NFR BLD: 0 %
LYMPHOCYTES NFR BLD: 2.62 K/UL (ref 1.1–3.7)
LYMPHOCYTES RELATIVE PERCENT: 19 % (ref 24–43)
MCH RBC QN AUTO: 28.7 PG (ref 25.2–33.5)
MCHC RBC AUTO-ENTMCNC: 30.7 G/DL (ref 28.4–34.8)
MCV RBC AUTO: 93.3 FL (ref 82.6–102.9)
MONOCYTES NFR BLD: 1.15 K/UL (ref 0.1–1.2)
MONOCYTES NFR BLD: 9 % (ref 3–12)
NEUTROPHILS NFR BLD: 70 % (ref 36–65)
NEUTS SEG NFR BLD: 9.4 K/UL (ref 1.5–8.1)
NRBC BLD-RTO: 0 PER 100 WBC
PLATELET # BLD AUTO: 388 K/UL (ref 138–453)
PMV BLD AUTO: 9.9 FL (ref 8.1–13.5)
POTASSIUM SERPL-SCNC: 4.6 MMOL/L (ref 3.7–5.3)
RBC # BLD AUTO: 4.15 M/UL (ref 3.95–5.11)
SODIUM SERPL-SCNC: 137 MMOL/L (ref 135–144)
WBC OTHER # BLD: 13.5 K/UL (ref 3.5–11.3)

## 2023-11-28 PROCEDURE — 2060000000 HC ICU INTERMEDIATE R&B

## 2023-11-28 PROCEDURE — 97535 SELF CARE MNGMENT TRAINING: CPT

## 2023-11-28 PROCEDURE — 99232 SBSQ HOSP IP/OBS MODERATE 35: CPT | Performed by: INTERNAL MEDICINE

## 2023-11-28 PROCEDURE — 86317 IMMUNOASSAY INFECTIOUS AGENT: CPT

## 2023-11-28 PROCEDURE — 80048 BASIC METABOLIC PNL TOTAL CA: CPT

## 2023-11-28 PROCEDURE — 6370000000 HC RX 637 (ALT 250 FOR IP): Performed by: NURSE PRACTITIONER

## 2023-11-28 PROCEDURE — 86140 C-REACTIVE PROTEIN: CPT

## 2023-11-28 PROCEDURE — 6370000000 HC RX 637 (ALT 250 FOR IP): Performed by: FAMILY MEDICINE

## 2023-11-28 PROCEDURE — 85652 RBC SED RATE AUTOMATED: CPT

## 2023-11-28 PROCEDURE — 82947 ASSAY GLUCOSE BLOOD QUANT: CPT

## 2023-11-28 PROCEDURE — 6370000000 HC RX 637 (ALT 250 FOR IP): Performed by: HOSPITALIST

## 2023-11-28 PROCEDURE — 6360000002 HC RX W HCPCS: Performed by: NURSE PRACTITIONER

## 2023-11-28 PROCEDURE — 6370000000 HC RX 637 (ALT 250 FOR IP): Performed by: INTERNAL MEDICINE

## 2023-11-28 PROCEDURE — 99232 SBSQ HOSP IP/OBS MODERATE 35: CPT | Performed by: FAMILY MEDICINE

## 2023-11-28 PROCEDURE — 87350 HEPATITIS BE AG IA: CPT

## 2023-11-28 PROCEDURE — 2580000003 HC RX 258: Performed by: HOSPITALIST

## 2023-11-28 PROCEDURE — 97530 THERAPEUTIC ACTIVITIES: CPT

## 2023-11-28 PROCEDURE — 85025 COMPLETE CBC W/AUTO DIFF WBC: CPT

## 2023-11-28 PROCEDURE — 82550 ASSAY OF CK (CPK): CPT

## 2023-11-28 PROCEDURE — 6360000002 HC RX W HCPCS: Performed by: HOSPITALIST

## 2023-11-28 PROCEDURE — 2580000003 HC RX 258: Performed by: NURSE PRACTITIONER

## 2023-11-28 PROCEDURE — 97167 OT EVAL HIGH COMPLEX 60 MIN: CPT

## 2023-11-28 PROCEDURE — 36415 COLL VENOUS BLD VENIPUNCTURE: CPT

## 2023-11-28 RX ORDER — CEPHALEXIN 500 MG/1
500 CAPSULE ORAL 2 TIMES DAILY
Qty: 6 CAPSULE | Refills: 0 | Status: SHIPPED | OUTPATIENT
Start: 2023-11-28 | End: 2023-12-01

## 2023-11-28 RX ORDER — ATORVASTATIN CALCIUM 40 MG/1
40 TABLET, FILM COATED ORAL NIGHTLY
Status: DISCONTINUED | OUTPATIENT
Start: 2023-11-28 | End: 2023-11-30 | Stop reason: HOSPADM

## 2023-11-28 RX ORDER — ATORVASTATIN CALCIUM 40 MG/1
40 TABLET, FILM COATED ORAL NIGHTLY
Qty: 30 TABLET | Refills: 3 | Status: SHIPPED | OUTPATIENT
Start: 2023-11-28

## 2023-11-28 RX ORDER — CARVEDILOL 6.25 MG/1
6.25 TABLET ORAL 2 TIMES DAILY
Status: DISCONTINUED | OUTPATIENT
Start: 2023-11-28 | End: 2023-11-30 | Stop reason: HOSPADM

## 2023-11-28 RX ORDER — CARVEDILOL 6.25 MG/1
6.25 TABLET ORAL 2 TIMES DAILY
Qty: 60 TABLET | Refills: 3 | Status: SHIPPED | OUTPATIENT
Start: 2023-11-28

## 2023-11-28 RX ADMIN — LISINOPRIL 5 MG: 5 TABLET ORAL at 09:03

## 2023-11-28 RX ADMIN — BUMETANIDE 4 MG: 1 TABLET ORAL at 20:14

## 2023-11-28 RX ADMIN — PREDNISOLONE ACETATE 1 DROP: 10 SUSPENSION/ DROPS OPHTHALMIC at 06:11

## 2023-11-28 RX ADMIN — INSULIN LISPRO 5 UNITS: 100 INJECTION, SOLUTION INTRAVENOUS; SUBCUTANEOUS at 08:20

## 2023-11-28 RX ADMIN — CLOPIDOGREL BISULFATE 75 MG: 75 TABLET, FILM COATED ORAL at 08:27

## 2023-11-28 RX ADMIN — MIDODRINE HYDROCHLORIDE 5 MG: 5 TABLET ORAL at 17:37

## 2023-11-28 RX ADMIN — MIDODRINE HYDROCHLORIDE 5 MG: 5 TABLET ORAL at 12:08

## 2023-11-28 RX ADMIN — CLONIDINE HYDROCHLORIDE 0.2 MG: 0.2 TABLET ORAL at 20:14

## 2023-11-28 RX ADMIN — SODIUM CHLORIDE, PRESERVATIVE FREE 10 ML: 5 INJECTION INTRAVENOUS at 08:28

## 2023-11-28 RX ADMIN — PREDNISOLONE ACETATE 1 DROP: 10 SUSPENSION/ DROPS OPHTHALMIC at 08:29

## 2023-11-28 RX ADMIN — MIDODRINE HYDROCHLORIDE 5 MG: 5 TABLET ORAL at 09:06

## 2023-11-28 RX ADMIN — PREDNISOLONE ACETATE 1 DROP: 10 SUSPENSION/ DROPS OPHTHALMIC at 20:17

## 2023-11-28 RX ADMIN — PREDNISOLONE ACETATE 1 DROP: 10 SUSPENSION/ DROPS OPHTHALMIC at 17:39

## 2023-11-28 RX ADMIN — HEPARIN SODIUM 5000 UNITS: 5000 INJECTION INTRAVENOUS; SUBCUTANEOUS at 20:13

## 2023-11-28 RX ADMIN — INSULIN LISPRO 5 UNITS: 100 INJECTION, SOLUTION INTRAVENOUS; SUBCUTANEOUS at 12:08

## 2023-11-28 RX ADMIN — SODIUM CHLORIDE, PRESERVATIVE FREE 10 ML: 5 INJECTION INTRAVENOUS at 20:16

## 2023-11-28 RX ADMIN — METOCLOPRAMIDE 5 MG: 5 TABLET ORAL at 13:49

## 2023-11-28 RX ADMIN — METOCLOPRAMIDE 5 MG: 5 TABLET ORAL at 08:28

## 2023-11-28 RX ADMIN — METOCLOPRAMIDE 5 MG: 5 TABLET ORAL at 20:15

## 2023-11-28 RX ADMIN — INSULIN GLARGINE 10 UNITS: 100 INJECTION, SOLUTION SUBCUTANEOUS at 08:20

## 2023-11-28 RX ADMIN — PREDNISOLONE ACETATE 1 DROP: 10 SUSPENSION/ DROPS OPHTHALMIC at 13:49

## 2023-11-28 RX ADMIN — CLONIDINE HYDROCHLORIDE 0.2 MG: 0.2 TABLET ORAL at 09:03

## 2023-11-28 RX ADMIN — Medication 81 MG: at 08:28

## 2023-11-28 RX ADMIN — DESMOPRESSIN ACETATE 40 MG: 0.2 TABLET ORAL at 20:14

## 2023-11-28 RX ADMIN — HEPARIN SODIUM 5000 UNITS: 5000 INJECTION INTRAVENOUS; SUBCUTANEOUS at 06:11

## 2023-11-28 RX ADMIN — INSULIN LISPRO 5 UNITS: 100 INJECTION, SOLUTION INTRAVENOUS; SUBCUTANEOUS at 17:37

## 2023-11-28 RX ADMIN — INSULIN GLARGINE 10 UNITS: 100 INJECTION, SOLUTION SUBCUTANEOUS at 20:13

## 2023-11-28 RX ADMIN — CARVEDILOL 6.25 MG: 25 TABLET, FILM COATED ORAL at 20:15

## 2023-11-28 RX ADMIN — METOCLOPRAMIDE 5 MG: 5 TABLET ORAL at 17:37

## 2023-11-28 RX ADMIN — MIRTAZAPINE 7.5 MG: 15 TABLET, ORALLY DISINTEGRATING ORAL at 20:15

## 2023-11-28 RX ADMIN — CARVEDILOL 6.25 MG: 25 TABLET, FILM COATED ORAL at 09:04

## 2023-11-28 RX ADMIN — BUMETANIDE 4 MG: 1 TABLET ORAL at 08:28

## 2023-11-28 RX ADMIN — Medication 1000 MG: at 15:41

## 2023-11-28 RX ADMIN — DOCUSATE SODIUM 50 MG AND SENNOSIDES 8.6 MG 2 TABLET: 8.6; 5 TABLET, FILM COATED ORAL at 20:14

## 2023-11-28 RX ADMIN — CLONIDINE HYDROCHLORIDE 0.2 MG: 0.2 TABLET ORAL at 13:48

## 2023-11-28 RX ADMIN — INSULIN LISPRO 2 UNITS: 100 INJECTION, SOLUTION INTRAVENOUS; SUBCUTANEOUS at 17:37

## 2023-11-28 RX ADMIN — HEPARIN SODIUM 5000 UNITS: 5000 INJECTION INTRAVENOUS; SUBCUTANEOUS at 13:49

## 2023-11-28 RX ADMIN — INSULIN LISPRO 2 UNITS: 100 INJECTION, SOLUTION INTRAVENOUS; SUBCUTANEOUS at 12:09

## 2023-11-28 ASSESSMENT — ENCOUNTER SYMPTOMS
DIARRHEA: 0
WHEEZING: 0
VOMITING: 0
ABDOMINAL PAIN: 0
CHEST TIGHTNESS: 0
CONSTIPATION: 0
BLOOD IN STOOL: 0
NAUSEA: 0
SHORTNESS OF BREATH: 0
RHINORRHEA: 0
COUGH: 0

## 2023-11-28 NOTE — PROGRESS NOTES
Occupational Therapy  Facility/Department: [facility]A STEPDOWN  Occupational Therapy Initial Assessment    Name: Damien Lambert  : 1990  MRN: [id number]  Date of Service: 2023    Discharge Recommendations:  Patient would benefit from continued therapy after discharge  OT Equipment Recommendations  Equipment Needed: Yes  Mobility Devices: ADL Assistive Devices  ADL Assistive Devices: Hand-held Shower     Copied from Emergency Medicine: Damien Lambert is a 35 y.o. female who presents with patient presents with chest pain she called out to EMS doing substernal chest pain that awoken her from sleep she is not having diaphoresis denies back pain endorses epigastric abdominal pain also has nausea denies radiation of the chest pain to her arm or her jaw or her neck. She states that she has had this similar chest pain in the past she has hypertension her systolic blood pressure on scene was 210/195. She did have improvement with this to 185 with sublingual nitroglycerin this did bring her chest pain from 10 out of 10 to down to 3 out of 10. She was hyperglycemic to 524 by EMS. History of previous stroke she is a dialysis patient reports to have missed a episode of dialysis this week given the Thanksgiving holiday     Reported surgical abdominal history of ovarian removal having a midline abdominal scar     She endorses emesis denies having bloody emesis    Patient Diagnosis(es): The primary encounter diagnosis was Chest pain due to myocardial ischemia, unspecified ischemic chest pain type. Diagnoses of Hypervolemia, unspecified hypervolemia type and Acute pancreatitis, unspecified complication status, unspecified pancreatitis type were also pertinent to this visit.   Past Medical History:  has a past medical history of Asthma, Atherosclerosis of native arteries of extremities with rest pain, unspecified extremity ([address]), Cerebral artery occlusion with cerebral infarction ([address]), Independent  Active : No  Patient's  Info: family  Mode of Transportation: Car, Cab  Occupation: On disability  Type of Occupation: [address]: Spend time with son playing games, plays games on phone, watch tv and movies  Additional Comments: Pt has hemodialysis 3 x week M W F. Pts son in school, goes to cousins home while pt is in hospital and BF either at work 2nd shift or sleeping. Pt also has 3 supportive brothers. Objective   SpO2: 99 %  O2 Device: None (Room air)       Safety Devices  Type of Devices: Nurse notified;Gait belt;Call light within reach; Left in bed;Bed alarm in place  Restraints  Restraints Initially in Place: No  Gait  Overall Level of Assistance:  (pt unable to take any steps with r walker)  Assistive Device: Walker, rolling  Interventions: Safety awareness training     AROM: Within functional limits  PROM: Within functional limits  Strength: Generally decreased, functional (BUE 3+/5 overall muscle strength.)  Coordination: Generally decreased, functional (pt has increased difficulty opening containers, cutting chicken breast, unwrapping chocolate chip cookie.)  Tone: Normal  Sensation: Intact (pt denies numbness/tingling B hands)  ADL  Feeding: Increased time to complete;Setup;Verbal cueing;Minimal assistance  Grooming: Minimal assistance;Setup; Increased time to complete (min a for opening containers. pt washed and dried face, utilized hand  for B hands. pt with very slow, laborious movements)  UE Bathing: Minimal assistance;Setup; Increased time to complete (pt with slow, labored movements, increased lethargy this date with pt aware of lethargy and asking RN why pt is so sleepy.)  LE Bathing: Maximum assistance;Setup;Verbal cueing; Increased time to complete (decreased balance with functional reach for below B knees.  Pt with decreased activity tolerance and strength)  UE Dressing: Increased time to complete;Minimal assistance;Setup;Verbal cueing

## 2023-11-28 NOTE — PROGRESS NOTES
Physician Progress Note      Rachael Ge  CSN #:                  [id number]  :                       1990  ADMIT DATE:       2023 4:28 AM  DISCH DATE:  RESPONDING  PROVIDER #:        Gabriela Doan MD          QUERY TEXT:    Pt admitted with ESRD, UTI and hypertensive emergency. Pt noted to have   positive indicators of sepsis. If possible, please document in the progress   notes and discharge summary if you are evaluating and /or treating any of the   following: The medical record reflects the following:  Risk Factors: DM type I, ERSD on dialysis  Clinical Indicators: MRSA-nasal-positive, CBC 15.3 CRP 51.9 procalcitonin 0.18   sed rate 81 UA positive. Max temp 99.7 oral  Treatment: IV Rocephin for UTI Bumex 4 mg twice daily, Cx-Resp, Urine and   Blood-no growth, CXR, Daily CBC, BMP    Thank you Shan Sebastian RN BSN  Email [email or web address]  Cell [phone]  Office Hours M-F 6am - 2:30pm  Options provided:  -- Sepsis, present on admission  -- Sepsis, present on admission, now resolved  -- UTI without Sepsis  -- Sepsis was ruled out  -- Other - I will add my own diagnosis  -- Disagree - Not applicable / Not valid  -- Disagree - Clinically unable to determine / Unknown  -- Refer to Clinical Documentation Reviewer    PROVIDER RESPONSE TEXT:    This patient has a UTI without Sepsis. Query created by: Rivka Covington on 2023 12:25 PM      Electronically signed by:   Gabriela Doan MD 2023 3:25 PM

## 2023-11-28 NOTE — PROGRESS NOTES
impaired. Memory is not impaired. She does not exhibit impaired recent memory or impaired remote memory. Laboratory findings:    Recent Labs     11/25/23  0958 11/25/23  1514 11/26/23  0710 11/27/23 0319 11/28/23 0453   WBC  --    < > 12.8* 11.8* 13.5*   HGB  --    < > 11.1* 11.0* 11.9   HCT  --    < > 36.0* 35.2* 38.7   PLT  --    < > 283 315 388   SEDRATE  --   --   --  81*  --    INR 1.1  --   --   --   --     < > = values in this interval not displayed. Recent Labs     11/25/23  1514 11/26/23 0710 11/27/23 0319 11/28/23 0453   NA  --  132* 133* 137   K  --  4.4 5.0 4.6   CL  --  91* 93* 91*   CO2  --  26 26 26   GLUCOSE  --  184* 188* 106*   BUN  --  45* 58* 35*   CREATININE  --  7.6* 9.0* 6.4*   MG 1.9  --   --   --    CALCIUM  --  9.0 9.3 9.2   PHOS 3.2  --   --   --        Recent Labs     11/25/23 0958 11/25/23 1514   TSH  --  0.83   LIPASE 31  --    CKTOTAL  --  153            Specific Gravity, UA   Date Value Ref Range Status   11/25/2023 1.025 1.005 - 1.030 Final     Protein, UA   Date Value Ref Range Status   11/25/2023 3+ (A) NEGATIVE mg/dL Final     RBC, UA   Date Value Ref Range Status   11/25/2023 20 TO 50 0 - 2 /HPF Final     Bacteria, UA   Date Value Ref Range Status   11/25/2023 MODERATE (A) None Final     Nitrite, Urine   Date Value Ref Range Status   11/25/2023 NEGATIVE NEGATIVE Final     WBC, UA   Date Value Ref Range Status   11/25/2023 50  0 - 5 /HPF Final     Leukocyte Esterase, Urine   Date Value Ref Range Status   11/25/2023 MODERATE (A) NEGATIVE Final       Imaging / Clinical Data :-   XR FOOT RIGHT (2 VIEWS)   Final Result   Suspected ulceration in the right great toe with no findings suggest   necrotizing fasciitis. However, there could be osteomyelitis involving the   base of the right 1st distal phalanx. XR CHEST (2 VW)   Final Result   Normal examination.             MRI brain 10/3/2022 at [facility]:     Multifocal areas of

## 2023-11-28 NOTE — CARE COORDINATION
Hemodialysis Initial Assessment    Information provided by: Patient    New or Current with HD: Current, ESRD    Unit:  [facility])    Schedule:  [facility]    Physician:  Sisi Aragon    Transportation:  cab; uses wheelchair     Insurance: Rachelle     DME: Wheelchair    -Met with patient to confirm HD information. Denies concerns related to HD at this time. Patient slow to respond but answered questions appropriately. Plans to return home.

## 2023-11-28 NOTE — CARE COORDINATION
Met with patient to discuss transitional planning. Plan is SNF. Freedom of choice provided. Referrals sent to [facility] and [facility]. Patient agreeable to plan. 1430: spoke with CRISTHIAN from [facility]. Patient has been accepted and they can dialyze her on site. They will need hep B antigen and antibody for hemodialysis approval. Perfect serve message to dr Julia Skelton to request labs for HD approval at [facility].

## 2023-11-28 NOTE — PROGRESS NOTES
CLINICAL PHARMACY NOTE: MEDS TO BEDS    Total # of Prescriptions Filled: 2   The following medications were delivered to the patient:  Carvedilol  cephalexin    Additional Documentation:   No copay

## 2023-11-29 LAB
ANION GAP SERPL CALCULATED.3IONS-SCNC: 17 MMOL/L (ref 9–17)
BUN SERPL-MCNC: 50 MG/DL (ref 6–20)
CALCIUM SERPL-MCNC: 9.4 MG/DL (ref 8.6–10.4)
CHLORIDE SERPL-SCNC: 86 MMOL/L (ref 98–107)
CO2 SERPL-SCNC: 26 MMOL/L (ref 20–31)
CREAT SERPL-MCNC: 8 MG/DL (ref 0.5–0.9)
CRP SERPL HS-MCNC: 63 MG/L (ref 0–5)
ERYTHROCYTE [DISTWIDTH] IN BLOOD BY AUTOMATED COUNT: 12.9 % (ref 11.8–14.4)
ERYTHROCYTE [SEDIMENTATION RATE] IN BLOOD BY PHOTOMETRIC METHOD: 97 MM/HR (ref 0–20)
GFR SERPL CREATININE-BSD FRML MDRD: 6 ML/MIN/1.73M2
GLUCOSE BLD-MCNC: 249 MG/DL (ref 65–105)
GLUCOSE BLD-MCNC: 282 MG/DL (ref 65–105)
GLUCOSE BLD-MCNC: 314 MG/DL (ref 65–105)
GLUCOSE BLD-MCNC: 343 MG/DL (ref 65–105)
GLUCOSE BLD-MCNC: 351 MG/DL (ref 65–105)
GLUCOSE SERPL-MCNC: 413 MG/DL (ref 70–99)
HBV E AG SERPL QL IA: NEGATIVE
HCT VFR BLD AUTO: 35 % (ref 36.3–47.1)
HGB BLD-MCNC: 11.1 G/DL (ref 11.9–15.1)
MCH RBC QN AUTO: 28.5 PG (ref 25.2–33.5)
MCHC RBC AUTO-ENTMCNC: 31.7 G/DL (ref 28.4–34.8)
MCV RBC AUTO: 89.7 FL (ref 82.6–102.9)
NRBC BLD-RTO: 0 PER 100 WBC
PLATELET # BLD AUTO: 428 K/UL (ref 138–453)
PMV BLD AUTO: 10 FL (ref 8.1–13.5)
POTASSIUM SERPL-SCNC: 4.8 MMOL/L (ref 3.7–5.3)
RBC # BLD AUTO: 3.9 M/UL (ref 3.95–5.11)
SODIUM SERPL-SCNC: 129 MMOL/L (ref 135–144)
WBC OTHER # BLD: 13.1 K/UL (ref 3.5–11.3)

## 2023-11-29 PROCEDURE — 6360000002 HC RX W HCPCS: Performed by: NURSE PRACTITIONER

## 2023-11-29 PROCEDURE — 80048 BASIC METABOLIC PNL TOTAL CA: CPT

## 2023-11-29 PROCEDURE — 86140 C-REACTIVE PROTEIN: CPT

## 2023-11-29 PROCEDURE — 2580000003 HC RX 258: Performed by: HOSPITALIST

## 2023-11-29 PROCEDURE — 6370000000 HC RX 637 (ALT 250 FOR IP): Performed by: FAMILY MEDICINE

## 2023-11-29 PROCEDURE — 6360000002 HC RX W HCPCS: Performed by: HOSPITALIST

## 2023-11-29 PROCEDURE — 6370000000 HC RX 637 (ALT 250 FOR IP): Performed by: HOSPITALIST

## 2023-11-29 PROCEDURE — 90935 HEMODIALYSIS ONE EVALUATION: CPT

## 2023-11-29 PROCEDURE — 85027 COMPLETE CBC AUTOMATED: CPT

## 2023-11-29 PROCEDURE — 90935 HEMODIALYSIS ONE EVALUATION: CPT | Performed by: INTERNAL MEDICINE

## 2023-11-29 PROCEDURE — 6370000000 HC RX 637 (ALT 250 FOR IP): Performed by: NURSE PRACTITIONER

## 2023-11-29 PROCEDURE — 90792 PSYCH DIAG EVAL W/MED SRVCS: CPT | Performed by: PSYCHIATRY & NEUROLOGY

## 2023-11-29 PROCEDURE — 99232 SBSQ HOSP IP/OBS MODERATE 35: CPT | Performed by: FAMILY MEDICINE

## 2023-11-29 PROCEDURE — 2580000003 HC RX 258: Performed by: NURSE PRACTITIONER

## 2023-11-29 PROCEDURE — 82947 ASSAY GLUCOSE BLOOD QUANT: CPT

## 2023-11-29 PROCEDURE — 6370000000 HC RX 637 (ALT 250 FOR IP): Performed by: INTERNAL MEDICINE

## 2023-11-29 PROCEDURE — 85652 RBC SED RATE AUTOMATED: CPT

## 2023-11-29 PROCEDURE — 2060000000 HC ICU INTERMEDIATE R&B

## 2023-11-29 PROCEDURE — 36415 COLL VENOUS BLD VENIPUNCTURE: CPT

## 2023-11-29 PROCEDURE — 2500000003 HC RX 250 WO HCPCS: Performed by: INTERNAL MEDICINE

## 2023-11-29 RX ORDER — INSULIN LISPRO 100 [IU]/ML
0-8 INJECTION, SOLUTION INTRAVENOUS; SUBCUTANEOUS
Status: DISCONTINUED | OUTPATIENT
Start: 2023-11-29 | End: 2023-11-30 | Stop reason: HOSPADM

## 2023-11-29 RX ORDER — INSULIN GLARGINE 100 [IU]/ML
12 INJECTION, SOLUTION SUBCUTANEOUS 2 TIMES DAILY
Status: DISCONTINUED | OUTPATIENT
Start: 2023-11-29 | End: 2023-11-30

## 2023-11-29 RX ORDER — LABETALOL HYDROCHLORIDE 5 MG/ML
20 INJECTION, SOLUTION INTRAVENOUS ONCE
Status: COMPLETED | OUTPATIENT
Start: 2023-11-30 | End: 2023-11-29

## 2023-11-29 RX ORDER — INSULIN LISPRO 100 [IU]/ML
0-4 INJECTION, SOLUTION INTRAVENOUS; SUBCUTANEOUS NIGHTLY
Status: DISCONTINUED | OUTPATIENT
Start: 2023-11-29 | End: 2023-11-30 | Stop reason: HOSPADM

## 2023-11-29 RX ADMIN — Medication 20 MG: at 23:54

## 2023-11-29 RX ADMIN — BUMETANIDE 4 MG: 1 TABLET ORAL at 08:12

## 2023-11-29 RX ADMIN — SODIUM CHLORIDE, PRESERVATIVE FREE 10 ML: 5 INJECTION INTRAVENOUS at 20:25

## 2023-11-29 RX ADMIN — PREDNISOLONE ACETATE 1 DROP: 10 SUSPENSION/ DROPS OPHTHALMIC at 08:14

## 2023-11-29 RX ADMIN — INSULIN LISPRO 5 UNITS: 100 INJECTION, SOLUTION INTRAVENOUS; SUBCUTANEOUS at 13:58

## 2023-11-29 RX ADMIN — CLOPIDOGREL BISULFATE 75 MG: 75 TABLET, FILM COATED ORAL at 08:12

## 2023-11-29 RX ADMIN — CLONIDINE HYDROCHLORIDE 0.2 MG: 0.2 TABLET ORAL at 20:21

## 2023-11-29 RX ADMIN — PREDNISOLONE ACETATE 1 DROP: 10 SUSPENSION/ DROPS OPHTHALMIC at 20:23

## 2023-11-29 RX ADMIN — MIDODRINE HYDROCHLORIDE 5 MG: 5 TABLET ORAL at 17:00

## 2023-11-29 RX ADMIN — INSULIN LISPRO 4 UNITS: 100 INJECTION, SOLUTION INTRAVENOUS; SUBCUTANEOUS at 13:58

## 2023-11-29 RX ADMIN — METOCLOPRAMIDE 5 MG: 5 TABLET ORAL at 17:01

## 2023-11-29 RX ADMIN — LISINOPRIL 5 MG: 5 TABLET ORAL at 08:11

## 2023-11-29 RX ADMIN — Medication 81 MG: at 08:11

## 2023-11-29 RX ADMIN — CARVEDILOL 6.25 MG: 25 TABLET, FILM COATED ORAL at 20:21

## 2023-11-29 RX ADMIN — DOCUSATE SODIUM 50 MG AND SENNOSIDES 8.6 MG 2 TABLET: 8.6; 5 TABLET, FILM COATED ORAL at 20:21

## 2023-11-29 RX ADMIN — ACETAMINOPHEN 650 MG: 325 TABLET ORAL at 18:16

## 2023-11-29 RX ADMIN — DESMOPRESSIN ACETATE 40 MG: 0.2 TABLET ORAL at 20:21

## 2023-11-29 RX ADMIN — CLONIDINE HYDROCHLORIDE 0.2 MG: 0.2 TABLET ORAL at 08:12

## 2023-11-29 RX ADMIN — INSULIN LISPRO 6 UNITS: 100 INJECTION, SOLUTION INTRAVENOUS; SUBCUTANEOUS at 17:01

## 2023-11-29 RX ADMIN — Medication 1000 MG: at 15:18

## 2023-11-29 RX ADMIN — INSULIN GLARGINE 12 UNITS: 100 INJECTION, SOLUTION SUBCUTANEOUS at 20:23

## 2023-11-29 RX ADMIN — HEPARIN SODIUM 5000 UNITS: 5000 INJECTION INTRAVENOUS; SUBCUTANEOUS at 05:28

## 2023-11-29 RX ADMIN — METOCLOPRAMIDE 5 MG: 5 TABLET ORAL at 08:12

## 2023-11-29 RX ADMIN — HEPARIN SODIUM 5000 UNITS: 5000 INJECTION INTRAVENOUS; SUBCUTANEOUS at 13:57

## 2023-11-29 RX ADMIN — MIDODRINE HYDROCHLORIDE 5 MG: 5 TABLET ORAL at 12:58

## 2023-11-29 RX ADMIN — PREDNISOLONE ACETATE 1 DROP: 10 SUSPENSION/ DROPS OPHTHALMIC at 14:00

## 2023-11-29 RX ADMIN — INSULIN LISPRO 4 UNITS: 100 INJECTION, SOLUTION INTRAVENOUS; SUBCUTANEOUS at 20:23

## 2023-11-29 RX ADMIN — INSULIN LISPRO 5 UNITS: 100 INJECTION, SOLUTION INTRAVENOUS; SUBCUTANEOUS at 17:00

## 2023-11-29 RX ADMIN — INSULIN GLARGINE 10 UNITS: 100 INJECTION, SOLUTION SUBCUTANEOUS at 08:10

## 2023-11-29 RX ADMIN — HEPARIN SODIUM 5000 UNITS: 5000 INJECTION INTRAVENOUS; SUBCUTANEOUS at 20:22

## 2023-11-29 RX ADMIN — CLONIDINE HYDROCHLORIDE 0.2 MG: 0.2 TABLET ORAL at 13:56

## 2023-11-29 RX ADMIN — MIDODRINE HYDROCHLORIDE 30 MG: 5 TABLET ORAL at 20:21

## 2023-11-29 RX ADMIN — SODIUM CHLORIDE, PRESERVATIVE FREE 10 ML: 5 INJECTION INTRAVENOUS at 08:15

## 2023-11-29 RX ADMIN — PREDNISOLONE ACETATE 1 DROP: 10 SUSPENSION/ DROPS OPHTHALMIC at 17:00

## 2023-11-29 RX ADMIN — CARVEDILOL 6.25 MG: 25 TABLET, FILM COATED ORAL at 08:13

## 2023-11-29 RX ADMIN — MIRTAZAPINE 7.5 MG: 15 TABLET, ORALLY DISINTEGRATING ORAL at 21:09

## 2023-11-29 RX ADMIN — METOCLOPRAMIDE 5 MG: 5 TABLET ORAL at 13:59

## 2023-11-29 RX ADMIN — INSULIN LISPRO 5 UNITS: 100 INJECTION, SOLUTION INTRAVENOUS; SUBCUTANEOUS at 08:08

## 2023-11-29 RX ADMIN — BUMETANIDE 4 MG: 1 TABLET ORAL at 20:21

## 2023-11-29 RX ADMIN — INSULIN LISPRO 8 UNITS: 100 INJECTION, SOLUTION INTRAVENOUS; SUBCUTANEOUS at 08:10

## 2023-11-29 RX ADMIN — METOCLOPRAMIDE 5 MG: 5 TABLET ORAL at 20:21

## 2023-11-29 RX ADMIN — MIDODRINE HYDROCHLORIDE 30 MG: 5 TABLET ORAL at 11:24

## 2023-11-29 ASSESSMENT — ENCOUNTER SYMPTOMS
CHEST TIGHTNESS: 0
RHINORRHEA: 0
BLOOD IN STOOL: 0
NAUSEA: 0
DIARRHEA: 0
WHEEZING: 0
VOMITING: 0
CONSTIPATION: 0
SHORTNESS OF BREATH: 0
ABDOMINAL PAIN: 0
COUGH: 0

## 2023-11-29 ASSESSMENT — PAIN SCALES - GENERAL: PAINLEVEL_OUTOF10: 0

## 2023-11-29 NOTE — PROGRESS NOTES
Dialysis Time Out  To be done by RN and tech or 2 RNs  Staff Names [facility] RN    [x]  Identity of the patient using 2 patient identifiers  [x]  Consent for treatment  [x]  Equipment-proper machine and dialyzer  [x]  B-Hep B status  [x]  Orders- to include bath, blood flow, dialyzer, time and fluid removal  [x]  Access-Correct site and in working order  [x]  Time for patient to ask questions.

## 2023-11-29 NOTE — CARE COORDINATION
Transitional planning    Writer placed call to [facility] , spoke with Rahel to confirm starting percert, she will return call.    0809 call back from [employer] she will start precert.

## 2023-11-29 NOTE — PROGRESS NOTES
[facility]  Office: [address], DO, Dorys James, DO, Ammon Ramirez, DO, Lennox Prince Blood, DO, Heather Cano MD, Tamiko Do MD, Dottie Gregory MD, Zena Perez MD,  Jorgito Lamar MD, Mack Thomson MD, Giovanna Bernard MD,  Georgette Lua MD, Xochitl Rea MD, Malou Nash DO, Khalif Villanueva MD,  Isamar Guzman DO, Vince Morris MD, Adrianna Castro MD, Yasir Dimas MD, Carolann Power MD,  Laura Lainez MD, René Sapp MD, Diana Andino MD, Lavelle Swartz MD, Carlie Lamas MD, Loretta Chandra DO, Duc Ayala, DO, Ezio Garcia MD,  Mary Rogers MD, Yamilka Cast, CNP,  Neo Leach, CNP, Tiarra Cabello, CNP,  Carlos Aquino, DNP, Sunny Clifford, CNP, Hever Hora, CNP, Juana Posadas, CNP, Kimber Song, CNP, [facility], [facility], Tiffany Vieyra, [address], [place], [facility], Juju Humphreys, CNS, Mercer Fabry, CNP, Genetta Manus, [address]      Daily Progress Note     Admit Date: 11/24/2023  Bed/Room No.  8048/8218-78  Admitting Physician : Dottie Gregory MD  Code Status :Full Code  Hospital Day:  LOS: 5 days   Chief Complaint:     Chief Complaint   Patient presents with    Chest Pain    Nausea     Principal Problem:    Fluid overload  Active Problems:    Asthma    Hypertension, essential    Diabetic polyneuropathy associated with type 1 diabetes mellitus [facility])    Diabetic gastroparesis associated with type 1 diabetes mellitus ([address])    ESRD on dialysis [facility])    Troponin level elevated    Uncontrolled type 1 diabetes mellitus with hyperglycemia ([address])    Superficial gastritis without hemorrhage    Non-compliance with renal dialysis    Anemia of chronic disease    Ulcer of toe of right foot, with necrosis of bone ([address])    Type 1 diabetes mellitus with foot ulcer ([address])  Resolved Problems:    Acute pancreatitis    Subjective :         Interval History/Significant events :  11/29/23    Patient seen in Specific Gravity, UA   Date Value Ref Range Status   11/25/2023 1.025 1.005 - 1.030 Final     Protein, UA   Date Value Ref Range Status   11/25/2023 3+ (A) NEGATIVE mg/dL Final     RBC, UA   Date Value Ref Range Status   11/25/2023 20 TO 50 0 - 2 /HPF Final     Bacteria, UA   Date Value Ref Range Status   11/25/2023 MODERATE (A) None Final     Nitrite, Urine   Date Value Ref Range Status   11/25/2023 NEGATIVE NEGATIVE Final     WBC, UA   Date Value Ref Range Status   11/25/2023 50  0 - 5 /HPF Final     Leukocyte Esterase, Urine   Date Value Ref Range Status   11/25/2023 MODERATE (A) NEGATIVE Final       Imaging / Clinical Data :-   XR FOOT RIGHT (2 VIEWS)   Final Result   Suspected ulceration in the right great toe with no findings suggest   necrotizing fasciitis. However, there could be osteomyelitis involving the   base of the right 1st distal phalanx. XR CHEST (2 VW)   Final Result   Normal examination. MRI brain 10/3/2022 at [facility]:     Multifocal areas of restricted diffusion involving the bilateral subcortical white matter, right internal capsule, genu, body, and splenium of the corpus callosum, li, and left cerebral peduncle, findings favor to represent embolic phenomenon, contrast enhanced study may be helpful in excluding   other pathology. Subtle asymmetric FLAIR hyperintensity the left hippocampus, most likely postictal.     Transesophageal echocardiogram 10/5/2022 at [address] [facility] [address]  Negative for endocarditis or vegetation    Sigmoidoscopy 10/11/2022 at [address] [facility] [address] -1 x 30 mm and 115 mm ulcer in the rectum. Coagulated for hemostasis using heater probe. Recommend anticoagulation to be held for 24 hours. Clinical Course : stable  Assessment and Plan  :        ESRD on hemodialysis MWF schedule - underwent hemodialysis on 11/27/2023. Uncontrolled hypertension and hypertensive urgency on presentation.   Suspect noncompliance

## 2023-11-29 NOTE — CONSULTS
3 times daily  glucagon 1 MG injection, 1 mg as needed (for low blood sugar) Indications: Hypoglycemia  insulin detemir (LEVEMIR) 100 UNIT/ML injection pen, Inject 10 Units into the skin nightly  prednisoLONE acetate (PRED FORTE) 1 % ophthalmic suspension, Place 1 drop into the right eye Four times daily for 7 days, then 1 drop twice daily until gone  [DISCONTINUED] atorvastatin (LIPITOR) 20 MG tablet, Take 1 tablet by mouth daily  mirtazapine (REMERON SOL-TAB) 15 MG disintegrating tablet, Take 1 tablet by mouth nightly  acetaminophen (TYLENOL) 325 MG tablet, Take 2 tablets by mouth every 6 hours as needed for Pain or Fever (do not exceed 3 gm in 24 hours)  aspirin 81 MG EC tablet, Take 1 tablet by mouth daily  calcium acetate (PHOSLO) 667 MG CAPS capsule, Take 3 capsules by mouth 3 times daily (with meals)  Cholecalciferol 50 MCG (2000 UT) TABS, Take 2 tablets by mouth daily  clopidogrel (PLAVIX) 75 MG tablet, Take 1 tablet by mouth daily  polyethylene glycol (GLYCOLAX) 17 GM/SCOOP powder, Take 17 g by mouth daily as needed (constipation)  midodrine (PROAMATINE) 10 MG tablet, Take 3 tablets by mouth three times a week Indications: Mon, Wed, Fri at dialysis  sennosides-docusate sodium (SENOKOT-S) 8.6-50 MG tablet, Take 2 tablets by mouth at bedtime  B Complex-C-Folic Acid (TRIPHROCAPS) 1 MG CAPS, Take 1 capsule by mouth daily  [DISCONTINUED] albuterol sulfate HFA (PROVENTIL;VENTOLIN;PROAIR) 108 (90 Base) MCG/ACT inhaler, Inhale 2 puffs into the lungs every 6 hours as needed for Wheezing or Shortness of Breath (Patient not taking: Reported on 9/26/2023)  [DISCONTINUED] carvedilol (COREG) 25 MG tablet, Take 1 tablet by mouth 2 times daily  [DISCONTINUED] hydrALAZINE (APRESOLINE) 100 MG tablet, Take 1 tablet by mouth 3 times daily  [DISCONTINUED] NIFEdipine (ADALAT CC) 90 MG extended release tablet, Take 1 tablet by mouth daily    Allergies:  Morphine and Januvia [sitagliptin]    Lifetime Psychiatric Review of Systems tablets by mouth every 6 hours as needed for Pain or Fever (do not exceed 3 gm in 24 hours)    Moise Byrnes MD   aspirin 81 MG EC tablet Take 1 tablet by mouth daily    Moise Byrnes MD   calcium acetate (PHOSLO) 667 MG CAPS capsule Take 3 capsules by mouth 3 times daily (with meals)    Moise Byrnes MD   Cholecalciferol 50 MCG (2000 UT) TABS Take 2 tablets by mouth daily    Moise Byrnes MD   clopidogrel (PLAVIX) 75 MG tablet Take 1 tablet by mouth daily    Moise Byrnes MD   polyethylene glycol (GLYCOLAX) 17 GM/SCOOP powder Take 17 g by mouth daily as needed (constipation)    Moise Byrnes MD   midodrine (PROAMATINE) 10 MG tablet Take 3 tablets by mouth three times a week Indications: Mon, Wed, Fri at dialysis    Moise Byrnes MD   sennosides-docusate sodium (SENOKOT-S) 8.6-50 MG tablet Take 2 tablets by mouth at bedtime    Moise Byrnes MD   B Complex-C-Folic Acid (TRIPHROCAPS) 1 MG CAPS Take 1 capsule by mouth daily    Moise Byrnes MD        Medications:    Current Facility-Administered Medications: insulin glargine (LANTUS) injection vial 12 Units, 12 Units, SubCUTAneous, BID  insulin lispro (HUMALOG) injection vial 0-8 Units, 0-8 Units, SubCUTAneous, TID WC  insulin lispro (HUMALOG) injection vial 0-4 Units, 0-4 Units, SubCUTAneous, Nightly  carvedilol (COREG) tablet 6.25 mg, 6.25 mg, Oral, BID  atorvastatin (LIPITOR) tablet 40 mg, 40 mg, Oral, Nightly  midodrine (PROAMATINE) tablet 5 mg, 5 mg, Oral, TID WC  cefTRIAXone (ROCEPHIN) 1000 mg in sterile water 10 mL IV syringe, 1,000 mg, IntraVENous, Q24H  insulin lispro (HUMALOG) injection vial 5 Units, 5 Units, SubCUTAneous, TID WC  nitroGLYCERIN (NITROSTAT) SL tablet 0.4 mg, 0.4 mg, SubLINGual, Q5 Min PRN  sodium chloride flush 0.9 % injection 5-40 mL, 5-40 mL, IntraVENous, 2 times per day  sodium chloride flush 0.9 % injection 10 mL, 10 mL, IntraVENous, PRN  0.9 % sodium chloride

## 2023-11-30 ENCOUNTER — APPOINTMENT (OUTPATIENT)
Dept: CT IMAGING | Age: 33
End: 2023-11-30
Payer: COMMERCIAL

## 2023-11-30 VITALS
OXYGEN SATURATION: 98 % | DIASTOLIC BLOOD PRESSURE: 76 MMHG | HEART RATE: 76 BPM | WEIGHT: 162.04 LBS | TEMPERATURE: 97.7 F | SYSTOLIC BLOOD PRESSURE: 131 MMHG | RESPIRATION RATE: 20 BRPM | BODY MASS INDEX: 27.81 KG/M2

## 2023-11-30 LAB
GLUCOSE BLD-MCNC: 416 MG/DL (ref 65–105)
GLUCOSE BLD-MCNC: 443 MG/DL (ref 65–105)
GLUCOSE BLD-MCNC: 459 MG/DL (ref 65–105)
HBV SURFACE AG SERPL QL IA: NONREACTIVE

## 2023-11-30 PROCEDURE — 99232 SBSQ HOSP IP/OBS MODERATE 35: CPT | Performed by: INTERNAL MEDICINE

## 2023-11-30 PROCEDURE — 36415 COLL VENOUS BLD VENIPUNCTURE: CPT

## 2023-11-30 PROCEDURE — 6370000000 HC RX 637 (ALT 250 FOR IP): Performed by: NURSE PRACTITIONER

## 2023-11-30 PROCEDURE — 87340 HEPATITIS B SURFACE AG IA: CPT

## 2023-11-30 PROCEDURE — 6370000000 HC RX 637 (ALT 250 FOR IP): Performed by: HOSPITALIST

## 2023-11-30 PROCEDURE — 6360000002 HC RX W HCPCS: Performed by: HOSPITALIST

## 2023-11-30 PROCEDURE — 2580000003 HC RX 258: Performed by: HOSPITALIST

## 2023-11-30 PROCEDURE — 6360000002 HC RX W HCPCS: Performed by: NURSE PRACTITIONER

## 2023-11-30 PROCEDURE — 70450 CT HEAD/BRAIN W/O DYE: CPT

## 2023-11-30 PROCEDURE — 99232 SBSQ HOSP IP/OBS MODERATE 35: CPT | Performed by: FAMILY MEDICINE

## 2023-11-30 PROCEDURE — 6370000000 HC RX 637 (ALT 250 FOR IP): Performed by: FAMILY MEDICINE

## 2023-11-30 PROCEDURE — 82947 ASSAY GLUCOSE BLOOD QUANT: CPT

## 2023-11-30 PROCEDURE — 2580000003 HC RX 258: Performed by: NURSE PRACTITIONER

## 2023-11-30 PROCEDURE — 6370000000 HC RX 637 (ALT 250 FOR IP): Performed by: INTERNAL MEDICINE

## 2023-11-30 RX ORDER — INSULIN GLARGINE 100 [IU]/ML
14 INJECTION, SOLUTION SUBCUTANEOUS 2 TIMES DAILY
Status: DISCONTINUED | OUTPATIENT
Start: 2023-11-30 | End: 2023-11-30 | Stop reason: HOSPADM

## 2023-11-30 RX ADMIN — INSULIN LISPRO 5 UNITS: 100 INJECTION, SOLUTION INTRAVENOUS; SUBCUTANEOUS at 08:46

## 2023-11-30 RX ADMIN — INSULIN GLARGINE 14 UNITS: 100 INJECTION, SOLUTION SUBCUTANEOUS at 08:46

## 2023-11-30 RX ADMIN — INSULIN LISPRO 5 UNITS: 100 INJECTION, SOLUTION INTRAVENOUS; SUBCUTANEOUS at 11:57

## 2023-11-30 RX ADMIN — HEPARIN SODIUM 5000 UNITS: 5000 INJECTION INTRAVENOUS; SUBCUTANEOUS at 14:30

## 2023-11-30 RX ADMIN — CLONIDINE HYDROCHLORIDE 0.2 MG: 0.2 TABLET ORAL at 12:01

## 2023-11-30 RX ADMIN — ACETAMINOPHEN 650 MG: 325 TABLET ORAL at 08:47

## 2023-11-30 RX ADMIN — CLOPIDOGREL BISULFATE 75 MG: 75 TABLET, FILM COATED ORAL at 08:51

## 2023-11-30 RX ADMIN — INSULIN LISPRO 8 UNITS: 100 INJECTION, SOLUTION INTRAVENOUS; SUBCUTANEOUS at 16:46

## 2023-11-30 RX ADMIN — INSULIN LISPRO 8 UNITS: 100 INJECTION, SOLUTION INTRAVENOUS; SUBCUTANEOUS at 08:50

## 2023-11-30 RX ADMIN — INSULIN LISPRO 5 UNITS: 100 INJECTION, SOLUTION INTRAVENOUS; SUBCUTANEOUS at 16:46

## 2023-11-30 RX ADMIN — Medication 1000 MG: at 14:33

## 2023-11-30 RX ADMIN — PREDNISOLONE ACETATE 1 DROP: 10 SUSPENSION/ DROPS OPHTHALMIC at 08:48

## 2023-11-30 RX ADMIN — BUMETANIDE 4 MG: 1 TABLET ORAL at 08:48

## 2023-11-30 RX ADMIN — PREDNISOLONE ACETATE 1 DROP: 10 SUSPENSION/ DROPS OPHTHALMIC at 05:48

## 2023-11-30 RX ADMIN — INSULIN LISPRO 8 UNITS: 100 INJECTION, SOLUTION INTRAVENOUS; SUBCUTANEOUS at 11:58

## 2023-11-30 RX ADMIN — CLONIDINE HYDROCHLORIDE 0.2 MG: 0.2 TABLET ORAL at 08:48

## 2023-11-30 RX ADMIN — HEPARIN SODIUM 5000 UNITS: 5000 INJECTION INTRAVENOUS; SUBCUTANEOUS at 05:48

## 2023-11-30 RX ADMIN — LISINOPRIL 5 MG: 5 TABLET ORAL at 08:48

## 2023-11-30 RX ADMIN — PREDNISOLONE ACETATE 1 DROP: 10 SUSPENSION/ DROPS OPHTHALMIC at 12:01

## 2023-11-30 RX ADMIN — PREDNISOLONE ACETATE 1 DROP: 10 SUSPENSION/ DROPS OPHTHALMIC at 16:45

## 2023-11-30 RX ADMIN — METOCLOPRAMIDE 5 MG: 5 TABLET ORAL at 16:46

## 2023-11-30 RX ADMIN — SODIUM CHLORIDE, PRESERVATIVE FREE 10 ML: 5 INJECTION INTRAVENOUS at 08:52

## 2023-11-30 RX ADMIN — Medication 81 MG: at 08:48

## 2023-11-30 RX ADMIN — METOCLOPRAMIDE 5 MG: 5 TABLET ORAL at 08:48

## 2023-11-30 RX ADMIN — CARVEDILOL 6.25 MG: 25 TABLET, FILM COATED ORAL at 08:49

## 2023-11-30 RX ADMIN — METOCLOPRAMIDE 5 MG: 5 TABLET ORAL at 12:00

## 2023-11-30 ASSESSMENT — ENCOUNTER SYMPTOMS
COUGH: 0
WHEEZING: 0
VOMITING: 0
ABDOMINAL PAIN: 0
SHORTNESS OF BREATH: 0
CHEST TIGHTNESS: 0
CONSTIPATION: 0
DIARRHEA: 0
NAUSEA: 0
BLOOD IN STOOL: 0
RHINORRHEA: 0

## 2023-11-30 ASSESSMENT — PAIN DESCRIPTION - LOCATION
LOCATION: HEAD

## 2023-11-30 ASSESSMENT — PAIN SCALES - GENERAL
PAINLEVEL_OUTOF10: 7
PAINLEVEL_OUTOF10: 7
PAINLEVEL_OUTOF10: 6

## 2023-11-30 NOTE — PLAN OF CARE
Attempted to speak to patient at dialysis. Patient sleep, would not wake up for interview. Patient stated she would like to be spoken to at later time. Patient has Band-Aid over wound on right great toe, she did admit to having this for approximately 1 year. Bandage left intact. We will reattempt to contact patient later today.
Problem: Discharge Planning  Goal: Discharge to home or other facility with appropriate resources  11/26/2023 0038 by Mesfin Dawkins RN  Outcome: Progressing  Flowsheets (Taken 11/25/2023 2000)  Discharge to home or other facility with appropriate resources:   Identify barriers to discharge with patient and caregiver   Arrange for needed discharge resources and transportation as appropriate   Identify discharge learning needs (meds, wound care, etc)   Refer to discharge planning if patient needs post-hospital services based on physician order or complex needs related to functional status, cognitive ability or social support system  11/25/2023 1557 by Paul Lee RN  Outcome: Progressing     Problem: Risk for Elopement  Goal: Patient will not exit the unit/facility without proper excort  11/26/2023 0038 by Mesfin Dawkins RN  Outcome: Progressing  11/25/2023 1557 by Paul Lee RN  Outcome: Progressing     Problem: Skin/Tissue Integrity  Goal: Absence of new skin breakdown  Description: 1. Monitor for areas of redness and/or skin breakdown  2. Assess vascular access sites hourly  3. Every 4-6 hours minimum:  Change oxygen saturation probe site  4. Every 4-6 hours:  If on nasal continuous positive airway pressure, respiratory therapy assess nares and determine need for appliance change or resting period.   11/26/2023 0038 by Mesfin Dawkins RN  Outcome: Progressing  11/25/2023 1557 by Paul Lee RN  Outcome: Progressing     Problem: Safety - Adult  Goal: Free from fall injury  11/26/2023 0038 by Mesfin Dawkins RN  Outcome: Progressing  11/25/2023 1557 by Paul Lee RN  Outcome: Progressing     Problem: ABCDS Injury Assessment  Goal: Absence of physical injury  11/26/2023 0038 by Mesfin Dawkins RN  Outcome: Progressing  11/25/2023 1557 by Paul Lee RN  Outcome: Progressing
Problem: Discharge Planning  Goal: Discharge to home or other facility with appropriate resources  Outcome: Adequate for Discharge     Problem: Risk for Elopement  Goal: Patient will not exit the unit/facility without proper excort  Outcome: Adequate for Discharge  Flowsheets  Taken 11/30/2023 0800 by Tj So RN  Nursing Interventions for Elopement Risk:   Assist with personal care needs such as toileting, eating, dressing, as needed to reduce the risk of wandering   Make sure patient has all necessary personal care items  Taken 11/30/2023 0400 by Max Baez RN  Nursing Interventions for Elopement Risk:   Assist with personal care needs such as toileting, eating, dressing, as needed to reduce the risk of wandering   Make sure patient has all necessary personal care items     Problem: Skin/Tissue Integrity  Goal: Absence of new skin breakdown  Description: 1. Monitor for areas of redness and/or skin breakdown  2. Assess vascular access sites hourly  3. Every 4-6 hours minimum:  Change oxygen saturation probe site  4. Every 4-6 hours:  If on nasal continuous positive airway pressure, respiratory therapy assess nares and determine need for appliance change or resting period.   Outcome: Adequate for Discharge     Problem: Safety - Adult  Goal: Free from fall injury  Outcome: Adequate for Discharge     Problem: ABCDS Injury Assessment  Goal: Absence of physical injury  Outcome: Adequate for Discharge     Problem: Chronic Conditions and Co-morbidities  Goal: Patient's chronic conditions and co-morbidity symptoms are monitored and maintained or improved  Outcome: Adequate for Discharge     Problem: Pain  Goal: Verbalizes/displays adequate comfort level or baseline comfort level  Outcome: Adequate for Discharge
Problem: Discharge Planning  Goal: Discharge to home or other facility with appropriate resources  Outcome: Progressing     Problem: Risk for Elopement  Goal: Patient will not exit the unit/facility without proper excort  Outcome: Progressing     Problem: Skin/Tissue Integrity  Goal: Absence of new skin breakdown  Description: 1. Monitor for areas of redness and/or skin breakdown  2. Assess vascular access sites hourly  3. Every 4-6 hours minimum:  Change oxygen saturation probe site  4. Every 4-6 hours:  If on nasal continuous positive airway pressure, respiratory therapy assess nares and determine need for appliance change or resting period.   Outcome: Progressing     Problem: Safety - Adult  Goal: Free from fall injury  Outcome: Progressing     Problem: ABCDS Injury Assessment  Goal: Absence of physical injury  Outcome: Progressing
Problem: Discharge Planning  Goal: Discharge to home or other facility with appropriate resources  Outcome: Progressing     Problem: Risk for Elopement  Goal: Patient will not exit the unit/facility without proper excort  Outcome: Progressing     Problem: Skin/Tissue Integrity  Goal: Absence of new skin breakdown  Description: 1. Monitor for areas of redness and/or skin breakdown  2. Assess vascular access sites hourly  3. Every 4-6 hours minimum:  Change oxygen saturation probe site  4. Every 4-6 hours:  If on nasal continuous positive airway pressure, respiratory therapy assess nares and determine need for appliance change or resting period.   Outcome: Progressing     Problem: Safety - Adult  Goal: Free from fall injury  Outcome: Progressing     Problem: ABCDS Injury Assessment  Goal: Absence of physical injury  Outcome: Progressing
Problem: Discharge Planning  Goal: Discharge to home or other facility with appropriate resources  Outcome: Progressing     Problem: Risk for Elopement  Goal: Patient will not exit the unit/facility without proper excort  Outcome: Progressing     Problem: Skin/Tissue Integrity  Goal: Absence of new skin breakdown  Description: 1. Monitor for areas of redness and/or skin breakdown  2. Assess vascular access sites hourly  3. Every 4-6 hours minimum:  Change oxygen saturation probe site  4. Every 4-6 hours:  If on nasal continuous positive airway pressure, respiratory therapy assess nares and determine need for appliance change or resting period.   Outcome: Progressing     Problem: Safety - Adult  Goal: Free from fall injury  Outcome: Progressing     Problem: ABCDS Injury Assessment  Goal: Absence of physical injury  Outcome: Progressing     Problem: Chronic Conditions and Co-morbidities  Goal: Patient's chronic conditions and co-morbidity symptoms are monitored and maintained or improved  Outcome: Progressing
Problem: Discharge Planning  Goal: Discharge to home or other facility with appropriate resources  Outcome: Progressing  Flowsheets (Taken 11/24/2023 1700 by Valentina Banegas RN)  Discharge to home or other facility with appropriate resources:   Identify barriers to discharge with patient and caregiver   Arrange for needed discharge resources and transportation as appropriate     Problem: Risk for Elopement  Goal: Patient will not exit the unit/facility without proper excort  Outcome: Progressing  Flowsheets (Taken 11/24/2023 2213)  Nursing Interventions for Elopement Risk: Assist with personal care needs such as toileting, eating, dressing, as needed to reduce the risk of wandering     Problem: Skin/Tissue Integrity  Goal: Absence of new skin breakdown  Description: 1. Monitor for areas of redness and/or skin breakdown  2. Assess vascular access sites hourly  3. Every 4-6 hours minimum:  Change oxygen saturation probe site  4. Every 4-6 hours:  If on nasal continuous positive airway pressure, respiratory therapy assess nares and determine need for appliance change or resting period.   Outcome: Progressing     Problem: Safety - Adult  Goal: Free from fall injury  Outcome: Progressing  Flowsheets (Taken 11/24/2023 2213)  Free From Fall Injury: Instruct family/caregiver on patient safety     Problem: ABCDS Injury Assessment  Goal: Absence of physical injury  Outcome: Progressing  Flowsheets (Taken 11/24/2023 2213)  Absence of Physical Injury: Implement safety measures based on patient assessment
necessary personal care items     Problem: Skin/Tissue Integrity  Goal: Absence of new skin breakdown  Description: 1. Monitor for areas of redness and/or skin breakdown  2. Assess vascular access sites hourly  3. Every 4-6 hours minimum:  Change oxygen saturation probe site  4. Every 4-6 hours:  If on nasal continuous positive airway pressure, respiratory therapy assess nares and determine need for appliance change or resting period.   Outcome: Progressing     Problem: Safety - Adult  Goal: Free from fall injury  Outcome: Progressing  Flowsheets  Taken 11/30/2023 0229  Free From Fall Injury: Instruct family/caregiver on patient safety  Taken 11/29/2023 2000  Free From Fall Injury: Instruct family/caregiver on patient safety     Problem: ABCDS Injury Assessment  Goal: Absence of physical injury  Outcome: Progressing  Flowsheets (Taken 11/29/2023 2000)  Absence of Physical Injury: Implement safety measures based on patient assessment     Problem: Chronic Conditions and Co-morbidities  Goal: Patient's chronic conditions and co-morbidity symptoms are monitored and maintained or improved  Outcome: Progressing  Flowsheets (Taken 11/29/2023 2000)  Care Plan - Patient's Chronic Conditions and Co-Morbidity Symptoms are Monitored and Maintained or Improved:   Monitor and assess patient's chronic conditions and comorbid symptoms for stability, deterioration, or improvement   Collaborate with multidisciplinary team to address chronic and comorbid conditions and prevent exacerbation or deterioration   Update acute care plan with appropriate goals if chronic or comorbid symptoms are exacerbated and prevent overall improvement and discharge

## 2023-11-30 NOTE — PROGRESS NOTES
Patient ambulated to restroom with writer, walker used for safety. Patient with general weakness. Tolerated well. Patient washed body with minimal assistance. Quality 226: Preventive Care And Screening: Tobacco Use: Screening And Cessation Intervention: Patient screened for tobacco use and is an ex/non-smoker Detail Level: Detailed

## 2023-11-30 NOTE — PROGRESS NOTES
[facility]  Office: [address], DO, Desean Curet, DO, Abel Cox, DO, Nanette Glendale Blood, DO, Carmen Don MD, Bob Shea MD, Herlinda Pina MD, Isamar Sewell MD,  Edwige Coffman MD, Jessa Tran MD, Yossi Briggs MD,  Frank Dudley MD, Josseline Uriarte MD, Vannessa Goldsmith, DO, Sai Saucedo MD,  Arvind Riggs DO, Adam Garland MD, Humphrey Fallon MD, Tsering Preciado MD, Itz Banda MD,  Cristin Elaine MD, Rl Woods MD, Marilee Cazares MD, Kimber Nunez MD, Samir Nichols MD, Mark Gonsalez, DO, So Nuñez DO, Philis Halsted, MD,  Cristina Chavez MD, Cristina Redding, CNP,  Catalina Butler, CNP, Georges Aquino, CNP,  Cheryle Balding, DNP, Valdemar Peabody, CNP, Syeda Juarez, CNP, Malgorzata Fernandez, CNP, Maggie Jones, CNP, [facility], CNP, Laney Haines, [address], Celena Callahan, CNP, Abdi Matias, CNS, Enrike Lowry, CNP, Heidy Renee, [address]      Daily Progress Note     Admit Date: 11/24/2023  Bed/Room No.  5573/9201-37  Admitting Physician : Herlinda Pina MD  Code Status :Full Code  Hospital Day:  LOS: 6 days   Chief Complaint:     Chief Complaint   Patient presents with    Chest Pain    Nausea     Principal Problem:    Fluid overload  Active Problems:    Asthma    Hypertension, essential    Diabetic polyneuropathy associated with type 1 diabetes mellitus [facility])    Diabetic gastroparesis associated with type 1 diabetes mellitus ([address])    ESRD on dialysis [facility])    Troponin level elevated    Uncontrolled type 1 diabetes mellitus with hyperglycemia ([address])    Superficial gastritis without hemorrhage    Non-compliance with renal dialysis    Anemia of chronic disease    Ulcer of toe of right foot, with necrosis of bone ([address])    Type 1 diabetes mellitus with foot ulcer ([address])  Resolved Problems:    Acute pancreatitis    Subjective :         Interval History/Significant events :  11/30/23    Patient is alert,

## 2023-11-30 NOTE — PROGRESS NOTES
Pt fell hit head on bed at 8:08. Witnessed per CTP. Pt assisted back to bed. Vital signs as charted. Neuro assessment as charted. Pt did not lose LOC. MD Herve Padilla notified. Charge notified. Manager Notified. Continue to monitor and await further orders. 8:25 MD bedside assessing Pt.

## 2023-11-30 NOTE — DISCHARGE SUMMARY
Pt IV removed. Pt dressed in personal clothing. Pt sent with all personal belongings. Wheelchair transport to [facility]. Report called to Genoveva with no further questions at this time.

## 2023-11-30 NOTE — PROGRESS NOTES
Physical Therapy        Physical Therapy Cancel Note      DATE: 2023    NAME: Chriss Saez  MRN: [id number]   : 1990      Patient not seen this date for Physical Therapy due to:    Testing: Pt leaving with transport for CT.  Will check back in the PM as time allows      Electronically signed by Doris Carbajal PTA on 2023 at 9:28 AM

## 2023-11-30 NOTE — PROGRESS NOTES
Occupational [address]  Occupational Therapy Not Seen Note    DATE: 2023    NAME: Lisa Deleon  MRN: [id number]   : 1990      Patient not seen this date for Occupational Therapy due to:      Testing: Pt off the unit for CT scan this AM      Next Scheduled Treatment: Will check back PM if able or 2023     Electronically signed by Matthew Langston OT on 2023 at 9:31 AM

## 2023-11-30 NOTE — CARE COORDINATION
Transitional planning    Writer received call from [facility], they have [address] and patient can come today, patient updated and agreeable, transport request faxed to [facility] for 1900.      1415 call from [address] with [address] , trip booked for 1900. Call to [facility], left  for call back. 1500 call to facility, spoke with staff aware patient coming , provided report number, HENS completed copy in packet, AVS faxed. Primary RN updated.

## 2023-11-30 NOTE — CARE COORDINATION
SW following for HD needs. Notified [facility]) that patient discharging to [facility] w/HD on-site.

## 2023-12-01 LAB
GLUCOSE BLD-MCNC: 420 MG/DL (ref 65–105)
MICROORGANISM SPEC CULT: NORMAL
SERVICE CMNT-IMP: NORMAL
SPECIMEN DESCRIPTION: NORMAL

## 2023-12-01 NOTE — DISCHARGE SUMMARY
[facility]  Office: [address], DO, Zoila Ba, DO, Manjit Ro, DO, Jaya Flores Blood, DO, Fernando Aceves MD, Severo Lopes, MD, Kannan Geronimo MD, Delmy Manrique MD,  Bisi Headley MD, Ravinder James MD, Elysia Long DO, Randi Orozco MD,  Sandra Gleason DO, Deena Jc MD, Kenroy Ortega MD, Neena Davis DO, Shamar Donovan MD,  Kasandra Gonzales DO, Roxana Sherman MD, Carrie August MD, Sudha Covington MD, Nathan Sosa MD,  Marquise Gómez MD, Yady March MD, Servando Hodges MD, Nikolay Garzon MD, Claudio Lemon DO, Brigido Riggins MD,  Aleks Eagle MD, Celia Mortimer, MD, Jose Jaffe, CNP,  Armani Mercado, CNP,, Kailyn Barnes, CNP,  Ky Ibanez, [facility], Gume Singh, [facility], Js Gann, CNP, Xin Colunga, CNP, Victoria Wasserman, CNP, Jorge Washington, CNP, Wanda Sandoval, CNP, Rai Richards, CNS, Judah Oliveira, [facility], Ana Salmon, [facility]      Discharge Summary     Patient ID: Anam Mullins  :  1990   MRN: [id number]     ACCOUNT:  [de-identified]   Patient Location : [address] [place] [address]  Patient's PCP: Richard Llamas Date: 2023   Discharge Date: 2023     Length of Stay: 6  Code Status:  Prior  Admitting Physician: Rudi Okeefe MD  Discharge Physician: Kannan Geronimo MD     Active Discharge Diagnosis :     Primary Problem  Fluid overload      [address] Problems    Diagnosis Date Noted    Asthma [J45.909] 2015     Priority: High    Ulcer of toe of right foot, with necrosis of bone [facility]) [L97.514] 2023    Type 1 diabetes mellitus with foot ulcer ([address] [facility]) [S44.614, L97.509] 2023    Anemia of chronic disease [D63.8] 2023    Fluid overload [E87.70] 2023    Troponin level elevated [R79.89] 2023    Uncontrolled type 1 diabetes mellitus with hyperglycemia ([address]) [E10.65] 2023    Superficial gastritis without

## 2023-12-04 ENCOUNTER — HOSPITAL ENCOUNTER (OUTPATIENT)
Age: 33
Setting detail: SPECIMEN
Discharge: HOME | End: 2023-12-04

## 2023-12-04 LAB
EST. AVERAGE GLUCOSE BLD GHB EST-MCNC: 344 MG/DL
HBA1C MFR BLD: 13.6 % (ref 4–6)

## 2023-12-04 PROCEDURE — 83036 HEMOGLOBIN GLYCOSYLATED A1C: CPT

## 2023-12-04 PROCEDURE — P9603 ONE-WAY ALLOW PRORATED MILES: HCPCS

## 2023-12-04 PROCEDURE — 36415 COLL VENOUS BLD VENIPUNCTURE: CPT

## 2023-12-06 PROBLEM — F33.1 MDD (MAJOR DEPRESSIVE DISORDER), RECURRENT EPISODE, MODERATE (HCC): Status: ACTIVE | Noted: 2023-12-06

## 2023-12-13 ENCOUNTER — OFFICE VISIT (OUTPATIENT)
Dept: PODIATRY | Age: 33
End: 2023-12-13
Payer: COMMERCIAL

## 2023-12-13 VITALS
DIASTOLIC BLOOD PRESSURE: 63 MMHG | HEART RATE: 92 BPM | BODY MASS INDEX: 27.81 KG/M2 | WEIGHT: 162 LBS | SYSTOLIC BLOOD PRESSURE: 109 MMHG

## 2023-12-13 DIAGNOSIS — E10.621 TYPE 1 DIABETES MELLITUS WITH FOOT ULCER (HCC): Primary | ICD-10-CM

## 2023-12-13 DIAGNOSIS — L97.514 ULCER OF TOE OF RIGHT FOOT, WITH NECROSIS OF BONE (HCC): ICD-10-CM

## 2023-12-13 DIAGNOSIS — L97.509 TYPE 1 DIABETES MELLITUS WITH FOOT ULCER (HCC): Primary | ICD-10-CM

## 2023-12-13 PROCEDURE — 99203 OFFICE O/P NEW LOW 30 MIN: CPT

## 2023-12-13 PROCEDURE — 3078F DIAST BP <80 MM HG: CPT

## 2023-12-13 PROCEDURE — 1111F DSCHRG MED/CURRENT MED MERGE: CPT

## 2023-12-13 PROCEDURE — 3046F HEMOGLOBIN A1C LEVEL >9.0%: CPT

## 2023-12-13 PROCEDURE — G8419 CALC BMI OUT NRM PARAM NOF/U: HCPCS

## 2023-12-13 PROCEDURE — 2022F DILAT RTA XM EVC RTNOPTHY: CPT

## 2023-12-13 PROCEDURE — 99203 OFFICE O/P NEW LOW 30 MIN: CPT | Performed by: PODIATRIST

## 2023-12-13 PROCEDURE — 3074F SYST BP LT 130 MM HG: CPT

## 2023-12-13 PROCEDURE — G8427 DOCREV CUR MEDS BY ELIG CLIN: HCPCS

## 2023-12-13 PROCEDURE — G8484 FLU IMMUNIZE NO ADMIN: HCPCS

## 2023-12-13 PROCEDURE — 1036F TOBACCO NON-USER: CPT

## 2023-12-13 NOTE — PATIENT INSTRUCTIONS
To the nurse providing dressing changes:    Please provide daily washing to toe wound with application of betadine swab and a bandaid. Patient's wound is progressing well.      Nico Jean, POLINAM  Podiatric Medicine & Surgery, PGY-2  [place], [place]

## 2024-01-02 NOTE — PROGRESS NOTES
Patient instructed to remove shoes and socks and instructed to sit in exam chair.  Current PCP is Nayla Miller and date of last visit was 10/12/2023.   Do you have a follow up visit scheduled?  No  If yes, the date is       Diabetic visit information    Blood pressure (Control is BP <140/90)  BP Readings from Last 3 Encounters:   11/30/23 131/76   09/26/23 (!) 172/79   08/21/23 (!) 147/78       BP taken with correct size cuff? - Yes   Repeated if > 140/90 Yes      Tobacco use:  Patient  reports that she has never smoked. She has never used smokeless tobacco.  If Smoker - Cessation materials given?- Yes       Diabetic Health Maintenance Items due  Diabetes Management   Topic Date Due    Diabetic retinal exam  Never done       Diabetic retinal exam done in last year? - Yes   If No: remind patient that it is due and they should schedule an exam    Medications  Is patient taking any medications for diabetes? -   Yes  Have blood sugars been controlled?   Fasting blood sugars under 120   -   Yes   Random home sugars or today's POCT glucose is under 180 -   Yes   []  If No to the above then patient should schedule appt with PCP.     Diabetic Plan    A1C Plan  Lab Results   Component Value Date    LABA1C 13.6 (H) 12/04/2023    LABA1C 14.6 (H) 11/27/2023    LABA1C 14.6 (H) 11/24/2023      []  If A1C over 8 and last result >3 months ago - Order A1C and refer to PCP   []  If last A1C over 6 months ago - Order A1C and refer to PCP for follow up   []  If elevated blood sugars > 180 - refer to PCP for follow up    []  Blood sugar controlled - A1C under 8 and last check was < 6 months      Cholesterol Plan   Lab Results   Component Value Date    LDLCHOLESTEROL 39 08/21/2023      []  If LDL > 100 and last result >3 months ago - order Fasting lipids and refer to PCP for follow up   []  If LDL < 100 and over 1 year ago - Order Fasting lipids and refer to PCP for follow up   [] LDL is controlled.  LDL < 100 and checked within the last 
7/5/23   Moise Byrnes MD   glucagon 1 MG injection 1 mg as needed (for low blood sugar) Indications: Hypoglycemia 4/13/23   Moise Byrnes MD   prednisoLONE acetate (PRED FORTE) 1 % ophthalmic suspension Place 1 drop into the right eye Four times daily for 7 days, then 1 drop twice daily until gone 6/27/23   Moise Byrnes MD   mirtazapine (REMERON SOL-TAB) 15 MG disintegrating tablet Take 1 tablet by mouth nightly 10/31/22   Morales Patel MD   acetaminophen (TYLENOL) 325 MG tablet Take 2 tablets by mouth every 6 hours as needed for Pain or Fever (do not exceed 3 gm in 24 hours)    Moise Byrnes MD   aspirin 81 MG EC tablet Take 1 tablet by mouth daily    Moise Byrnes MD   calcium acetate (PHOSLO) 667 MG CAPS capsule Take 3 capsules by mouth 3 times daily (with meals)    Moise Byrnes MD   Cholecalciferol 50 MCG (2000 UT) TABS Take 2 tablets by mouth daily    Moise Byrnes MD   clopidogrel (PLAVIX) 75 MG tablet Take 1 tablet by mouth daily    Moise Byrnes MD   polyethylene glycol (GLYCOLAX) 17 GM/SCOOP powder Take 17 g by mouth daily as needed (constipation)    Moise Byrnes MD   midodrine (PROAMATINE) 10 MG tablet Take 3 tablets by mouth three times a week Indications: Mon, Wed, Fri at dialysis    Moise Byrnes MD   sennosides-docusate sodium (SENOKOT-S) 8.6-50 MG tablet Take 2 tablets by mouth at bedtime    Moise Byrnes MD   B Complex-C-Folic Acid (TRIPHROCAPS) 1 MG CAPS Take 1 capsule by mouth daily    Moise Byrnes MD       Objective     Vitals:    12/13/23 1437   BP: 109/63   Pulse: 92       Lab Results   Component Value Date    LABA1C 13.6 (H) 12/04/2023       Physical Exam:  General:  Alert and oriented x3. In no acute distress.     Lower Extremity Physical Exam:    Vascular: DP and PT pulses are palpable. CFT <5 seconds to all digits.  Hair growth is present to the level of the digits.  No edema.       Neuro:

## 2024-07-30 ENCOUNTER — TELEPHONE (OUTPATIENT)
Dept: FAMILY MEDICINE CLINIC | Age: 34
End: 2024-07-30

## 2025-08-27 ENCOUNTER — APPOINTMENT (OUTPATIENT)
Dept: CT IMAGING | Age: 35
End: 2025-08-27
Payer: MEDICARE

## 2025-08-27 ENCOUNTER — HOSPITAL ENCOUNTER (OUTPATIENT)
Age: 35
Setting detail: OBSERVATION
Discharge: NURSING, REHAB OR LONG-TERM CARE | End: 2025-08-29
Attending: EMERGENCY MEDICINE | Admitting: STUDENT IN AN ORGANIZED HEALTH CARE EDUCATION/TRAINING PROGRAM
Payer: MEDICARE

## 2025-08-27 ENCOUNTER — APPOINTMENT (OUTPATIENT)
Dept: GENERAL RADIOLOGY | Age: 35
End: 2025-08-27
Payer: MEDICARE

## 2025-08-27 DIAGNOSIS — R41.82 ALTERED MENTAL STATUS, UNSPECIFIED ALTERED MENTAL STATUS TYPE: ICD-10-CM

## 2025-08-27 DIAGNOSIS — J18.9 PNEUMONIA DUE TO INFECTIOUS ORGANISM, UNSPECIFIED LATERALITY, UNSPECIFIED PART OF LUNG: Primary | ICD-10-CM

## 2025-08-27 PROBLEM — E10.22 TYPE 1 DIABETES MELLITUS WITH DIABETIC CHRONIC KIDNEY DISEASE (HCC): Status: ACTIVE | Noted: 2025-08-27

## 2025-08-27 LAB
AMMONIA PLAS-SCNC: <10 UMOL/L (ref 11–51)
AMPHET UR QL SCN: NEGATIVE
ANION GAP SERPL CALCULATED.3IONS-SCNC: 16 MMOL/L (ref 9–16)
BACTERIA URNS QL MICRO: ABNORMAL
BARBITURATES UR QL SCN: NEGATIVE
BASOPHILS # BLD: 0.05 K/UL (ref 0–0.2)
BASOPHILS NFR BLD: 0 % (ref 0–2)
BENZODIAZ UR QL: NEGATIVE
BILIRUB UR QL STRIP: NEGATIVE
BUN SERPL-MCNC: 36 MG/DL (ref 6–20)
CALCIUM SERPL-MCNC: 9.7 MG/DL (ref 8.6–10.4)
CANNABINOIDS UR QL SCN: NEGATIVE
CHLORIDE SERPL-SCNC: 91 MMOL/L (ref 98–107)
CLARITY UR: ABNORMAL
CO2 SERPL-SCNC: 26 MMOL/L (ref 20–31)
COCAINE UR QL SCN: NEGATIVE
COLOR UR: YELLOW
CREAT SERPL-MCNC: 6.4 MG/DL (ref 0.5–0.9)
CRITICAL ACTION: NORMAL
CRITICAL NOTIFICATION DATE/TIME: NORMAL
CRITICAL NOTIFICATION: NORMAL
CRITICAL VALUE READ BACK: YES
EOSINOPHIL # BLD: 0.21 K/UL (ref 0–0.44)
EOSINOPHILS RELATIVE PERCENT: 2 % (ref 1–4)
EPI CELLS #/AREA URNS HPF: ABNORMAL /HPF (ref 0–5)
ERYTHROCYTE [DISTWIDTH] IN BLOOD BY AUTOMATED COUNT: 14.2 % (ref 11.8–14.4)
ETHANOL PERCENT: NORMAL %
ETHANOLAMINE SERPL-MCNC: <10 MG/DL (ref 0–0.08)
FENTANYL UR QL: POSITIVE
GFR, ESTIMATED: 8 ML/MIN/1.73M2
GLUCOSE SERPL-MCNC: 127 MG/DL (ref 74–99)
GLUCOSE UR STRIP-MCNC: NEGATIVE MG/DL
HCG SERPL QL: NEGATIVE
HCO3 VENOUS: 35.7 MMOL/L (ref 22–29)
HCT VFR BLD AUTO: 33.3 % (ref 36.3–47.1)
HGB BLD-MCNC: 10.5 G/DL (ref 11.9–15.1)
HGB UR QL STRIP.AUTO: ABNORMAL
IMM GRANULOCYTES # BLD AUTO: 0.07 K/UL (ref 0–0.3)
IMM GRANULOCYTES NFR BLD: 1 %
KETONES UR STRIP-MCNC: NEGATIVE MG/DL
LEUKOCYTE ESTERASE UR QL STRIP: ABNORMAL
LYMPHOCYTES NFR BLD: 1.67 K/UL (ref 1.1–3.7)
LYMPHOCYTES RELATIVE PERCENT: 14 % (ref 24–43)
MAGNESIUM SERPL-MCNC: 2.1 MG/DL (ref 1.6–2.6)
MCH RBC QN AUTO: 29 PG (ref 25.2–33.5)
MCHC RBC AUTO-ENTMCNC: 31.5 G/DL (ref 28.4–34.8)
MCV RBC AUTO: 92 FL (ref 82.6–102.9)
METHADONE UR QL: NEGATIVE
MONOCYTES NFR BLD: 0.5 K/UL (ref 0.1–1.2)
MONOCYTES NFR BLD: 4 % (ref 3–12)
NEUTROPHILS NFR BLD: 79 % (ref 36–65)
NEUTS SEG NFR BLD: 9.15 K/UL (ref 1.5–8.1)
NITRITE UR QL STRIP: NEGATIVE
NRBC BLD-RTO: 0 PER 100 WBC
O2 SAT, VEN: 60.1 % (ref 60–85)
OPIATES UR QL SCN: NEGATIVE
OXYCODONE UR QL SCN: POSITIVE
PCO2 VENOUS: 48.6 MM HG (ref 41–51)
PCP UR QL SCN: NEGATIVE
PH UR STRIP: 8.5 [PH] (ref 5–8)
PH VENOUS: 7.47 (ref 7.32–7.43)
PLATELET # BLD AUTO: 363 K/UL (ref 138–453)
PMV BLD AUTO: 10.2 FL (ref 8.1–13.5)
PO2 VENOUS: 29.8 MM HG (ref 30–50)
POSITIVE BASE EXCESS, VEN: 10.8 MMOL/L (ref 0–3)
POTASSIUM SERPL-SCNC: 5.2 MMOL/L (ref 3.7–5.3)
PROCALCITONIN SERPL-MCNC: 0.93 NG/ML (ref 0–0.09)
PROT UR STRIP-MCNC: ABNORMAL MG/DL
RBC # BLD AUTO: 3.62 M/UL (ref 3.95–5.11)
RBC #/AREA URNS HPF: ABNORMAL /HPF (ref 0–2)
SODIUM SERPL-SCNC: 133 MMOL/L (ref 136–145)
SP GR UR STRIP: 1.02 (ref 1–1.03)
TEST INFORMATION: ABNORMAL
UROBILINOGEN UR STRIP-ACNC: NORMAL EU/DL (ref 0–1)
WBC #/AREA URNS HPF: ABNORMAL /HPF (ref 0–5)
WBC OTHER # BLD: 11.7 K/UL (ref 3.5–11.3)

## 2025-08-27 PROCEDURE — 2500000003 HC RX 250 WO HCPCS: Performed by: EMERGENCY MEDICINE

## 2025-08-27 PROCEDURE — 82803 BLOOD GASES ANY COMBINATION: CPT

## 2025-08-27 PROCEDURE — 0202U NFCT DS 22 TRGT SARS-COV-2: CPT

## 2025-08-27 PROCEDURE — 6370000000 HC RX 637 (ALT 250 FOR IP): Performed by: EMERGENCY MEDICINE

## 2025-08-27 PROCEDURE — 80048 BASIC METABOLIC PNL TOTAL CA: CPT

## 2025-08-27 PROCEDURE — 82140 ASSAY OF AMMONIA: CPT

## 2025-08-27 PROCEDURE — 2580000003 HC RX 258: Performed by: EMERGENCY MEDICINE

## 2025-08-27 PROCEDURE — 99285 EMERGENCY DEPT VISIT HI MDM: CPT

## 2025-08-27 PROCEDURE — 96375 TX/PRO/DX INJ NEW DRUG ADDON: CPT

## 2025-08-27 PROCEDURE — 36415 COLL VENOUS BLD VENIPUNCTURE: CPT

## 2025-08-27 PROCEDURE — 2500000003 HC RX 250 WO HCPCS: Performed by: NURSE PRACTITIONER

## 2025-08-27 PROCEDURE — 84145 PROCALCITONIN (PCT): CPT

## 2025-08-27 PROCEDURE — 83735 ASSAY OF MAGNESIUM: CPT

## 2025-08-27 PROCEDURE — 93005 ELECTROCARDIOGRAM TRACING: CPT | Performed by: EMERGENCY MEDICINE

## 2025-08-27 PROCEDURE — G0378 HOSPITAL OBSERVATION PER HR: HCPCS

## 2025-08-27 PROCEDURE — 74176 CT ABD & PELVIS W/O CONTRAST: CPT

## 2025-08-27 PROCEDURE — 85025 COMPLETE CBC W/AUTO DIFF WBC: CPT

## 2025-08-27 PROCEDURE — 96365 THER/PROPH/DIAG IV INF INIT: CPT

## 2025-08-27 PROCEDURE — 70450 CT HEAD/BRAIN W/O DYE: CPT

## 2025-08-27 PROCEDURE — G0480 DRUG TEST DEF 1-7 CLASSES: HCPCS

## 2025-08-27 PROCEDURE — 6360000002 HC RX W HCPCS: Performed by: EMERGENCY MEDICINE

## 2025-08-27 PROCEDURE — 81001 URINALYSIS AUTO W/SCOPE: CPT

## 2025-08-27 PROCEDURE — 87899 AGENT NOS ASSAY W/OPTIC: CPT

## 2025-08-27 PROCEDURE — 51701 INSERT BLADDER CATHETER: CPT

## 2025-08-27 PROCEDURE — 99222 1ST HOSP IP/OBS MODERATE 55: CPT | Performed by: NURSE PRACTITIONER

## 2025-08-27 PROCEDURE — 84703 CHORIONIC GONADOTROPIN ASSAY: CPT

## 2025-08-27 PROCEDURE — 71045 X-RAY EXAM CHEST 1 VIEW: CPT

## 2025-08-27 PROCEDURE — 87449 NOS EACH ORGANISM AG IA: CPT

## 2025-08-27 PROCEDURE — 80307 DRUG TEST PRSMV CHEM ANLYZR: CPT

## 2025-08-27 PROCEDURE — 51798 US URINE CAPACITY MEASURE: CPT

## 2025-08-27 RX ORDER — SEVELAMER CARBONATE 800 MG/1
1 TABLET, FILM COATED ORAL
COMMUNITY

## 2025-08-27 RX ORDER — CARVEDILOL 12.5 MG/1
12.5 TABLET ORAL 2 TIMES DAILY
Status: DISCONTINUED | OUTPATIENT
Start: 2025-08-27 | End: 2025-08-29

## 2025-08-27 RX ORDER — SODIUM CHLORIDE 0.9 % (FLUSH) 0.9 %
10 SYRINGE (ML) INJECTION PRN
Status: DISCONTINUED | OUTPATIENT
Start: 2025-08-27 | End: 2025-08-29 | Stop reason: HOSPADM

## 2025-08-27 RX ORDER — MIDODRINE HYDROCHLORIDE 10 MG/1
30 TABLET ORAL
Status: DISCONTINUED | OUTPATIENT
Start: 2025-08-27 | End: 2025-08-29

## 2025-08-27 RX ORDER — POLYETHYLENE GLYCOL 3350 17 G/17G
17 POWDER, FOR SOLUTION ORAL DAILY PRN
Status: DISCONTINUED | OUTPATIENT
Start: 2025-08-27 | End: 2025-08-29 | Stop reason: HOSPADM

## 2025-08-27 RX ORDER — EZETIMIBE 10 MG/1
10 TABLET ORAL DAILY
COMMUNITY

## 2025-08-27 RX ORDER — ASPIRIN 81 MG/1
81 TABLET ORAL DAILY
Status: DISCONTINUED | OUTPATIENT
Start: 2025-08-27 | End: 2025-08-29 | Stop reason: HOSPADM

## 2025-08-27 RX ORDER — CLOPIDOGREL BISULFATE 75 MG/1
75 TABLET ORAL DAILY
Status: DISCONTINUED | OUTPATIENT
Start: 2025-08-27 | End: 2025-08-29 | Stop reason: HOSPADM

## 2025-08-27 RX ORDER — ONDANSETRON 2 MG/ML
4 INJECTION INTRAMUSCULAR; INTRAVENOUS EVERY 6 HOURS PRN
Status: DISCONTINUED | OUTPATIENT
Start: 2025-08-27 | End: 2025-08-29 | Stop reason: HOSPADM

## 2025-08-27 RX ORDER — LORATADINE 10 MG/1
10 TABLET ORAL DAILY
COMMUNITY

## 2025-08-27 RX ORDER — ACETAMINOPHEN 325 MG/1
650 TABLET ORAL EVERY 6 HOURS PRN
Status: DISCONTINUED | OUTPATIENT
Start: 2025-08-27 | End: 2025-08-29 | Stop reason: HOSPADM

## 2025-08-27 RX ORDER — MIRTAZAPINE 15 MG/1
15 TABLET, ORALLY DISINTEGRATING ORAL NIGHTLY
Status: CANCELLED | OUTPATIENT
Start: 2025-08-27

## 2025-08-27 RX ORDER — MULTIVIT WITH MINERALS/LUTEIN
400 TABLET ORAL DAILY
COMMUNITY

## 2025-08-27 RX ORDER — PREGABALIN 50 MG/1
75 CAPSULE ORAL 2 TIMES DAILY
COMMUNITY

## 2025-08-27 RX ORDER — SODIUM CHLORIDE 9 MG/ML
INJECTION, SOLUTION INTRAVENOUS PRN
Status: DISCONTINUED | OUTPATIENT
Start: 2025-08-27 | End: 2025-08-29 | Stop reason: HOSPADM

## 2025-08-27 RX ORDER — LOSARTAN POTASSIUM 100 MG/1
100 TABLET ORAL DAILY
COMMUNITY

## 2025-08-27 RX ORDER — INSULIN LISPRO 100 [IU]/ML
15 INJECTION, SOLUTION INTRAVENOUS; SUBCUTANEOUS
COMMUNITY
End: 2025-08-27

## 2025-08-27 RX ORDER — INSULIN LISPRO 100 [IU]/ML
0-4 INJECTION, SOLUTION INTRAVENOUS; SUBCUTANEOUS
Status: DISCONTINUED | OUTPATIENT
Start: 2025-08-27 | End: 2025-08-29 | Stop reason: HOSPADM

## 2025-08-27 RX ORDER — ACETAMINOPHEN 650 MG/1
650 SUPPOSITORY RECTAL EVERY 6 HOURS PRN
Status: DISCONTINUED | OUTPATIENT
Start: 2025-08-27 | End: 2025-08-29 | Stop reason: HOSPADM

## 2025-08-27 RX ORDER — HYDROXYZINE HYDROCHLORIDE 25 MG/1
25 TABLET, FILM COATED ORAL EVERY 6 HOURS PRN
COMMUNITY

## 2025-08-27 RX ORDER — OXYCODONE HYDROCHLORIDE 5 MG/1
5 TABLET ORAL EVERY 6 HOURS PRN
Status: ON HOLD | COMMUNITY
End: 2025-08-29 | Stop reason: HOSPADM

## 2025-08-27 RX ORDER — FLUTICASONE PROPIONATE 50 MCG
1 SPRAY, SUSPENSION (ML) NASAL EVERY MORNING
COMMUNITY

## 2025-08-27 RX ORDER — INSULIN ASPART 100 [IU]/ML
2-10 INJECTION, SOLUTION INTRAVENOUS; SUBCUTANEOUS
COMMUNITY

## 2025-08-27 RX ORDER — INSULIN GLARGINE 100 [IU]/ML
6 INJECTION, SOLUTION SUBCUTANEOUS DAILY
COMMUNITY

## 2025-08-27 RX ORDER — PANTOPRAZOLE SODIUM 40 MG/1
40 TABLET, DELAYED RELEASE ORAL 2 TIMES DAILY
Status: ON HOLD | COMMUNITY
End: 2025-08-29 | Stop reason: HOSPADM

## 2025-08-27 RX ORDER — ATORVASTATIN CALCIUM 40 MG/1
40 TABLET, FILM COATED ORAL NIGHTLY
Status: DISCONTINUED | OUTPATIENT
Start: 2025-08-27 | End: 2025-08-29 | Stop reason: HOSPADM

## 2025-08-27 RX ORDER — HEPARIN SODIUM 5000 [USP'U]/ML
5000 INJECTION, SOLUTION INTRAVENOUS; SUBCUTANEOUS EVERY 8 HOURS SCHEDULED
Status: DISCONTINUED | OUTPATIENT
Start: 2025-08-27 | End: 2025-08-29 | Stop reason: HOSPADM

## 2025-08-27 RX ORDER — OXYCODONE AND ACETAMINOPHEN 5; 325 MG/1; MG/1
1 TABLET ORAL EVERY 8 HOURS PRN
COMMUNITY
End: 2025-08-27

## 2025-08-27 RX ORDER — INSULIN GLARGINE 100 [IU]/ML
6 INJECTION, SOLUTION SUBCUTANEOUS DAILY
Status: DISCONTINUED | OUTPATIENT
Start: 2025-08-28 | End: 2025-08-29 | Stop reason: HOSPADM

## 2025-08-27 RX ORDER — BUMETANIDE 1 MG/1
4 TABLET ORAL 2 TIMES DAILY
Status: DISCONTINUED | OUTPATIENT
Start: 2025-08-27 | End: 2025-08-29 | Stop reason: HOSPADM

## 2025-08-27 RX ORDER — CARVEDILOL 12.5 MG/1
12.5 TABLET ORAL 2 TIMES DAILY WITH MEALS
COMMUNITY

## 2025-08-27 RX ORDER — AMINO ACIDS/PROTEIN HYDROLYS 15G-100/30
30 LIQUID (ML) ORAL 2 TIMES DAILY
COMMUNITY
Start: 2025-08-23 | End: 2025-08-31

## 2025-08-27 RX ORDER — SODIUM CHLORIDE 0.9 % (FLUSH) 0.9 %
5-40 SYRINGE (ML) INJECTION EVERY 12 HOURS SCHEDULED
Status: DISCONTINUED | OUTPATIENT
Start: 2025-08-27 | End: 2025-08-29 | Stop reason: HOSPADM

## 2025-08-27 RX ORDER — ONDANSETRON 4 MG/1
4 TABLET, ORALLY DISINTEGRATING ORAL EVERY 8 HOURS PRN
Status: DISCONTINUED | OUTPATIENT
Start: 2025-08-27 | End: 2025-08-29 | Stop reason: HOSPADM

## 2025-08-27 RX ORDER — ATORVASTATIN CALCIUM 40 MG/1
40 TABLET, FILM COATED ORAL NIGHTLY
COMMUNITY

## 2025-08-27 RX ORDER — ACETAMINOPHEN 325 MG/1
650 TABLET ORAL EVERY 6 HOURS PRN
Status: ON HOLD | COMMUNITY
End: 2025-08-29 | Stop reason: HOSPADM

## 2025-08-27 RX ORDER — CLONIDINE HYDROCHLORIDE 0.1 MG/1
0.2 TABLET ORAL 3 TIMES DAILY
Status: CANCELLED | OUTPATIENT
Start: 2025-08-27

## 2025-08-27 RX ORDER — BISACODYL 5 MG/1
10 TABLET, DELAYED RELEASE ORAL DAILY PRN
COMMUNITY

## 2025-08-27 RX ORDER — DEXTROSE MONOHYDRATE 100 MG/ML
INJECTION, SOLUTION INTRAVENOUS CONTINUOUS PRN
Status: DISCONTINUED | OUTPATIENT
Start: 2025-08-27 | End: 2025-08-29 | Stop reason: HOSPADM

## 2025-08-27 RX ADMIN — SODIUM ZIRCONIUM CYCLOSILICATE 10 G: 10 POWDER, FOR SUSPENSION ORAL at 17:42

## 2025-08-27 RX ADMIN — SODIUM CHLORIDE, PRESERVATIVE FREE 10 ML: 5 INJECTION INTRAVENOUS at 20:38

## 2025-08-27 RX ADMIN — AZITHROMYCIN MONOHYDRATE 500 MG: 500 INJECTION, POWDER, LYOPHILIZED, FOR SOLUTION INTRAVENOUS at 15:37

## 2025-08-27 RX ADMIN — WATER 1000 MG: 1 INJECTION INTRAMUSCULAR; INTRAVENOUS; SUBCUTANEOUS at 15:36

## 2025-08-27 ASSESSMENT — PAIN SCALES - GENERAL: PAINLEVEL_OUTOF10: 0

## 2025-08-28 ENCOUNTER — APPOINTMENT (OUTPATIENT)
Dept: MRI IMAGING | Age: 35
End: 2025-08-28
Payer: MEDICARE

## 2025-08-28 PROBLEM — R41.89 UNRESPONSIVENESS: Status: ACTIVE | Noted: 2025-08-28

## 2025-08-28 LAB
ANION GAP SERPL CALCULATED.3IONS-SCNC: 18 MMOL/L (ref 9–16)
B PARAP IS1001 DNA NPH QL NAA+NON-PROBE: NOT DETECTED
B PERT DNA SPEC QL NAA+PROBE: NOT DETECTED
BASOPHILS # BLD: 0.03 K/UL (ref 0–0.2)
BASOPHILS NFR BLD: 0 % (ref 0–2)
BUN SERPL-MCNC: 49 MG/DL (ref 6–20)
C PNEUM DNA NPH QL NAA+NON-PROBE: NOT DETECTED
CALCIUM SERPL-MCNC: 9.9 MG/DL (ref 8.6–10.4)
CHLORIDE SERPL-SCNC: 92 MMOL/L (ref 98–107)
CO2 SERPL-SCNC: 24 MMOL/L (ref 20–31)
CREAT SERPL-MCNC: 8.3 MG/DL (ref 0.5–0.9)
EOSINOPHIL # BLD: 0.39 K/UL (ref 0–0.44)
EOSINOPHILS RELATIVE PERCENT: 4 % (ref 1–4)
ERYTHROCYTE [DISTWIDTH] IN BLOOD BY AUTOMATED COUNT: 13.7 % (ref 11.8–14.4)
FLUAV RNA NPH QL NAA+NON-PROBE: NOT DETECTED
FLUBV RNA NPH QL NAA+NON-PROBE: NOT DETECTED
GFR, ESTIMATED: 6 ML/MIN/1.73M2
GLUCOSE BLD-MCNC: 102 MG/DL (ref 65–105)
GLUCOSE BLD-MCNC: 115 MG/DL (ref 65–105)
GLUCOSE BLD-MCNC: 127 MG/DL (ref 65–105)
GLUCOSE BLD-MCNC: 131 MG/DL (ref 65–105)
GLUCOSE BLD-MCNC: 169 MG/DL (ref 65–105)
GLUCOSE SERPL-MCNC: 93 MG/DL (ref 74–99)
HADV DNA NPH QL NAA+NON-PROBE: NOT DETECTED
HAV IGM SERPL QL IA: NONREACTIVE
HBV CORE IGM SERPL QL IA: NONREACTIVE
HBV SURFACE AG SERPL QL IA: NONREACTIVE
HCOV 229E RNA NPH QL NAA+NON-PROBE: NOT DETECTED
HCOV HKU1 RNA NPH QL NAA+NON-PROBE: NOT DETECTED
HCOV NL63 RNA NPH QL NAA+NON-PROBE: NOT DETECTED
HCOV OC43 RNA NPH QL NAA+NON-PROBE: NOT DETECTED
HCT VFR BLD AUTO: 29.8 % (ref 36.3–47.1)
HCV AB SERPL QL IA: NONREACTIVE
HGB BLD-MCNC: 9.5 G/DL (ref 11.9–15.1)
HMPV RNA NPH QL NAA+NON-PROBE: NOT DETECTED
HPIV1 RNA NPH QL NAA+NON-PROBE: NOT DETECTED
HPIV2 RNA NPH QL NAA+NON-PROBE: NOT DETECTED
HPIV3 RNA NPH QL NAA+NON-PROBE: NOT DETECTED
HPIV4 RNA NPH QL NAA+NON-PROBE: NOT DETECTED
IMM GRANULOCYTES # BLD AUTO: 0.03 K/UL (ref 0–0.3)
IMM GRANULOCYTES NFR BLD: 0 %
INR PPP: 1.1
L PNEUMO1 AG UR QL IA.RAPID: NEGATIVE
LYMPHOCYTES NFR BLD: 2.71 K/UL (ref 1.1–3.7)
LYMPHOCYTES RELATIVE PERCENT: 30 % (ref 24–43)
M PNEUMO DNA NPH QL NAA+NON-PROBE: NOT DETECTED
MCH RBC QN AUTO: 29.1 PG (ref 25.2–33.5)
MCHC RBC AUTO-ENTMCNC: 31.9 G/DL (ref 28.4–34.8)
MCV RBC AUTO: 91.4 FL (ref 82.6–102.9)
MONOCYTES NFR BLD: 0.53 K/UL (ref 0.1–1.2)
MONOCYTES NFR BLD: 6 % (ref 3–12)
NEUTROPHILS NFR BLD: 60 % (ref 36–65)
NEUTS SEG NFR BLD: 5.38 K/UL (ref 1.5–8.1)
NRBC BLD-RTO: 0 PER 100 WBC
PLATELET # BLD AUTO: 346 K/UL (ref 138–453)
PMV BLD AUTO: 9.9 FL (ref 8.1–13.5)
POTASSIUM SERPL-SCNC: 5.5 MMOL/L (ref 3.7–5.3)
PROTHROMBIN TIME: 14.6 SEC (ref 11.5–14.2)
RBC # BLD AUTO: 3.26 M/UL (ref 3.95–5.11)
RSV RNA NPH QL NAA+NON-PROBE: NOT DETECTED
RV+EV RNA NPH QL NAA+NON-PROBE: NOT DETECTED
S PNEUM AG SPEC QL: NEGATIVE
SARS-COV-2 RNA NPH QL NAA+NON-PROBE: NOT DETECTED
SODIUM SERPL-SCNC: 134 MMOL/L (ref 136–145)
SPECIMEN DESCRIPTION: NORMAL
SPECIMEN SOURCE: NORMAL
WBC OTHER # BLD: 9.1 K/UL (ref 3.5–11.3)

## 2025-08-28 PROCEDURE — 6360000002 HC RX W HCPCS: Performed by: EMERGENCY MEDICINE

## 2025-08-28 PROCEDURE — 80048 BASIC METABOLIC PNL TOTAL CA: CPT

## 2025-08-28 PROCEDURE — 6370000000 HC RX 637 (ALT 250 FOR IP): Performed by: EMERGENCY MEDICINE

## 2025-08-28 PROCEDURE — 6370000000 HC RX 637 (ALT 250 FOR IP): Performed by: NURSE PRACTITIONER

## 2025-08-28 PROCEDURE — 96376 TX/PRO/DX INJ SAME DRUG ADON: CPT

## 2025-08-28 PROCEDURE — 85610 PROTHROMBIN TIME: CPT

## 2025-08-28 PROCEDURE — 85025 COMPLETE CBC W/AUTO DIFF WBC: CPT

## 2025-08-28 PROCEDURE — 70551 MRI BRAIN STEM W/O DYE: CPT

## 2025-08-28 PROCEDURE — 96366 THER/PROPH/DIAG IV INF ADDON: CPT

## 2025-08-28 PROCEDURE — G0378 HOSPITAL OBSERVATION PER HR: HCPCS

## 2025-08-28 PROCEDURE — 90935 HEMODIALYSIS ONE EVALUATION: CPT

## 2025-08-28 PROCEDURE — 96372 THER/PROPH/DIAG INJ SC/IM: CPT

## 2025-08-28 PROCEDURE — 2500000003 HC RX 250 WO HCPCS: Performed by: NURSE PRACTITIONER

## 2025-08-28 PROCEDURE — 6360000002 HC RX W HCPCS: Performed by: NURSE PRACTITIONER

## 2025-08-28 PROCEDURE — 36415 COLL VENOUS BLD VENIPUNCTURE: CPT

## 2025-08-28 PROCEDURE — 80074 ACUTE HEPATITIS PANEL: CPT

## 2025-08-28 PROCEDURE — 99221 1ST HOSP IP/OBS SF/LOW 40: CPT | Performed by: NURSE PRACTITIONER

## 2025-08-28 PROCEDURE — 2580000003 HC RX 258: Performed by: NURSE PRACTITIONER

## 2025-08-28 PROCEDURE — 51798 US URINE CAPACITY MEASURE: CPT

## 2025-08-28 PROCEDURE — 82947 ASSAY GLUCOSE BLOOD QUANT: CPT

## 2025-08-28 PROCEDURE — 99222 1ST HOSP IP/OBS MODERATE 55: CPT | Performed by: STUDENT IN AN ORGANIZED HEALTH CARE EDUCATION/TRAINING PROGRAM

## 2025-08-28 RX ORDER — OXYCODONE HYDROCHLORIDE 5 MG/1
2.5 TABLET ORAL ONCE
Refills: 0 | Status: COMPLETED | OUTPATIENT
Start: 2025-08-28 | End: 2025-08-28

## 2025-08-28 RX ADMIN — SODIUM CHLORIDE, PRESERVATIVE FREE 10 ML: 5 INJECTION INTRAVENOUS at 09:40

## 2025-08-28 RX ADMIN — SODIUM CHLORIDE, PRESERVATIVE FREE 10 ML: 5 INJECTION INTRAVENOUS at 20:11

## 2025-08-28 RX ADMIN — CLOPIDOGREL BISULFATE 75 MG: 75 TABLET, FILM COATED ORAL at 09:34

## 2025-08-28 RX ADMIN — CARVEDILOL 12.5 MG: 12.5 TABLET, FILM COATED ORAL at 09:34

## 2025-08-28 RX ADMIN — WATER 1000 MG: 1 INJECTION INTRAMUSCULAR; INTRAVENOUS; SUBCUTANEOUS at 17:33

## 2025-08-28 RX ADMIN — OXYCODONE HYDROCHLORIDE 2.5 MG: 5 TABLET ORAL at 05:35

## 2025-08-28 RX ADMIN — BUMETANIDE 4 MG: 1 TABLET ORAL at 09:34

## 2025-08-28 RX ADMIN — HEPARIN SODIUM 5000 UNITS: 5000 INJECTION, SOLUTION INTRAVENOUS; SUBCUTANEOUS at 21:38

## 2025-08-28 RX ADMIN — INSULIN GLARGINE 6 UNITS: 100 INJECTION, SOLUTION SUBCUTANEOUS at 09:40

## 2025-08-28 RX ADMIN — ACETAMINOPHEN 650 MG: 325 TABLET ORAL at 01:10

## 2025-08-28 RX ADMIN — ASPIRIN 81 MG: 81 TABLET, DELAYED RELEASE ORAL at 09:34

## 2025-08-28 RX ADMIN — CARVEDILOL 12.5 MG: 12.5 TABLET, FILM COATED ORAL at 20:11

## 2025-08-28 RX ADMIN — AZITHROMYCIN MONOHYDRATE 500 MG: 500 INJECTION, POWDER, LYOPHILIZED, FOR SOLUTION INTRAVENOUS at 17:36

## 2025-08-28 RX ADMIN — ATORVASTATIN CALCIUM 40 MG: 40 TABLET, FILM COATED ORAL at 20:11

## 2025-08-28 RX ADMIN — SODIUM ZIRCONIUM CYCLOSILICATE 10 G: 10 POWDER, FOR SUSPENSION ORAL at 09:55

## 2025-08-28 RX ADMIN — BUMETANIDE 4 MG: 1 TABLET ORAL at 20:11

## 2025-08-28 RX ADMIN — HEPARIN SODIUM 1600 UNITS: 1000 INJECTION, SOLUTION INTRAVENOUS; SUBCUTANEOUS at 14:17

## 2025-08-28 ASSESSMENT — PAIN - FUNCTIONAL ASSESSMENT
PAIN_FUNCTIONAL_ASSESSMENT: PREVENTS OR INTERFERES SOME ACTIVE ACTIVITIES AND ADLS
PAIN_FUNCTIONAL_ASSESSMENT: 0-10
PAIN_FUNCTIONAL_ASSESSMENT: ACTIVITIES ARE NOT PREVENTED

## 2025-08-28 ASSESSMENT — PAIN SCALES - GENERAL
PAINLEVEL_OUTOF10: 4
PAINLEVEL_OUTOF10: 4
PAINLEVEL_OUTOF10: 7
PAINLEVEL_OUTOF10: 0
PAINLEVEL_OUTOF10: 8
PAINLEVEL_OUTOF10: 0

## 2025-08-28 ASSESSMENT — PAIN DESCRIPTION - DESCRIPTORS
DESCRIPTORS: ACHING
DESCRIPTORS: ACHING

## 2025-08-28 ASSESSMENT — PAIN DESCRIPTION - LOCATION
LOCATION: LEG
LOCATION: LEG

## 2025-08-28 ASSESSMENT — PAIN DESCRIPTION - ORIENTATION
ORIENTATION: LEFT
ORIENTATION: LEFT

## 2025-08-29 VITALS
HEART RATE: 88 BPM | OXYGEN SATURATION: 100 % | BODY MASS INDEX: 28.85 KG/M2 | HEIGHT: 64 IN | SYSTOLIC BLOOD PRESSURE: 120 MMHG | DIASTOLIC BLOOD PRESSURE: 41 MMHG | WEIGHT: 169 LBS | RESPIRATION RATE: 16 BRPM | TEMPERATURE: 98.6 F

## 2025-08-29 LAB
ALBUMIN: 3.3 G/DL (ref 3.5–5.2)
ANION GAP SERPL CALCULATED.3IONS-SCNC: 14 MMOL/L (ref 9–16)
BUN SERPL-MCNC: 34 MG/DL (ref 6–20)
CALCIUM SERPL-MCNC: 9.9 MG/DL (ref 8.6–10.4)
CHLORIDE SERPL-SCNC: 100 MMOL/L (ref 98–107)
CO2 SERPL-SCNC: 24 MMOL/L (ref 20–31)
CREAT SERPL-MCNC: 6.1 MG/DL (ref 0.5–0.9)
ERYTHROCYTE [DISTWIDTH] IN BLOOD BY AUTOMATED COUNT: 13.3 % (ref 11.8–14.4)
GFR, ESTIMATED: 9 ML/MIN/1.73M2
GLUCOSE BLD-MCNC: 114 MG/DL (ref 65–105)
GLUCOSE BLD-MCNC: 131 MG/DL (ref 65–105)
GLUCOSE SERPL-MCNC: 130 MG/DL (ref 74–99)
HCT VFR BLD AUTO: 29.4 % (ref 36.3–47.1)
HGB BLD-MCNC: 9.3 G/DL (ref 11.9–15.1)
MCH RBC QN AUTO: 29 PG (ref 25.2–33.5)
MCHC RBC AUTO-ENTMCNC: 31.6 G/DL (ref 28.4–34.8)
MCV RBC AUTO: 91.6 FL (ref 82.6–102.9)
NRBC BLD-RTO: 0 PER 100 WBC
PHOSPHATE SERPL-MCNC: 5.5 MG/DL (ref 2.5–4.5)
PLATELET # BLD AUTO: 311 K/UL (ref 138–453)
PMV BLD AUTO: 9.6 FL (ref 8.1–13.5)
POTASSIUM SERPL-SCNC: 4.3 MMOL/L (ref 3.7–5.3)
RBC # BLD AUTO: 3.21 M/UL (ref 3.95–5.11)
SODIUM SERPL-SCNC: 137 MMOL/L (ref 136–145)
WBC OTHER # BLD: 7.6 K/UL (ref 3.5–11.3)

## 2025-08-29 PROCEDURE — 97163 PT EVAL HIGH COMPLEX 45 MIN: CPT

## 2025-08-29 PROCEDURE — 36415 COLL VENOUS BLD VENIPUNCTURE: CPT

## 2025-08-29 PROCEDURE — G0378 HOSPITAL OBSERVATION PER HR: HCPCS

## 2025-08-29 PROCEDURE — 2580000003 HC RX 258: Performed by: NURSE PRACTITIONER

## 2025-08-29 PROCEDURE — 82947 ASSAY GLUCOSE BLOOD QUANT: CPT

## 2025-08-29 PROCEDURE — 80069 RENAL FUNCTION PANEL: CPT

## 2025-08-29 PROCEDURE — 85027 COMPLETE CBC AUTOMATED: CPT

## 2025-08-29 PROCEDURE — 96372 THER/PROPH/DIAG INJ SC/IM: CPT

## 2025-08-29 PROCEDURE — 6360000002 HC RX W HCPCS: Performed by: EMERGENCY MEDICINE

## 2025-08-29 PROCEDURE — 96366 THER/PROPH/DIAG IV INF ADDON: CPT

## 2025-08-29 PROCEDURE — 99232 SBSQ HOSP IP/OBS MODERATE 35: CPT | Performed by: STUDENT IN AN ORGANIZED HEALTH CARE EDUCATION/TRAINING PROGRAM

## 2025-08-29 PROCEDURE — 97530 THERAPEUTIC ACTIVITIES: CPT

## 2025-08-29 PROCEDURE — 6370000000 HC RX 637 (ALT 250 FOR IP): Performed by: NURSE PRACTITIONER

## 2025-08-29 PROCEDURE — 96376 TX/PRO/DX INJ SAME DRUG ADON: CPT

## 2025-08-29 PROCEDURE — 99233 SBSQ HOSP IP/OBS HIGH 50: CPT | Performed by: PSYCHIATRY & NEUROLOGY

## 2025-08-29 PROCEDURE — 6360000002 HC RX W HCPCS: Performed by: NURSE PRACTITIONER

## 2025-08-29 PROCEDURE — 6370000000 HC RX 637 (ALT 250 FOR IP): Performed by: STUDENT IN AN ORGANIZED HEALTH CARE EDUCATION/TRAINING PROGRAM

## 2025-08-29 PROCEDURE — 90935 HEMODIALYSIS ONE EVALUATION: CPT

## 2025-08-29 PROCEDURE — 51701 INSERT BLADDER CATHETER: CPT

## 2025-08-29 PROCEDURE — 51798 US URINE CAPACITY MEASURE: CPT

## 2025-08-29 PROCEDURE — 2500000003 HC RX 250 WO HCPCS: Performed by: NURSE PRACTITIONER

## 2025-08-29 RX ORDER — CARVEDILOL 6.25 MG/1
6.25 TABLET ORAL 2 TIMES DAILY
Status: DISCONTINUED | OUTPATIENT
Start: 2025-08-29 | End: 2025-08-29 | Stop reason: HOSPADM

## 2025-08-29 RX ORDER — MAG HYDROX/ALUMINUM HYD/SIMETH 400-400-40
1 SUSPENSION, ORAL (FINAL DOSE FORM) ORAL ONCE
Status: COMPLETED | OUTPATIENT
Start: 2025-08-29 | End: 2025-08-29

## 2025-08-29 RX ADMIN — CLOPIDOGREL BISULFATE 75 MG: 75 TABLET, FILM COATED ORAL at 09:29

## 2025-08-29 RX ADMIN — INSULIN LISPRO 1 UNITS: 100 INJECTION, SOLUTION INTRAVENOUS; SUBCUTANEOUS at 17:36

## 2025-08-29 RX ADMIN — GLYCERIN 2 G: 2 SUPPOSITORY RECTAL at 16:21

## 2025-08-29 RX ADMIN — EPOETIN ALFA-EPBX 8000 UNITS: 4000 INJECTION, SOLUTION INTRAVENOUS; SUBCUTANEOUS at 09:29

## 2025-08-29 RX ADMIN — HEPARIN SODIUM 5000 UNITS: 5000 INJECTION, SOLUTION INTRAVENOUS; SUBCUTANEOUS at 14:18

## 2025-08-29 RX ADMIN — WATER 1000 MG: 1 INJECTION INTRAMUSCULAR; INTRAVENOUS; SUBCUTANEOUS at 16:15

## 2025-08-29 RX ADMIN — HEPARIN SODIUM 1600 UNITS: 1000 INJECTION, SOLUTION INTRAVENOUS; SUBCUTANEOUS at 13:01

## 2025-08-29 RX ADMIN — INSULIN GLARGINE 6 UNITS: 100 INJECTION, SOLUTION SUBCUTANEOUS at 09:29

## 2025-08-29 RX ADMIN — ONDANSETRON 4 MG: 4 TABLET, ORALLY DISINTEGRATING ORAL at 16:25

## 2025-08-29 RX ADMIN — HEPARIN SODIUM 5000 UNITS: 5000 INJECTION, SOLUTION INTRAVENOUS; SUBCUTANEOUS at 05:36

## 2025-08-29 RX ADMIN — ASPIRIN 81 MG: 81 TABLET, DELAYED RELEASE ORAL at 09:29

## 2025-08-29 RX ADMIN — HEPARIN SODIUM 1600 UNITS: 1000 INJECTION, SOLUTION INTRAVENOUS; SUBCUTANEOUS at 13:02

## 2025-08-29 RX ADMIN — AZITHROMYCIN MONOHYDRATE 500 MG: 500 INJECTION, POWDER, LYOPHILIZED, FOR SOLUTION INTRAVENOUS at 16:19

## 2025-08-29 RX ADMIN — SODIUM CHLORIDE, PRESERVATIVE FREE 10 ML: 5 INJECTION INTRAVENOUS at 09:34

## 2025-08-29 RX ADMIN — BUMETANIDE 4 MG: 1 TABLET ORAL at 09:29

## 2025-08-29 ASSESSMENT — PAIN DESCRIPTION - PAIN TYPE: TYPE: CHRONIC PAIN

## 2025-08-29 ASSESSMENT — PAIN DESCRIPTION - ORIENTATION: ORIENTATION: LEFT

## 2025-08-29 ASSESSMENT — PAIN SCALES - GENERAL
PAINLEVEL_OUTOF10: 0
PAINLEVEL_OUTOF10: 3

## 2025-08-29 ASSESSMENT — PAIN DESCRIPTION - LOCATION: LOCATION: LEG

## 2025-08-30 LAB
EKG ATRIAL RATE: 92 BPM
EKG P AXIS: 37 DEGREES
EKG P-R INTERVAL: 142 MS
EKG Q-T INTERVAL: 368 MS
EKG QRS DURATION: 84 MS
EKG QTC CALCULATION (BAZETT): 455 MS
EKG R AXIS: 132 DEGREES
EKG T AXIS: 50 DEGREES
EKG VENTRICULAR RATE: 92 BPM

## 2025-08-30 PROCEDURE — 93010 ELECTROCARDIOGRAM REPORT: CPT | Performed by: INTERNAL MEDICINE

## 2025-09-03 LAB
GLUCOSE BLD-MCNC: 118 MG/DL (ref 65–105)
GLUCOSE BLD-MCNC: 219 MG/DL (ref 65–105)

## (undated) DEVICE — CO2 CANNULA,SUPERSOFT, ADLT,7'O2,7'CO2: Brand: MEDLINE

## (undated) DEVICE — GAUZE,SPONGE,4"X4",16PLY,STRL,LF,10/TRAY: Brand: MEDLINE

## (undated) DEVICE — FORCEPS BX L240CM WRK CHN 2.8MM STD CAP W/ NDL MIC MESH

## (undated) DEVICE — MEDICINE CUP, GRADUATED, STER: Brand: MEDLINE

## (undated) DEVICE — JELLY,LUBE,STERILE,FLIP TOP,TUBE,2-OZ: Brand: MEDLINE

## (undated) DEVICE — ADAPTER TBNG LUER STUB 15 GA INTMED

## (undated) DEVICE — DEFENDO AIR WATER SUCTION AND BIOPSY VALVE KIT FOR  OLYMPUS: Brand: DEFENDO AIR/WATER/SUCTION AND BIOPSY VALVE

## (undated) DEVICE — ENDO KIT W/SYRINGE: Brand: MEDLINE INDUSTRIES, INC.

## (undated) DEVICE — BASIN EMSIS 700ML GRAPHITE PLAS KID SHP GRAD

## (undated) DEVICE — FORCEPS BX L240CM JAW DIA2.8MM L CAP W/ NDL MIC MESH TOOTH

## (undated) DEVICE — BITEBLOCK 54FR W/ DENT RIM BLOX

## (undated) DEVICE — BITEBLOCK ENDOSCP 60FR MAXI WHT POLYETH STURDY W/ VELC WVN